# Patient Record
Sex: FEMALE | Race: BLACK OR AFRICAN AMERICAN | NOT HISPANIC OR LATINO | Employment: PART TIME | URBAN - NONMETROPOLITAN AREA
[De-identification: names, ages, dates, MRNs, and addresses within clinical notes are randomized per-mention and may not be internally consistent; named-entity substitution may affect disease eponyms.]

---

## 2017-01-04 ENCOUNTER — OFFICE VISIT (OUTPATIENT)
Dept: OTOLARYNGOLOGY | Facility: CLINIC | Age: 67
End: 2017-01-04

## 2017-01-04 VITALS — TEMPERATURE: 97.8 F | BODY MASS INDEX: 25.18 KG/M2 | HEIGHT: 69 IN | WEIGHT: 170 LBS

## 2017-01-04 DIAGNOSIS — K11.20 SIALADENITIS: Primary | ICD-10-CM

## 2017-01-04 PROCEDURE — 99203 OFFICE O/P NEW LOW 30 MIN: CPT | Performed by: OTOLARYNGOLOGY

## 2017-01-04 NOTE — LETTER
January 4, 2017     Pete Clarke MD  [address] [place] [address] Dr  [facility] [address] [place] [id number]    Patient: Chrissy Dawson   YOB: 1950   Date of Visit: 1/4/2017       Dear Dr. Tricia MD:    Thank you for referring Chrissy Dawson to me for evaluation. Below are the relevant portions of my assessment and plan of care.    If you have questions, please do not hesitate to call me. I look forward to following Chrissy along with you.         Sincerely,        Cornelius Joseph MD        CC: MD Cornelius Landaverde MD  1/4/2017  2:58 PM  Signed  Subjective   Chrissy Dawson is a 66 y.o. female.   This is a consultation from Dr. Clarke  History of Present Illness   Patient reports that about 6 weeks ago she noted swelling in her neck more so on the right than the left.  Nothing in particular seems to bring this on at the time was not particularly painful.  Saw Dr. Hairston in the [facility] who referred her to Dr. Clarke.  Dr. Clarke treated her with Bactrim.  She says the mass doesn't seem to have gone down any and is now but come tender on the right side.  She denies dysphagia.  Denies any voice change.  Has had a deliberate approximately 30 pound weight loss over the last year.  States the mass does not change with eating.  Has a remote history of a mandible fracture at age 19.  Denies any fevers, chills, night sweats      The following portions of the patient's history were reviewed and updated as appropriate: allergies, current medications, past family history, past medical history, past social history, past surgical history and problem list.      Chrissy Dawson reports that she has never smoked. She does not have any smokeless tobacco history on file. She reports that she does not drink alcohol or use illicit drugs.  Patient is not a tobacco user and has not been counseled for use of tobacco products    Family History   Problem Relation Age of  Onset   • Heart disease Other    • Hypertension Other    • Hypertension Mother    • Diabetes Father    • Depression Maternal Grandmother    • Heart disease Maternal Grandfather          Current Outpatient Prescriptions:   •  aspirin 81 MG tablet, Take 81 mg by mouth., Disp: , Rfl:   •  Multiple Vitamins-Minerals (CENTRUM SILVER PO), Take 1 tablet by mouth Daily., Disp: , Rfl:   •  pravastatin (PRAVACHOL) 10 MG tablet, Take 10 mg by mouth., Disp: , Rfl:   •  sulfamethoxazole-trimethoprim (BACTRIM DS,SEPTRA DS) 800-160 MG per tablet, TK 1 T PO BID, Disp: , Rfl: 0      Past Medical History   Diagnosis Date   • Backache    • Benign essential hypertension    • Cervical arthritis    • Coronary arteriosclerosis    • Ganglion cyst of wrist      Ganglion/synovial cyst - wrist   • Ganglion of wrist    • Hip pain    • History of echocardiogram 10/23/2015     Echocardiogram W/ color flow 43734 (1) - Normal LV function with Ef of 55-60%.Normal RV size and function.Pseudonormal diastolic dysfunciton with borderline [facility].NO sig.valvular regurg or stenosis.   • History of mammogram 09/10/2014     MAMMOGRAM SCREENING 77108 - [facility] (1) - [facility] ([facility])   • Hyperlipidemia    • Inguinal pain    • Recurrent angina status post coronary artery bypass graft      Recurrent angina after coronary artery bypass graft   • Urge incontinence of urine          Review of Systems   Constitutional: Negative for chills and fever.   Genitourinary:        Urinary incontinence   All other systems reviewed and are negative.          Objective   Physical Exam  General: Well-developed well-nourished female in no acute distress.  Alert and oriented ×3.  Head: Normocephalic. Face: Symmetrical strength and appearance. PERRL. EOMI. Voice:Strong Speech:Fluent  Ears: External ears no deformity, canals no discharge, tympanic membranes intact clear and mobile bilaterally.  Nose: Nares show no discharge mass polyp or purulence.  Boggy mucosa is  present.  No gross external deformity.  Septum: Midline  Oral cavity: Lips and gums without lesions.  Tongue and floor of mouth without lesions.  Parotid and submandibular ducts unobstructed.  No mucosal lesions on the buccal mucosa or vestibule of the mouth.  Pharynx: No erythema exudate mass or ulcer  Neck: The masses in question appeared to be the submandibular glands bilaterally.  The right is slightly more prominent than the left and they are somewhat firm and mildly tender but not fluctuant.  Bimanual palpation does not reveal anything that feels like a stone in either submandibular duct.  I don't feel any worrisome lymphadenopathy.  Trachea and larynx midline.  No thyromegaly.        Assessment/Plan   Chrissy was seen today for neck mass.    Diagnoses and all orders for this visit:    Sialadenitis        Plan: Ceftin 250 mg by mouth twice a day ×10 days.  Increase oral fluid intake.  Use lemon drops or other sour material 2-3 times a day to stimulate salivary flow.  Return in 3 weeks.    My thanks to Dr. Clarke for this consult

## 2017-01-04 NOTE — MR AVS SNAPSHOT
Chrissy Dawson   1/4/2017 1:25 PM   Office Visit    Dept Phone:  [phone]   Encounter #:  [id number]    Provider:  Cornelius Joseph MD   Department:  [facility] OTOLARYNGOLOGY                Your Full Care Plan              Your Updated Medication List          This list is accurate as of: 1/4/17  2:55 PM.  Always use your most recent med list.                aspirin 81 MG tablet       CENTRUM SILVER PO       pravastatin 10 MG tablet   Commonly known as:  PRAVACHOL       sulfamethoxazole-trimethoprim 800-160 MG per tablet   Commonly known as:  BACTRIM DS,SEPTRA DS               You Were Diagnosed With        Codes Comments    Sialadenitis    -  Primary ICD-10-CM: K11.20  ICD-9-CM: 527.2       Instructions     None    Patient Instructions History      Upcoming Appointments     Visit Type Date Time Department    NEW PATIENT 1/4/2017  1:25 PM [facility] OTOLARYNGOLOGY MAD    FOLLOW UP 1/20/2017  1:00 PM [facility] CARDIOLOGY MAD    OFFICE VISIT 1/26/2017 10:10 AM [facility] OTOLARYNGOLOGY MAD    OFFICE VISIT 2/13/2017  1:45 PM [facility] MED MAD 4TH      MyChart Signup     Our records indicate that you have declined [facility] [employer] signup. If you would like to sign up for [employer], please email [email or web address] or call [phone] to obtain an activation code.             Other Info from Your Visit           Your Appointments     Jan 20, 2017  1:00 PM CST   Follow Up with Vishnu Reynaga MD PhD   [facility] CARDIOLOGY (--)    [address] [place] [address] Dr  [facility] [address] [place] [id number] [phone]           Arrive 15 minutes prior to appointment.            Jan 26, 2017 10:10 AM CST   Office Visit with Cornelius Joseph MD   [facility] OTOLARYNGOLOGY (--)    [address] [place] [address] Dr  [facility] [address] [place] [id number] [phone]           Arrive 15 minutes prior to appointment.            Feb  "13, 2017  1:45 PM CST   Office Visit with Yessica Carrillo MD   [facility] FAMILY MEDICINE (--)    [address]  [facility] [address] [place] [id number] [phone]           Arrive 15 minutes prior to appointment.              Allergies     Crestor [Rosuvastatin]      Lipitor [Atorvastatin]        Reason for Visit     Neck Mass right      Vital Signs     Temperature Height Weight Last Menstrual Period Body Mass Index Smoking Status    97.8 °F (36.6 °C) 69\" (175.3 cm) 170 lb (77.1 kg) (LMP Unknown) 25.1 kg/m2 Never Smoker      Problems and Diagnoses Noted     Sialadenitis    -  Primary        "

## 2017-01-04 NOTE — PROGRESS NOTES
Subjective   Chrissy Dawson is a 66 y.o. female.   This is a consultation from Dr. Clarke  History of Present Illness   Patient reports that about 6 weeks ago she noted swelling in her neck more so on the right than the left.  Nothing in particular seems to bring this on at the time was not particularly painful.  Saw Dr. Hairston in the [facility] who referred her to Dr. Clarke.  Dr. Clarke treated her with Bactrim.  She says the mass doesn't seem to have gone down any and is now but come tender on the right side.  She denies dysphagia.  Denies any voice change.  Has had a deliberate approximately 30 pound weight loss over the last year.  States the mass does not change with eating.  Has a remote history of a mandible fracture at age 19.  Denies any fevers, chills, night sweats      The following portions of the patient's history were reviewed and updated as appropriate: allergies, current medications, past family history, past medical history, past social history, past surgical history and problem list.      Chrissy Dawson reports that she has never smoked. She does not have any smokeless tobacco history on file. She reports that she does not drink alcohol or use illicit drugs.  Patient is not a tobacco user and has not been counseled for use of tobacco products    Family History   Problem Relation Age of Onset   • Heart disease Other    • Hypertension Other    • Hypertension Mother    • Diabetes Father    • Depression Maternal Grandmother    • Heart disease Maternal Grandfather          Current Outpatient Prescriptions:   •  aspirin 81 MG tablet, Take 81 mg by mouth., Disp: , Rfl:   •  Multiple Vitamins-Minerals (CENTRUM SILVER PO), Take 1 tablet by mouth Daily., Disp: , Rfl:   •  pravastatin (PRAVACHOL) 10 MG tablet, Take 10 mg by mouth., Disp: , Rfl:   •  sulfamethoxazole-trimethoprim (BACTRIM DS,SEPTRA DS) 800-160 MG per tablet, TK 1 T PO BID, Disp: , Rfl: 0      Past Medical History    Diagnosis Date   • Backache    • Benign essential hypertension    • Cervical arthritis    • Coronary arteriosclerosis    • Ganglion cyst of wrist      Ganglion/synovial cyst - wrist   • Ganglion of wrist    • Hip pain    • History of echocardiogram 10/23/2015     Echocardiogram W/ color flow 77330 (1) - Normal LV function with Ef of 55-60%.Normal RV size and function.Pseudonormal diastolic dysfunciton with borderline [facility].NO sig.valvular regurg or stenosis.   • History of mammogram 09/10/2014     MAMMOGRAM SCREENING 33309 - [facility] (1) - [facility] ([facility])   • Hyperlipidemia    • Inguinal pain    • Recurrent angina status post coronary artery bypass graft      Recurrent angina after coronary artery bypass graft   • Urge incontinence of urine          Review of Systems   Constitutional: Negative for chills and fever.   Genitourinary:        Urinary incontinence   All other systems reviewed and are negative.          Objective   Physical Exam  General: Well-developed well-nourished female in no acute distress.  Alert and oriented ×3.  Head: Normocephalic. Face: Symmetrical strength and appearance. PERRL. EOMI. Voice:Strong Speech:Fluent  Ears: External ears no deformity, canals no discharge, tympanic membranes intact clear and mobile bilaterally.  Nose: Nares show no discharge mass polyp or purulence.  Boggy mucosa is present.  No gross external deformity.  Septum: Midline  Oral cavity: Lips and gums without lesions.  Tongue and floor of mouth without lesions.  Parotid and submandibular ducts unobstructed.  No mucosal lesions on the buccal mucosa or vestibule of the mouth.  Pharynx: No erythema exudate mass or ulcer  Neck: The masses in question appeared to be the submandibular glands bilaterally.  The right is slightly more prominent than the left and they are somewhat firm and mildly tender but not fluctuant.  Bimanual palpation does not reveal anything that feels like a stone in either submandibular  duct.  I don't feel any worrisome lymphadenopathy.  Trachea and larynx midline.  No thyromegaly.        Assessment/Plan   Chrissy was seen today for neck mass.    Diagnoses and all orders for this visit:    Sialadenitis        Plan: Ceftin 250 mg by mouth twice a day ×10 days.  Increase oral fluid intake.  Use lemon drops or other sour material 2-3 times a day to stimulate salivary flow.  Return in 3 weeks.    My thanks to Dr. Clarke for this consult

## 2017-01-06 DIAGNOSIS — R92.8 ABNORMAL MAMMOGRAM OF RIGHT BREAST: Primary | ICD-10-CM

## 2017-01-09 ENCOUNTER — TELEPHONE (OUTPATIENT)
Dept: FAMILY MEDICINE CLINIC | Facility: CLINIC | Age: 67
End: 2017-01-09

## 2017-01-10 NOTE — TELEPHONE ENCOUNTER
Letter sent to pt with her Cologuard Colon Cancer Screening results read and Negative, Normal.  Copy of the Report attached to the letter.    ----- Message from Yessica Carrillo MD sent at 1/4/2017 10:20 AM CST -----  Please let her know that her Colon Cancer screen was negative.  Next one in 3 years.

## 2017-01-17 ENCOUNTER — CONSULT (OUTPATIENT)
Dept: SURGERY | Facility: CLINIC | Age: 67
End: 2017-01-17

## 2017-01-17 ENCOUNTER — TELEPHONE (OUTPATIENT)
Dept: FAMILY MEDICINE CLINIC | Facility: CLINIC | Age: 67
End: 2017-01-17

## 2017-01-17 ENCOUNTER — HOSPITAL ENCOUNTER (OUTPATIENT)
Dept: OTHER | Facility: HOSPITAL | Age: 67
Discharge: HOME | End: 2017-01-17
Attending: FAMILY MEDICINE | Admitting: FAMILY MEDICINE

## 2017-01-17 VITALS
HEIGHT: 69 IN | WEIGHT: 174 LBS | DIASTOLIC BLOOD PRESSURE: 80 MMHG | BODY MASS INDEX: 25.77 KG/M2 | SYSTOLIC BLOOD PRESSURE: 120 MMHG

## 2017-01-17 DIAGNOSIS — R92.8 ABNORMAL MAMMOGRAM OF RIGHT BREAST: Primary | ICD-10-CM

## 2017-01-17 DIAGNOSIS — R92.8 ABNORMAL MAMMOGRAM, UNSPECIFIED: ICD-10-CM

## 2017-01-17 PROCEDURE — 99213 OFFICE O/P EST LOW 20 MIN: CPT | Performed by: SURGERY

## 2017-01-17 RX ORDER — CEFUROXIME AXETIL 250 MG/1
TABLET ORAL
Refills: 0 | COMMUNITY
Start: 2017-01-04 | End: 2017-01-27

## 2017-01-17 NOTE — TELEPHONE ENCOUNTER
Called to discuss ultrasound/mammogram findings.  Is scheduled to have bx.  Didn't seem upset and didn't have any questions or concerns.    Will follow-up result.

## 2017-01-17 NOTE — LETTER
January 17, 2017     Eric Juarez MD  [facility] [address] [place] [address]    Patient: Kash Dawson   YOB: 1950   Date of Visit: 1/17/2017       Dear Dr. Gerardo MD:    Thank you for referring Kash Dawson to me for evaluation. Below are the relevant portions of my assessment and plan of care.    If you have questions, please do not hesitate to call me. I look forward to following Kash along with you.         Sincerely,        Delfino Greer MD        CC: No Recipients  Delfino Greer MD  1/17/2017  4:59 PM  Signed    Chief Complaint: Kash Dawson is a 66 y.o. female who was seen in consultation at the request of Eric Juarez MD  for abnormal breast imaging    History of Present Illness:  66 year old woman with an abnormal RIGHT mammogram and US category 5. No palpable masses, skin dimpling, no nipple dc, no axillary adenopathy. No FH of breast or ovarian cancer.  Patient presents with abnormal breast imaging. She noted no new masses, skin changes, nipple discharge, nipple changes prior to her most recent imaging.  Her most recent imaging includes the following:     Patient Name- KASH DAWSON  Patient ID- [id number]   Ordering- ERIC JUAREZ  Attending-   Referring- MD ERIC JUAREZ  ------------------------------------------------      PROCEDURE- Right unilateral mammogram with 3-D tomosynthesis with CAD  and ultrasound right breast      REASON FOR EXAM- Abnormal screening mammography      FINDINGS- Comparison study dated 1/4/2017.Patient presents for workup  of questionable right breast upper inner quadrant small asymmetric  density and associated architectural distortion. This workup consisted  of spot compression imaging true lateral projection right breast and  ultrasound. Spot compression imaging reveals persistent right breast  12 to 1-00 position small asymmetric density with associated  architectural distortion. Ultrasound was  performed to further evaluate  this region. On ultrasound this lesion is seen to represent a solid  hypoechoic mass with very irregular borders and acoustic shadowing.  This mass appears deeper than wide and measures 1 x 0.84 x 0.61 cm.  This mass is highly suggestive of malignancy.      IMPRESSION-   Right breast 12 to 1-00 position mass lesion which is highly  suggestive of malignancy. Biopsy is recommended to further evaluate.      BI-RADS category 5- Highly suggestive of malignancy.      Recommendation- Ultrasound biopsy recommended.      Electronically Signed By- Alphonse Botello MD On: 2017-01-17 16:17:07  Breast Biopsy History: Patient has had the following breast biopsies:bilateral, multiple  Breast Cancer HIstory: Patient does not have a past medical history of breast cancer.  Obstetric/Gynecologic History:   Age menstrual periods began:           Patient is postmenopausal, entered menopause naturally at age: 14    Number of pregnancies: 3   Number of live births: 3      Age of delivery of first child: 19   She has had her uterus and ovaries removed, is postmenopausal, and takes norhormones.      She is here for evaluation.    Past Medical History   Diagnosis Date   • Backache    • Benign essential hypertension    • Cervical arthritis    • Coronary arteriosclerosis    • Ganglion cyst of wrist      Ganglion/synovial cyst - wrist   • Ganglion of wrist    • Hip pain    • History of echocardiogram 10/23/2015     Echocardiogram W/ color flow 11288 (1) - Normal LV function with Ef of 55-60%.Normal RV size and function.Pseudonormal diastolic dysfunciton with borderline [facility].NO sig.valvular regurg or stenosis.   • History of mammogram 09/10/2014     MAMMOGRAM SCREENING 86276 - [facility] (1) - LIZZ MILLER ([facility])   • Hyperlipidemia    • Inguinal pain    • Recurrent angina status post coronary artery bypass graft      Recurrent angina after coronary artery bypass graft   • Urge incontinence of urine      Past  "Surgical History   Procedure Laterality Date   • Coronary artery bypass graft  2000     CABG, arterial, single (1)   • Total abdominal hysterectomy with salpingo oophorectomy  1998     SALVADOR/BSO (1)   • Total hip arthroplasty  2011     Total hip replacement (3)       Current Outpatient Prescriptions:     •  aspirin 81 MG tablet, Take 81 mg by mouth., Disp: , Rfl:   •  cefuroxime (CEFTIN) 250 MG tablet, TK 1 T PO BID, Disp: , Rfl: 0  •  Multiple Vitamins-Minerals (CENTRUM SILVER PO), Take 1 tablet by mouth Daily., Disp: , Rfl:   •  pravastatin (PRAVACHOL) 10 MG tablet, Take 10 mg by mouth., Disp: , Rfl:     Allergies   Allergen Reactions   • Crestor [Rosuvastatin]    • Lipitor [Atorvastatin]      Family History   Problem Relation Age of Onset   • Heart disease Other    • Hypertension Other    • Hypertension Mother    • Diabetes Father    • Depression Maternal Grandmother    • Heart disease Maternal Grandfather      Social History     Social History   • Marital status:                      Occupational History   • Nurse assistant at [employer]     Social History Main Topics   • Smoking status: Never Smoker   • Smokeless tobacco: No               Review of Systems    Vital Signs:  Visit Vitals   • /80   • Ht 69\" (175.3 cm)   • Wt 174 lb (78.9 kg)   • BMI 25.7 kg/m2        Physical Exam   Constitutional: She is oriented to person, place, and time and well-developed, well-nourished, and in no distress. No distress.   HENT:   Head: Normocephalic and atraumatic.   Eyes: Conjunctivae and EOM are normal. Pupils are equal, round, and reactive to light.   Neck: Normal range of motion. Neck supple. No JVD present. No tracheal deviation present. No thyromegaly present.   Cardiovascular: Normal rate, regular rhythm and normal heart sounds.    Pulmonary/Chest: Effort normal and breath sounds normal. No respiratory distress. She has no wheezes. She has no rales. Right breast exhibits no inverted nipple, no mass, no " nipple discharge, no skin change and no tenderness. Left breast exhibits no inverted nipple, no mass, no nipple discharge, no skin change and no tenderness. Breasts are symmetrical.       Musculoskeletal: Normal range of motion. She exhibits no edema, tenderness or deformity.   Lymphadenopathy:     She has no cervical adenopathy.   Neurological: She is oriented to person, place, and time. GCS score is 15.   Skin: Skin is warm and dry. No rash noted. She is not diaphoretic. No erythema. No pallor.   Psychiatric: Mood, memory, affect and judgment normal.   Nursing note and vitals reviewed.      I have personally reviewed all breast imaging and concur with the radiologic diagnosis: BiRADS 4 RIGHT side    Assessment:    Chrissy was seen today for abnormal breast imaging.    Diagnoses and all orders for this visit:    Abnormal mammogram of right breast        Plan:  1. US mammotome RIGHT breast with clip placement    The procedure is explained to the patient. The patient will be placed supine for the procedure. A small incision will be made under local anesthesia, and a special, large needle will be used to perform the biopsy under ultrasound guidance.   A permanent titanium clip will be placed in the breast to red the site of biopsy should further surgery be necessary.  The risks of bleeding/bruising, infection, the possible need for open biopsy or other procedure, scarring, and poor wound healing are explained. There is a low transfusion risk. The risks of chronic pain are, likewise, low.   Alternatives include open biopsy in the operating room under local anesthesia with moderate sedation or general anesthesia or simply watching the lesion to see if there is change. These are not currently suggested.    She understands and agrees to the procedure.      Delfino Greer MD        We have spent 20 minutes in visit today, 20 in face to face .          EMR Dragon/transcription disclaimer:    Much of this encounter  note is an electronic transcription/translocation of spoken language to printed text.  The electronic translation of spoken language may permit erroneous, or at times, nonsensical words or phrases to be inadvertently transcribed.  Although I have reviewed the note from such areas, some may still exist.

## 2017-01-17 NOTE — PROGRESS NOTES
Chief Complaint: Chrissy Rogers is a 66 y.o. female who was seen in consultation at the request of Yessica Juarez MD  for abnormal breast imaging    History of Present Illness:  66 year old woman with an abnormal RIGHT mammogram and US category 5. No palpable masses, skin dimpling, no nipple dc, no axillary adenopathy. No FH of breast or ovarian cancer.  Patient presents with abnormal breast imaging. She noted no new masses, skin changes, nipple discharge, nipple changes prior to her most recent imaging.  Her most recent imaging includes the following:     Patient Name- CHRISSY ROGERS  Patient ID- [id number]   Ordering- YESSICA JUAREZ  Attending-   Referring- MD YESSICA JUAREZ  ------------------------------------------------      PROCEDURE- Right unilateral mammogram with 3-D tomosynthesis with CAD  and ultrasound right breast      REASON FOR EXAM- Abnormal screening mammography      FINDINGS- Comparison study dated 1/4/2017.Patient presents for workup  of questionable right breast upper inner quadrant small asymmetric  density and associated architectural distortion. This workup consisted  of spot compression imaging true lateral projection right breast and  ultrasound. Spot compression imaging reveals persistent right breast  12 to 1-00 position small asymmetric density with associated  architectural distortion. Ultrasound was performed to further evaluate  this region. On ultrasound this lesion is seen to represent a solid  hypoechoic mass with very irregular borders and acoustic shadowing.  This mass appears deeper than wide and measures 1 x 0.84 x 0.61 cm.  This mass is highly suggestive of malignancy.      IMPRESSION-   Right breast 12 to 1-00 position mass lesion which is highly  suggestive of malignancy. Biopsy is recommended to further evaluate.      BI-RADS category 5- Highly suggestive of malignancy.      Recommendation- Ultrasound biopsy recommended.      Electronically  Signed By- Alphonse Botello MD On: 2017-01-17 16:17:07  Breast Biopsy History: Patient has had the following breast biopsies:bilateral, multiple  Breast Cancer HIstory: Patient does not have a past medical history of breast cancer.  Obstetric/Gynecologic History:   Age menstrual periods began:           Patient is postmenopausal, entered menopause naturally at age: 14    Number of pregnancies: 3   Number of live births: 3      Age of delivery of first child: 19   She has had her uterus and ovaries removed, is postmenopausal, and takes norhormones.      She is here for evaluation.    Past Medical History   Diagnosis Date   • Backache    • Benign essential hypertension    • Cervical arthritis    • Coronary arteriosclerosis    • Ganglion cyst of wrist      Ganglion/synovial cyst - wrist   • Ganglion of wrist    • Hip pain    • History of echocardiogram 10/23/2015     Echocardiogram W/ color flow 35436 (1) - Normal LV function with Ef of 55-60%.Normal RV size and function.Pseudonormal diastolic dysfunciton with borderline [facility].NO sig.valvular regurg or stenosis.   • History of mammogram 09/10/2014     MAMMOGRAM SCREENING 00941 - [facility] (1) - [facility] ([facility])   • Hyperlipidemia    • Inguinal pain    • Recurrent angina status post coronary artery bypass graft      Recurrent angina after coronary artery bypass graft   • Urge incontinence of urine      Past Surgical History   Procedure Laterality Date   • Coronary artery bypass graft  2000     CABG, arterial, single (1)   • Total abdominal hysterectomy with salpingo oophorectomy  1998     SALVADOR/BSO (1)   • Total hip arthroplasty  2011     Total hip replacement (3)       Current Outpatient Prescriptions:     •  aspirin 81 MG tablet, Take 81 mg by mouth., Disp: , Rfl:   •  cefuroxime (CEFTIN) 250 MG tablet, TK 1 T PO BID, Disp: , Rfl: 0  •  Multiple Vitamins-Minerals (CENTRUM SILVER PO), Take 1 tablet by mouth Daily., Disp: , Rfl:   •  pravastatin (PRAVACHOL) 10 MG  "tablet, Take 10 mg by mouth., Disp: , Rfl:     Allergies   Allergen Reactions   • Crestor [Rosuvastatin]    • Lipitor [Atorvastatin]      Family History   Problem Relation Age of Onset   • Heart disease Other    • Hypertension Other    • Hypertension Mother    • Diabetes Father    • Depression Maternal Grandmother    • Heart disease Maternal Grandfather      Social History     Social History   • Marital status:                      Occupational History   • Nurse assistant at [employer]     Social History Main Topics   • Smoking status: Never Smoker   • Smokeless tobacco: No               Review of Systems    Vital Signs:  Visit Vitals   • /80   • Ht 69\" (175.3 cm)   • Wt 174 lb (78.9 kg)   • BMI 25.7 kg/m2        Physical Exam   Constitutional: She is oriented to person, place, and time and well-developed, well-nourished, and in no distress. No distress.   HENT:   Head: Normocephalic and atraumatic.   Eyes: Conjunctivae and EOM are normal. Pupils are equal, round, and reactive to light.   Neck: Normal range of motion. Neck supple. No JVD present. No tracheal deviation present. No thyromegaly present.   Cardiovascular: Normal rate, regular rhythm and normal heart sounds.    Pulmonary/Chest: Effort normal and breath sounds normal. No respiratory distress. She has no wheezes. She has no rales. Right breast exhibits no inverted nipple, no mass, no nipple discharge, no skin change and no tenderness. Left breast exhibits no inverted nipple, no mass, no nipple discharge, no skin change and no tenderness. Breasts are symmetrical.       Musculoskeletal: Normal range of motion. She exhibits no edema, tenderness or deformity.   Lymphadenopathy:     She has no cervical adenopathy.   Neurological: She is oriented to person, place, and time. GCS score is 15.   Skin: Skin is warm and dry. No rash noted. She is not diaphoretic. No erythema. No pallor.   Psychiatric: Mood, memory, affect and judgment normal. "   Nursing note and vitals reviewed.      I have personally reviewed all breast imaging and concur with the radiologic diagnosis: BiRADS 4 RIGHT side    Assessment:    Chrissy was seen today for abnormal breast imaging.    Diagnoses and all orders for this visit:    Abnormal mammogram of right breast        Plan:  1. US mammotome RIGHT breast with clip placement    The procedure is explained to the patient. The patient will be placed supine for the procedure. A small incision will be made under local anesthesia, and a special, large needle will be used to perform the biopsy under ultrasound guidance.   A permanent titanium clip will be placed in the breast to red the site of biopsy should further surgery be necessary.  The risks of bleeding/bruising, infection, the possible need for open biopsy or other procedure, scarring, and poor wound healing are explained. There is a low transfusion risk. The risks of chronic pain are, likewise, low.   Alternatives include open biopsy in the operating room under local anesthesia with moderate sedation or general anesthesia or simply watching the lesion to see if there is change. These are not currently suggested.    She understands and agrees to the procedure.      Delfino Greer MD        We have spent 20 minutes in visit today, 20 in face to face .          EMR Dragon/transcription disclaimer:    Much of this encounter note is an electronic transcription/translocation of spoken language to printed text.  The electronic translation of spoken language may permit erroneous, or at times, nonsensical words or phrases to be inadvertently transcribed.  Although I have reviewed the note from such areas, some may still exist.

## 2017-01-23 ENCOUNTER — DOCUMENTATION (OUTPATIENT)
Dept: SURGERY | Facility: CLINIC | Age: 67
End: 2017-01-23

## 2017-01-23 ENCOUNTER — HOSPITAL ENCOUNTER (OUTPATIENT)
Dept: ULTRASOUND IMAGING | Facility: HOSPITAL | Age: 67
Discharge: HOME | End: 2017-01-23
Admitting: SURGERY

## 2017-01-23 VITALS
HEIGHT: 69 IN | RESPIRATION RATE: 18 BRPM | DIASTOLIC BLOOD PRESSURE: 62 MMHG | WEIGHT: 176.15 LBS | TEMPERATURE: 98 F | SYSTOLIC BLOOD PRESSURE: 113 MMHG | HEART RATE: 66 BPM | BODY MASS INDEX: 26.09 KG/M2 | OXYGEN SATURATION: 97 %

## 2017-01-23 DIAGNOSIS — R92.8 ABNORMAL MAMMOGRAM, UNSPECIFIED: ICD-10-CM

## 2017-01-23 PROCEDURE — 19083 BX BREAST 1ST LESION US IMAG: CPT | Performed by: SURGERY

## 2017-01-23 PROCEDURE — A4648 IMPLANTABLE TISSUE MARKER: HCPCS

## 2017-01-23 PROCEDURE — 76942 ECHO GUIDE FOR BIOPSY: CPT

## 2017-01-23 PROCEDURE — 88305 TISSUE EXAM BY PATHOLOGIST: CPT | Performed by: SURGERY

## 2017-01-23 PROCEDURE — 88305 TISSUE EXAM BY PATHOLOGIST: CPT | Performed by: PATHOLOGY

## 2017-01-23 RX ORDER — HYDROCODONE BITARTRATE AND ACETAMINOPHEN 5; 325 MG/1; MG/1
1 TABLET ORAL ONCE AS NEEDED
Status: DISCONTINUED | OUTPATIENT
Start: 2017-01-23 | End: 2017-01-24 | Stop reason: HOSPADM

## 2017-01-23 RX ORDER — OXYCODONE HYDROCHLORIDE AND ACETAMINOPHEN 5; 325 MG/1; MG/1
TABLET ORAL
Status: COMPLETED
Start: 2017-01-23 | End: 2017-01-23

## 2017-01-23 RX ORDER — DIAZEPAM 5 MG/1
TABLET ORAL
Status: COMPLETED
Start: 2017-01-23 | End: 2017-01-23

## 2017-01-23 RX ADMIN — DIAZEPAM 5 MG: 5 TABLET ORAL at 07:00

## 2017-01-23 RX ADMIN — OXYCODONE HYDROCHLORIDE AND ACETAMINOPHEN 1 TABLET: 5; 325 TABLET ORAL at 07:00

## 2017-01-23 NOTE — MR AVS SNAPSHOT
[facility]  [phone]                    Chrissy Dawson   1/23/2017  8:15 AM   [facility] guide brst biop vac rot dev    Dept Phone:  [phone]   Encounter #:  [id number]    Provider:  LUNA  1   Department:  [facility]                Your Full Care Plan              Your Updated Medication List      TAKE these medications     aspirin 81 MG tablet       cefuroxime 250 MG tablet   Commonly known as:  CEFTIN       CENTRUM SILVER PO       pravastatin 10 MG tablet   Commonly known as:  PRAVACHOL               We Performed the Following     Activity as Tolerated     Discharge patient     Discharge to Care of  When Patient Meets Criteria     Return to Clinic for Follow Up     Tissue Exam     US guided breast biopsy vacuum rotate device right       You Were Diagnosed With        Codes Comments    Abnormal mammogram, unspecified     ICD-10-CM: R92.8  ICD-9-CM: 793.80       Instructions     None    Patient Instructions History      Upcoming Appointments     Visit Type Date Time Department    [facility] GUIDE BRST BIOP VAC ROT DEV 1/23/2017  8:15 AM [facility] US    OFFICE VISIT 1/26/2017 10:10 AM [facility] OTOLARYNGOLOGY MAD    OFFICE VISIT 1/27/2017  3:25 PM W GEN SURGERY MAD    OFFICE VISIT 2/13/2017  1:45 PM [facility] FAM MED MAD 4TH    FOLLOW UP 2/27/2017  1:45 PM W CARDIOLOGY [facility]      MyChart Signup     Our records indicate that you have declined [facility] MyChart signup. If you would like to sign up for MyChart, please email [email or web address] or call [phone] to obtain an activation code.             Other Info from Your Visit           Your Appointments     Jan 26, 2017 10:10 AM CST   Office Visit with Cornelius Joseph MD   [facility] OTOLARYNGOLOGY (--)    [address] [place] [address] [address]  [address] [place] [id number] [phone]           Arrive 15 minutes prior to appointment.            Jan 27, 2017  3:25 PM CST    "  Office Visit with Delfino Greer MD   [facility] GENERAL SURGERY (--)    [address] [place] [address] [address]  [address]  [facility] [address] [phone]           Arrive 15 minutes prior to appointment.            Feb 13, 2017  1:45 PM CST   Office Visit with Yessica Carrillo MD   [facility] FAMILY MEDICINE (--)    [address]  [address] [facility] [address] [phone]           Arrive 15 minutes prior to appointment.            Feb 27, 2017  1:45 PM CST   Follow Up with Vishnu Reynaga MD PhD   [facility] CARDIOLOGY (--)    [address] [place] [address] [address]  [address] [facility] [address] [phone]           Arrive 15 minutes prior to appointment.              Allergies     Crestor [Rosuvastatin]      Lipitor [Atorvastatin]        Vital Signs     Blood Pressure Pulse Temperature Respirations Height Weight    113/62 (BP Location: Left arm, Patient Position: Lying) 66 98 °F (36.7 °C) (Temporal Artery ) 18 69\" (175.3 cm) 176 lb 2.4 oz (79.9 kg)    Oxygen Saturation Body Mass Index Smoking Status             97% 26.01 kg/m2 Never Smoker         Problems and Diagnoses Noted     Abnormal mammogram          Medications Administered     diazePAM (VALIUM) 5 MG tablet  - ADS Override Pull                  oxyCODONE-acetaminophen (PERCOCET) 5-325 MG per tablet  - ADS Override Pull                    Results         "

## 2017-01-23 NOTE — PROGRESS NOTES
Preoperative diagnosis: Abnormal right-sided mammogram and ultrasound  Postoperative diagnosis: Same  Operation: Ultrasound directed mammotome biopsy of the right breast with clip placement  Surgeon: Dr. Delfino Greer  Asst.: Janet Bishop  Findings: Adequate biopsies taken clip in good position  Complications: None  Drains: None  Anesthesia: 40 cc of 1% Xylocaine with epinephrine  Indications: This patient is 66 years old and seen for an abnormal right-sided mammogram and ultrasound BI-RADS Category 4.  The lesion is located at the 1 o'clock position, 5 cm from the nipple  Procedure: The patient is brought to the x-ray suite and placed supine on the stretcher in slight right side up left side down position.  Ultrasound is performed demonstrating the area in question.  The area lateral to this is prepped with Betadine and local anesthesia of 1% Xylocaine with epinephrine is instilled.  An 11 blade knife was used to make a small incision and an 8-gauge mammotome needle is placed under the lesion.  Core biopsies are taken.  The device rotated plus and -30° to complete the excision.  The mammotome clip is then passed through the channel and deployed.  The devices rotated 180° and removed from the breast.  Steri-Strip and compression are applied.  She tolerated the procedure well and was returned to the same-day surgery suite.

## 2017-01-23 NOTE — BRIEF OP NOTE
Procedure Note    Chrissy Dawson  1/23/2017    Pre-op Diagnosis:   Abnormal RIGHT mammogram and US    Post-op Diagnosis:     Same    Procedure/CPT® Codes:  US RIGHT mammotome bx and clip placement    Surgeon: Percy    Assistant: Raymond Barry MD    Anesthesia: Local/minimal sedation    Staff:  Janet Green CFA    Estimated Blood Loss: * No values recorded between 1/23/2017 12:00 AM and 1/23/2017  8:46 AM *    Specimens:                  ID Type Source Tests Collected by Time Destination   A :  Tissue Breast, Right TISSUE EXAM Delfino Greer MD 1/23/2017 0838          Findings: Adequate biopsy and clip placement    Complications: None      Delfino Greer MD     Date: 1/23/2017  Time: 8:46 AM

## 2017-01-23 NOTE — IP AVS SNAPSHOT
AFTER VISIT SUMMARY             Chrissy Dawson           About your hospitalization     You were admitted on:  January 23, 2017 You last received care in the:  [facility]       Procedures & Surgeries      * No procedures listed *     1/23/2017     * No surgeons listed *  -------------------      Medications    If you or your caregiver advised us that you are currently taking a medication and that medication is marked below as “Resume”, this simply indicates that we have reviewed those medications to make sure our new therapy recommendations do not interfere.  If you have concerns about medications other than those new ones which we are prescribing today, please consult the physician who prescribed them (or your primary physician).  Our review of your home medications is not meant to indicate that we are directing their use.             Your Medications      CONTINUE taking these medications     aspirin 81 MG tablet   Take 81 mg by mouth.           cefuroxime 250 MG tablet   TK 1 T PO BID   Commonly known as:  CEFTIN           CENTRUM SILVER PO   Take 1 tablet by mouth Daily.           pravastatin 10 MG tablet   Take 10 mg by mouth.   Commonly known as:  PRAVACHOL                      Your Medications      Your Medication List           Morning Noon Evening Bedtime As Needed    aspirin 81 MG tablet   Take 81 mg by mouth.                                cefuroxime 250 MG tablet   TK 1 T PO BID   Commonly known as:  CEFTIN                                CENTRUM SILVER PO   Take 1 tablet by mouth Daily.                                pravastatin 10 MG tablet   Take 10 mg by mouth.   Commonly known as:  PRAVACHOL                                         Instructions for After Discharge        Activity Instructions     Activity as Tolerated                   Discharge Instructions       Friday 1/27/17 at 3:30pm with     Discharge References/Attachments     BREAST BIOPSY, CARE AFTER,  EASY-TO-READ (ENGLISH)       Follow-ups for After Discharge        Follow-up Information     Follow up with Yessica Carrillo MD .    Specialty:  Family Medicine    Contact information:    [address]   [place] [id number] [phone]        Referrals and Follow-ups to Schedule     Return to Clinic for Follow Up    As directed    4 days             Scheduled Appointments     Jan 26, 2017 10:10 AM CST   Office Visit with Cornelius Joseph MD   [facility] OTOLARYNGOLOGY (--)    [address] [place] [address] [address]  [address] [facility] [address] [phone]           Arrive 15 minutes prior to appointment.            Jan 27, 2017  3:25 PM CST   Office Visit with Delfino Greer MD   [facility] GENERAL SURGERY (--)    [address] [place] [address] [address]  [address]  [facility] [address] [phone]           Arrive 15 minutes prior to appointment.            Feb 13, 2017  1:45 PM CST   Office Visit with Yessica Carrillo MD   [facility] FAMILY MEDICINE (--)    [address] [facility] [address]  [address] [place] [id number] [phone]           Arrive 15 minutes prior to appointment.            Feb 27, 2017  1:45 PM CST   Follow Up with Vishnu Reynaga MD PhD   [facility] CARDIOLOGY (--)    [address] [place] [address] [address]  [address] [place] [id number] [phone]           Arrive 15 minutes prior to appointment.              MyChart Signup     Our records indicate that you have declined [facility] [employer] signup. If you would like to sign up for [employer], please email [email or web address] or call [phone] to obtain an activation code.         Summary of Your Hospitalization        Reason for Hospitalization     Your primary diagnosis was:  Not on File      Care Providers     Not on file      Your Allergies  Date Reviewed: 1/23/2017    Allergen Reactions    Crestor (Rosuvastatin) Not Noted          Lipitor (Atorvastatin) Not Noted      Pending Labs     Order Current Status    Tissue Exam Collected (01/23/17 2569)      Patient Belongings Returned     Document Return of Belongings Flowsheet     Were the patient bedside belongings sent home?   --   Belongings Retrieved from Security & Sent Home   --    Belongings Sent to Safe   --   Medications Retrieved from Pharmacy & Sent Home   --              More Information      Breast Biopsy, Care After  These instructions give you information on caring for yourself after your procedure. Your doctor may also give you more specific instructions. Call your doctor if you have any problems or questions after your procedure.  HOME CARE  · Only take medicine as told by your doctor.  · Do not take aspirin.  · Keep your sutures (stitches) dry when bathing.  · Protect the biopsy area. Do not let the area get bumped.  · Avoid activities that could pull the biopsy site open until your doctor approves. This includes:    Stretching.    Reaching.    Exercise.    Sports.    Lifting more than 3lb.  · Continue your normal diet.  · Wear a good support bra for 24hours. Ice pack 15 mins every hour for first 4 hours  · Change any bandages (dressings) as told by your doctor.  · Do not drink alcohol while taking pain medicine.  · Keep all doctor visits as told. Ask when your test results will be ready. Make sure you get your test results.  GET HELP RIGHT AWAY IF:   · You have a fever.  · You have more bleeding (more than a small spot) from the biopsy site.  · You have trouble breathing.  · You have yellowish-white fluid (pus) coming from the biopsy site.  · You have redness, puffiness (swelling), or more pain in the biopsy site.  · You have a bad smell coming from the biopsy site.  · Your biopsy site opens after sutures, staples, or sticky strips have been removed.  · You have a rash.  · You need stronger medicine.  MAKE SURE YOU:  · Understand these instructions.  · Will watch your  condition.  · Will get help right away if you are not doing well or get worse.     This information is not intended to replace advice given to you by your health care provider. Make sure you discuss any questions you have with your health care provider.     Document Released: 10/14/2010 Document Revised: 01/08/2016 Document Reviewed: 01/27/2013  [employer] Interactive Patient Education ©2016 [employer]vier Inc.         PREVENTING SURGICAL SITE INFECTIONS     Surgical Site Infections FAQs  What is a Surgical Site Infection (SSI)?  A surgical site infection is an infection that occurs after surgery in the part of the body where the surgery took place. Most patients who have surgery do not develop an infection. However, infections develop in about 1 to 3 out of every 100 patients who have surgery.  Some of the common symptoms of a surgical site infection are:  · Redness and pain around the area where you had surgery  · Drainage of cloudy fluid from your surgical wound  · Fever  Can SSIs be treated?  Yes. Most surgical site infections can be treated with antibiotics. The antibiotic given to you depends on the bacteria (germs) causing the infection. Sometimes patients with SSIs also need another surgery to treat the infection.  What are some of the things that hospitals are doing to prevent SSIs?  To prevent SSIs, doctors, nurses, and other healthcare providers:  · Clean their hands and arms up to their elbows with an antiseptic agent just before the surgery.  · Clean their hands with soap and water or an alcohol-based hand rub before and after caring for each patient.  · May remove some of your hair immediately before your surgery using electric clippers if the hair is in the same area where the procedure will occur. They should not shave you with a razor.  · Wear special hair covers, masks, gowns, and gloves during surgery to keep the surgery area clean.  · Give you antibiotics before your surgery starts. In most cases, you  should get antibiotics within 60 minutes before the surgery starts and the antibiotics should be stopped within 24 hours after surgery.  · Clean the skin at the site of your surgery with a special soap that kills germs.  What can I do to help prevent SSIs?  Before your surgery:  · Tell your doctor about other medical problems you may have. Health problems such as allergies, diabetes, and obesity could affect your surgery and your treatment.  · Quit smoking. Patients who smoke get more infections. Talk to your doctor about how you can quit before your surgery.  · Do not shave near where you will have surgery. Shaving with a razor can irritate your skin and make it easier to develop an infection.  At the time of your surgery:  · Speak up if someone tries to shave you with a razor before surgery. Ask why you need to be shaved and talk with your surgeon if you have any concerns.  · Ask if you will get antibiotics before surgery.  After your surgery:  · Make sure that your healthcare providers clean their hands before examining you, either with soap and water or an alcohol-based hand rub.    If you do not see your providers clean their hands, please ask them to do so.  · Family and friends who visit you should not touch the surgical wound or dressings.  · Family and friends should clean their hands with soap and water or an alcohol-based hand rub before and after visiting you. If you do not see them clean their hands, ask them to clean their hands.  What do I need to do when I go home from the hospital?  · Before you go home, your doctor or nurse should explain everything you need to know about taking care of your wound. Make sure you understand how to care for your wound before you leave the hospital.  · Always clean your hands before and after caring for your wound.  · Before you go home, make sure you know who to contact if you have questions or problems after you get home.  · If you have any symptoms of an infection,  such as redness and pain at the surgery site, drainage, or fever, call your doctor immediately.  If you have additional questions, please ask your doctor or nurse.  Developed and co-sponsored by The Society for Healthcare Epidemiology of [place] (SHEA); Infectious Diseases Society of [place] (IDSA); American Hospital Association; Association for Professionals in Infection Control and Epidemiology (APIC); Centers for Disease Control and Prevention (CDC); and The Joint Commission.     This information is not intended to replace advice given to you by your health care provider. Make sure you discuss any questions you have with your health care provider.     Document Released: 12/23/2014 Document Revised: 01/08/2016 Document Reviewed: 03/02/2016  [employer] Interactive Patient Education ©2016 Elsevier Inc.             SYMPTOMS OF A STROKE    Call 911 or have someone take you to the Emergency Department if you have any of the following:    · Sudden numbness or weakness of your face, arm or leg especially on one side of the body  · Sudden confusion, diffiiculty speaking or trouble understanding   · Changes in your vision or loss of sight in one eye  · Sudden severe headache with no known cause  · sudden dizziness, trouble walking, loss of balance or coordination    It is important to seek emergency care right away if you have further stroke symptoms. If you get emergency help quickly, the powerful clot-dissolving medicines can reduce the disabilities caused by a stroke.     For more information:    American Stroke Association  1-907-3-STROKE  www.strokeassociation.org           IF YOU SMOKE OR USE TOBACCO PLEASE READ THE FOLLOWING:    Why is smoking bad for me?  Smoking increases the risk of heart disease, lung disease, vascular disease, stroke, and cancer.     If you smoke, STOP!    If you would like more information on quitting smoking, please visit the [employer] website:  www.[employer]table/Entig[employer] Systemsate/healthier-together/smoke   This link will provide additional resources including the QUIT line and the Beat the Pack support groups.     For more information:    American Cancer Society  (533) 162-6441    American Heart Association  1-598.127.5621               YOU ARE THE MOST IMPORTANT FACTOR IN YOUR RECOVERY.     Follow all instructions carefully.     I have reviewed my discharge instructions with my nurse, including the following information, if applicable:     Information about my illness and diagnosis   Follow up appointments (including lab draws)   Wound Care   Equipment Needs   Medications (new and continuing) along with side effects   Preventative information such as vaccines and smoking cessations   Diet   Pain   I know when to contact my Doctor's office or seek emergency care      I want my nurse to describe the side effects of my medications: YES NO   If the answer is no, I understand the side effects of my medications: YES NO   My nurse described the side effects of my medications in a way that I could understand: YES NO   I have taken my personal belongings and my own medications with me at discharge: YES NO            I have received this information and my questions have been answered. I have discussed any concerns I see with this plan with the nurse or physician. I understand these instructions.    Signature of Patient or Responsible Person: _____________________________________    Date: _________________  Time: __________________    Signature of Healthcare Provider: _______________________________________  Date: _________________  Time: __________________

## 2017-01-23 NOTE — POST-PROCEDURE NOTE
Preoperative diagnosis: Abnormal right-sided mammogram and ultrasound  Postoperative diagnosis: Same  Operation: Ultrasound directed mammotome biopsy of the right breast with clip placement  Surgeon: Dr. Delfino Greer  Asst.: Janet Bishop  Findings: Adequate biopsies taken, clip in good position  Complications: None  Drains: None  Anesthesia: 40 cc of 1% Xylocaine with epinephrine  Indications: This patient is 66 years old and seen for an abnormal right-sided mammogram and ultrasound BI-RADS Category 4.  The lesion is located at the 100 o'clock position, 5 cm from the nipple  Procedure: The patient is brought to the x-ray suite and placed supine on the stretcher.  Ultrasound is performed demonstrating the area in question.  The area lateral to this is prepped with Betadine and local anesthesia of 1% Xylocaine with epinephrine is instilled.  An 11 blade knife is used to make a small incision and a 8-gauge mammotome needle is placed under the lesion.  Core biopsies are taken.  The device rotated plus and -30° to complete the excision.  The mammotome clip is then passed through the channel and deployed.  The devices rotated 180° and removed from the breast.  Steri-Strip and compression are applied.      She tolerated the procedure well and was returned to the same-day surgery suite.

## 2017-01-23 NOTE — PROGRESS NOTES
Procedure   Procedures    Preoperative diagnosis: Abnormal RIGHT-sided mammogram and ultrasound  Postoperative diagnosis: Same  Operation: Ultrasound directed mammotome biopsy of the RIGHT breast with clip placement  Surgeon: Dr. Delfino Greer  Asst.: Janet Bishop  Findings: Adequate biopsies taken, clip in good position  Complications: None  Drains: None  Anesthesia: 40 cc of 1% Xylocaine with epinephrine  Indications: This patient is 66 years old and seen for an abnormal RIGHT-sided mammogram and ultrasound BI-RADS Category 4.  The lesion is located at the 100 o'clock position, 5 cm from the nipple  Procedure: The patient is brought to the x-ray suite and placed supine on the stretcher.  Ultrasound is performed demonstrating the area in question.  The area lateral to this is prepped with Betadine and local anesthesia of 1% Xylocaine with epinephrine is instilled.  An 11 blade knife is used to make a small incision and a 8-gauge mammotome needle is placed under the lesion.  Core biopsies are taken.  The device rotated plus and -30° to complete the excision.  The mammotome clip is then passed through the channel and deployed.  The devices rotated 180° and removed from the breast.  Steri-Strip and compression are applied.      She tolerated the procedure well and was returned to the same-day surgery suite.

## 2017-01-23 NOTE — PLAN OF CARE
Problem: Patient Care Overview (Adult)  Goal: Plan of Care Review  Outcome: Outcome(s) achieved Date Met:  01/23/17 01/23/17 0933   Coping/Psychosocial Response Interventions   Plan Of Care Reviewed With patient;family   Patient Care Overview   Progress improving   Outcome Evaluation   Outcome Summary/Follow up Plan No complications noted       Goal: Discharge Needs Assessment  Outcome: Outcome(s) achieved Date Met:  01/23/17    Problem: Perioperative Period (Adult)  Goal: Signs and Symptoms of Listed Potential Problems Will be Absent or Manageable (Perioperative Period)  Outcome: Outcome(s) achieved Date Met:  01/23/17

## 2017-01-24 LAB
LAB AP CASE REPORT: NORMAL
LAB AP DIAGNOSIS COMMENT: NORMAL
LAB AP INTRADEPARTMENTAL CONSULT: NORMAL
Lab: NORMAL
PATH REPORT.FINAL DX SPEC: NORMAL
PATH REPORT.GROSS SPEC: NORMAL

## 2017-01-27 ENCOUNTER — OFFICE VISIT (OUTPATIENT)
Dept: SURGERY | Facility: CLINIC | Age: 67
End: 2017-01-27

## 2017-01-27 ENCOUNTER — PREP FOR SURGERY (OUTPATIENT)
Dept: SURGERY | Facility: CLINIC | Age: 67
End: 2017-01-27

## 2017-01-27 VITALS
BODY MASS INDEX: 25.77 KG/M2 | DIASTOLIC BLOOD PRESSURE: 80 MMHG | HEIGHT: 69 IN | WEIGHT: 174 LBS | SYSTOLIC BLOOD PRESSURE: 130 MMHG

## 2017-01-27 DIAGNOSIS — R92.8 ABNORMAL MAMMOGRAM OF RIGHT BREAST: Primary | ICD-10-CM

## 2017-01-27 PROCEDURE — 99212 OFFICE O/P EST SF 10 MIN: CPT | Performed by: SURGERY

## 2017-01-27 RX ORDER — LIDOCAINE HYDROCHLORIDE 10 MG/ML
20 INJECTION, SOLUTION INFILTRATION; PERINEURAL ONCE
Status: COMPLETED | OUTPATIENT
Start: 2017-01-27 | End: 2017-01-27

## 2017-01-27 RX ORDER — SODIUM CHLORIDE, SODIUM LACTATE, POTASSIUM CHLORIDE, CALCIUM CHLORIDE 600; 310; 30; 20 MG/100ML; MG/100ML; MG/100ML; MG/100ML
50 INJECTION, SOLUTION INTRAVENOUS CONTINUOUS
Status: CANCELLED | OUTPATIENT
Start: 2017-01-27

## 2017-01-27 RX ADMIN — LIDOCAINE HYDROCHLORIDE 20 ML: 10 INJECTION, SOLUTION INFILTRATION; PERINEURAL at 13:59

## 2017-01-27 NOTE — PROGRESS NOTES
Chief Complaint   Patient presents with   • Follow-up     Recheck right breast mammotone 1-23-17.     HPI    Final Diagnosis   MAMMOTOME CORES, RIGHT BREAST:   FIBROEPITHELIAL LESION, PROBABLY FIBROADENOMA.   SCLEROSING ADENOSIS AND A FEW MICROCALCIFIC DEPOSITS.   FOCAL CICATRICIAL FIBROSIS.      Physical Exam   Cardiovascular: Normal rate, regular rhythm and normal heart sounds.    Pulmonary/Chest: Right breast exhibits no inverted nipple, no mass, no nipple discharge, no skin change and no tenderness. Left breast exhibits no inverted nipple, no mass, no nipple discharge, no skin change and no tenderness. Breasts are symmetrical.       ASSESSMENT    Chrissy was seen today for follow-up.    Diagnoses and all orders for this visit:    Abnormal mammogram of right breast  Comments:  Bx is not concordant with the xray findings  Orders:  -     Case Request; Standing  -     lactated ringers infusion; Infuse 50 mL/hr into a venous catheter Continuous.  -     ceFAZolin (ANCEF) 2 g in sodium chloride 0.9 % 100 mL IVPB; Infuse 2 g into a venous catheter 1 (One) Time.  -     Case Request    Other orders  -     Verify informed consent; Standing  -     Place sequential compression device- to be placed on patient in Pre-op; Standing  -     Please contact surgeon with questions or concerns.; Standing  -     Insert Peripheral IV; Standing        PLAN  1. Excisional biopsy under US guidance with xray examination of the surgical specimen- procedure is explained with the risks of bleeding, infection, poor wound healing. Understands and agrees.

## 2017-01-31 ENCOUNTER — APPOINTMENT (OUTPATIENT)
Dept: PREADMISSION TESTING | Facility: HOSPITAL | Age: 67
End: 2017-01-31

## 2017-01-31 VITALS
BODY MASS INDEX: 25.92 KG/M2 | WEIGHT: 175 LBS | OXYGEN SATURATION: 98 % | RESPIRATION RATE: 18 BRPM | SYSTOLIC BLOOD PRESSURE: 112 MMHG | HEIGHT: 69 IN | DIASTOLIC BLOOD PRESSURE: 68 MMHG | HEART RATE: 68 BPM

## 2017-01-31 PROCEDURE — 93010 ELECTROCARDIOGRAM REPORT: CPT | Performed by: INTERNAL MEDICINE

## 2017-01-31 PROCEDURE — 93005 ELECTROCARDIOGRAM TRACING: CPT

## 2017-02-01 ENCOUNTER — ANESTHESIA EVENT (OUTPATIENT)
Dept: PERIOP | Facility: HOSPITAL | Age: 67
End: 2017-02-01

## 2017-02-02 ENCOUNTER — HOSPITAL ENCOUNTER (OUTPATIENT)
Facility: HOSPITAL | Age: 67
Setting detail: HOSPITAL OUTPATIENT SURGERY
Discharge: HOME | End: 2017-02-02
Attending: SURGERY | Admitting: SURGERY

## 2017-02-02 ENCOUNTER — APPOINTMENT (OUTPATIENT)
Dept: MAMMOGRAPHY | Facility: HOSPITAL | Age: 67
End: 2017-02-02

## 2017-02-02 ENCOUNTER — ANESTHESIA (OUTPATIENT)
Dept: PERIOP | Facility: HOSPITAL | Age: 67
End: 2017-02-02

## 2017-02-02 VITALS
SYSTOLIC BLOOD PRESSURE: 121 MMHG | OXYGEN SATURATION: 100 % | BODY MASS INDEX: 26.29 KG/M2 | DIASTOLIC BLOOD PRESSURE: 73 MMHG | RESPIRATION RATE: 18 BRPM | WEIGHT: 177.47 LBS | HEIGHT: 69 IN | TEMPERATURE: 98 F | HEART RATE: 74 BPM

## 2017-02-02 DIAGNOSIS — R92.8 ABNORMAL MAMMOGRAM OF RIGHT BREAST: ICD-10-CM

## 2017-02-02 DIAGNOSIS — N63.0 BREAST MASS: ICD-10-CM

## 2017-02-02 PROCEDURE — 19301 PARTIAL MASTECTOMY: CPT | Performed by: SURGERY

## 2017-02-02 PROCEDURE — 25010000002 HYDROMORPHONE PER 4 MG: Performed by: NURSE ANESTHETIST, CERTIFIED REGISTERED

## 2017-02-02 PROCEDURE — 88307 TISSUE EXAM BY PATHOLOGIST: CPT | Performed by: PATHOLOGY

## 2017-02-02 PROCEDURE — 25010000002 FENTANYL CITRATE (PF) 100 MCG/2ML SOLUTION: Performed by: NURSE ANESTHETIST, CERTIFIED REGISTERED

## 2017-02-02 PROCEDURE — 88307 TISSUE EXAM BY PATHOLOGIST: CPT | Performed by: SURGERY

## 2017-02-02 PROCEDURE — 25010000002 PROPOFOL 10 MG/ML EMULSION: Performed by: NURSE ANESTHETIST, CERTIFIED REGISTERED

## 2017-02-02 PROCEDURE — 76098 X-RAY EXAM SURGICAL SPECIMEN: CPT

## 2017-02-02 PROCEDURE — 25010000002 MIDAZOLAM PER 1 MG: Performed by: ANESTHESIOLOGY

## 2017-02-02 RX ORDER — SODIUM CHLORIDE, SODIUM LACTATE, POTASSIUM CHLORIDE, CALCIUM CHLORIDE 600; 310; 30; 20 MG/100ML; MG/100ML; MG/100ML; MG/100ML
50 INJECTION, SOLUTION INTRAVENOUS CONTINUOUS
Status: DISCONTINUED | OUTPATIENT
Start: 2017-02-02 | End: 2017-02-02 | Stop reason: HOSPADM

## 2017-02-02 RX ORDER — ONDANSETRON 2 MG/ML
4 INJECTION INTRAMUSCULAR; INTRAVENOUS ONCE AS NEEDED
Status: DISCONTINUED | OUTPATIENT
Start: 2017-02-02 | End: 2017-02-02 | Stop reason: HOSPADM

## 2017-02-02 RX ORDER — FENTANYL CITRATE 50 UG/ML
INJECTION, SOLUTION INTRAMUSCULAR; INTRAVENOUS AS NEEDED
Status: DISCONTINUED | OUTPATIENT
Start: 2017-02-02 | End: 2017-02-02 | Stop reason: SURG

## 2017-02-02 RX ORDER — ISOSULFAN BLUE 50 MG/5ML
INJECTION, SOLUTION SUBCUTANEOUS
Status: DISPENSED
Start: 2017-02-02 | End: 2017-02-02

## 2017-02-02 RX ORDER — HYDRALAZINE HYDROCHLORIDE 20 MG/ML
5 INJECTION INTRAMUSCULAR; INTRAVENOUS
Status: DISCONTINUED | OUTPATIENT
Start: 2017-02-02 | End: 2017-02-02 | Stop reason: HOSPADM

## 2017-02-02 RX ORDER — PROMETHAZINE HYDROCHLORIDE 25 MG/ML
12.5 INJECTION, SOLUTION INTRAMUSCULAR; INTRAVENOUS ONCE AS NEEDED
Status: DISCONTINUED | OUTPATIENT
Start: 2017-02-02 | End: 2017-02-02 | Stop reason: HOSPADM

## 2017-02-02 RX ORDER — ACETAMINOPHEN 650 MG/1
650 SUPPOSITORY RECTAL ONCE AS NEEDED
Status: DISCONTINUED | OUTPATIENT
Start: 2017-02-02 | End: 2017-02-02 | Stop reason: HOSPADM

## 2017-02-02 RX ORDER — ISOSULFAN BLUE 50 MG/5ML
INJECTION, SOLUTION SUBCUTANEOUS AS NEEDED
Status: DISCONTINUED | OUTPATIENT
Start: 2017-02-02 | End: 2017-02-02 | Stop reason: HOSPADM

## 2017-02-02 RX ORDER — PROPOFOL 10 MG/ML
VIAL (ML) INTRAVENOUS AS NEEDED
Status: DISCONTINUED | OUTPATIENT
Start: 2017-02-02 | End: 2017-02-02 | Stop reason: SURG

## 2017-02-02 RX ORDER — SODIUM CHLORIDE, SODIUM LACTATE, POTASSIUM CHLORIDE, CALCIUM CHLORIDE 600; 310; 30; 20 MG/100ML; MG/100ML; MG/100ML; MG/100ML
9 INJECTION, SOLUTION INTRAVENOUS CONTINUOUS
Status: DISCONTINUED | OUTPATIENT
Start: 2017-02-02 | End: 2017-02-02 | Stop reason: HOSPADM

## 2017-02-02 RX ORDER — MIDAZOLAM HYDROCHLORIDE 1 MG/ML
2 INJECTION INTRAMUSCULAR; INTRAVENOUS
Status: DISCONTINUED | OUTPATIENT
Start: 2017-02-02 | End: 2017-02-02 | Stop reason: HOSPADM

## 2017-02-02 RX ORDER — FLUMAZENIL 0.1 MG/ML
0.2 INJECTION INTRAVENOUS AS NEEDED
Status: DISCONTINUED | OUTPATIENT
Start: 2017-02-02 | End: 2017-02-02 | Stop reason: HOSPADM

## 2017-02-02 RX ORDER — LABETALOL HYDROCHLORIDE 5 MG/ML
5 INJECTION, SOLUTION INTRAVENOUS
Status: DISCONTINUED | OUTPATIENT
Start: 2017-02-02 | End: 2017-02-02 | Stop reason: HOSPADM

## 2017-02-02 RX ORDER — PROMETHAZINE HYDROCHLORIDE 25 MG/1
25 SUPPOSITORY RECTAL ONCE AS NEEDED
Status: DISCONTINUED | OUTPATIENT
Start: 2017-02-02 | End: 2017-02-02 | Stop reason: HOSPADM

## 2017-02-02 RX ORDER — METOCLOPRAMIDE HYDROCHLORIDE 5 MG/ML
10 INJECTION INTRAMUSCULAR; INTRAVENOUS ONCE AS NEEDED
Status: DISCONTINUED | OUTPATIENT
Start: 2017-02-02 | End: 2017-02-02 | Stop reason: HOSPADM

## 2017-02-02 RX ORDER — SODIUM CHLORIDE 0.9 % (FLUSH) 0.9 %
1-10 SYRINGE (ML) INJECTION AS NEEDED
Status: DISCONTINUED | OUTPATIENT
Start: 2017-02-02 | End: 2017-02-02 | Stop reason: HOSPADM

## 2017-02-02 RX ORDER — DIPHENHYDRAMINE HYDROCHLORIDE 50 MG/ML
12.5 INJECTION INTRAMUSCULAR; INTRAVENOUS
Status: DISCONTINUED | OUTPATIENT
Start: 2017-02-02 | End: 2017-02-02 | Stop reason: HOSPADM

## 2017-02-02 RX ORDER — NALOXONE HCL 0.4 MG/ML
0.2 VIAL (ML) INJECTION AS NEEDED
Status: DISCONTINUED | OUTPATIENT
Start: 2017-02-02 | End: 2017-02-02 | Stop reason: HOSPADM

## 2017-02-02 RX ORDER — MORPHINE SULFATE 2 MG/ML
2 INJECTION, SOLUTION INTRAMUSCULAR; INTRAVENOUS
Status: DISCONTINUED | OUTPATIENT
Start: 2017-02-02 | End: 2017-02-02 | Stop reason: HOSPADM

## 2017-02-02 RX ORDER — ACETAMINOPHEN 325 MG/1
650 TABLET ORAL ONCE AS NEEDED
Status: DISCONTINUED | OUTPATIENT
Start: 2017-02-02 | End: 2017-02-02 | Stop reason: HOSPADM

## 2017-02-02 RX ORDER — DEXAMETHASONE SODIUM PHOSPHATE 4 MG/ML
8 INJECTION, SOLUTION INTRA-ARTICULAR; INTRALESIONAL; INTRAMUSCULAR; INTRAVENOUS; SOFT TISSUE ONCE AS NEEDED
Status: DISCONTINUED | OUTPATIENT
Start: 2017-02-02 | End: 2017-02-02 | Stop reason: HOSPADM

## 2017-02-02 RX ORDER — LIDOCAINE HYDROCHLORIDE 20 MG/ML
INJECTION, SOLUTION INFILTRATION; PERINEURAL AS NEEDED
Status: DISCONTINUED | OUTPATIENT
Start: 2017-02-02 | End: 2017-02-02 | Stop reason: SURG

## 2017-02-02 RX ORDER — ISOSULFAN BLUE 50 MG/5ML
INJECTION, SOLUTION SUBCUTANEOUS
Status: DISCONTINUED
Start: 2017-02-02 | End: 2017-02-02 | Stop reason: WASHOUT

## 2017-02-02 RX ORDER — PROMETHAZINE HYDROCHLORIDE 25 MG/1
25 TABLET ORAL ONCE AS NEEDED
Status: DISCONTINUED | OUTPATIENT
Start: 2017-02-02 | End: 2017-02-02 | Stop reason: HOSPADM

## 2017-02-02 RX ORDER — BUPIVACAINE HYDROCHLORIDE AND EPINEPHRINE 5; 5 MG/ML; UG/ML
INJECTION, SOLUTION EPIDURAL; INTRACAUDAL; PERINEURAL
Status: DISCONTINUED
Start: 2017-02-02 | End: 2017-02-02 | Stop reason: WASHOUT

## 2017-02-02 RX ORDER — HYDROCODONE BITARTRATE AND ACETAMINOPHEN 7.5; 325 MG/1; MG/1
1 TABLET ORAL EVERY 4 HOURS PRN
Qty: 20 TABLET | Refills: 0 | Status: SHIPPED | OUTPATIENT
Start: 2017-02-02 | End: 2017-02-13

## 2017-02-02 RX ORDER — HYDROMORPHONE HCL 110MG/55ML
PATIENT CONTROLLED ANALGESIA SYRINGE INTRAVENOUS AS NEEDED
Status: DISCONTINUED | OUTPATIENT
Start: 2017-02-02 | End: 2017-02-02 | Stop reason: SURG

## 2017-02-02 RX ORDER — MIDAZOLAM HYDROCHLORIDE 1 MG/ML
1 INJECTION INTRAMUSCULAR; INTRAVENOUS
Status: DISCONTINUED | OUTPATIENT
Start: 2017-02-02 | End: 2017-02-02 | Stop reason: HOSPADM

## 2017-02-02 RX ORDER — SODIUM CHLORIDE, SODIUM LACTATE, POTASSIUM CHLORIDE, CALCIUM CHLORIDE 600; 310; 30; 20 MG/100ML; MG/100ML; MG/100ML; MG/100ML
INJECTION, SOLUTION INTRAVENOUS
Status: COMPLETED
Start: 2017-02-02 | End: 2017-02-02

## 2017-02-02 RX ADMIN — HYDROMORPHONE HYDROCHLORIDE 0.5 MG: 2 INJECTION, SOLUTION INTRAMUSCULAR; INTRAVENOUS; SUBCUTANEOUS at 12:40

## 2017-02-02 RX ADMIN — SODIUM CHLORIDE, POTASSIUM CHLORIDE, SODIUM LACTATE AND CALCIUM CHLORIDE 50 ML/HR: 600; 310; 30; 20 INJECTION, SOLUTION INTRAVENOUS at 10:34

## 2017-02-02 RX ADMIN — LIDOCAINE HYDROCHLORIDE 100 MG: 20 INJECTION, SOLUTION INFILTRATION; PERINEURAL at 11:52

## 2017-02-02 RX ADMIN — SODIUM CHLORIDE, POTASSIUM CHLORIDE, SODIUM LACTATE AND CALCIUM CHLORIDE: 600; 310; 30; 20 INJECTION, SOLUTION INTRAVENOUS at 11:49

## 2017-02-02 RX ADMIN — HYDROMORPHONE HYDROCHLORIDE 0.5 MG: 1 INJECTION, SOLUTION INTRAMUSCULAR; INTRAVENOUS; SUBCUTANEOUS at 13:50

## 2017-02-02 RX ADMIN — FENTANYL CITRATE 100 MCG: 50 INJECTION, SOLUTION INTRAMUSCULAR; INTRAVENOUS at 11:52

## 2017-02-02 RX ADMIN — PROPOFOL 100 MG: 10 INJECTION, EMULSION INTRAVENOUS at 11:52

## 2017-02-02 RX ADMIN — HYDROMORPHONE HYDROCHLORIDE 4 MG: 2 INJECTION, SOLUTION INTRAMUSCULAR; INTRAVENOUS; SUBCUTANEOUS at 11:50

## 2017-02-02 RX ADMIN — MIDAZOLAM 2 MG: 1 INJECTION INTRAMUSCULAR; INTRAVENOUS at 11:47

## 2017-02-02 RX ADMIN — SODIUM CHLORIDE, SODIUM LACTATE, POTASSIUM CHLORIDE, CALCIUM CHLORIDE 50 ML/HR: 600; 310; 30; 20 INJECTION, SOLUTION INTRAVENOUS at 10:34

## 2017-02-02 RX ADMIN — HYDROMORPHONE HYDROCHLORIDE 0.5 MG: 2 INJECTION, SOLUTION INTRAMUSCULAR; INTRAVENOUS; SUBCUTANEOUS at 12:37

## 2017-02-02 NOTE — ANESTHESIA PREPROCEDURE EVALUATION
Anesthesia Evaluation     Patient summary reviewed and Nursing notes reviewed    No history of anesthetic complications   Airway   Mallampati: II  TM distance: >3 FB  Neck ROM: full  no difficulty expected  Dental - normal exam     Pulmonary - normal exam   Cardiovascular - normal exam  (+) hypertension, CAD, CABG > 6 Months, angina,     Neuro/Psych  GI/Hepatic/Renal/Endo      Musculoskeletal     Abdominal  - normal exam   Substance History      OB/GYN          Other   (+) arthritis                          Anesthesia Plan    ASA 3     general     intravenous induction   Anesthetic plan and risks discussed with patient.

## 2017-02-02 NOTE — ANESTHESIA POSTPROCEDURE EVALUATION
Patient: Chrissy Dawson    Procedure Summary     Date Anesthesia Start Anesthesia Stop Room / Location    02/02/17 1149   [facility] / [facility]       Procedure Diagnosis Surgeon Provider    RIGHT BREAST LUMPECTOMY WITH NEEDLE LOCALIZATION AND ULTRASOUND (Right Breast) Abnormal mammogram of right breast  (Abnormal mammogram of right breast [R92.8]) MD Jaron Graham MD          Anesthesia Type: general  Last vitals  BP      Temp      Pulse     Resp      SpO2        Post Anesthesia Care and Evaluation    Patient location during evaluation: bedside  Patient participation: complete - patient cannot participate  Level of consciousness: awake  Pain score: 0  Pain management: adequate  Airway patency: patent  Anesthetic complications: No anesthetic complications  PONV Status: none  Cardiovascular status: acceptable  Respiratory status: acceptable  Hydration status: acceptable

## 2017-02-02 NOTE — ANESTHESIA PROCEDURE NOTES
Airway  Date/Time: 2/2/2017 1:16 PM  Airway not difficult    General Information and Staff    CRNA: ANTONINO LOPEZ    Indications and Patient Condition  Indications for airway management: airway protection    Preoxygenated: yes  MILS maintained throughout  Mask difficulty assessment: 0 - not attempted    Final Airway Details  Final airway type: supraglottic airway      Successful airway: Size 1    Number of attempts at approach: 1

## 2017-02-02 NOTE — ANESTHESIA PROCEDURE NOTES
Airway  Urgency: elective    Date/Time: 2/2/2017 11:59 AM  Airway not difficult    General Information and Staff    Patient location during procedure: OR  Anesthesiologist: ANTONINO LOPEZ    Indications and Patient Condition  Indications for airway management: airway protection    Preoxygenated: yes  MILS maintained throughout  Mask difficulty assessment: 0 - not attempted    Final Airway Details  Final airway type: supraglottic airway      Successful airway: classic  Size 4    Number of attempts at approach: 1

## 2017-02-06 ENCOUNTER — OFFICE VISIT (OUTPATIENT)
Dept: SURGERY | Facility: CLINIC | Age: 67
End: 2017-02-06

## 2017-02-06 VITALS
DIASTOLIC BLOOD PRESSURE: 78 MMHG | BODY MASS INDEX: 26.96 KG/M2 | WEIGHT: 182 LBS | HEIGHT: 69 IN | SYSTOLIC BLOOD PRESSURE: 124 MMHG

## 2017-02-06 DIAGNOSIS — R92.8 ABNORMAL MAMMOGRAM OF RIGHT BREAST: Primary | ICD-10-CM

## 2017-02-06 PROCEDURE — 99024 POSTOP FOLLOW-UP VISIT: CPT | Performed by: SURGERY

## 2017-02-06 NOTE — PROGRESS NOTES
Chief Complaint   Patient presents with   • Post-op Follow-up     Post operative right lumpectomy 2-2-17     HPI    Pathology:    Fibrous mastitis. No cancer    Physical Exam   Pulmonary/Chest:         ASSESSMENT    Chrissy was seen today for post-op follow-up.    Diagnoses and all orders for this visit:    Abnormal mammogram of right breast       PLAN  1. Recheck in 1 month    Delfino Greer MD

## 2017-02-07 LAB
LAB AP CASE REPORT: NORMAL
Lab: NORMAL
PATH REPORT.FINAL DX SPEC: NORMAL
PATH REPORT.GROSS SPEC: NORMAL

## 2017-02-13 ENCOUNTER — OFFICE VISIT (OUTPATIENT)
Dept: FAMILY MEDICINE CLINIC | Facility: CLINIC | Age: 67
End: 2017-02-13

## 2017-02-13 VITALS
BODY MASS INDEX: 26.36 KG/M2 | WEIGHT: 178 LBS | SYSTOLIC BLOOD PRESSURE: 110 MMHG | DIASTOLIC BLOOD PRESSURE: 62 MMHG | HEIGHT: 69 IN

## 2017-02-13 DIAGNOSIS — H60.501 ACUTE OTITIS EXTERNA OF RIGHT EAR, UNSPECIFIED TYPE: Primary | ICD-10-CM

## 2017-02-13 DIAGNOSIS — R32 URINARY INCONTINENCE, UNSPECIFIED TYPE: ICD-10-CM

## 2017-02-13 PROCEDURE — 99213 OFFICE O/P EST LOW 20 MIN: CPT | Performed by: FAMILY MEDICINE

## 2017-02-13 RX ORDER — OXYBUTYNIN CHLORIDE 5 MG/1
5 TABLET, EXTENDED RELEASE ORAL DAILY
Qty: 30 TABLET | Refills: 2 | Status: SHIPPED | OUTPATIENT
Start: 2017-02-13 | End: 2017-04-13 | Stop reason: DRUGHIGH

## 2017-02-13 RX ORDER — PRAVASTATIN SODIUM 10 MG
10 TABLET ORAL DAILY
Qty: 90 TABLET | Refills: 3 | Status: SHIPPED | OUTPATIENT
Start: 2017-02-13 | End: 2018-03-05 | Stop reason: SDUPTHER

## 2017-02-13 NOTE — PROGRESS NOTES
"Subjective   Chrissy Amira Dawson is a 66 y.o. female.     History of Present Illness   Ms. Dawson is a 67yo female that presents today for follow-up on urinary incontince.  She has been trying kegel exercises and that hasn't helped that much.  Says that she has sensation of urgency with standing.  Doesn't have that issue when she is sitting down or at rest. She wanted to not try medication the last time that she was here but now is considering it.  She has been having \"cracking\" sensation in her right ear.  Hearing has been off in that ear as well.  She has been having this issue since December.  She has pain in that ear.  No drainage from the ear.  She feels a little off balance as well.       Current Outpatient Prescriptions:   •  aspirin 81 MG tablet, Take 81 mg by mouth., Disp: , Rfl:   •  Multiple Vitamins-Minerals (CENTRUM SILVER PO), Take 1 tablet by mouth Daily., Disp: , Rfl:   •  pravastatin (PRAVACHOL) 10 MG tablet, Take 10 mg by mouth., Disp: , Rfl:     The following portions of the patient's history were reviewed and updated as appropriate: allergies, current medications, past family history, past medical history, past social history, past surgical history and problem list.    Review of Systems   Constitutional: Negative for activity change, appetite change, fatigue and unexpected weight change.   HENT: Positive for congestion, ear pain, hearing loss, postnasal drip and rhinorrhea. Negative for ear discharge, facial swelling, sinus pressure and sore throat.    Gastrointestinal: Negative for abdominal distention, abdominal pain, constipation and diarrhea.   Genitourinary: Positive for urgency. Negative for dysuria, frequency, hematuria, menstrual problem, pelvic pain, vaginal discharge and vaginal pain.       Objective    Vitals:    02/13/17 1350   BP: 110/62   Weight: 178 lb (80.7 kg)   Height: 69\" (175.3 cm)       Physical Exam   Constitutional: She is oriented to person, place, and time. She appears " well-developed and well-nourished. No distress.   HENT:   Right Ear: Tympanic membrane normal. There is swelling and tenderness. No drainage. No middle ear effusion. Decreased hearing is noted.   Left Ear: Hearing, tympanic membrane, external ear and ear canal normal.   Cardiovascular: Normal rate, regular rhythm and normal heart sounds.    No murmur heard.  Pulmonary/Chest: Effort normal and breath sounds normal. No respiratory distress. She has no wheezes.   Abdominal: Soft. Bowel sounds are normal. She exhibits no distension. There is no tenderness.   Neurological: She is alert and oriented to person, place, and time.   Nursing note and vitals reviewed.      Assessment/Plan   Problems Addressed this Visit     None      Visit Diagnoses     Acute otitis externa of right ear, unspecified type    -  Primary    Relevant Medications    neomycin-colistin-hydrocortisone-thonzonium (CORTISPORIN-TC) 3.3-3-10-0.5 MG/ML otic suspension    Urinary incontinence, unspecified type              1.) Otitis Externa-  Will treat with cortisporin for 10 days.  2.) Urinary incontinence-  Will try oxybutnin.  Continue kegel exercises also.  RTC in 2-3 months or sooner PRN

## 2017-03-21 ENCOUNTER — OFFICE VISIT (OUTPATIENT)
Dept: ORTHOPEDIC SURGERY | Facility: CLINIC | Age: 67
End: 2017-03-21

## 2017-03-21 VITALS — BODY MASS INDEX: 24.62 KG/M2 | HEIGHT: 70 IN | WEIGHT: 172 LBS

## 2017-03-21 DIAGNOSIS — Z96.642 PRESENCE OF LEFT ARTIFICIAL HIP JOINT: ICD-10-CM

## 2017-03-21 DIAGNOSIS — Z96.641 PRESENCE OF ARTIFICIAL HIP JOINT, RIGHT: Primary | ICD-10-CM

## 2017-03-21 DIAGNOSIS — R26.9 GAIT DISTURBANCE: ICD-10-CM

## 2017-03-21 PROBLEM — Z96.649 PRESENCE OF ARTIFICIAL HIP JOINT: Status: ACTIVE | Noted: 2017-03-21

## 2017-03-21 PROCEDURE — 99203 OFFICE O/P NEW LOW 30 MIN: CPT | Performed by: ORTHOPAEDIC SURGERY

## 2017-03-21 NOTE — PROGRESS NOTES
Chrissy Dawson is a 66 y.o. female   Primary provider:  Yessica Carrillo MD       Chief Complaint   Patient presents with   • Right Hip - Hip Problem     Xray at [facility]  6/24/16,  New xray done today in the office       HISTORY OF PRESENT ILLNESS: patient states that she has no pain she has trouble with standing from a sitting position has to use cane for assistance.  She had a total hip arthroplasty in 1999 on the right.  She then had a revision in 2011.  She now reports difficulty with walking but has no pain.  She has a problem when she initially stands and begins walking, she has a difficult time starting in getting going.  She is currently using a cane for support.  She has not done physical therapy and a long time.  She denies any numbness or tingling.  No specific injury.    Lower Extremity Issue   This is a recurrent problem. Episode onset: 1998 and 2011 hip replacements. The problem occurs constantly. Associated symptoms comments: Limping. The symptoms are aggravated by walking.        CONCURRENT MEDICAL HISTORY:    Past Medical History:   Diagnosis Date   • Backache    • Benign essential hypertension     PATIENT DENIES   • Cervical arthritis    • Coronary arteriosclerosis    • Ganglion cyst of wrist     Ganglion/synovial cyst - wrist   • Ganglion of wrist    • Hip pain    • History of echocardiogram 10/23/2015    Echocardiogram W/ color flow 61434 (1) - Normal LV function with Ef of 55-60%.Normal RV size and function.Pseudonormal diastolic dysfunciton with borderline [facility].NO sig.valvular regurg or stenosis.   • History of mammogram 09/10/2014    MAMMOGRAM SCREENING 31865 - [facility] (1[facility] - [facility] ([facility])   • History of transfusion    • Hyperlipidemia    • Inguinal pain    • Recurrent angina status post coronary artery bypass graft     Recurrent angina after coronary artery bypass graft   • Urge incontinence of urine        Allergies   Allergen Reactions   • Crestor  [Rosuvastatin]    • Lipitor [Atorvastatin]          Current Outpatient Prescriptions:   •  amoxicillin (AMOXIL) 500 MG capsule, Take 2 capsules by mouth 3 (Three) Times a Day., Disp: 30 capsule, Rfl: 0  •  aspirin 81 MG tablet, Take 81 mg by mouth., Disp: , Rfl:   •  loratadine (CLARITIN) 10 MG tablet, Take 1 tablet by mouth Daily., Disp: 30 tablet, Rfl: 0  •  Multiple Vitamins-Minerals (CENTRUM SILVER PO), Take 1 tablet by mouth Daily., Disp: , Rfl:   •  neomycin-colistin-hydrocortisone-thonzonium (CORTISPORIN-TC) 3.3-3-10-0.5 MG/ML otic suspension, Administer 3 drops to the right ear 3 (Three) Times a Day., Disp: 10 mL, Rfl: 0  •  oxybutynin XL (DITROPAN-XL) 5 MG 24 hr tablet, Take 1 tablet by mouth Daily., Disp: 30 tablet, Rfl: 2  •  pravastatin (PRAVACHOL) 10 MG tablet, Take 1 tablet by mouth Daily., Disp: 90 tablet, Rfl: 3    Past Surgical History:   Procedure Laterality Date   • BREAST BIOPSY Right 2/2/2017    Procedure: RIGHT BREAST LUMPECTOMY WITH NEEDLE LOCALIZATION AND ULTRASOUND;  Surgeon: Delfino Greer MD;  Location: [facility];  Service:    • CARDIAC SURGERY     • CORONARY ARTERY BYPASS GRAFT  2000    CABG, arterial, single (1)   • GANGLION CYST EXCISION     • JOINT REPLACEMENT     • ORIF MANDIBULAR FRACTURE     • TOTAL ABDOMINAL HYSTERECTOMY     • TOTAL ABDOMINAL HYSTERECTOMY WITH SALPINGO OOPHORECTOMY  1998    SALVADOR/BSO (1)   • TOTAL HIP ARTHROPLASTY  2011    Total hip replacement (3)       Family History   Problem Relation Age of Onset   • Heart disease Other    • Hypertension Other    • Hypertension Mother    • Diabetes Father    • Depression Maternal Grandmother    • Heart disease Maternal Grandfather        Social History     Social History   • Marital status:      Spouse name: N/A   • Number of children: N/A   • Years of education: N/A     Occupational History   • Not on file.     Social History Main Topics   • Smoking status: Never Smoker   • Smokeless tobacco: Never Used   • Alcohol use No  "  • Drug use: No   • Sexual activity: Defer      Comment:      Other Topics Concern   • Not on file     Social History Narrative        Review of Systems   Respiratory: Negative.    Cardiovascular: Negative.    Gastrointestinal: Negative.    Genitourinary: Negative.    All other systems reviewed and are negative.      PHYSICAL EXAMINATION:       Ht 69.5\" (176.5 cm)  Wt 172 lb (78 kg)  BMI 25.04 kg/m2    Physical Exam   Constitutional: She is oriented to person, place, and time. She appears well-developed and well-nourished.   Neurological: She is alert and oriented to person, place, and time.   Psychiatric: She has a normal mood and affect. Her behavior is normal. Judgment and thought content normal.       GAIT:     []  Normal  []  Antalgic    Assistive device: []  None  []  Walker     []  Crutches  [x]  Cane     []  Wheelchair  []  Stretcher    Right Hip Exam     Tenderness   The patient is experiencing no tenderness.         Range of Motion   Flexion: 110   Internal Rotation: 20   External Rotation: 30     Muscle Strength   Abduction: 4/5   Adduction: 4/5   Flexion: 4/5     Tests   FABBY: negative  Fadir:  Negative FADIR test    Other   Erythema: absent  Sensation: normal  Pulse: present    Comments:  Stable joint exam      Left Hip Exam     Tenderness   The patient is experiencing no tenderness.         Range of Motion   Flexion: 110   Internal Rotation: 20   External Rotation: 30     Muscle Strength   Abduction: 4/5   Adduction: 4/5   Flexion: 4/5     Tests   FABBY: negative  Fadir:  Negative FADIR test    Other   Erythema: absent  Sensation: normal  Pulse: present    Comments:  Stable joint exam                  Xr Hip With Or Without Pelvis 2 - 3 View Right    Result Date: 3/22/2017  Narrative: AP of the pelvis with AP and lateral views of the right hip show acceptable position alignment of bilateral total hip arthroplasty.  The right hip shows questionable ostial lysis around the acetabular component " with mild medialization of the component.  She has had previous revision surgery with fixation of the greater trochanter on the right.  These wires are broken in multiple locations and there is severe ostial lysis of the greater trochanter suggestive of stress shielding.  The implant appears to be very well seated and secure in the femoral shaft.  There are some bony changes along the proximal aspect of the left femur consistent with some stress shielding as well.  The left sided implant appears to be cemented in place without any signs of loosening at this time.  She appears to have slight pelvic obliquity with the right leg perhaps being slightly short.  No comparison views. 03/22/17 at 3:57 PM by Jeovanny Maravilla MD           ASSESSMENT:    Diagnoses and all orders for this visit:    Presence of artificial hip joint, right  -     XR Hip With or Without Pelvis 2 - 3 View Right; Future  -     Ambulatory Referral to Physical Therapy Evaluate and treat    Presence of left artificial hip joint  -     Ambulatory Referral to Physical Therapy Evaluate and treat    Gait disturbance  -     Ambulatory Referral to Physical Therapy Evaluate and treat          PLAN    She will begin physical therapy with range of motion and strengthening and modalities for gait training.  We also discussed conditioning and strength training.  She will work with a home exercise program and use a cane as necessary.  We had a long discussion regarding the fact that she is not having pain but is having difficulty with walking.  Revision surgery needs to be reserved for painful scenario.  She definitely shows signs of ostial lysis and stress shielding in the greater trochanter and shows sign of hardware failure from previous trochanteric fixation.  However, the stem looks well seated and well fixed in the canal of the femur.  There is questionable loosening of the acetabular component which may need to be addressed at some point.    Return in  about 6 weeks (around 5/2/2017) for recheck.    Jeovanny Maravilla MD

## 2017-03-28 ENCOUNTER — HOSPITAL ENCOUNTER (OUTPATIENT)
Dept: PHYSICAL THERAPY | Facility: HOSPITAL | Age: 67
Setting detail: THERAPIES SERIES
Discharge: HOME | End: 2017-03-28

## 2017-03-28 DIAGNOSIS — Z96.641 HISTORY OF TOTAL HIP REPLACEMENT, RIGHT: ICD-10-CM

## 2017-03-28 DIAGNOSIS — Z96.642 PRESENCE OF LEFT ARTIFICIAL HIP JOINT: ICD-10-CM

## 2017-03-28 DIAGNOSIS — R26.9 ABNORMALITY OF GAIT: Primary | ICD-10-CM

## 2017-03-28 PROCEDURE — G8979 MOBILITY GOAL STATUS: HCPCS | Performed by: PHYSICAL THERAPIST

## 2017-03-28 PROCEDURE — 97162 PT EVAL MOD COMPLEX 30 MIN: CPT | Performed by: PHYSICAL THERAPIST

## 2017-03-28 PROCEDURE — G8978 MOBILITY CURRENT STATUS: HCPCS | Performed by: PHYSICAL THERAPIST

## 2017-03-28 NOTE — PROGRESS NOTES
Outpatient Physical Therapy Ortho Initial Evaluation  [facility]     Patient Name: Chrissy Dawson  : 1950  MRN: [id number]  Today's Date: 3/28/2017Patient reports 0/10 pain and N/A % improvement since initiating therapy.  Attendance 1/1appointments scheduled, Not yet approved by insurance. Next MD follow up is 17.  Visit Date: 2017    Patient Active Problem List   Diagnosis   • Adenopathy, cervical   • Hyperlipidemia   • Coronary arteriosclerosis   • Benign essential hypertension   • Abnormal mammogram of right breast   • Presence of artificial hip joint        Past Medical History:   Diagnosis Date   • Backache    • Benign essential hypertension     PATIENT DENIES   • Cervical arthritis    • Coronary arteriosclerosis    • Ganglion cyst of wrist     Ganglion/synovial cyst - wrist   • Ganglion of wrist    • Hip pain    • History of echocardiogram 10/23/2015    Echocardiogram W/ color flow 06499 (1) - Normal LV function with Ef of 55-60%.Normal RV size and function.Pseudonormal diastolic dysfunciton with borderline [facility].NO sig.valvular regurg or stenosis.   • History of mammogram 09/10/2014    MAMMOGRAM SCREENING 47347 - [facility] (1[facility] - [facility] ([facility])   • History of transfusion    • Hyperlipidemia    • Inguinal pain    • Recurrent angina status post coronary artery bypass graft     Recurrent angina after coronary artery bypass graft   • Urge incontinence of urine         Past Surgical History:   Procedure Laterality Date   • BREAST BIOPSY Right 2017    Procedure: RIGHT BREAST LUMPECTOMY WITH NEEDLE LOCALIZATION AND ULTRASOUND;  Surgeon: Delfino Greer MD;  Location: [facility];  Service:    • CARDIAC SURGERY     • CORONARY ARTERY BYPASS GRAFT      CABG, arterial, single (1)   • GANGLION CYST EXCISION     • JOINT REPLACEMENT     • ORIF MANDIBULAR FRACTURE     • TOTAL ABDOMINAL HYSTERECTOMY     • TOTAL ABDOMINAL HYSTERECTOMY WITH SALPINGO OOPHORECTOMY      SALVADOR/BSO  (1)   • TOTAL HIP ARTHROPLASTY  2011    Total hip replacement (3)     Outpatient Medications     amoxicillin (AMOXIL) 500 MG capsule      aspirin 81 MG tablet      loratadine (CLARITIN) 10 MG tablet      Multiple Vitamins-Minerals (CENTRUM SILVER PO)      neomycin-colistin-hydrocortisone-thonzonium (CORTISPORIN-TC) 3.3-3-10-0.5 MG/ML otic suspension      oxybutynin XL (DITROPAN-XL) 5 MG 24 hr tablet      pravastatin (PRAVACHOL) 10 MG tablet      ALLERGIES: Crestor, Lipitor    Visit Dx:     ICD-10-CM ICD-9-CM   1. Abnormality of gait R26.9 781.2   2. History of total hip replacement, right Z96.641 V43.64   3. Presence of left artificial hip joint Z96.642 V43.64           PT Ortho       03/28/17 1000          Subjective Comments    Subjective Comments Reports to therapy today with complaints of weakness and difficulties getting up out of a chair, also issues with initiating walking without limping.  She states she uses the cane when pushing up from the chair and first started walking in the morning she doesn't use it most of the time throughout her own household but only when outdoors  -DD      Subjective Pain    Able to rate subjective pain? yes  -DD      Post-Treatment Pain Level 0  -DD      Sensation    Sensation WNL? WNL  -DD      Hip/Thigh Palpation    Greater Trochanter Right:;Tender  -DD      ITB Right:;Tender  -DD      Left Hip    Internal Rotation ROM Details 20  -DD      External Rotation ROM Details 25  -DD      Right Hip    Flexion ROM Details 110   supine  -DD      Extension ROM Details 5  -DD      ABduction ROM Details 20   supine  -DD        User Key  (r) = Recorded By, (t) = Taken By, (c) = Cosigned By    Initials Name Provider Type    DD Cheyanne Landers, PT Physical Therapist                  PT OP Goals       03/28/17 1000       PT Short Term Goals    STG Date to Achieve 04/18/17  -DD     STG 1 Patient will be independent in home exercise program.  -DD     STG 2 The patient will have 100° hip  flexion active range of motion supine.  -DD     STG 3 The patient will have 30° active hip abduction supine.  -DD     STG 4 The patient be able to sit to stand and initiate walk without hesitation.  -DD     STG 5 Subjectively report 40-50% improvement.  -DD     STG 6 Patient will be independent in HEP.  -DD     Long Term Goals    LTG Date to Achieve 05/09/17  -DD     LTG 1 Will be able to perform 10 sit to stands without use of upper extremities.  -DD     LTG 2 The patient be able to ambulate without an assistive device.  -DD     LTG 3 The patient will manual muscle test hip flexor at 5 minus or greater on the right.  -DD     LTG 4 Patient will manual muscle test at 4+ or better for hip abduction on the right.  -DD     LTG 5 The patient reports 75% or better improvement.  -DD     LTG 6 LEFS score   40 or more  -DD     Time Calculation    PT Goal Re-Cert Due Date 04/18/17  -DD       User Key  (r) = Recorded By, (t) = Taken By, (c) = Cosigned By    Initials Name Provider Type    DD Cheyanne Landers, PT Physical Therapist                PT Assessment/Plan       03/28/17 1039       PT Assessment    Functional Limitations Impaired gait  -DD     Impairments Muscle strength;Range of motion;Gait  -DD     Assessment Comments Patient shows decreased right hip mobility. Weakness of the right hip. Post SALTY 1999, with revision 2011 by Dr Brown.  She did not have therapy after surgery.  -DD     Please refer to paper survey for additional self-reported information No  -DD     Rehab Potential Good  -DD     Patient/caregiver participated in establishment of treatment plan and goals Yes  -DD     Patient would benefit from skilled therapy intervention Yes  -DD     PT Plan    PT Frequency 2x/week  -DD     Predicted Duration of Therapy Intervention (days/wks) 6 weeks  -DD     Planned CPT's? PT EVAL MOD COMPLELITY: 71019;PT THER PROC EA 15 MIN: 66379;PT MANUAL THERAPY EA 15 MIN: 08286;PT GAIT TRAINING EA 15 MIN: 11452  -DD      Physical Therapy Interventions (Optional Details) manual therapy techniques;stretching;strengthening;home exercise program;gait training;modalities  -DD     PT Plan Comments Range of motion, stretching for hip mobility.  Strengthening of the right hip.  Gait and balance activities.  Ice as needed.  Myofascial release soft tissue of the IT band  -DD       User Key  (r) = Recorded By, (t) = Taken By, (c) = Cosigned By    Initials Name Provider Type    MERE Landers, PT Physical Therapist                  Exercises       03/28/17 1000          Subjective Comments    Subjective Comments Reports to therapy today with complaints of weakness and difficulties getting up out of a chair, also issues with initiating walking without limping.  She states she uses the cane when pushing up from the chair and first started walking in the morning she doesn't use it most of the time throughout her own household but only when outdoors  -DD      Subjective Pain    Able to rate subjective pain? yes  -DD      Post-Treatment Pain Level 0  -DD        User Key  (r) = Recorded By, (t) = Taken By, (c) = Cosigned By    Initials Name Provider Type    MERE Landers, PT Physical Therapist                  Outcome Measures       03/28/17 1000 03/28/17 0800       Lower Extremity Functional Index    Any of your usual work, housework or school activities  1  -DD     Your usual hobbies, recreational or sporting activities  1  -DD     Getting into or out of the bath  1  -DD     Walking between rooms  1  -DD     Putting on your shoes or socks  0  -DD     Squatting  0  -DD     Lifting an object, like a bag of groceries from the floor  3  -DD     Performing light activities around your home  2  -DD     Performing heavy activities around your home  1  -DD     Getting into or out of a car  2  -DD     Walking 2 blocks  2  -DD     Walking a mile  1  -DD     Going up or down 10 stairs (about 1 flight of stairs)  2  -DD     Standing for 1  hour  3  -DD     Sitting for 1 hour  2  -DD     Running on even ground  1  -DD     Running on uneven ground  1  -DD     Making sharp turns while running fast  1  -DD     Hopping  1  -DD     Rolling over in bed  1  -DD     Total  27  -DD     Functional Assessment    Outcome Measure Options Lower Extremity Functional Scale (LEFS)  -DD        User Key  (r) = Recorded By, (t) = Taken By, (c) = Cosigned By    Initials Name Provider Type    DD Cheyanne Landers, PT Physical Therapist            Time Calculation:   Start Time: 0805  Stop Time: 0840  Time Calculation (min): 35 min  Total Timed Code Minutes- PT: 0 minute(s)     Therapy Charges for Today     Code Description Service Date Service Provider Modifiers Qty    86536015505 HC PT MOBILITY CURRENT 3/28/2017 Cheyanne Landers, PT GP, CK 1    26829814900 HC PT MOBILITY PROJECTED 3/28/2017 Cheyanne Landers, PT GP, CI 1    21629417494 HC PT EVAL MOD COMPLEXITY 3 3/28/2017 Cheyanne Landers, PT GP 1          PT G-Codes  PT Professional Judgement Used?: Yes  Outcome Measure Options: Lower Extremity Functional Scale (LEFS)  Score: 27  Functional Limitation: Mobility: Walking and moving around  Mobility: Walking and Moving Around Current Status (): At least 40 percent but less than 60 percent impaired, limited or restricted  Mobility: Walking and Moving Around Goal Status (): At least 1 percent but less than 20 percent impaired, limited or restricted         Cheyanne Landers, PT  3/28/2017

## 2017-04-06 ENCOUNTER — HOSPITAL ENCOUNTER (OUTPATIENT)
Dept: PHYSICAL THERAPY | Facility: HOSPITAL | Age: 67
Setting detail: THERAPIES SERIES
Discharge: HOME | End: 2017-04-06

## 2017-04-06 DIAGNOSIS — Z96.641 HISTORY OF TOTAL HIP REPLACEMENT, RIGHT: Primary | ICD-10-CM

## 2017-04-06 DIAGNOSIS — R26.9 ABNORMALITY OF GAIT: ICD-10-CM

## 2017-04-06 PROCEDURE — 97110 THERAPEUTIC EXERCISES: CPT

## 2017-04-06 NOTE — PROGRESS NOTES
Outpatient Physical Therapy Ortho Treatment Note  [facility]     Patient Name: Chrissy Dawson  : 1950  MRN: [id number]  Today's Date: 2017      Attendance: 2  Subjective Improvement: 0%  Recert:  MD Appointment: 2017      Visit Date: 2017    Visit Dx:    ICD-10-CM ICD-9-CM   1. History of total hip replacement, right Z96.641 V43.64   2. Abnormality of gait R26.9 781.2       Patient Active Problem List   Diagnosis   • Adenopathy, cervical   • Hyperlipidemia   • Coronary arteriosclerosis   • Benign essential hypertension   • Abnormal mammogram of right breast   • Presence of artificial hip joint        Past Medical History:   Diagnosis Date   • Backache    • Benign essential hypertension     PATIENT DENIES   • Cervical arthritis    • Coronary arteriosclerosis    • Ganglion cyst of wrist     Ganglion/synovial cyst - wrist   • Ganglion of wrist    • Hip pain    • History of echocardiogram 10/23/2015    Echocardiogram W/ color flow 84264 (1) - Normal LV function with Ef of 55-60%.Normal RV size and function.Pseudonormal diastolic dysfunciton with borderline [facility].NO sig.valvular regurg or stenosis.   • History of mammogram 09/10/2014    MAMMOGRAM SCREENING 73625 - [facility] ([facility] - [facility] ([facility])   • History of transfusion    • Hyperlipidemia    • Inguinal pain    • Recurrent angina status post coronary artery bypass graft     Recurrent angina after coronary artery bypass graft   • Urge incontinence of urine         Past Surgical History:   Procedure Laterality Date   • BREAST BIOPSY Right 2017    Procedure: RIGHT BREAST LUMPECTOMY WITH NEEDLE LOCALIZATION AND ULTRASOUND;  Surgeon: Delfino Greer MD;  Location: [facility];  Service:    • CARDIAC SURGERY     • CORONARY ARTERY BYPASS GRAFT      CABG, arterial, single (1)   • GANGLION CYST EXCISION     • JOINT REPLACEMENT     • ORIF MANDIBULAR FRACTURE     • TOTAL ABDOMINAL HYSTERECTOMY     • TOTAL ABDOMINAL HYSTERECTOMY  WITH SALPINGO OOPHORECTOMY  1998    SALVADOR/BSO (1)   • TOTAL HIP ARTHROPLASTY  2011    Total hip replacement (3)             PT Ortho       04/06/17 1026    Gait Assessment/Treatment    Gait, [place] Level independent  -JW    Gait, Assistive Device other (see comments)   none- pt states she doesn't always use SC  -JW      User Key  (r) = Recorded By, (t) = Taken By, (c) = Cosigned By    Initials Name Provider Type    JERRY Medina PTA Physical Therapy Assistant                            PT Assessment/Plan       04/06/17 1000       PT Assessment    Functional Limitations Impaired gait  -JW     Impairments Muscle strength;Range of motion;Gait  -JW     Assessment Comments Pt w/ slight increase in pain after tx, blair new ther ex well  -JW     Rehab Potential Good  -JW     Patient/caregiver participated in establishment of treatment plan and goals Yes  -JW     Patient would benefit from skilled therapy intervention Yes  -JW     PT Plan    PT Frequency 2x/week  -JW     Predicted Duration of Therapy Intervention (days/wks) 6 weeks  -JW     PT Plan Comments hip streghtening, stretches  -JW       User Key  (r) = Recorded By, (t) = Taken By, (c) = Cosigned By    Initials Name Provider Type    JERRY Medina PTA Physical Therapy Assistant                    Exercises       04/06/17 1026          Subjective Comments    Subjective Comments Pt states she worked on exercises and stretches at home since last tx  -JW      Subjective Pain    Able to rate subjective pain? yes  -JW      Pre-Treatment Pain Level 0  -JW      Post-Treatment Pain Level 3  -JW      Aquatics    Aquatics performed? No  -JW      Exercise 1    Exercise Name 1 Pro II, seat 12, level -1   -JW      Time (Minutes) 1 10  -JW      Exercise 2    Exercise Name 2 st. HS stretch  -JW      Sets 2 1  -JW      Reps 2 3  -JW      Time (Seconds) 2 30  -JW      Exercise 3    Exercise Name 3 seated piriformis stretch  -JW      Sets 3 1  -JW      Reps 3 3   "-JW      Time (Seconds) 3 30  -JW      Exercise 4    Exercise Name 4 seated hip flexion  -JW      Sets 4 2  -JW      Reps 4 10  -JW      Exercise 5    Exercise Name 5 ADD squeeze w/ bolster  -JW      Sets 5 2  -JW      Reps 5 10  -JW      Time (Seconds) 5 5\" hold  -JW      Exercise 6    Exercise Name 6 sup SLR  -JW      Sets 6 2  -JW      Reps 6 10  -JW      Exercise 7    Exercise Name 7 bridging  -JW      Sets 7 2  -JW      Reps 7 10  -JW      Exercise 8    Exercise Name 8 SL clamshells  -JW      Sets 8 2  -JW      Reps 8 10  -JW      Exercise 9    Exercise Name 9 SL hip ABD  -JW      Sets 9 2  -JW      Reps 9 10  -JW      Exercise 10    Exercise Name 10 supine HS  -JW      Sets 10 2  -JW      Reps 10 10  -JW        User Key  (r) = Recorded By, (t) = Taken By, (c) = Cosigned By    Initials Name Provider Type    JERRY Medina, PTA Physical Therapy Assistant                               PT OP Goals       04/06/17 1026       PT Short Term Goals    STG Date to Achieve 04/18/17  -JW     STG 1 Patient will be independent in home exercise program.  -JW     STG 1 Progress Not Met  -JW     STG 2 The patient will have 100° hip flexion active range of motion supine.  -JW     STG 2 Progress Not Met  -JW     STG 3 The patient will have 30° active hip abduction supine.  -JW     STG 3 Progress Not Met  -JW     STG 4 The patient be able to sit to stand and initiate walk without hesitation.  -JW     STG 4 Progress Not Met  -JW     STG 5 Subjectively report 40-50% improvement.  -JW     STG 5 Progress Not Met  -JW     STG 6 Patient will be independent in HEP.  -JW     STG 6 Progress Not Met  -JW     Long Term Goals    LTG Date to Achieve 05/09/17  -JW     LTG 1 Will be able to perform 10 sit to stands without use of upper extremities.  -JW     LTG 1 Progress Not Met  -JW     LTG 2 The patient be able to ambulate without an assistive device.  -JW     LTG 2 Progress Not Met  -JW     LTG 3 The patient will manual muscle test " hip flexor at 5 minus or greater on the right.  -JW     LTG 3 Progress Not Met  -JW     LTG 4 Patient will manual muscle test at 4+ or better for hip abduction on the right.  -JW     LTG 4 Progress Not Met  -JW     LTG 5 The patient reports 75% or better improvement.  -JW     LTG 5 Progress Not Met  -JW     LTG 6 LEFS score   40 or more  -JW     LTG 6 Progress Not Met  -JW     Time Calculation    PT Goal Re-Cert Due Date 04/18/17  -JW       User Key  (r) = Recorded By, (t) = Taken By, (c) = Cosigned By    Initials Name Provider Type    JERRY Medina PTA Physical Therapy Assistant                Therapy Education       04/06/17 1111          Therapy Education    Given HEP  -JW      Program New  -JW      How Provided Written  -JW      Provided to Patient  -JW      Level of Understanding Verbalized;Demonstrated  -JW        User Key  (r) = Recorded By, (t) = Taken By, (c) = Cosigned By    Initials Name Provider Type    JERRY Medina PTA Physical Therapy Assistant                Time Calculation:   Start Time: 1026  Stop Time: 1111  Time Calculation (min): 45 min  Total Timed Code Minutes- PT: 45 minute(s)    Therapy Charges for Today     Code Description Service Date Service Provider Modifiers Qty    51320149001 HC PT THER PROC EA 15 MIN 4/6/2017 Diana Medina PTA GP 3                    Diana Medina PTA  4/6/2017

## 2017-04-11 ENCOUNTER — APPOINTMENT (OUTPATIENT)
Dept: PHYSICAL THERAPY | Facility: HOSPITAL | Age: 67
End: 2017-04-11

## 2017-04-13 ENCOUNTER — APPOINTMENT (OUTPATIENT)
Dept: LAB | Facility: HOSPITAL | Age: 67
End: 2017-04-13

## 2017-04-13 ENCOUNTER — OFFICE VISIT (OUTPATIENT)
Dept: FAMILY MEDICINE CLINIC | Facility: CLINIC | Age: 67
End: 2017-04-13

## 2017-04-13 VITALS
HEIGHT: 70 IN | WEIGHT: 173 LBS | SYSTOLIC BLOOD PRESSURE: 110 MMHG | BODY MASS INDEX: 24.77 KG/M2 | DIASTOLIC BLOOD PRESSURE: 72 MMHG

## 2017-04-13 DIAGNOSIS — E78.5 HYPERLIPIDEMIA, UNSPECIFIED HYPERLIPIDEMIA TYPE: ICD-10-CM

## 2017-04-13 DIAGNOSIS — Z11.59 NEED FOR HEPATITIS C SCREENING TEST: ICD-10-CM

## 2017-04-13 DIAGNOSIS — R32 URINARY INCONTINENCE, UNSPECIFIED TYPE: Primary | ICD-10-CM

## 2017-04-13 LAB
ALBUMIN SERPL-MCNC: 4.5 G/DL (ref 3.4–4.8)
ALBUMIN/GLOB SERPL: 1.3 G/DL (ref 1.1–1.8)
ALP SERPL-CCNC: 80 U/L (ref 38–126)
ALT SERPL W P-5'-P-CCNC: 18 U/L (ref 9–52)
ANION GAP SERPL CALCULATED.3IONS-SCNC: 10 MMOL/L (ref 5–15)
ARTICHOKE IGE QN: 97 MG/DL (ref 1–129)
AST SERPL-CCNC: 46 U/L (ref 14–36)
BILIRUB SERPL-MCNC: 0.5 MG/DL (ref 0.2–1.3)
BUN BLD-MCNC: 18 MG/DL (ref 7–21)
BUN/CREAT SERPL: 31 (ref 7–25)
CALCIUM SPEC-SCNC: 9.8 MG/DL (ref 8.4–10.2)
CHLORIDE SERPL-SCNC: 99 MMOL/L (ref 95–110)
CHOLEST SERPL-MCNC: 208 MG/DL (ref 0–199)
CO2 SERPL-SCNC: 30 MMOL/L (ref 22–31)
CREAT BLD-MCNC: 0.58 MG/DL (ref 0.5–1)
GFR SERPL CREATININE-BSD FRML MDRD: 126 ML/MIN/1.73 (ref 45–104)
GLOBULIN UR ELPH-MCNC: 3.4 GM/DL (ref 2.3–3.5)
GLUCOSE BLD-MCNC: 86 MG/DL (ref 60–100)
HDLC SERPL-MCNC: 59 MG/DL (ref 60–200)
LDLC/HDLC SERPL: 2.3 {RATIO} (ref 0–3.22)
POTASSIUM BLD-SCNC: 4.1 MMOL/L (ref 3.5–5.1)
PROT SERPL-MCNC: 7.9 G/DL (ref 6.3–8.6)
SODIUM BLD-SCNC: 139 MMOL/L (ref 137–145)
TRIGL SERPL-MCNC: 67 MG/DL (ref 20–199)

## 2017-04-13 PROCEDURE — 80061 LIPID PANEL: CPT | Performed by: FAMILY MEDICINE

## 2017-04-13 PROCEDURE — 86803 HEPATITIS C AB TEST: CPT | Performed by: FAMILY MEDICINE

## 2017-04-13 PROCEDURE — 80053 COMPREHEN METABOLIC PANEL: CPT | Performed by: FAMILY MEDICINE

## 2017-04-13 PROCEDURE — 99213 OFFICE O/P EST LOW 20 MIN: CPT | Performed by: FAMILY MEDICINE

## 2017-04-13 PROCEDURE — 36415 COLL VENOUS BLD VENIPUNCTURE: CPT | Performed by: FAMILY MEDICINE

## 2017-04-13 RX ORDER — OXYBUTYNIN CHLORIDE 15 MG/1
15 TABLET, EXTENDED RELEASE ORAL DAILY
Qty: 30 TABLET | Refills: 2 | Status: SHIPPED | OUTPATIENT
Start: 2017-04-13 | End: 2017-07-24 | Stop reason: SDUPTHER

## 2017-04-13 NOTE — PROGRESS NOTES
Subjective   Chrissy Amira Dawson is a 66 y.o. female.     History of Present Illness   Ms. Dawson is a 67yo female that presents today for follow-up on urinary incontence.  She has had to continue wearing briefs.   She has been trying Kegels and oxybutynin and that hasn't helped.  She is interested in trying something else.  She hasn't had any issues with dysuria, hematuria, or increased frequency.  She has been taking pravastatin for her cholesterol and has been tolerating that better then previous statins.  She has had CAD and wants to make sure that the medication is working well.      Current Outpatient Prescriptions:   •  aspirin 81 MG tablet, Take 81 mg by mouth., Disp: , Rfl:   •  Multiple Vitamins-Minerals (CENTRUM SILVER PO), Take 1 tablet by mouth Daily., Disp: , Rfl:   •  pravastatin (PRAVACHOL) 10 MG tablet, Take 1 tablet by mouth Daily., Disp: 90 tablet, Rfl: 3  •  oxybutynin XL (DITROPAN XL) 15 MG 24 hr tablet, Take 1 tablet by mouth Daily., Disp: 30 tablet, Rfl: 2    The following portions of the patient's history were reviewed and updated as appropriate: allergies, current medications, past family history, past medical history, past social history, past surgical history and problem list.    Review of Systems   Constitutional: Negative for activity change, appetite change, fatigue and unexpected weight change.   Cardiovascular: Negative for chest pain, palpitations and leg swelling.   Gastrointestinal: Negative for abdominal distention, abdominal pain, constipation, diarrhea, nausea and vomiting.   Genitourinary: Negative for difficulty urinating, dysuria, frequency, hematuria, menstrual problem, pelvic pain, urgency, vaginal discharge and vaginal pain.   Skin: Negative for pallor, rash and wound.   Psychiatric/Behavioral: Negative for dysphoric mood and sleep disturbance. The patient is not nervous/anxious.        Objective    Vitals:    04/13/17 0937   BP: 110/72   Weight: 173 lb (78.5 kg)   Height:  "69.5\" (176.5 cm)     Physical Exam   Constitutional: She is oriented to person, place, and time. She appears well-developed and well-nourished. No distress.   Cardiovascular: Normal rate, regular rhythm and normal heart sounds.    No murmur heard.  No LE edema.   Pulmonary/Chest: Effort normal and breath sounds normal. No respiratory distress.   Abdominal: Soft. Bowel sounds are normal. She exhibits no distension. There is no tenderness.   Neurological: She is alert and oriented to person, place, and time.   Psychiatric: She has a normal mood and affect. Her behavior is normal. Judgment and thought content normal.   Nursing note and vitals reviewed.      Assessment/Plan   Problems Addressed this Visit        Cardiovascular and Mediastinum    Hyperlipidemia    Relevant Orders    Lipid Panel (Completed)    Comprehensive Metabolic Panel (Completed)      Other Visit Diagnoses     Urinary incontinence, unspecified type    -  Primary    Relevant Medications    oxybutynin XL (DITROPAN XL) 15 MG 24 hr tablet    Other Relevant Orders    Ambulatory Referral to Urology    Need for hepatitis C screening test        Relevant Orders    Hepatitis C antibody        1.) Urinary Incontinence-  Will increase her oxybutynin and if that helps she can cancel appointment with urology. Will order that referral today.  2.) Hyperlipidemia-  Will recheck lipids and LFT today.  Increase pravastatin if needed.  RTC in 2-3 months or sooner PRN           "

## 2017-04-14 LAB — HCV AB SER DONR QL: NEGATIVE

## 2017-04-21 ENCOUNTER — HOSPITAL ENCOUNTER (OUTPATIENT)
Dept: PHYSICAL THERAPY | Facility: HOSPITAL | Age: 67
Setting detail: THERAPIES SERIES
Discharge: HOME | End: 2017-04-21

## 2017-04-21 DIAGNOSIS — Z96.641 HISTORY OF TOTAL HIP REPLACEMENT, RIGHT: Primary | ICD-10-CM

## 2017-04-21 DIAGNOSIS — R26.9 ABNORMALITY OF GAIT: ICD-10-CM

## 2017-04-21 DIAGNOSIS — Z96.642 PRESENCE OF LEFT ARTIFICIAL HIP JOINT: ICD-10-CM

## 2017-04-21 PROCEDURE — 97110 THERAPEUTIC EXERCISES: CPT | Performed by: PHYSICAL THERAPIST

## 2017-04-21 PROCEDURE — G8979 MOBILITY GOAL STATUS: HCPCS | Performed by: PHYSICAL THERAPIST

## 2017-04-21 PROCEDURE — 97110 THERAPEUTIC EXERCISES: CPT

## 2017-04-21 PROCEDURE — G8978 MOBILITY CURRENT STATUS: HCPCS | Performed by: PHYSICAL THERAPIST

## 2017-04-21 NOTE — PROGRESS NOTES
Outpatient Physical Therapy Ortho Re-Assessment  [facility]     Patient Name: Chrissy Dawson  : 1950  MRN: [id number]  Today's Date: 2017      Visit Date: 2017   Subjective Improvement 30-40%  Visits 3/5  Visits approvede6 until 2017  RTMD 2017  recert date 2017    Patient Active Problem List   Diagnosis   • Adenopathy, cervical   • Hyperlipidemia   • Coronary arteriosclerosis   • Benign essential hypertension   • Abnormal mammogram of right breast   • Presence of artificial hip joint        Past Medical History:   Diagnosis Date   • Backache    • Benign essential hypertension     PATIENT DENIES   • Cervical arthritis    • Coronary arteriosclerosis    • Ganglion cyst of wrist     Ganglion/synovial cyst - wrist   • Ganglion of wrist    • Hip pain    • History of echocardiogram 10/23/2015    Echocardiogram W/ color flow 65669 (1) - Normal LV function with Ef of 55-60%.Normal RV size and function.Pseudonormal diastolic dysfunciton with borderline [facility].NO sig.valvular regurg or stenosis.   • History of mammogram 09/10/2014    MAMMOGRAM SCREENING 92147 - [facility] (1) - LIZZ MILLER ([facility])   • History of transfusion    • Hyperlipidemia    • Inguinal pain    • Recurrent angina status post coronary artery bypass graft     Recurrent angina after coronary artery bypass graft   • Urge incontinence of urine         Past Surgical History:   Procedure Laterality Date   • BREAST BIOPSY Right 2017    Procedure: RIGHT BREAST LUMPECTOMY WITH NEEDLE LOCALIZATION AND ULTRASOUND;  Surgeon: Delfino Greer MD;  Location: [facility];  Service:    • CARDIAC SURGERY     • CORONARY ARTERY BYPASS GRAFT      CABG, arterial, single (1)   • GANGLION CYST EXCISION     • JOINT REPLACEMENT     • ORIF MANDIBULAR FRACTURE     • TOTAL ABDOMINAL HYSTERECTOMY     • TOTAL ABDOMINAL HYSTERECTOMY WITH SALPINGO OOPHORECTOMY      SALVADOR/BSO (1)   • TOTAL HIP ARTHROPLASTY      Total hip  replacement (3)       Visit Dx:     ICD-10-CM ICD-9-CM   1. History of total hip replacement, right Z96.641 V43.64   2. Abnormality of gait R26.9 781.2   3. Presence of left artificial hip joint Z96.642 V43.64     Medications (Admitted on 4/21/2017)     Outpatient Medications     aspirin 81 MG tablet      Multiple Vitamins-Minerals (CENTRUM SILVER PO)      oxybutynin XL (DITROPAN XL) 15 MG 24 hr tablet      pravastatin (PRAVACHOL) 10 MG tablet                  PT Ortho       04/21/17 0800    Gait Assessment/Treatment    Gait, [place] Level independent  -CP    Gait, Gait Deviations antalgic  -CP    Gait, Comment trendelberg gait   -CP      User Key  (r) = Recorded By, (t) = Taken By, (c) = Cosigned By    Initials Name Provider Type    CP Breana Lutz PTA Physical Therapy Assistant                            Therapy Education       04/21/17 0943          Therapy Education    Given HEP   Standing hip 3way, heel raises and toe riases  -CP      Program New  -CP      How Provided Verbal;Demonstration;Written  -CP      Provided to Patient  -CP      Level of Understanding Verbalized;Demonstrated  -CP        User Key  (r) = Recorded By, (t) = Taken By, (c) = Cosigned By    Initials Name Provider Type    CP Breana Lutz PTA Physical Therapy Assistant                PT OP Goals       04/21/17 0900 04/21/17 0845    PT Short Term Goals    STG Date to Achieve 05/05/17  -BS 05/05/17  -BS    STG 1 Patient will be independent in home exercise program.  -BS     STG 1 Progress Progressing  -BS     STG 1 Progress Comments --  -BS     STG 2 The patient will have 100° hip flexion active range of motion supine.  -BS     STG 2 Progress Progressing  -BS     STG 3 The patient will have 30° active hip abduction supine.  -BS     STG 3 Progress Progressing  -BS     STG 4 The patient be able to sit to stand and initiate walk without hesitation.  -BS     STG 4 Progress Progressing  -BS     STG 5 Subjectively report 40-50%  improvement.  -BS     STG 5 Progress Progressing  -BS     STG 6 Patient will be independent in HEP.  -BS     STG 6 Progress Progressing  -BS     Long Term Goals    LTG Date to Achieve 05/19/17  -BS     LTG 1 Will be able to perform 10 sit to stands without use of upper extremities.  -BS     LTG 1 Progress Progressing  -BS     LTG 2 The patient be able to ambulate without an assistive device.  -BS     LTG 2 Progress Met  -BS     LTG 3 The patient will manual muscle test hip flexor at 5 minus or greater on the right.  -BS     LTG 3 Progress Ongoing  -BS     LTG 4 Patient will manual muscle test at 4+ or better for hip abduction on the right.  -BS     LTG 4 Progress Progressing  -BS     LTG 5 The patient reports 75% or better improvement.  -BS     LTG 5 Progress Progressing  -BS     LTG 6 LEFS score   > or = 45   -BS     LTG 6 Progress Progressing  -BS     Time Calculation    PT Goal Re-Cert Due Date 05/12/17  -BS 05/12/17  -BS      User Key  (r) = Recorded By, (t) = Taken By, (c) = Cosigned By    Initials Name Provider Type    RADHA Franks, PT Physical Therapist                PT Assessment/Plan       04/21/17 0944 04/21/17 0900    PT Assessment    Functional Limitations  Impaired gait  -BS    Impairments  Muscle strength;Range of motion;Gait  -BS    Assessment Comments no increase pain with exercies.  c/o weakness.  -CP Pt is s/p L hip revision in 2011 (s/p L SALTY in 1999). Limited R hip strength, with slight decrease in R hip flex/abd MMT and AROM since the PT evaluation and improved gait mechanics without use of an AD (met LTG #2) on level surfaces.   -BS    Please refer to paper survey for additional self-reported information  Yes  -BS    Rehab Potential  Good  -BS    Patient/caregiver participated in establishment of treatment plan and goals  Yes  -BS    Patient would benefit from skilled therapy intervention  Yes  -BS    PT Plan    PT Frequency 2x/week  -CP 2x/week  -BS    Predicted Duration of Therapy  "Intervention (days/wks)  4 weeks  -BS    Planned CPT's?  PT THER PROC EA 15 MIN: 25265;PT RE-EVAL: 94288;PT NEUROMUSC RE-EDUCATION EA 15 MIN: 62251;PT THER ACT EA 15 MIN: 90432;PT GAIT TRAINING EA 15 MIN: 96904;PT MANUAL THERAPY EA 15 MIN: 08871;PT HOT OR COLD PACK TREAT MCARE;PT ELECTRICAL STIM UNATTEND:   -BS    Physical Therapy Interventions (Optional Details)  gait training;home exercise program;modalities;patient/family education;strengthening;ROM (Range of Motion);stretching;manual therapy techniques;neuromuscular re-education  -BS    PT Plan Comments  continue with R hip flex/abd strengthening, with emphais on improving R hip flex AROM to improve R foot clearance with climbing stairs  -BS      User Key  (r) = Recorded By, (t) = Taken By, (c) = Cosigned By    Initials Name Provider Type    CP Breana Lutz, PTA Physical Therapy Assistant    BS Alphonse Franks, PT Physical Therapist                  Exercises       04/21/17 0800          Subjective Comments    Subjective Comments Patient states that she does not want to have another hip surgery.  States that it is hard to get out of a chair or a car.  Only uses a cane on unlevel surfaces  -CP      Subjective Pain    Able to rate subjective pain? yes  -CP      Pre-Treatment Pain Level 2  -CP      Post-Treatment Pain Level 2  -CP      Subjective Pain Comment Believes that increase pain is secondary to with weather  -CP      Aquatics    Aquatics performed? No  -CP      Exercise 1    Exercise Name 1 Pro II seat 12 Level 2  -CP      Time (Minutes) 1 10  -CP      Exercise 2    Exercise Name 2 Standing Right HS Stretch  -CP      Sets 2 3  -CP      Time (Seconds) 2 30  -CP      Exercise 3    Exercise Name 3 incline stretch  -CP      Sets 3 3  -CP      Time (Seconds) 3 30  -CP      Exercise 4    Exercise Name 4 heel/toe raises  -CP      Sets 4 2  -CP      Reps 4 10  -CP      Exercise 5    Exercise Name 5 step up 4\"  -CP      Sets 5 2  -CP      Reps 5 10  -CP      " Exercise 6    Exercise Name 6 mini squats  -CP      Sets 6 2  -CP      Reps 6 10  -CP      Exercise 7    Exercise Name 7 Standing hip 3 way  -CP      Reps 7 15  -CP      Exercise 8    Exercise Name 8 side stepping on foam  -CP      Cueing 8 --   handrail assist for balance  -CP      Reps 8 5  -CP      Exercise 9    Exercise Name 9 seated hip fl  -CP      Sets 9 2  -CP      Reps 9 10  -CP      Exercise 10    Exercise Name 10 Sit to stand from high low table at different heights independeant  -CP      Sets 10 3  -CP      Reps 10 5  -CP        User Key  (r) = Recorded By, (t) = Taken By, (c) = Cosigned By    Initials Name Provider Type    CP Breana Lutz, PTA Physical Therapy Assistant                              Outcome Measures       04/21/17 0900          Lower Extremity Functional Index    Any of your usual work, housework or school activities 3  -BS      Your usual hobbies, recreational or sporting activities 2  -BS      Getting into or out of the bath 4  -BS      Walking between rooms 2  -BS      Putting on your shoes or socks 1  -BS      Squatting 0  -BS      Lifting an object, like a bag of groceries from the floor 4  -BS      Performing light activities around your home 2  -BS      Performing heavy activities around your home 1  -BS      Getting into or out of a car 3  -BS      Walking 2 blocks 3  -BS      Walking a mile 1  -BS      Going up or down 10 stairs (about 1 flight of stairs) 2  -BS      Standing for 1 hour 3  -BS      Sitting for 1 hour 3  -BS      Running on even ground 2  -BS      Running on uneven ground 1  -BS      Making sharp turns while running fast 1  -BS      Hopping 0  -BS      Rolling over in bed 2  -BS      Total 40  -BS      Functional Assessment    Outcome Measure Options Lower Extremity Functional Scale (LEFS)  -BS        User Key  (r) = Recorded By, (t) = Taken By, (c) = Cosigned By    Initials Name Provider Type    RADHA Franks, PT Physical Therapist            Time  Calculation:   Start Time: 0840  Stop Time: 0930  Time Calculation (min): 50 min  Total Timed Code Minutes- PT: 30 minute(s)     Therapy Charges for Today     Code Description Service Date Service Provider Modifiers Qty    75632017262 HC PT MOBILITY CURRENT 4/21/2017 Alphonse Franks, PT GP, CK 1    24021719031 HC PT MOBILITY PROJECTED 4/21/2017 Alphonse Franks, PT GP, CI 1    27860211659 HC PT THER PROC EA 15 MIN 4/21/2017 Alphonse Franks, PT GP 1          PT G-Codes  PT Professional Judgement Used?: Yes  Outcome Measure Options: Lower Extremity Functional Scale (LEFS)  Score: 40  Functional Limitation: Mobility: Walking and moving around  Mobility: Walking and Moving Around Current Status (): At least 40 percent but less than 60 percent impaired, limited or restricted  Mobility: Walking and Moving Around Goal Status (): At least 1 percent but less than 20 percent impaired, limited or restricted         Alphonse Franks PT  4/21/2017

## 2017-04-21 NOTE — PROGRESS NOTES
Outpatient Physical Therapy Ortho Treatment Note  [facility]     Patient Name: Chrissy Dawson  : 1950  MRN: [id number]  Today's Date: 2017      Visit Date: 2017      Subjective [id number]%  Visits 3/5  Visits approvede6 until 2017  RTMD 2017  recert date 2017    Visit Dx:    ICD-10-CM ICD-9-CM   1. History of total hip replacement, right Z96.641 V43.64   2. Abnormality of gait R26.9 781.2   3. Presence of left artificial hip joint Z96.642 V43.64       Patient Active Problem List   Diagnosis   • Adenopathy, cervical   • Hyperlipidemia   • Coronary arteriosclerosis   • Benign essential hypertension   • Abnormal mammogram of right breast   • Presence of artificial hip joint        Past Medical History:   Diagnosis Date   • Backache    • Benign essential hypertension     PATIENT DENIES   • Cervical arthritis    • Coronary arteriosclerosis    • Ganglion cyst of wrist     Ganglion/synovial cyst - wrist   • Ganglion of wrist    • Hip pain    • History of echocardiogram 10/23/2015    Echocardiogram W/ color flow 74090 (1) - Normal LV function with Ef of 55-60%.Normal RV size and function.Pseudonormal diastolic dysfunciton with borderline [facility].NO sig.valvular regurg or stenosis.   • History of mammogram 09/10/2014    MAMMOGRAM SCREENING 05635 - [facility] ([facility] - [facility] ([facility])   • History of transfusion    • Hyperlipidemia    • Inguinal pain    • Recurrent angina status post coronary artery bypass graft     Recurrent angina after coronary artery bypass graft   • Urge incontinence of urine         Past Surgical History:   Procedure Laterality Date   • BREAST BIOPSY Right 2017    Procedure: RIGHT BREAST LUMPECTOMY WITH NEEDLE LOCALIZATION AND ULTRASOUND;  Surgeon: Delfino Greer MD;  Location: [facility];  Service:    • CARDIAC SURGERY     • CORONARY ARTERY BYPASS GRAFT      CABG, arterial, single (1)   • GANGLION CYST EXCISION     • JOINT REPLACEMENT     •  ORIF MANDIBULAR FRACTURE     • TOTAL ABDOMINAL HYSTERECTOMY     • TOTAL ABDOMINAL HYSTERECTOMY WITH SALPINGO OOPHORECTOMY  1998    SALVADOR/BSO (1)   • TOTAL HIP ARTHROPLASTY  2011    Total hip replacement (3)             PT Ortho       04/21/17 0800    Gait Assessment/Treatment    Gait, [place] Level independent  -CP    Gait, Gait Deviations antalgic  -CP    Gait, Comment trendelberg gait   -CP      User Key  (r) = Recorded By, (t) = Taken By, (c) = Cosigned By    Initials Name Provider Type    CP Breana Lutz PTA Physical Therapy Assistant                            PT Assessment/Plan       04/21/17 0944 04/21/17 0900    PT Assessment    Functional Limitations  Impaired gait  -BS    Impairments  Muscle strength;Range of motion;Gait  -BS    Assessment Comments no increase pain with exercies.  c/o weakness.  -CP Pt is s/p L hip revision in 2011 (s/p L SALTY in 1999). Limited R hip strength, with slight decrease in R hip flex/abd MMT and AROM since the PT evaluation and improved gait mechanics without use of an AD (met LTG #2) on level surfaces.   -BS    Rehab Potential  Good  -BS    Patient/caregiver participated in establishment of treatment plan and goals  Yes  -BS    Patient would benefit from skilled therapy intervention  Yes  -BS    PT Plan    PT Frequency 2x/week  -CP 2x/week  -BS    Predicted Duration of Therapy Intervention (days/wks)  4 weeks  -BS    PT Plan Comments  continue with R hip flex/abd strengthening, with emphais on improving R hip flex AROM to improve R foot clearance with climbing stairs  -BS      User Key  (r) = Recorded By, (t) = Taken By, (c) = Cosigned By    Initials Name Provider Type    CP Breana Lutz PTA Physical Therapy Assistant    RADHA Franks, PT Physical Therapist                    Exercises       04/21/17 0800          Subjective Comments    Subjective Comments Patient states that she does not want to have another hip surgery.  States that it is hard to get out of a  "chair or a car.  Only uses a cane on unlevel surfaces  -CP      Subjective Pain    Able to rate subjective pain? yes  -CP      Pre-Treatment Pain Level 2  -CP      Post-Treatment Pain Level 2  -CP      Subjective Pain Comment Believes that increase pain is secondary to with weather  -CP      Aquatics    Aquatics performed? No  -CP      Exercise 1    Exercise Name 1 Pro II seat 12 Level 2  -CP      Time (Minutes) 1 10  -CP      Exercise 2    Exercise Name 2 Standing Right HS Stretch  -CP      Sets 2 3  -CP      Time (Seconds) 2 30  -CP      Exercise 3    Exercise Name 3 incline stretch  -CP      Sets 3 3  -CP      Time (Seconds) 3 30  -CP      Exercise 4    Exercise Name 4 heel/toe raises  -CP      Sets 4 2  -CP      Reps 4 10  -CP      Exercise 5    Exercise Name 5 step up 4\"  -CP      Sets 5 2  -CP      Reps 5 10  -CP      Exercise 6    Exercise Name 6 mini squats  -CP      Sets 6 2  -CP      Reps 6 10  -CP      Exercise 7    Exercise Name 7 Standing hip 3 way  -CP      Reps 7 15  -CP      Exercise 8    Exercise Name 8 side stepping on foam  -CP      Cueing 8 --   handrail assist for balance  -CP      Reps 8 5  -CP      Exercise 9    Exercise Name 9 seated hip fl  -CP      Sets 9 2  -CP      Reps 9 10  -CP      Exercise 10    Exercise Name 10 Sit to stand from high low table at different heights independeant  -CP      Sets 10 3  -CP      Reps 10 5  -CP        User Key  (r) = Recorded By, (t) = Taken By, (c) = Cosigned By    Initials Name Provider Type    CP Breana Lutz, PTA Physical Therapy Assistant                               PT OP Goals       04/21/17 0900 04/21/17 0845    PT Short Term Goals    STG Date to Achieve 05/05/17  -BS 05/05/17  -BS    STG 1 Patient will be independent in home exercise program.  -BS     STG 1 Progress Not Met  -BS     STG 1 Progress Comments Not met  -BS     STG 2 The patient will have 100° hip flexion active range of motion supine.  -BS     STG 2 Progress Not Met  -BS     STG 3 " The patient will have 30° active hip abduction supine.  -BS     STG 3 Progress Not Met  -BS     STG 4 The patient be able to sit to stand and initiate walk without hesitation.  -BS     STG 4 Progress Progressing  -BS     STG 5 Subjectively report 40-50% improvement.  -BS     STG 5 Progress Progressing  -BS     STG 6 Patient will be independent in HEP.  -BS     STG 6 Progress Not Met  -BS     Long Term Goals    LTG Date to Achieve 05/19/17  -BS     LTG 1 Will be able to perform 10 sit to stands without use of upper extremities.  -BS     LTG 1 Progress Not Met  -BS     LTG 2 The patient be able to ambulate without an assistive device.  -BS     LTG 2 Progress Met  -BS     LTG 3 The patient will manual muscle test hip flexor at 5 minus or greater on the right.  -BS     LTG 3 Progress Not Met  -BS     LTG 4 Patient will manual muscle test at 4+ or better for hip abduction on the right.  -BS     LTG 4 Progress Not Met  -BS     LTG 5 The patient reports 75% or better improvement.  -BS     LTG 5 Progress Not Met  -BS     LTG 6 LEFS score   40 or more  -BS     LTG 6 Progress Not Met  -BS     Time Calculation    PT Goal Re-Cert Due Date 05/12/17  -BS 05/12/17  -BS      User Key  (r) = Recorded By, (t) = Taken By, (c) = Cosigned By    Initials Name Provider Type    RADHA Franks, PT Physical Therapist                Therapy Education       04/21/17 0943          Therapy Education    Given HEP   Standing hip 3way, heel raises and toe riases  -CP      Program New  -CP      How Provided Verbal;Demonstration;Written  -CP      Provided to Patient  -CP      Level of Understanding Verbalized;Demonstrated  -CP        User Key  (r) = Recorded By, (t) = Taken By, (c) = Cosigned By    Initials Name Provider Type    CP Breana Lutz PTA Physical Therapy Assistant                Outcome Measures       04/21/17 0900          Lower Extremity Functional Index    Any of your usual work, housework or school activities 3  -BS      Your  usual hobbies, recreational or sporting activities 2  -BS      Getting into or out of the bath 4  -BS      Walking between rooms 2  -BS      Putting on your shoes or socks 1  -BS      Squatting 0  -BS      Lifting an object, like a bag of groceries from the floor 4  -BS      Performing light activities around your home 2  -BS      Performing heavy activities around your home 1  -BS      Getting into or out of a car 3  -BS      Walking 2 blocks 3  -BS      Walking a mile 1  -BS      Going up or down 10 stairs (about 1 flight of stairs) 2  -BS      Standing for 1 hour 3  -BS      Sitting for 1 hour 3  -BS      Running on even ground 2  -BS      Running on uneven ground 1  -BS      Making sharp turns while running fast 1  -BS      Hopping 0  -BS      Rolling over in bed 2  -BS      Total 40  -BS      Functional Assessment    Outcome Measure Options Lower Extremity Functional Scale (LEFS)  -BS        User Key  (r) = Recorded By, (t) = Taken By, (c) = Cosigned By    Initials Name Provider Type    RADHA Franks, PT Physical Therapist            Time Calculation:   Start Time: 0840  Stop Time: 0930  Time Calculation (min): 50 min  Total Timed Code Minutes- PT: 30 minute(s)    Therapy Charges for Today     Code Description Service Date Service Provider Modifiers Qty    76794521566 HC PT THER PROC EA 15 MIN 4/21/2017 Breana Lutz PTA GP 2          PT G-Codes  Outcome Measure Options: Lower Extremity Functional Scale (LEFS)         Breana Lutz PTA  4/21/2017

## 2017-04-24 ENCOUNTER — HOSPITAL ENCOUNTER (OUTPATIENT)
Dept: PHYSICAL THERAPY | Facility: HOSPITAL | Age: 67
Setting detail: THERAPIES SERIES
Discharge: HOME | End: 2017-04-24

## 2017-04-24 DIAGNOSIS — Z96.642 PRESENCE OF LEFT ARTIFICIAL HIP JOINT: ICD-10-CM

## 2017-04-24 DIAGNOSIS — R26.9 ABNORMALITY OF GAIT: ICD-10-CM

## 2017-04-24 DIAGNOSIS — Z96.641 HISTORY OF TOTAL HIP REPLACEMENT, RIGHT: Primary | ICD-10-CM

## 2017-04-24 PROCEDURE — 97110 THERAPEUTIC EXERCISES: CPT

## 2017-04-24 NOTE — PROGRESS NOTES
Outpatient Physical Therapy Ortho Treatment Note  [facility]     Patient Name: Chrissy Dawson  : 1950  MRN: [id number]  Today's Date: 2017      Visit Date: 2017     Subjective Improvement some  Visits 4/6  Visits approved 6 or until 2017  RTMD 2017  Recert Date 2017      Visit Dx:    ICD-10-CM ICD-9-CM   1. History of total hip replacement, right Z96.641 V43.64   2. Abnormality of gait R26.9 781.2   3. Presence of left artificial hip joint Z96.642 V43.64       Patient Active Problem List   Diagnosis   • Adenopathy, cervical   • Hyperlipidemia   • Coronary arteriosclerosis   • Benign essential hypertension   • Abnormal mammogram of right breast   • Presence of artificial hip joint        Past Medical History:   Diagnosis Date   • Backache    • Benign essential hypertension     PATIENT DENIES   • Cervical arthritis    • Coronary arteriosclerosis    • Ganglion cyst of wrist     Ganglion/synovial cyst - wrist   • Ganglion of wrist    • Hip pain    • History of echocardiogram 10/23/2015    Echocardiogram W/ color flow 49871 (1) - Normal LV function with Ef of 55-60%.Normal RV size and function.Pseudonormal diastolic dysfunciton with borderline [facility].NO sig.valvular regurg or stenosis.   • History of mammogram 09/10/2014    MAMMOGRAM SCREENING 12219 - [facility] ([facility] - LIZZ MILLER ([facility]   • History of transfusion    • Hyperlipidemia    • Inguinal pain    • Recurrent angina status post coronary artery bypass graft     Recurrent angina after coronary artery bypass graft   • Urge incontinence of urine         Past Surgical History:   Procedure Laterality Date   • BREAST BIOPSY Right 2017    Procedure: RIGHT BREAST LUMPECTOMY WITH NEEDLE LOCALIZATION AND ULTRASOUND;  Surgeon: Delfino Greer MD;  Location: [facility];  Service:    • CARDIAC SURGERY     • CORONARY ARTERY BYPASS GRAFT      CABG, arterial, single (1)   • GANGLION CYST EXCISION     • JOINT REPLACEMENT    "  • ORIF MANDIBULAR FRACTURE     • TOTAL ABDOMINAL HYSTERECTOMY     • TOTAL ABDOMINAL HYSTERECTOMY WITH SALPINGO OOPHORECTOMY  1998    SALVADOR/BSO (1)   • TOTAL HIP ARTHROPLASTY  2011    Total hip replacement (3)             PT Ortho       04/24/17 0800    Subjective Comments    Subjective Comments Patient reports no pain today.  States right leg feels weak.  -CP    Subjective Pain    Able to rate subjective pain? yes  -CP    Pre-Treatment Pain Level 0  -CP    Gait Assessment/Treatment    Gait, [place] Level independent  -CP    Gait, Comment trendelberg gait.  -CP      User Key  (r) = Recorded By, (t) = Taken By, (c) = Cosigned By    Initials Name Provider Type    CP Breana Lutz PTA Physical Therapy Assistant                            PT Assessment/Plan       04/24/17 0931       PT Assessment    Assessment Comments no c/o  increased pain.  -CP     PT Plan    PT Frequency 2x/week  -CP     Predicted Duration of Therapy Intervention (days/wks) 4 weeks  -CP     PT Plan Comments sit to stand from high low table at different heights  -CP       User Key  (r) = Recorded By, (t) = Taken By, (c) = Cosigned By    Initials Name Provider Type    CP Breana Lutz PTA Physical Therapy Assistant                    Exercises       04/24/17 0800          Subjective Comments    Subjective Comments Patient reports no pain today.  States right leg feels weak.  -CP      Subjective Pain    Able to rate subjective pain? yes  -CP      Pre-Treatment Pain Level 0  -CP      Aquatics    Aquatics performed? No  -CP      Exercise 1    Exercise Name 1 Pro II sesat 12 level 1  -CP      Time (Minutes) 1 10  -CP      Exercise 2    Exercise Name 2 incline stretch  -CP      Sets 2 3  -CP      Time (Seconds) 2 30  -CP      Exercise 3    Exercise Name 3 heel raises  -CP      Sets 3 2  -CP      Reps 3 10  -CP      Exercise 4    Exercise Name 4 Step up 4\"  -CP      Sets 4 2  -CP      Reps 4 10  -CP      Exercise 5    Exercise Name 5 lat step up " "4\"  -CP      Sets 5 2  -CP      Reps 5 10  -CP      Exercise 6    Exercise Name 6 Cybex leg press  -CP      Resistance 6 --   50 lb  -CP      Sets 6 3  -CP      Reps 6 10  -CP      Exercise 7    Exercise Name 7 Cybex hip AD  -CP      Weights/Plates 7 --   20 lb  -CP      Sets 7 3  -CP      Reps 7 10  -CP      Exercise 8    Exercise Name 8 Cybex hip AB  -CP      Weights/Plates 8 20  -CP      Sets 8 3  -CP      Reps 8 10  -CP      Exercise 9    Exercise Name 9 seated marching  -CP      Sets 9 2  -CP      Reps 9 10  -CP      Exercise 10    Exercise Name 10 SLR with place and hold  -CP      Sets 10 2  -CP      Reps 10 10  -CP      Time (Seconds) 10 3  -CP      Exercise 11    Exercise Name 11 heel slides for hip arom  -CP      Sets 11 2  -CP      Reps 11 10  -CP        User Key  (r) = Recorded By, (t) = Taken By, (c) = Cosigned By    Initials Name Provider Type    NICOLLE Lutz, PTA Physical Therapy Assistant                               PT OP Goals       04/24/17 0933 04/24/17 0900    PT Short Term Goals    STG Date to Achieve 05/05/17  -CP     STG 1 Patient will be independent in home exercise program.  -CP     STG 1 Progress Progressing  -CP     STG 2 The patient will have 100° hip flexion active range of motion supine.  -CP     STG 2 Progress Progressing  -CP     STG 3 The patient will have 30° active hip abduction supine.  -CP     STG 3 Progress Progressing  -CP     STG 4 The patient be able to sit to stand and initiate walk without hesitation.  -CP     STG 4 Progress Progressing  -CP     STG 5 Subjectively report 40-50% improvement.  -CP     STG 5 Progress Progressing  -CP     STG 6 Patient will be independent in HEP.  -CP     STG 6 Progress Progressing  -CP     Long Term Goals    LTG Date to Achieve 05/19/17  -CP     LTG 1 Will be able to perform 10 sit to stands without use of upper extremities.  -CP     LTG 1 Progress Progressing  -CP     LTG 2 The patient be able to ambulate without an assistive device.  " -CP     LTG 2 Progress Met  -CP     LTG 3 The patient will manual muscle test hip flexor at 5 minus or greater on the right.  -CP     LTG 3 Progress Ongoing  -CP     LTG 4 Patient will manual muscle test at 4+ or better for hip abduction on the right.  -CP     LTG 4 Progress Progressing  -CP     LTG 5 The patient reports 75% or better improvement.  -CP     LTG 5 Progress Progressing  -CP     LTG 6 LEFS score   > or = 45   -CP     LTG 6 Progress Progressing  -CP     Time Calculation    PT Goal Re-Cert Due Date  05/12/17  -CP      User Key  (r) = Recorded By, (t) = Taken By, (c) = Cosigned By    Initials Name Provider Type    CP Breana Lutz PTA Physical Therapy Assistant                Therapy Education       04/24/17 0930          Therapy Education    Given --   no change to hep  -CP      Program Reinforced  -CP      How Provided Verbal;Demonstration  -CP      Level of Understanding Verbalized;Demonstrated  -CP        User Key  (r) = Recorded By, (t) = Taken By, (c) = Cosigned By    Initials Name Provider Type    CP Breana Lutz PTA Physical Therapy Assistant                Time Calculation:   Start Time: 0850  Stop Time: 0933  Time Calculation (min): 43 min  Total Timed Code Minutes- PT: 43 minute(s)    Therapy Charges for Today     Code Description Service Date Service Provider Modifiers Qty    [id number]  PT THER PROC EA 15 MIN 4/24/2017 Breana Lutz PTA GP 3                    Breana Lutz PTA  4/24/2017

## 2017-04-25 ENCOUNTER — HOSPITAL ENCOUNTER (OUTPATIENT)
Dept: PHYSICAL THERAPY | Facility: HOSPITAL | Age: 67
Setting detail: THERAPIES SERIES
Discharge: HOME | End: 2017-04-25

## 2017-04-25 DIAGNOSIS — Z96.642 PRESENCE OF LEFT ARTIFICIAL HIP JOINT: ICD-10-CM

## 2017-04-25 DIAGNOSIS — Z96.641 HISTORY OF TOTAL HIP REPLACEMENT, RIGHT: ICD-10-CM

## 2017-04-25 DIAGNOSIS — R26.9 ABNORMALITY OF GAIT: Primary | ICD-10-CM

## 2017-04-25 PROCEDURE — 97110 THERAPEUTIC EXERCISES: CPT

## 2017-04-25 NOTE — PROGRESS NOTES
Outpatient Physical Therapy Ortho Treatment Note  [facility]     Patient Name: Chrissy Dawson  : 1950  MRN: [id number]  Today's Date: 2017      Visit Date: 2017  Visits /7. Viry 17. MD f/u 17. 60% improvement.   Visit Dx:    ICD-10-CM ICD-9-CM   1. Abnormality of gait R26.9 781.2   2. History of total hip replacement, right Z96.641 V43.64   3. Presence of left artificial hip joint Z96.642 V43.64       Patient Active Problem List   Diagnosis   • Adenopathy, cervical   • Hyperlipidemia   • Coronary arteriosclerosis   • Benign essential hypertension   • Abnormal mammogram of right breast   • Presence of artificial hip joint        Past Medical History:   Diagnosis Date   • Backache    • Benign essential hypertension     PATIENT DENIES   • Cervical arthritis    • Coronary arteriosclerosis    • Ganglion cyst of wrist     Ganglion/synovial cyst - wrist   • Ganglion of wrist    • Hip pain    • History of echocardiogram 10/23/2015    Echocardiogram W/ color flow 55253 (1) - Normal LV function with Ef of 55-60%.Normal RV size and function.Pseudonormal diastolic dysfunciton with borderline [facility].NO sig.valvular regurg or stenosis.   • History of mammogram 09/10/2014    MAMMOGRAM SCREENING 89918 - [facility] (1) - [facility] ([facility])   • History of transfusion    • Hyperlipidemia    • Inguinal pain    • Recurrent angina status post coronary artery bypass graft     Recurrent angina after coronary artery bypass graft   • Urge incontinence of urine         Past Surgical History:   Procedure Laterality Date   • BREAST BIOPSY Right 2017    Procedure: RIGHT BREAST LUMPECTOMY WITH NEEDLE LOCALIZATION AND ULTRASOUND;  Surgeon: Delfino Greer MD;  Location: [facility];  Service:    • CARDIAC SURGERY     • CORONARY ARTERY BYPASS GRAFT      CABG, arterial, single (1)   • GANGLION CYST EXCISION     • JOINT REPLACEMENT     • ORIF MANDIBULAR FRACTURE     • TOTAL ABDOMINAL HYSTERECTOMY      • TOTAL ABDOMINAL HYSTERECTOMY WITH SALPINGO OOPHORECTOMY  1998    SALVADOR/BSO (1)   • TOTAL HIP ARTHROPLASTY  2011    Total hip replacement (3)             PT Ortho       04/25/17 1100    Subjective Comments    Subjective Comments Pt reports feeling well today. She had some soreness from machine exercises but not very intense. She still has difficulty with don/doff R sock and shoe.   -JW    Subjective Pain    Post-Treatment Pain Level 0  -JW    Right Hip    Flexion AROM --   78°  -JW    ABduction AROM --   Supine 15°  -JW    Gait Assessment/Treatment    Gait, Bland Level independent  -    Gait, Comment Cortezenberg  -JW      04/24/17 0800    Subjective Comments    Subjective Comments Patient reports no pain today.  States right leg feels weak.  -CP    Subjective Pain    Able to rate subjective pain? yes  -CP    Pre-Treatment Pain Level 0  -CP    Gait Assessment/Treatment    Gait, Bland Level independent  -CP    Gait, Comment trendelberg gait.  -CP      User Key  (r) = Recorded By, (t) = Taken By, (c) = Cosigned By    Initials Name Provider Type     Kalyan Howe, [facility] Physical Therapy Assistant    NICOLLE Lutz, [facility] Physical Therapy Assistant                            PT Assessment/Plan       04/25/17 1100       PT Assessment    Functional Limitations Impaired gait  -     Impairments Muscle strength;Range of motion;Gait  -JW     Assessment Comments 2 new goals met today. Good blair of today's treatment. Subjective reports of better TE performance.   -     Rehab Potential Good  -     Patient/caregiver participated in establishment of treatment plan and goals Yes  -JW     Patient would benefit from skilled therapy intervention Yes  -JW     PT Plan    PT Frequency 2x/week  -     Predicted Duration of Therapy Intervention (days/wks) 4 weeks  -JW     PT Plan Comments Cont PT per POC.  -       User Key  (r) = Recorded By, (t) = Taken By, (c) = Cosigned By    Initials Name Provider Type     JERRY Howe PTA Physical Therapy Assistant                    Exercises       04/25/17 1100          Subjective Comments    Subjective Comments Pt reports feeling well today. She had some soreness from machine exercises but not very intense. She still has difficulty with don/doff R sock and shoe.   -JW      Subjective Pain    Able to rate subjective pain? yes  -JW      Pre-Treatment Pain Level 0  -JW      Post-Treatment Pain Level 0  -JW      Aquatics    Aquatics performed? No  -JW      Exercise 1    Exercise Name 1 Pro II sesat 12 level 1  -JW      Time (Minutes) 1 10  -JW      Exercise 2    Exercise Name 2 SKC to 90°  -JW      Sets 2 2  -JW      Time (Seconds) 2 30  -JW      Exercise 3    Exercise Name 3 Supine hip abd stretch  -JW      Sets 3 2  -JW      Time (Seconds) 3 30  -JW      Exercise 4    Exercise Name 4 SLR   Independent  -JW      Sets 4 3  -JW      Reps 4 10  -JW      Exercise 5    Exercise Name 5 Bridges  -JW      Sets 5 3  -JW      Reps 5 10  -JW      Exercise 6    Exercise Name 6 S/L hip abd  -JW      Sets 6 2  -JW      Reps 6 10  -JW      Exercise 7    Exercise Name 7 Clamshells  -JW      Reps 7 20  -JW      Exercise 8    Exercise Name 8 Sit-stands    Multi haris mat table  -JW      Sets 8 2  -JW      Reps 8 10  -JW      Exercise 9    Exercise Name 9 Step ups  -JW      Sets 9 2  -JW      Reps 9 10  -JW      Exercise 10    Exercise Name 10 Alt toe touch to step  -JW      Sets 10 2  -JW      Reps 10 10  -JW      Exercise 11    Exercise Name 11 Hip add squeeze  -JW      Reps 11 30  -JW      Exercise 12    Exercise Name 12 Supine modified light piriformis stretch   Very light stretch.  -JW      Sets 12 3  -JW      Time (Seconds) 12 30  -JW        User Key  (r) = Recorded By, (t) = Taken By, (c) = Cosigned By    Initials Name Provider Type    JW Kalyan Howe PTA Physical Therapy Assistant                               PT OP Goals       04/25/17 1100       PT Short Term Goals    STG Date  to Achieve 05/05/17  -JW     STG 1 Patient will be independent in home exercise program.  -JW     STG 1 Progress Progressing  -JW     STG 2 The patient will have 100° hip flexion active range of motion supine.  -JW     STG 2 Progress Progressing  -JW     STG 3 The patient will have 30° active hip abduction supine.  -JW     STG 3 Progress Progressing  -JW     STG 4 The patient be able to sit to stand and initiate walk without hesitation.  -JW     STG 4 Progress Progressing  -JW     STG 5 Subjectively report 40-50% improvement.  -JW     STG 5 Progress Met  -JW     STG 6 Patient will be independent in HEP.  -JW     STG 6 Progress Progressing  -JW     Long Term Goals    LTG Date to Achieve 05/19/17  -JW     LTG 1 Will be able to perform 10 sit to stands without use of upper extremities.  -JW     LTG 1 Progress Met  -     LTG 2 The patient be able to ambulate without an assistive device.  -     LTG 2 Progress Met  -     LTG 3 The patient will manual muscle test hip flexor at 5 minus or greater on the right.  -     LTG 3 Progress Ongoing  -JW     LTG 4 Patient will manual muscle test at 4+ or better for hip abduction on the right.  -     LTG 4 Progress Progressing  -JW     LTG 5 The patient reports 75% or better improvement.  -     LTG 5 Progress Progressing  -     LTG 6 LEFS score   > or = 45   -     LTG 6 Progress Progressing  -       User Key  (r) = Recorded By, (t) = Taken By, (c) = Cosigned By    Initials Name Provider Type    JERRY Howe PTA Physical Therapy Assistant                    Time Calculation:   Start Time: 1105  Stop Time: 1154  Time Calculation (min): 49 min  Total Timed Code Minutes- PT: 49 minute(s)    Therapy Charges for Today     Code Description Service Date Service Provider Modifiers Qty    [id number] HC PT THER PROC EA 15 MIN 4/25/2017 Kalyan Howe PTA GP 3                    Kalyan Howe PTA  4/25/2017

## 2017-05-02 ENCOUNTER — OFFICE VISIT (OUTPATIENT)
Dept: ORTHOPEDIC SURGERY | Facility: CLINIC | Age: 67
End: 2017-05-02

## 2017-05-02 VITALS — BODY MASS INDEX: 25.52 KG/M2 | WEIGHT: 172.3 LBS | HEIGHT: 69 IN

## 2017-05-02 DIAGNOSIS — Z96.641 PRESENCE OF ARTIFICIAL HIP JOINT, RIGHT: Primary | ICD-10-CM

## 2017-05-02 DIAGNOSIS — M70.61 TROCHANTERIC BURSITIS, RIGHT HIP: ICD-10-CM

## 2017-05-02 DIAGNOSIS — R26.9 GAIT DISTURBANCE: ICD-10-CM

## 2017-05-02 PROCEDURE — 99213 OFFICE O/P EST LOW 20 MIN: CPT | Performed by: ORTHOPAEDIC SURGERY

## 2017-05-02 PROCEDURE — 20610 DRAIN/INJ JOINT/BURSA W/O US: CPT | Performed by: ORTHOPAEDIC SURGERY

## 2017-05-02 RX ADMIN — TRIAMCINOLONE ACETONIDE 40 MG: 40 INJECTION, SUSPENSION INTRA-ARTICULAR; INTRAMUSCULAR at 10:21

## 2017-05-02 RX ADMIN — LIDOCAINE HYDROCHLORIDE 1 ML: 10 INJECTION, SOLUTION INFILTRATION; PERINEURAL at 10:21

## 2017-05-04 ENCOUNTER — HOSPITAL ENCOUNTER (OUTPATIENT)
Dept: PHYSICAL THERAPY | Facility: HOSPITAL | Age: 67
Setting detail: THERAPIES SERIES
Discharge: HOME | End: 2017-05-04

## 2017-05-04 DIAGNOSIS — Z96.641 HISTORY OF TOTAL HIP REPLACEMENT, RIGHT: ICD-10-CM

## 2017-05-04 DIAGNOSIS — Z96.642 PRESENCE OF LEFT ARTIFICIAL HIP JOINT: ICD-10-CM

## 2017-05-04 DIAGNOSIS — R26.9 ABNORMALITY OF GAIT: Primary | ICD-10-CM

## 2017-05-04 PROCEDURE — 97110 THERAPEUTIC EXERCISES: CPT

## 2017-05-04 RX ORDER — TRIAMCINOLONE ACETONIDE 40 MG/ML
40 INJECTION, SUSPENSION INTRA-ARTICULAR; INTRAMUSCULAR
Status: COMPLETED | OUTPATIENT
Start: 2017-05-02 | End: 2017-05-02

## 2017-05-04 RX ORDER — LIDOCAINE HYDROCHLORIDE 10 MG/ML
1 INJECTION, SOLUTION INFILTRATION; PERINEURAL
Status: COMPLETED | OUTPATIENT
Start: 2017-05-02 | End: 2017-05-02

## 2017-05-05 ENCOUNTER — APPOINTMENT (OUTPATIENT)
Dept: PHYSICAL THERAPY | Facility: HOSPITAL | Age: 67
End: 2017-05-05

## 2017-07-24 DIAGNOSIS — R32 URINARY INCONTINENCE, UNSPECIFIED TYPE: ICD-10-CM

## 2017-07-24 RX ORDER — OXYBUTYNIN CHLORIDE 15 MG/1
15 TABLET, EXTENDED RELEASE ORAL DAILY
Qty: 30 TABLET | Refills: 2 | Status: SHIPPED | OUTPATIENT
Start: 2017-07-24 | End: 2018-07-16

## 2017-10-02 ENCOUNTER — TELEPHONE (OUTPATIENT)
Dept: FAMILY MEDICINE CLINIC | Facility: CLINIC | Age: 67
End: 2017-10-02

## 2017-10-02 NOTE — TELEPHONE ENCOUNTER
Patient has been called with the information relayed.       ----- Message from Yessica Carrillo MD sent at 9/29/2017  1:32 PM CDT -----  Contact: pt called  She will need the booster in December and then she won't need another one.      ----- Message -----     From: Adriana Diaz     Sent: 9/29/2017  12:48 PM       To: Yessica Carrillo MD    Pt said that she had left a message and has never heard anything back. She had pneumonia shot done within the last year thinks it was last December and wants to see how long it is good for. Phone is [phone].

## 2017-12-12 ENCOUNTER — TELEPHONE (OUTPATIENT)
Dept: FAMILY MEDICINE CLINIC | Facility: CLINIC | Age: 67
End: 2017-12-12

## 2018-01-02 ENCOUNTER — CLINICAL SUPPORT (OUTPATIENT)
Dept: FAMILY MEDICINE CLINIC | Facility: CLINIC | Age: 68
End: 2018-01-02

## 2018-01-02 DIAGNOSIS — Z23 NEED FOR PNEUMOCOCCAL VACCINATION: Primary | ICD-10-CM

## 2018-01-02 PROCEDURE — 90732 PPSV23 VACC 2 YRS+ SUBQ/IM: CPT | Performed by: FAMILY MEDICINE

## 2018-01-02 PROCEDURE — 90471 IMMUNIZATION ADMIN: CPT | Performed by: FAMILY MEDICINE

## 2018-03-06 RX ORDER — PRAVASTATIN SODIUM 10 MG
TABLET ORAL
Qty: 90 TABLET | Refills: 0 | Status: SHIPPED | OUTPATIENT
Start: 2018-03-06 | End: 2018-08-27 | Stop reason: SDUPTHER

## 2018-07-16 ENCOUNTER — OFFICE VISIT (OUTPATIENT)
Dept: INTERNAL MEDICINE | Facility: CLINIC | Age: 68
End: 2018-07-16

## 2018-07-16 ENCOUNTER — APPOINTMENT (OUTPATIENT)
Dept: LAB | Facility: HOSPITAL | Age: 68
End: 2018-07-16

## 2018-07-16 ENCOUNTER — TELEPHONE (OUTPATIENT)
Dept: INTERNAL MEDICINE | Facility: CLINIC | Age: 68
End: 2018-07-16

## 2018-07-16 VITALS
WEIGHT: 163.8 LBS | OXYGEN SATURATION: 98 % | SYSTOLIC BLOOD PRESSURE: 110 MMHG | HEIGHT: 69 IN | HEART RATE: 72 BPM | DIASTOLIC BLOOD PRESSURE: 70 MMHG | BODY MASS INDEX: 24.26 KG/M2

## 2018-07-16 DIAGNOSIS — Z78.0 ASYMPTOMATIC AGE-RELATED POSTMENOPAUSAL STATE: ICD-10-CM

## 2018-07-16 DIAGNOSIS — Z12.31 ENCOUNTER FOR SCREENING MAMMOGRAM FOR BREAST CANCER: ICD-10-CM

## 2018-07-16 DIAGNOSIS — I25.10 CORONARY ARTERY DISEASE INVOLVING NATIVE CORONARY ARTERY OF NATIVE HEART WITHOUT ANGINA PECTORIS: ICD-10-CM

## 2018-07-16 DIAGNOSIS — E78.2 MIXED HYPERLIPIDEMIA: Primary | ICD-10-CM

## 2018-07-16 DIAGNOSIS — Z13.820 ENCOUNTER FOR SCREENING FOR OSTEOPOROSIS: ICD-10-CM

## 2018-07-16 DIAGNOSIS — N39.41 URGE INCONTINENCE OF URINE: ICD-10-CM

## 2018-07-16 LAB
ALBUMIN SERPL-MCNC: 4.2 G/DL (ref 3.4–4.8)
ALBUMIN/GLOB SERPL: 1.1 G/DL (ref 1.1–1.8)
ALP SERPL-CCNC: 67 U/L (ref 38–126)
ALT SERPL W P-5'-P-CCNC: 14 U/L (ref 9–52)
ANION GAP SERPL CALCULATED.3IONS-SCNC: 5 MMOL/L (ref 5–15)
ARTICHOKE IGE QN: 92 MG/DL (ref 1–129)
AST SERPL-CCNC: 51 U/L (ref 14–36)
BASOPHILS # BLD AUTO: 0.01 10*3/MM3 (ref 0–0.2)
BASOPHILS NFR BLD AUTO: 0.2 % (ref 0–2)
BILIRUB SERPL-MCNC: 0.6 MG/DL (ref 0.2–1.3)
BILIRUB UR QL STRIP: NEGATIVE
BUN BLD-MCNC: 21 MG/DL (ref 7–21)
BUN/CREAT SERPL: 39.6 (ref 7–25)
CALCIUM SPEC-SCNC: 9.5 MG/DL (ref 8.4–10.2)
CHLORIDE SERPL-SCNC: 102 MMOL/L (ref 95–110)
CHOLEST SERPL-MCNC: 199 MG/DL (ref 0–199)
CLARITY UR: CLEAR
CO2 SERPL-SCNC: 32 MMOL/L (ref 22–31)
COLOR UR: YELLOW
CREAT BLD-MCNC: 0.53 MG/DL (ref 0.5–1)
DEPRECATED RDW RBC AUTO: 45.3 FL (ref 36.4–46.3)
EOSINOPHIL # BLD AUTO: 0.04 10*3/MM3 (ref 0–0.7)
EOSINOPHIL NFR BLD AUTO: 1 % (ref 0–7)
ERYTHROCYTE [DISTWIDTH] IN BLOOD BY AUTOMATED COUNT: 15 % (ref 11.5–14.5)
GFR SERPL CREATININE-BSD FRML MDRD: 139 ML/MIN/1.73 (ref 45–104)
GLOBULIN UR ELPH-MCNC: 4 GM/DL (ref 2.3–3.5)
GLUCOSE BLD-MCNC: 82 MG/DL (ref 60–100)
GLUCOSE UR STRIP-MCNC: NEGATIVE MG/DL
HCT VFR BLD AUTO: 39.3 % (ref 35–45)
HDLC SERPL-MCNC: 68 MG/DL (ref 60–200)
HGB BLD-MCNC: 13 G/DL (ref 12–15.5)
HGB UR QL STRIP.AUTO: NEGATIVE
IMM GRANULOCYTES # BLD: 0.01 10*3/MM3 (ref 0–0.02)
IMM GRANULOCYTES NFR BLD: 0.2 % (ref 0–0.5)
KETONES UR QL STRIP: NEGATIVE
LDLC/HDLC SERPL: 1.78 {RATIO} (ref 0–3.22)
LEUKOCYTE ESTERASE UR QL STRIP.AUTO: NEGATIVE
LYMPHOCYTES # BLD AUTO: 1.71 10*3/MM3 (ref 0.6–4.2)
LYMPHOCYTES NFR BLD AUTO: 42.6 % (ref 10–50)
MCH RBC QN AUTO: 27.4 PG (ref 26.5–34)
MCHC RBC AUTO-ENTMCNC: 33.1 G/DL (ref 31.4–36)
MCV RBC AUTO: 82.9 FL (ref 80–98)
MONOCYTES # BLD AUTO: 0.23 10*3/MM3 (ref 0–0.9)
MONOCYTES NFR BLD AUTO: 5.7 % (ref 0–12)
NEUTROPHILS # BLD AUTO: 2.01 10*3/MM3 (ref 2–8.6)
NEUTROPHILS NFR BLD AUTO: 50.3 % (ref 37–80)
NITRITE UR QL STRIP: NEGATIVE
PH UR STRIP.AUTO: 6 [PH] (ref 5–9)
PLATELET # BLD AUTO: 246 10*3/MM3 (ref 150–450)
PMV BLD AUTO: 10.8 FL (ref 8–12)
POTASSIUM BLD-SCNC: 4.2 MMOL/L (ref 3.5–5.1)
PROT SERPL-MCNC: 8.2 G/DL (ref 6.3–8.6)
PROT UR QL STRIP: NEGATIVE
RBC # BLD AUTO: 4.74 10*6/MM3 (ref 3.77–5.16)
SODIUM BLD-SCNC: 139 MMOL/L (ref 137–145)
SP GR UR STRIP: 1.02 (ref 1–1.03)
TRIGL SERPL-MCNC: 51 MG/DL (ref 20–199)
TSH SERPL DL<=0.05 MIU/L-ACNC: 1.41 MIU/ML (ref 0.46–4.68)
UROBILINOGEN UR QL STRIP: NORMAL
WBC NRBC COR # BLD: 4.01 10*3/MM3 (ref 3.2–9.8)

## 2018-07-16 PROCEDURE — 85025 COMPLETE CBC W/AUTO DIFF WBC: CPT | Performed by: INTERNAL MEDICINE

## 2018-07-16 PROCEDURE — 36415 COLL VENOUS BLD VENIPUNCTURE: CPT | Performed by: INTERNAL MEDICINE

## 2018-07-16 PROCEDURE — 80061 LIPID PANEL: CPT | Performed by: INTERNAL MEDICINE

## 2018-07-16 PROCEDURE — 99203 OFFICE O/P NEW LOW 30 MIN: CPT | Performed by: INTERNAL MEDICINE

## 2018-07-16 PROCEDURE — 81003 URINALYSIS AUTO W/O SCOPE: CPT | Performed by: INTERNAL MEDICINE

## 2018-07-16 PROCEDURE — 80053 COMPREHEN METABOLIC PANEL: CPT | Performed by: INTERNAL MEDICINE

## 2018-07-16 PROCEDURE — 84443 ASSAY THYROID STIM HORMONE: CPT | Performed by: INTERNAL MEDICINE

## 2018-07-16 NOTE — PROGRESS NOTES
Subjective   Chrissy Dawson is a 68 y.o. female       Patient is in to [facility]. Former patient of .    Patient is doing well, and denies any new problems, except of urinary incontinence.      Hyperlipidemia   This is a chronic problem. The current episode started more than 1 year ago. Recent lipid tests were reviewed and are variable. Pertinent negatives include no chest pain or shortness of breath. Current antihyperlipidemic treatment includes statins. The current treatment provides significant improvement of lipids. There are no compliance problems.          She missed her appointment with Cardiology.  History of CAD, CABG in 2000.      Regarding urinary incontinence, she was placed on Ditropan by Dr.D'Amico.  Still has problems with urge incontinence and complains of the side effects related to the medication. /xerostomia/.    Past Medical History:   Diagnosis Date   • Backache    • Benign essential hypertension     PATIENT DENIES   • Cervical arthritis (CMS/HCC)    • Coronary arteriosclerosis    • Ganglion cyst of wrist     Ganglion/synovial cyst - wrist   • Ganglion of wrist    • Hip pain    • History of echocardiogram 10/23/2015    Echocardiogram W/ color flow 27427 (1) - Normal LV function with Ef of 55-60%.Normal RV size and function.Pseudonormal diastolic dysfunciton with borderline [facility].NO sig.valvular regurg or stenosis.   • History of mammogram 09/10/2014    MAMMOGRAM SCREENING 24272 - [facility] (1) - [facility] ([facility])   • History of transfusion    • Hyperlipidemia    • Inguinal pain    • Recurrent angina status post coronary artery bypass graft (CMS/HCC)     Recurrent angina after coronary artery bypass graft   • Urge incontinence of urine      Past Surgical History:   Procedure Laterality Date   • BREAST BIOPSY Right 2/2/2017    Procedure: RIGHT BREAST LUMPECTOMY WITH NEEDLE LOCALIZATION AND ULTRASOUND;  Surgeon: Delfino Greer MD;  Location: [facility];  Service:    •  CARDIAC SURGERY     • CORONARY ARTERY BYPASS GRAFT  2000    CABG, arterial, single (1)   • GANGLION CYST EXCISION     • JOINT REPLACEMENT     • ORIF MANDIBULAR FRACTURE     • TOTAL ABDOMINAL HYSTERECTOMY     • TOTAL ABDOMINAL HYSTERECTOMY WITH SALPINGO OOPHORECTOMY  1998    SALVADOR/BSO (1)   • TOTAL HIP ARTHROPLASTY  2011    Total hip replacement (3)       Social History   Substance Use Topics   • Smoking status: Never Smoker   • Smokeless tobacco: Never Used   • Alcohol use No     Family History   Problem Relation Age of Onset   • Heart disease Other    • Hypertension Other    • Hypertension Mother    • Diabetes Father    • Depression Maternal Grandmother    • Heart disease Maternal Grandfather      Allergies   Allergen Reactions   • Crestor [Rosuvastatin]    • Lipitor [Atorvastatin]      Current Outpatient Prescriptions on File Prior to Visit   Medication Sig Dispense Refill   • aspirin 81 MG tablet Take 81 mg by mouth.     • Multiple Vitamins-Minerals (CENTRUM SILVER PO) Take 1 tablet by mouth Daily.     • pravastatin (PRAVACHOL) 10 MG tablet TAKE 1 TABLET BY MOUTH DAILY 90 tablet 0   • trimethoprim-polymyxin b (POLYTRIM) 04752-9.1 UNIT/ML-% ophthalmic solution Administer 1 drop to the right eye Every 4 (Four) Hours. 10 mL 0   • [DISCONTINUED] oxybutynin XL (DITROPAN XL) 15 MG 24 hr tablet Take 1 tablet by mouth Daily. 30 tablet 2     No current facility-administered medications on file prior to visit.      Review of Systems   Constitutional: Negative.  Negative for activity change and fatigue.   HENT: Negative.    Eyes: Negative.    Respiratory: Negative for chest tightness and shortness of breath.    Cardiovascular: Negative for chest pain, palpitations and leg swelling.   Gastrointestinal: Negative for abdominal pain, constipation and diarrhea.   Endocrine: Negative for cold intolerance and heat intolerance.   Genitourinary: Negative for decreased urine volume, difficulty urinating, dysuria, vaginal bleeding and  vaginal discharge.        Urge incontinence     Musculoskeletal: Negative for back pain and gait problem.   Skin: Negative for color change and rash.   Neurological: Negative for dizziness and light-headedness.   Hematological: Negative for adenopathy. Does not bruise/bleed easily.   Psychiatric/Behavioral: Negative for sleep disturbance. The patient is not nervous/anxious.        Objective   Physical Exam   Constitutional: She is oriented to person, place, and time. She appears well-developed and well-nourished.   HENT:   Head: Normocephalic and atraumatic.   Nose: Nose normal.   Mouth/Throat: Oropharynx is clear and moist.   Eyes: Conjunctivae are normal. No scleral icterus.   Neck: Neck supple. No JVD present.   Cardiovascular: Normal rate and regular rhythm.    Pulmonary/Chest: Effort normal and breath sounds normal.   Abdominal: Soft. Bowel sounds are normal. She exhibits no mass.   Musculoskeletal: She exhibits no deformity.   Neurological: She is alert and oriented to person, place, and time.   Skin: Skin is warm and dry.   Psychiatric: She has a normal mood and affect. Her behavior is normal.   Nursing note and vitals reviewed.          Patient Active Problem List   Diagnosis   • Adenopathy, cervical   • Hyperlipidemia   • Coronary arteriosclerosis   • Benign essential hypertension   • Abnormal mammogram of right breast   • Presence of artificial hip joint   • Gait disturbance   • Trochanteric bursitis, right hip         Lab Results   Component Value Date    LDL 97 04/13/2017     (H) 10/19/2015     (H) 09/03/2014         Lab Results   Component Value Date    TSH 1.07 12/02/2016     No results found for: HGBA1C   Lab Results   Component Value Date    BUN 18 04/13/2017    BUN 21 12/02/2016    BUN 16 10/19/2015     Lab Results   Component Value Date    BUN 18 04/13/2017    BUN 21 12/02/2016    BUN 16 10/19/2015     Lab Results   Component Value Date    CREATININE 0.58 04/13/2017    CREATININE 0.6  12/02/2016    CREATININE 0.6 10/19/2015     Lab Results   Component Value Date    HGB 13.5 12/02/2016    HGB 13.0 10/19/2015    HGB 13.1 09/03/2014     Lab Results   Component Value Date    HCT 40.5 12/02/2016    HCT 40.0 10/19/2015    HCT 40.1 09/03/2014     Lab Results   Component Value Date    CHOL 208 (H) 04/13/2017     Lab Results   Component Value Date    TRIG 67 04/13/2017    TRIG 41 10/19/2015    TRIG 72 09/03/2014     Lab Results   Component Value Date    HDL 59 (L) 04/13/2017    HDL 68 10/19/2015    HDL 69 09/03/2014     No components found for: LDLDIRECTC  Lab Results   Component Value Date    LDL 97 04/13/2017     (H) 10/19/2015     (H) 09/03/2014     Lab Results   Component Value Date    LDLHDL 2.30 04/13/2017     Brief Urine Lab Results     None        ECG 12 Lead   Order: [id number]   Status:  Final result   Visible to patient:  No (Not Released) Next appt:  None   Narrative     Test Reason : Pre-Op ClearanceBlood Pressure : **/** mmHGVent. Rate : 062 BPM     Atrial Rate : 062 BPM   P-R Int : 138 ms          QRS Dur : 074 ms    QT Int : 424 ms       P-R-T Axes : 028 -31 039 degrees   QTc Int : 430 msNormal sinus rhythmLeft axis   deviationMinimal voltage criteria for LVH, may be normal variantCannot rule out Anterior infarct (cited on or before 19-OCT-2015)Abnormal ECGWhen compared with ECG of 19-JUL-2016 13:12,No significant change was foundConfirmed by HUY HUGGINS, B. N.   ([id number]),  DERRICK NUNEZ (5) on2/2/2017 8:23:18 AMReferred By:             Confirmed By:ENRRIQUE SON MD      Specimen Collected: 01/31/17 14:14              Tissue Exam [[id number]] (Order [id number])   Order   Date: 2/2/2017 Department: [facility] OR Released By: Alycia Caballero RN Authorizing: Delfino Greer MD   Reprint Order Requisition     Tissue Exam (Order #[id number]) on 2/2/17   OR Specimen ID: [id number]  Specimen Description: RIGHT LUMPECTOMY, 1 OCLOCK POSITION, 4.3 CM FROM NIPPLE, APPROPRIATELY  MARKED   Tissue Exam   Order: [id number]   Status:  Edited Result - FINAL   Visible to patient:  No (Not Released) Dx:  Abnormal mammogram of right breast    1yr ago   Final Diagnosis   RIGHT LUMPECTOMY, 1 O'CLOCK POSITION, 4.3 CM FROM NIPPLE:        LYMPHOCYTIC MASTITIS.        NO EVIDENCE OF NEOPLASM.    Amendment electronically signed by Kris Baker MD on 2/7/2017 at 1437   Electronically signed by Kris Baker MD on 2/6/2017 at 1539   Gross Description    The specimen consists of a lumpectomy specimen from the right breast weighing 6 grams and measuring 5.0 x 4.0 x 2.0 cm.  Serial sectioning shows a centrally located cavity filled with blood measuring 1.5 cm in greatest dimension and is about 0.6 cm away from the closest excisional margin which is deep.  Representative sections are embedded as follows:   1A and 1B cranial, 1C and 1D caudal, 1E and 1F medial, 1G and 1H lateral, 1I and 1J superficial, 1K and 1L deep and the remainder from around the cavity.       Resulting Agency [facility]      Specimen Collected: 02/02/17 12:43 Last Resulted: 02/07/17 14:37                Assessment/Plan   Chrissy was seen today for establish care.    Diagnoses and all orders for this visit:    Mixed hyperlipidemia  -     Lipid Panel  -     Comprehensive metabolic panel  -     TSH    Coronary artery disease involving native coronary artery of native heart without angina pectoris  -     Comprehensive metabolic panel  -     CBC & Differential  -     Ambulatory Referral to Cardiology    Urge incontinence of urine  -     Urinalysis With Microscopic If Indicated (No Culture) - Urine, Clean Catch    Encounter for screening mammogram for breast cancer  -     Mammo Screening Digital Tomosynthesis Bilateral With CAD    Encounter for screening for osteoporosis  -     DEXA Bone Density Axial    Asymptomatic age-related postmenopausal state  -     DEXA Bone Density Axial    Other orders  -     Mirabegron ER (MYRBETRIQ) 25 MG  tablet sustained-release 24 hour 24 hr tablet; Take 1 tablet by mouth Daily.           Plan        1. Pravastatin.  Will monitor lipids and LFTs.  2. ASA, statin.  As per cardiology.  3. Trial of Myrbetriq.  Medication benefits and side effects discussed in detail.  4. Mammogram scheduled.  Breast self exam.  5.Bone density for screening purposes.  OTC MVI with Calcium and vit D.  Weight bearing exercise.  6. Information provided on Shingrix and Tdap.  She will check with the Pharmacy regarding coverage and availibility.          Follow up in 6 months.            This document has been electronically signed by Kaya Rice MD on July 16, 2018 9:33 AM

## 2018-08-08 DIAGNOSIS — I25.10 CORONARY ARTERY DISEASE INVOLVING NATIVE HEART WITHOUT ANGINA PECTORIS, UNSPECIFIED VESSEL OR LESION TYPE: Primary | ICD-10-CM

## 2018-08-09 ENCOUNTER — OFFICE VISIT (OUTPATIENT)
Dept: CARDIOLOGY | Facility: CLINIC | Age: 68
End: 2018-08-09

## 2018-08-09 VITALS
OXYGEN SATURATION: 98 % | HEART RATE: 72 BPM | DIASTOLIC BLOOD PRESSURE: 80 MMHG | BODY MASS INDEX: 24.27 KG/M2 | SYSTOLIC BLOOD PRESSURE: 120 MMHG | WEIGHT: 163.9 LBS | HEIGHT: 69 IN

## 2018-08-09 DIAGNOSIS — I10 BENIGN ESSENTIAL HYPERTENSION: ICD-10-CM

## 2018-08-09 DIAGNOSIS — E78.2 MIXED HYPERLIPIDEMIA: ICD-10-CM

## 2018-08-09 DIAGNOSIS — I25.10 CORONARY ARTERIOSCLEROSIS: Primary | ICD-10-CM

## 2018-08-09 PROCEDURE — 99214 OFFICE O/P EST MOD 30 MIN: CPT | Performed by: INTERNAL MEDICINE

## 2018-08-09 PROCEDURE — 93000 ELECTROCARDIOGRAM COMPLETE: CPT | Performed by: INTERNAL MEDICINE

## 2018-08-09 NOTE — PROGRESS NOTES
Cardiovascular Medicine   Vishnu Reynaga M.D., Ph.D., [facility]      The patient returns to cardiovascular clinic for follow-up of the following:    PROBLEM LIST:  1. ASCAD  A. PCI-LAD  B. CABG with LIMA to LAD in 2000 Dr. Montes  2. HLD  A. Statin intolerance to Zocor and Crestor    Chrissy Amira Dawson is a 68 y.o. female who returns in follow-up today after being lost to follow-up.  She has a history of atherosclerotic coronary artery disease.  She had a PCI to the LAD.  She subsequently underwent CABG with a LIMA to the LAD in 2000.  This was secondary to stent restenosis.  Her last invasive coronary angiography was in October 2010.  At that time her THOMPSON was patent.  LAD showed native vessel disease of 60-70%.  She is currently on aspirin and pravastatin.  She denies easy bruising or bleeding.  She's had no exercise intolerance or dyspnea.  She denies any resting, exertional or nocturnal angina.  She tells me that she still was not interested in using a beta blocker at this point.  Concerning her hyperlipidemia, she remains on pravastatin.  She's had documented statin intolerance to other statin medications.  She's currently tolerating her dose well.  She remains closely followed by primary care.        Review of Systems   Cardiovascular: Negative.    Respiratory: Negative.      family history includes Depression in her maternal grandmother; Diabetes in her father; Heart disease in her maternal grandfather and other; Hypertension in her mother and other.   reports that she has never smoked. She has never used smokeless tobacco. She reports that she does not drink alcohol or use drugs.  Allergies   Allergen Reactions   • Crestor [Rosuvastatin]    • Lipitor [Atorvastatin]        Current Outpatient Prescriptions:   •  aspirin 81 MG tablet, Take 81 mg by mouth., Disp: , Rfl:   •  Mirabegron ER (MYRBETRIQ) 25 MG tablet sustained-release 24 hour 24 hr tablet, Take 1 tablet by mouth Daily., Disp: 30 tablet, Rfl:  2  •  Multiple Vitamins-Minerals (CENTRUM SILVER PO), Take 1 tablet by mouth Daily., Disp: , Rfl:   •  pravastatin (PRAVACHOL) 10 MG tablet, TAKE 1 TABLET BY MOUTH DAILY, Disp: 90 tablet, Rfl: 0  •  trimethoprim-polymyxin b (POLYTRIM) 58305-8.1 UNIT/ML-% ophthalmic solution, Administer 1 drop to the right eye Every 4 (Four) Hours., Disp: 10 mL, Rfl: 0    Physical Exam:  Vitals:    08/09/18 0823   BP: 120/80   Pulse: 72   SpO2: 98%     Body mass index is 24.2 kg/m².   Last Weights:   Wt Readings from Last 3 Encounters:   07/16/18 74.3 kg (163 lb 12.8 oz)   12/30/17 75.4 kg (166 lb 3.2 oz)   05/02/17 78.2 kg (172 lb 4.8 oz)     Pulse Ox: Normal   General: alert, appears stated age and cooperative  Body Habitus: well-nourished  HEENT: Head: Normocephalic, no lesions, without obvious abnormality. No arcus senilis, xanthelasma or xanthomas.  JVP: 6 cm of water at 45 degrees   Volume/Pulsation: Normal  Carotid Exam: normal pulsation bilaterally   Carotid Volume: normal    Subclavian Bruit: absent  Vertebral Bruit: absent   Heart rate: normal    Heart Rhythm: regular     Heart Sounds: S1: normal intensity  S2: normal intensity  S3: absent   S4: absent  Opening Snap: absent  A2-OS:  absent.   Pericardial rub: absent    Ejection click: None      Murmurs:  absent   Extremity: moves all extremities equally.       DATA REVIEWED:     EKG. I personally reviewed and interpreted the EKG.    --------------------------------------------------------------------------------------------------  LABS:   Lab Results   Component Value Date    GLUCOSE 82 07/16/2018    BUN 21 07/16/2018    CREATININE 0.53 07/16/2018    EGFRIFAFRI 139 (H) 07/16/2018    BCR 39.6 (H) 07/16/2018    K 4.2 07/16/2018    CO2 32.0 (H) 07/16/2018    CALCIUM 9.5 07/16/2018    ALBUMIN 4.20 07/16/2018    AST 51 (H) 07/16/2018    ALT 14 07/16/2018     Lab Results   Component Value Date    WBC 4.01 07/16/2018    HGB 13.0 07/16/2018    HCT 39.3 07/16/2018    MCV 82.9  07/16/2018     07/16/2018     Lab Results   Component Value Date    CHOL 199 07/16/2018    CHLPL 294 (H) 10/19/2015    TRIG 51 07/16/2018    HDL 68 07/16/2018    LDL 92 07/16/2018     Lab Results   Component Value Date    TSH 1.410 07/16/2018     No results found for: CKTOTAL, CKMB, CKMBINDEX, TROPONINI, TROPONINT  No results found for: HGBA1C  Lab Results   Component Value Date    DDIMER 773 (H) 08/08/2014     Lab Results   Component Value Date    ALT 14 07/16/2018     No results found for: HGBA1C  Lab Results   Component Value Date    CREATININE 0.53 07/16/2018     No results found for: IRON, TIBC, FERRITIN  No results found for: INR, PROTIME    Assessment/Plan      Diagnosis Plan   1. Coronary arteriosclerosis, s/p CABG and PCI to LAD. CCS 0.   · Continue aspirin and statin   · She continues to refuse beta blocker    2. Mixed hyperlipidemia  Hyperlipidemia Plan  · Dietary changes: Increase soluble fiber  · Reduce saturated fat and cholesterol  · Exercise changes: Advised to engage in aerobic exercise on most days of the week  · Statin: Yes  · ASA: Yes  · Continue follow-up visits with PCP for monitoring of labs         3. Normal body weight  · . continue active lifestyle              Return in about 1 year (around 8/9/2019).            This document has been electronically signed by Vishnu Reynaga MD PhD on August 9, 2018 8:37 AM

## 2018-08-13 ENCOUNTER — TELEPHONE (OUTPATIENT)
Dept: FAMILY MEDICINE CLINIC | Facility: CLINIC | Age: 68
End: 2018-08-13

## 2018-08-13 NOTE — TELEPHONE ENCOUNTER
Per Dr. Rice, Ms. Dawson has been called with her recent Mammogram results & recommendations.  Continue her current medications and follow-up as planned or sooner if any problems.      On the Toviaz  She states she has only been on it for a couple of days and it has seemed to help.  She will give it a little more time.       ----- Message from Kaya Rice MD sent at 8/13/2018 11:17 AM CDT -----  mammo ok  Ask her if toviaz samples helped  ----- Message -----  From: [email or web address], Rad Results [place]  Sent: 8/13/2018   8:56 AM  To: Kaya Rice MD

## 2018-08-27 ENCOUNTER — TELEPHONE (OUTPATIENT)
Dept: INTERNAL MEDICINE | Facility: CLINIC | Age: 68
End: 2018-08-27

## 2018-08-27 RX ORDER — PRAVASTATIN SODIUM 10 MG
10 TABLET ORAL DAILY
Qty: 90 TABLET | Refills: 3 | Status: SHIPPED | OUTPATIENT
Start: 2018-08-27 | End: 2018-10-05 | Stop reason: SDUPTHER

## 2018-09-05 ENCOUNTER — TELEPHONE (OUTPATIENT)
Dept: INTERNAL MEDICINE | Facility: CLINIC | Age: 68
End: 2018-09-05

## 2018-09-05 RX ORDER — FESOTERODINE FUMARATE 4 MG/1
4 TABLET, EXTENDED RELEASE ORAL
Qty: 90 TABLET | Refills: 1 | Status: SHIPPED | OUTPATIENT
Start: 2018-09-05 | End: 2019-01-02

## 2018-10-05 RX ORDER — PRAVASTATIN SODIUM 10 MG
10 TABLET ORAL DAILY
Qty: 30 TABLET | Refills: 1 | Status: SHIPPED | OUTPATIENT
Start: 2018-10-05 | End: 2019-03-29 | Stop reason: SDUPTHER

## 2019-03-29 ENCOUNTER — OFFICE VISIT (OUTPATIENT)
Dept: FAMILY MEDICINE CLINIC | Facility: CLINIC | Age: 69
End: 2019-03-29

## 2019-03-29 ENCOUNTER — APPOINTMENT (OUTPATIENT)
Dept: LAB | Facility: HOSPITAL | Age: 69
End: 2019-03-29

## 2019-03-29 VITALS
SYSTOLIC BLOOD PRESSURE: 120 MMHG | OXYGEN SATURATION: 97 % | BODY MASS INDEX: 24.05 KG/M2 | HEIGHT: 70 IN | DIASTOLIC BLOOD PRESSURE: 66 MMHG | WEIGHT: 168 LBS | HEART RATE: 75 BPM

## 2019-03-29 DIAGNOSIS — N39.490 INCONTINENCE OVERFLOW, STRESS FEMALE: ICD-10-CM

## 2019-03-29 DIAGNOSIS — N39.3 INCONTINENCE OVERFLOW, STRESS FEMALE: ICD-10-CM

## 2019-03-29 DIAGNOSIS — I25.10 CORONARY ARTERIOSCLEROSIS: ICD-10-CM

## 2019-03-29 DIAGNOSIS — Z00.00 ANNUAL PHYSICAL EXAM: Primary | ICD-10-CM

## 2019-03-29 PROCEDURE — 99203 OFFICE O/P NEW LOW 30 MIN: CPT | Performed by: FAMILY MEDICINE

## 2019-03-29 PROCEDURE — 83036 HEMOGLOBIN GLYCOSYLATED A1C: CPT | Performed by: FAMILY MEDICINE

## 2019-03-29 PROCEDURE — 84443 ASSAY THYROID STIM HORMONE: CPT | Performed by: FAMILY MEDICINE

## 2019-03-29 PROCEDURE — 36415 COLL VENOUS BLD VENIPUNCTURE: CPT | Performed by: FAMILY MEDICINE

## 2019-03-29 RX ORDER — OXYBUTYNIN CHLORIDE 5 MG/1
5 TABLET, EXTENDED RELEASE ORAL DAILY
Qty: 30 TABLET | Refills: 2 | Status: SHIPPED | OUTPATIENT
Start: 2019-03-29 | End: 2019-06-26

## 2019-03-29 RX ORDER — PRAVASTATIN SODIUM 10 MG
10 TABLET ORAL DAILY
Qty: 30 TABLET | Refills: 5 | Status: SHIPPED | OUTPATIENT
Start: 2019-03-29 | End: 2020-05-04 | Stop reason: SDUPTHER

## 2019-03-29 NOTE — PROGRESS NOTES
Subjective:     Chrissy Dawson is a 68 y.o. female who presents for establish care      Patient reports long-standing history of incontinence.  She was previously on oxybutynin, and was following with Dr. D'Amico with urology.  She has previously been referred to physical therapy for pelvic floor exercises, with no improvement.  She is not currently on any medications, and stopped seeing Dr. D'Amico because of conflict with the specialist.  Patient is interested in referral to see urologist other than Dr. Damico, and we discussed the importance of continued pelvic floor exercises for symptomatic relief.  Patient reports incontinence usually occurs in the setting of positional changes, especially at night, and she wears a breif at all times for accidents.    Risk Factors Reviewed  1. Diet -- generally healthy, lots of vegetable  2. Exercise -- walks, will start water aerobics  3. Safety -- wears seatbelt/helmet/sunscreen  4. Smoking -- second hand smoke exposure  5. Alcohol -e denies  6. Ilicit drug use -- denies  7. STDs/Contraception -- post-menopausal, same partner  8. Advance directive -- she is interested in further information            Past Medical Hx:  Past Medical History:   Diagnosis Date   • Backache    • Benign essential hypertension     PATIENT DENIES   • Cervical arthritis    • Coronary arteriosclerosis    • Fibrocystic breast    • Ganglion cyst of wrist     Ganglion/synovial cyst - wrist   • Ganglion of wrist    • Hip pain    • History of echocardiogram 10/23/2015    Echocardiogram W/ color flow 59520 (1) - Normal LV function with Ef of 55-60%.Normal RV size and function.Pseudonormal diastolic dysfunciton with borderline [facility].NO sig.valvular regurg or stenosis.   • History of mammogram 09/10/2014    MAMMOGRAM SCREENING 21185 - [facility] (1) - [facility]ONILLAWSANDRA ([facility])   • History of transfusion    • Hyperlipidemia    • Inguinal pain    • Recurrent angina status post coronary artery bypass  graft (CMS/HCC)     Recurrent angina after coronary artery bypass graft   • Urge incontinence of urine        Past Surgical Hx:  Past Surgical History:   Procedure Laterality Date   • BREAST BIOPSY Right 2/2/2017    Procedure: RIGHT BREAST LUMPECTOMY WITH NEEDLE LOCALIZATION AND ULTRASOUND;  Surgeon: Delfino Greer MD;  Location: [facility];  Service:    • BREAST CYST ASPIRATION     • CARDIAC SURGERY     • CORONARY ARTERY BYPASS GRAFT  2000    CABG, arterial, single (1)   • GANGLION CYST EXCISION     • JOINT REPLACEMENT     • ORIF MANDIBULAR FRACTURE     • TOTAL ABDOMINAL HYSTERECTOMY     • TOTAL ABDOMINAL HYSTERECTOMY WITH SALPINGO OOPHORECTOMY  1998    SALVADOR/BSO (1)   • TOTAL HIP ARTHROPLASTY  2011    Total hip replacement (3)       Health Maintenance:  Health Maintenance   Topic Date Due   • MEDICARE ANNUAL WELLNESS  12/09/2016   • INFLUENZA VACCINE  08/01/2018   • TDAP/TD VACCINES (1 - Tdap) 07/16/2019 (Originally 6/12/1969)   • ZOSTER VACCINE (1 of 2) 07/16/2019 (Originally 6/12/2000)   • LIPID PANEL  07/16/2019   • MAMMOGRAM  08/10/2020   • DXA SCAN  08/10/2023   • COLONOSCOPY  12/09/2026   • PNEUMOCOCCAL VACCINES (65+ LOW/MEDIUM RISK)  Completed   • HEPATITIS C SCREENING  Addressed       Current Meds:    Current Outpatient Medications:   •  aspirin 81 MG tablet, Take 81 mg by mouth., Disp: , Rfl:   •  Multiple Vitamins-Minerals (CENTRUM SILVER PO), Take 1 tablet by mouth Daily., Disp: , Rfl:   •  pravastatin (PRAVACHOL) 10 MG tablet, Take 1 tablet by mouth Daily., Disp: 30 tablet, Rfl: 5  •  oxybutynin XL (DITROPAN-XL) 5 MG 24 hr tablet, Take 1 tablet by mouth Daily., Disp: 30 tablet, Rfl: 2    Allergies:  Crestor [rosuvastatin] and Lipitor [atorvastatin]    Family Hx:  Family History   Problem Relation Age of Onset   • Heart disease Other    • Hypertension Other    • Hypertension Mother    • Diabetes Father    • Depression Maternal Grandmother    • Heart disease Maternal Grandfather         Social  "History:  Social History     Socioeconomic History   • Marital status:      Spouse name: Not on file   • Number of children: Not on file   • Years of education: Not on file   • Highest education level: Not on file   Tobacco Use   • Smoking status: Never Smoker   • Smokeless tobacco: Never Used   Substance and Sexual Activity   • Alcohol use: No   • Drug use: No   • Sexual activity: Defer     Comment:        Review of Systems  Review of Systems   Constitutional: Negative for activity change, appetite change, fatigue and fever.   HENT: Negative for ear pain and sore throat.    Eyes: Negative for pain and visual disturbance.   Respiratory: Negative for cough and shortness of breath.    Cardiovascular: Negative for chest pain and palpitations.   Gastrointestinal: Negative for abdominal pain and nausea.   Endocrine: Negative for cold intolerance and heat intolerance.   Genitourinary: Positive for frequency. Negative for decreased urine volume, difficulty urinating and dysuria.   Musculoskeletal: Negative for arthralgias and gait problem.   Skin: Negative for color change and rash.   Neurological: Negative for dizziness, weakness and headaches.   Hematological: Negative for adenopathy. Does not bruise/bleed easily.   Psychiatric/Behavioral: Negative for agitation, confusion and sleep disturbance.       Objective:     /66 (BP Location: Left arm, Patient Position: Sitting, Cuff Size: Adult)   Pulse 75   Ht 176.5 cm (69.5\")   Wt 76.2 kg (168 lb)   LMP  (LMP Unknown)   SpO2 97%   BMI 24.45 kg/m²     Physical Exam   Constitutional: She is oriented to person, place, and time. She appears well-developed and well-nourished.   HENT:   Head: Normocephalic and atraumatic.   Eyes: Conjunctivae, EOM and lids are normal. Pupils are equal, round, and reactive to light.   Neck: Normal range of motion. Neck supple.   Cardiovascular: Normal rate, regular rhythm and normal heart sounds. Exam reveals no gallop and " no friction rub.   No murmur heard.  Pulmonary/Chest: Effort normal and breath sounds normal.   Abdominal: Soft. Normal appearance and bowel sounds are normal. There is no tenderness.   Musculoskeletal: Normal range of motion. She exhibits no tenderness or deformity.   Patient has a cane to help with standing and sitting, and getting in and out of cars, uses minimally with walking.   Neurological: She is alert and oriented to person, place, and time.   Skin: Skin is warm, dry and intact.   Psychiatric: She has a normal mood and affect. Her speech is normal and behavior is normal. Judgment and thought content normal. Cognition and memory are normal.        Visual Acuity Screening    Right eye Left eye Both eyes   Without correction: 20/40 20/30 20/30   With correction:        Assessment/Plan:     Chrissy was seen today for establish care and annual exam.    Diagnoses and all orders for this visit:    Annual physical exam  -     Ambulatory Referral to Urology  -     TSH  -     CBC Auto Differential  -     Comprehensive Metabolic Panel  -     Hemoglobin A1c    Incontinence overflow, stress female  -     oxybutynin XL (DITROPAN-XL) 5 MG 24 hr tablet; Take 1 tablet by mouth Daily.    Coronary arteriosclerosis  -     pravastatin (PRAVACHOL) 10 MG tablet; Take 1 tablet by mouth Daily.           Follow-up:     Return in about 6 months (around 9/29/2019).      Goals     None          Preventative:    Vaccines Recommended at this visit:   No Vaccines recommended today. Patient is up to date on all vaccines.     Screenings Recommended at this visit:  No Screenings offered today. Patient is up to date on all screenings at this time.     Smoking Status:  Patient has never smoked.    Alcohol Intake:  Patient does not drink    Patient's Body mass index is 24.45 kg/m². BMI is within normal parameters. No follow-up required..        RISK SCORE: 4      Signature:  Dasia Carlisle MD [facility] PGY3  [place] [address]  Office: [phone]          This document has been electronically signed by Dasia Carlisle MD on March 29, 2019 5:14 PM

## 2019-03-30 LAB
HBA1C MFR BLD: 5.9 % (ref 4.8–5.6)
TSH SERPL DL<=0.05 MIU/L-ACNC: 1.32 MIU/ML (ref 0.27–4.2)

## 2019-06-26 ENCOUNTER — OFFICE VISIT (OUTPATIENT)
Dept: ORTHOPEDIC SURGERY | Facility: CLINIC | Age: 69
End: 2019-06-26

## 2019-06-26 VITALS
HEIGHT: 70 IN | DIASTOLIC BLOOD PRESSURE: 78 MMHG | SYSTOLIC BLOOD PRESSURE: 130 MMHG | HEART RATE: 72 BPM | WEIGHT: 174 LBS | OXYGEN SATURATION: 98 % | BODY MASS INDEX: 24.91 KG/M2 | TEMPERATURE: 98.5 F

## 2019-06-26 DIAGNOSIS — Z96.641 PRESENCE OF RIGHT ARTIFICIAL HIP JOINT: ICD-10-CM

## 2019-06-26 DIAGNOSIS — M70.61 TROCHANTERIC BURSITIS, RIGHT HIP: Primary | ICD-10-CM

## 2019-06-26 PROCEDURE — 20610 DRAIN/INJ JOINT/BURSA W/O US: CPT | Performed by: ORTHOPAEDIC SURGERY

## 2019-06-26 PROCEDURE — 99202 OFFICE O/P NEW SF 15 MIN: CPT | Performed by: ORTHOPAEDIC SURGERY

## 2019-06-26 RX ORDER — LIDOCAINE HYDROCHLORIDE AND EPINEPHRINE 10; 10 MG/ML; UG/ML
2 INJECTION, SOLUTION INFILTRATION; PERINEURAL
Status: COMPLETED | OUTPATIENT
Start: 2019-06-26 | End: 2019-06-26

## 2019-06-26 RX ORDER — TRIAMCINOLONE ACETONIDE 40 MG/ML
80 INJECTION, SUSPENSION INTRA-ARTICULAR; INTRAMUSCULAR
Status: COMPLETED | OUTPATIENT
Start: 2019-06-26 | End: 2019-06-26

## 2019-06-26 RX ADMIN — LIDOCAINE HYDROCHLORIDE AND EPINEPHRINE 2 ML: 10; 10 INJECTION, SOLUTION INFILTRATION; PERINEURAL at 14:20

## 2019-06-26 RX ADMIN — TRIAMCINOLONE ACETONIDE 80 MG: 40 INJECTION, SUSPENSION INTRA-ARTICULAR; INTRAMUSCULAR at 14:20

## 2019-06-26 NOTE — PROGRESS NOTES
"Chrissy Amira Dawson is a 69 y.o. female returns for     Chief Complaint   Patient presents with   • Right Hip - Follow-up, Pain       HISTORY OF PRESENT ILLNESS:  Patient is being seen for Right Hip pain follow up.    Mrs. Dawson returns for evaluation of right hip pain.    Mrs. Dawson is now 69 years old.  I treated her previously in [place].  He underwent bilateral hip arthroplasty with a right hip surgery being done in 1998.  Do not have a previous medical records for review.  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  She underwent revision of her liner with bone grafting of the acetabular cyst.  The surgery was done in 2011.  As I recall initially she did well after this.  I have not seen her for quite some time.  She saw Dr. Maravilla 2 years ago and was referred to physical therapy.  She also had an injection of the trochanteric bursa which gave her good relief of her symptoms.  She complains of a limp primarily with prolonged standing or sitting up.  She has been using a cane intermittently.  She also has some pain over the lateral aspect of the hip which she says is worse when lying on her right side.  There is been no recent trauma.    Medications include Pravachol.  Is allergic to Crestor and Lipitor.  Past medical history is remarkable for hypertension and coronary artery disease.  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       CONCURRENT MEDICAL HISTORY:    The following portions of the patient's history were reviewed and updated as appropriate: allergies, current medications, past family history, past medical history, past social history, past surgical history and problem list.         PHYSICAL EXAMINATION:       /78 (BP Location: Right leg, Patient Position: Sitting)   Pulse 72   Temp 98.5 °F (36.9 °C) (Oral)   Ht 176.5 cm (69.5\")   Wt 78.9 kg (174 lb)   LMP  (LMP Unknown)   SpO2 98%   BMI 25.33 kg/m² "     Physical Exam in general she is alert pleasant and in no apparent distress.  She responds appropriately to questions and commands.  GAIT:     []  Normal  [x]  Antalgic    Assistive device: []  None  []  Walker     []  Crutches  [x]  Cane     []  Wheelchair  []  Stretcher    Ortho Exam she walks with a slight limp favoring the right.  Alignment of lower extremities is unremarkable.  Leg lengths are equal.  There are 2 surgical scars over the lateral and posterior lateral aspects of the right hip.  Dorsalis pedis pulses 2+ symmetrically.  Motion of the right hip is smooth but somewhat guarded with flexion to 80 degrees, external rotation 20 to 30 degrees, internal rotation to neutral, and abduction about 20 degrees.  Motions of the hip are smooth.  Motions of the left hip are painless with flexion to almost 90 degrees, external rotation 45 degrees, internal rotation 5 degrees, and abduction almost 45 degrees.    Xr Hip With Or Without Pelvis 2 - 3 View Right    Result Date: 6/26/2019  Ordering Provider:  Jeovanny Brown MD Ordering Diagnosis/Indication:  Trochanteric bursitis, right hip, Presence of right artificial hip joint Procedure:  XR HIP W OR WO PELVIS 2-3 VIEW RIGHT Exam Date:  6/26/19 COMPARISON: Exam of the right hip dated 2017.      Radiographs done today include crosstable lateral of the right hip and standing AP pelvis.  There is been bilateral total hip arthroplasty.  The left femoral component is cemented.  There are multiple wire sutures in place over the proximal femur.  The left acetabulum and right femur appear to be well fixed.  There is a circumferential radiolucency around the right acetabular cup.  Abduction of the right cup is estimated at 50 to 55 degrees.  There is about 15 degrees of retroversion of the cup.  There is also a radiolucency between the femoral component and cement proximally on the left.  There is mild protrusio of the right hip. Jeovanny Brown MD  6/26/19        Large Joint Arthrocentesis: R greater trochanteric bursa  Date/Time: 6/26/2019 2:20 PM  Consent given by: patient  Site marked: site marked  Timeout: Immediately prior to procedure a time out was called to verify the correct patient, procedure, equipment, support staff and site/side marked as required   Supporting Documentation  Indications: pain   Procedure Details  Location: hip - R greater trochanteric bursa  Preparation: Patient was prepped and draped in the usual sterile fashion  Needle size: 20 G (spinal)  Approach: lateral  Medications administered: 2 mL lidocaine-EPINEPHrine 1 %-1:721448; 80 mg triamcinolone acetonide 40 MG/ML (0.5%Bupivacaine 3cc NDC  3063-9631-62  Lot# -[id number])  Patient tolerance: patient tolerated the procedure well with no immediate complications              ASSESSMENT: Right hip pain following revision arthroplasty.  Her Pain may be partly related to trochanteric bursitis.  However think the most likely cause of her symptoms is polyethylene wear and loose acetabular component.  I find no clinical suggestion of infection.  We Had a prolonged discussion about the natural history of the disorders and treatment options.  SHe wishes to avoid another surgery if possible.  He was instructed in activity restriction.  Potential benefits of trochanteric injection were discussed.  She appeared to understand and wished to proceed.    The right hip was prepped.  Skin was infiltrated with 1% Xylocaine with epinephrine.  The trochanteric bursa was injected with a mixture of Marcaine Kenalog and Xylocaine with epinephrine here there were no complications.  After the injection she reported improvement in pain with weightbearing.    Return here in 3months if her symptoms have not significantly improved.    Diagnoses and all orders for this visit:    Trochanteric bursitis, right hip  -     XR Pelvis 1 or 2 View  -     XR Hip With or Without Pelvis 2 - 3 View Right; Future  -     Large  Joint Arthrocentesis: R greater trochanteric bursa    Presence of right artificial hip joint  -     XR Pelvis 1 or 2 View  -     XR Hip With or Without Pelvis 2 - 3 View Right; Future  -     Large Joint Arthrocentesis: R greater trochanteric bursa          PLAN    Return in about 3 months (around 9/26/2019).    Jeovanny Brown MD

## 2019-07-15 ENCOUNTER — TELEPHONE (OUTPATIENT)
Dept: ORTHOPEDIC SURGERY | Facility: CLINIC | Age: 69
End: 2019-07-15

## 2019-07-15 NOTE — TELEPHONE ENCOUNTER
ANASTASIA      Pt CALLED AND IS WANTING A CALL DO DISCUSS THE INFLAMMATION IN HER HIP    SHE IS THINKING SHE NEEDS SOME KIND OF MEDICATION TO GET RID OF THE INFLAMMATION

## 2019-07-19 NOTE — TELEPHONE ENCOUNTER
We received a phone call from Mrs. Dawson dictating she was wanting to know if anti-inflammatory medication will be helpful.  I called her listed phone #4 times with no answer.  I called on 16 July at 2:00 and 415, on 17 July at 110, and on 18 July at 1230.    Anti-inflammatory medications may be of some benefit.  If she has not tried over-the-counter medication such as ibuprofen and Aleve that would be my first choice.  If she has not tried these medications with no benefit I would recommend Mobic 15 mg daily with meals.    We will await her return call.

## 2019-07-24 NOTE — TELEPHONE ENCOUNTER
ANASTASIA      Pt CALLED STATING SHE HADN'T SEEN YOUR MISSED CALLS AND WANTED TO KNOW WHAT YOUR RESPONSE WAS TO HER MESSAGE..    I READ YOUR MESSAGE TO THE Pt   SHE STATED SHE HAS NOT TRIED OVER THE COUNTER MEDICATIONS AND IS GOING TO GIVE THEM A TRY AND THEN CALL US BACK AND LET US KNOW HOW THEY ARE DOING FOR HER..

## 2019-08-08 DIAGNOSIS — I25.10 CORONARY ARTERY DISEASE INVOLVING NATIVE CORONARY ARTERY OF NATIVE HEART WITHOUT ANGINA PECTORIS: Primary | ICD-10-CM

## 2019-08-09 ENCOUNTER — OFFICE VISIT (OUTPATIENT)
Dept: CARDIOLOGY | Facility: CLINIC | Age: 69
End: 2019-08-09

## 2019-08-09 VITALS
HEART RATE: 78 BPM | OXYGEN SATURATION: 98 % | DIASTOLIC BLOOD PRESSURE: 68 MMHG | HEIGHT: 70 IN | SYSTOLIC BLOOD PRESSURE: 110 MMHG | BODY MASS INDEX: 24.55 KG/M2 | WEIGHT: 171.5 LBS

## 2019-08-09 DIAGNOSIS — E78.2 MIXED HYPERLIPIDEMIA: ICD-10-CM

## 2019-08-09 DIAGNOSIS — I25.10 CORONARY ARTERIOSCLEROSIS: Primary | ICD-10-CM

## 2019-08-09 PROCEDURE — 99214 OFFICE O/P EST MOD 30 MIN: CPT | Performed by: INTERNAL MEDICINE

## 2019-08-09 PROCEDURE — 93000 ELECTROCARDIOGRAM COMPLETE: CPT | Performed by: INTERNAL MEDICINE

## 2019-08-09 NOTE — PROGRESS NOTES
Cardiovascular Medicine   Vishnu Reynaga M.D., Ph.D., [facility]      The patient returns to cardiovascular clinic for follow-up of the following:    PROBLEM LIST:  1. ASCAD  A. PCI-LAD  B. CABG with LIMA to LAD in 2000 Dr. Montes  2. HLD  A. Statin intolerance to Zocor and Crestor    Chrissy Amira Dawson is a 69 y.o. female who returns in follow-up today. She has a history of atherosclerotic coronary artery disease.  She had a PCI to the LAD.  She subsequently underwent CABG with a LIMA to the LAD in 2000.  This was secondary to ISR. Her last invasive coronary angiography was in October 2010.  At that time her THOMPSON was patent.  LAD showed native vessel disease of 60-70%.  She is currently on aspirin and pravastatin.  She denies easy bruising or bleeding.  She's had no exercise intolerance or dyspnea.  She denies any resting, exertional or nocturnal angina.  She tells me that she still was not interested in using a beta blocker at this point.  Concerning her hyperlipidemia, she remains on pravastatin.  She's had documented statin intolerance to other statin medications.  She's currently tolerating her dose well.  She has had no emergency department visits or inpatient admissions for angina. 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 She has had no activity or exercise intolerance.         Review of Systems   Cardiovascular: Negative.    Respiratory: Negative.      family history includes Depression in her maternal grandmother; Diabetes in her father; Heart disease in her maternal grandfather and other; Hypertension in her mother and other.   reports that she has never smoked. She has never used smokeless tobacco. She reports that she does not drink alcohol or use drugs.  Allergies   Allergen Reactions   • Crestor [Rosuvastatin]    • Lipitor [Atorvastatin]        Current Outpatient Medications:   •  aspirin 81 MG tablet, Take 81 mg by mouth., Disp: , Rfl:   •  Multiple Vitamins-Minerals (CENTRUM SILVER PO),  Take 1 tablet by mouth Daily., Disp: , Rfl:   •  pravastatin (PRAVACHOL) 10 MG tablet, Take 1 tablet by mouth Daily., Disp: 30 tablet, Rfl: 5    Physical Exam:  Vitals:    08/09/19 0852   BP: 110/68   Pulse: 78   SpO2: 98%     Body mass index is 24.96 kg/m².   Last Weights:   Wt Readings from Last 3 Encounters:   06/26/19 78.9 kg (174 lb)   03/29/19 76.2 kg (168 lb)   01/02/19 75.1 kg (165 lb 9.6 oz)     Pulse Ox: Normal   General: alert, appears stated age and cooperative  Body Habitus: well-nourished  HEENT: Head: Normocephalic, no lesions, without obvious abnormality. No arcus senilis, xanthelasma or xanthomas.  JVP: 6 cm of water at 45 degrees   Volume/Pulsation: Normal  Carotid Exam: normal pulsation bilaterally   Carotid Volume: normal    Subclavian Bruit: absent  Vertebral Bruit: absent   Heart rate: normal    Heart Rhythm: regular     Heart Sounds: S1: normal intensity  S2: normal intensity  S3: absent   S4: absent  Opening Snap: absent  A2-OS:  absent.   Pericardial rub: absent    Ejection click: None      Murmurs:  absent   Extremity: moves all extremities equally.       DATA REVIEWED:     EKG. I personally reviewed and interpreted the EKG.      --------------------------------------------------------------------------------------------------  LABS:   Lab Results   Component Value Date    GLUCOSE 82 07/16/2018    BUN 21 07/16/2018    CREATININE 0.53 07/16/2018    EGFRIFAFRI 139 (H) 07/16/2018    BCR 39.6 (H) 07/16/2018    K 4.2 07/16/2018    CO2 32.0 (H) 07/16/2018    CALCIUM 9.5 07/16/2018    ALBUMIN 4.20 07/16/2018    AST 51 (H) 07/16/2018    ALT 14 07/16/2018     Lab Results   Component Value Date    WBC 4.01 07/16/2018    HGB 13.0 07/16/2018    HCT 39.3 07/16/2018    MCV 82.9 07/16/2018     07/16/2018     Lab Results   Component Value Date    CHOL 199 07/16/2018    CHLPL 294 (H) 10/19/2015    TRIG 51 07/16/2018    HDL 68 07/16/2018    LDL 92 07/16/2018     Lab Results   Component Value Date     TSH 1.320 03/29/2019     No results found for: CKTOTAL, CKMB, CKMBINDEX, TROPONINI, TROPONINT  Lab Results   Component Value Date    HGBA1C 5.90 (H) 03/29/2019     Lab Results   Component Value Date    DDIMER 773 (H) 08/08/2014     Lab Results   Component Value Date    ALT 14 07/16/2018     Lab Results   Component Value Date    HGBA1C 5.90 (H) 03/29/2019     Lab Results   Component Value Date    CREATININE 0.53 07/16/2018     No results found for: IRON, TIBC, FERRITIN  No results found for: INR, PROTIME    Assessment/Plan      Diagnosis Plan   1. Coronary arteriosclerosis, s/p CABG and PCI to LAD. CCS 0.   · Continue aspirin and statin   · She continues to refuse beta blocker    2. Mixed hyperlipidemia  Hyperlipidemia Plan  · Dietary changes: Increase soluble fiber  · Reduce saturated fat and cholesterol  · Exercise changes: Advised to engage in aerobic exercise on most days of the week  · Statin: Yes  · ASA: Yes  · Continue follow-up visits with PCP for monitoring of labs         3. Normal body weight  ·  continue active lifestyle            Return in about 1 year (around 8/9/2020).

## 2019-08-09 NOTE — PATIENT INSTRUCTIONS
Preventing Unhealthy Weight Gain, Adult  Staying at a healthy weight is important to your overall health. When fat builds up in your body, you may become overweight or obese. Being overweight or obese increases your risk of developing certain health problems, such as heart disease, diabetes, sleeping problems, joint problems, and some types of cancer.  Unhealthy weight gain is often the result of making unhealthy food choices or not getting enough exercise. You can make changes to your lifestyle to prevent obesity and stay as healthy as possible.  What nutrition changes can be made?    · Eat only as much as your body needs. To do this:  ? Pay attention to signs that you are hungry or full. Stop eating as soon as you feel full.  ? If you feel hungry, try drinking water first before eating. Drink enough water so your urine is clear or pale yellow.  ? Eat smaller portions. Pay attention to portion sizes when eating out.  ? Look at serving sizes on food labels. Most foods contain more than one serving per container.  ? Eat the recommended number of calories for your gender and activity level. For most active people, a daily total of 2,000 calories is appropriate. If you are trying to lose weight or are not very active, you may need to eat fewer calories. Talk with your health care provider or a diet and nutrition specialist (dietitian) about how many calories you need each day.  · Choose healthy foods, such as:  ? Fruits and vegetables. At each meal, try to fill at least half of your plate with fruits and vegetables.  ? Whole grains, such as whole-wheat bread, brown rice, and quinoa.  ? Lean meats, such as chicken or fish.  ? Other healthy proteins, such as beans, eggs, or tofu.  ? Healthy fats, such as nuts, seeds, fatty fish, and olive oil.  ? Low-fat or fat-free dairy products.  · Check food labels, and avoid food and drinks that:  ? Are high in calories.  ? Have added sugar.  ? Are high in sodium.  ? Have saturated  fats or trans fats.  · Cook foods in healthier ways, such as by baking, broiling, or grilling.  · Make a meal plan for the week, and shop with a grocery list to help you stay on track with your purchases. Try to avoid going to the grocery store when you are hungry.  · When grocery shopping, try to shop around the outside of the store first, where the fresh foods are. Doing this helps you to avoid prepackaged foods, which can be high in sugar, salt (sodium), and fat.  What lifestyle changes can be made?    · Exercise for 30 or more minutes on 5 or more days each week. Exercising may include brisk walking, yard work, biking, running, swimming, and team sports like basketball and soccer. Ask your health care provider which exercises are safe for you.  · Do muscle-strengthening activities, such as lifting weights or using resistance bands, on 2 or more days a week.  · Do not use any products that contain nicotine or tobacco, such as cigarettes and e-cigarettes. If you need help quitting, ask your health care provider.  · Limit alcohol intake to no more than 1 drink a day for nonpregnant women and 2 drinks a day for men. One drink equals 12 oz of beer, 5 oz of wine, or 1½ oz of hard liquor.  · Try to get 7-9 hours of sleep each night.  What other changes can be made?  · Keep a food and activity journal to keep track of:  ? What you ate and how many calories you had. Remember to count the calories in sauces, dressings, and side dishes.  ? Whether you were active, and what exercises you did.  ? Your calorie, weight, and activity goals.  · Check your weight regularly. Track any changes. If you notice you have gained weight, make changes to your diet or activity routine.  · Avoid taking weight-loss medicines or supplements. Talk to your health care provider before starting any new medicine or supplement.  · Talk to your health care provider before trying any new diet or exercise plan.  Why are these changes  important?  Eating healthy, staying active, and having healthy habits can help you to prevent obesity. Those changes also:  · Help you manage stress and emotions.  · Help you connect with friends and family.  · Improve your self-esteem.  · Improve your sleep.  · Prevent long-term health problems.  What can happen if changes are not made?  Being obese or overweight can cause you to develop joint or bone problems, which can make it hard for you to stay active or do activities you enjoy. Being obese or overweight also puts stress on your heart and lungs and can lead to health problems like diabetes, heart disease, and some cancers.  Where to find more information  Talk with your health care provider or a dietitian about healthy eating and healthy lifestyle choices. You may also find information from:  · [place] Department of Agriculture, MyPlate: www.DrawQuestmyplate.gov  · American Heart Association: www.heart.org  · Centers for Disease Control and Prevention: www.cdc.gov  Summary  · Staying at a healthy weight is important to your overall health. It helps you to prevent certain diseases and health problems, such as heart disease, diabetes, joint problems, sleep disorders, and some types of cancer.  · Being obese or overweight can cause you to develop joint or bone problems, which can make it hard for you to stay active or do activities you enjoy.  · You can prevent unhealthy weight gain by eating a healthy diet, exercising regularly, not smoking, limiting alcohol, and getting enough sleep.  · Talk with your health care provider or a dietitian for guidance about healthy eating and healthy lifestyle choices.  This information is not intended to replace advice given to you by your health care provider. Make sure you discuss any questions you have with your health care provider.  Document Released: 12/19/2017 Document Revised: 09/28/2018 Document Reviewed: 01/24/2018  [employer] Interactive Patient Education © 2019 [employer]  Coronary Artery Disease, Female    Coronary artery disease (CAD) is a condition in which the arteries that lead to the heart (coronary arteries) become narrow or blocked. The narrowing or blockage can lead to decreased blood flow to the heart. Prolonged reduced blood flow can cause a heart attack (myocardial infarction or MI). This condition may also be called coronary heart disease.  Because CAD is the leading cause of death in women, it is important to understand what causes this condition and how it is treated.  What are the causes?  CAD is most often caused by atherosclerosis. This is the buildup of fat and cholesterol (plaque) on the inside of the arteries. Over time, the plaque may narrow or block the artery, reducing blood flow to the heart. Plaque can also become weak and break off within a coronary artery and cause a sudden blockage. Other less common causes of CAD include:  · An embolism or blood clot in a coronary artery.  · A tearing of the artery (spontaneous coronary artery dissection).  · An aneurysm.  · Inflammation (vasculitis) in the artery wall.  What increases the risk?  The following factors may make you more likely to develop this condition:  · Age. Women over age 55 are at a greater risk of CAD.  · Family history of CAD.  · High blood pressure (hypertension).  · Diabetes.  · High cholesterol levels.  · Tobacco use.  · Lack of exercise.  · Menopause.  ? All postmenopausal women are at greater risk of CAD.  ? Women who have experienced menopause between the ages of 40-45 (early menopause) are at a higher risk of CAD.  ? Women who have experienced menopause before age 40 (premature menopause) are at a very high risk of CAD.  · Excessive alcohol use  · A diet high in saturated and trans fats, such as fried food and processed meat.  Other possible risk factors include:  · High stress levels.  · Depression  · Obesity.  · Sleep apnea.  What are the signs or symptoms?  Many people do not have  any symptoms during the early stages of CAD. As the condition progresses, symptoms may include:  · Chest pain (angina). The pain can:  ? Feel like crushing or squeezing, or a tightness, pressure, fullness, or heaviness in the chest.  ? Last more than a few minutes or can stop and recur. The pain tends to get worse with exercise or stress and to fade with rest.  · Pain in the arms, neck, jaw, or back.  · Unexplained heartburn or indigestion.  · Shortness of breath.  · Nausea.  · Sudden cold sweats.  · Sudden light-headedness.  · Fluttering or fast heartbeat (palpitations).  Many women have chest discomfort and the other symptoms. However, women often have unusual (atypical) symptoms, such as:  · Fatigue.  · Vomiting.  · Unexplained feelings of nervousness or anxiety.  · Unexplained weakness.  · Dizziness or fainting.  How is this diagnosed?  This condition is diagnosed based on:  · Your family and medical history.  · A physical exam.  · Tests, including:  ? A test to check the electrical signals in your heart (electrocardiogram).  ? Exercise stress test. This looks for signs of blockage when the heart is stressed with exercise, such as running on a treadmill.  ? Pharmacologic stress test. This test looks for signs of blockage when the heart is being stressed with a medicine.  ? Blood tests.  ? Coronary angiogram. This is a procedure to look at the coronary arteries to see if there is any blockage. During this test, a dye is injected into your arteries so they appear on an X-ray.  ? A test that uses sound waves to take a picture of your heart (echocardiogram).  ? Chest X-ray.  How is this treated?  This condition may be treated by:  · Healthy lifestyle changes to reduce risk factors.  · Medicines such as:  ? Antiplatelet medicines and blood-thinning medicines, such as aspirin. These help prevent blood clots.  ? Nitroglycerin.  ? Blood pressure medicines.  ? Cholesterol-lowering medicine.  · Coronary angioplasty and  stenting. During this procedure, a thin, flexible tube is inserted through a blood vessel and into a blocked artery. A balloon or similar device on the end of the tube is inflated to open up the artery. In some cases, a small, mesh tube (stent) is inserted into the artery to keep it open.  · Coronary artery bypass surgery. During this surgery, veins or arteries from other parts of the body are used to create a bypass around the blockage and allow blood to reach your heart.  Follow these instructions at home:  Medicines  · Take over-the-counter and prescription medicines only as told by your health care provider.  · Do not take the following medicines unless your health care provider approves:  ? NSAIDs, such as ibuprofen, naproxen, or celecoxib.  ? Vitamin supplements that contain vitamin A, vitamin E, or both.  ? Hormone replacement therapy that contains estrogen with or without progestin.  Lifestyle  · Follow an exercise program approved by your health care provider. Aim for 150 minutes of moderate exercise or 75 minutes of vigorous exercise each week.  · Maintain a healthy weight or lose weight as approved by your health care provider.  · Rest when you are tired.  · Learn to manage stress or try to limit your stress. Ask your health care provider for suggestions if you need help.  · Get screened for depression and seek treatment, if needed.  · Do not use any products that contain nicotine or tobacco, such as cigarettes and e-cigarettes. If you need help quitting, ask your health care provider.  · Do not use illegal drugs.  Eating and drinking  · Follow a heart-healthy diet. A dietitian can help educate you about healthy food options and changes. In general, eat plenty of fruits and vegetables, lean meats, and whole grains.  · Avoid foods high in:  ? Sugar.  ? Salt (sodium).  ? Saturated fats, such as processed or fatty meat.  ? Trans fats, such as fried food.  · Use healthy cooking methods such as roasting,  grilling, broiling, baking, poaching, steaming, or stir-frying.  · If you drink alcohol, and your health care provider approves, limit your alcohol intake to no more than 1 drink per day. One drink equals 12 ounces of beer, 5 ounces of wine, or 1½ ounces of hard liquor.  General instructions  · Manage any other health conditions, such as hypertension and diabetes. These conditions affect your heart.  · Your health care provider may ask you to monitor your blood pressure. Ideally, your blood pressure should be below 130/80.  · Keep all follow-up visits as told by your health care provider. This is important.  Get help right away if:  · You have pain in your chest, neck, arm, jaw, stomach, or back that:  ? Lasts more than a few minutes.  ? Is recurring.  ? Is not relieved by taking medicine under your tongue (sublingualnitroglycerin).  · You have profuse sweating without cause.  · You have unexplained:  ? Heartburn or indigestion.  ? Shortness of breath or difficulty breathing.  ? Fluttering or fast heartbeat (palpitations).  ? Nausea or vomiting.  ? Fatigue.  ? Feelings of nervousness or anxiety.  ? Weakness.  ? Diarrhea.  · You have sudden light-headedness or dizziness.  · You faint.  · You feel like hurting yourself or think about taking your own life.  These symptoms may represent a serious problem that is an emergency. Do not wait to see if the symptoms will go away. Get medical help right away. Call your local emergency services (911 in the [place] Do not drive yourself to the hospital.  Summary  · Coronary artery disease (CAD) is a process in which the arteries that lead to the heart (coronary arteries) become narrow or blocked. The narrowing or blockage can lead to a heart attack.  · Many women have chest discomfort and other common symptoms of CAD. However, women often have different (atypical) symptoms, such as fatigue, vomiting, and dizziness or weakness.  · CAD can be treated with lifestyle changes,  medicines, surgery, or a combination of these treatments.  This information is not intended to replace advice given to you by your health care provider. Make sure you discuss any questions you have with your health care provider.  Document Released: 03/11/2013 Document Revised: 12/08/2017 Document Reviewed: 12/08/2017  [employer] Interactive Patient Education © 2019 [employer]

## 2019-10-02 ENCOUNTER — HOSPITAL ENCOUNTER (EMERGENCY)
Facility: HOSPITAL | Age: 69
Discharge: HOME | End: 2019-10-02
Attending: EMERGENCY MEDICINE | Admitting: EMERGENCY MEDICINE

## 2019-10-02 ENCOUNTER — APPOINTMENT (OUTPATIENT)
Dept: ULTRASOUND IMAGING | Facility: HOSPITAL | Age: 69
End: 2019-10-02

## 2019-10-02 VITALS
BODY MASS INDEX: 25.96 KG/M2 | WEIGHT: 175.3 LBS | RESPIRATION RATE: 18 BRPM | OXYGEN SATURATION: 95 % | DIASTOLIC BLOOD PRESSURE: 77 MMHG | TEMPERATURE: 98.3 F | HEART RATE: 61 BPM | SYSTOLIC BLOOD PRESSURE: 117 MMHG | HEIGHT: 69 IN

## 2019-10-02 DIAGNOSIS — S80.11XA CONTUSION OF RIGHT LOWER EXTREMITY, INITIAL ENCOUNTER: Primary | ICD-10-CM

## 2019-10-02 PROCEDURE — 99283 EMERGENCY DEPT VISIT LOW MDM: CPT

## 2019-10-02 PROCEDURE — 93971 EXTREMITY STUDY: CPT

## 2019-10-02 NOTE — ED PROVIDER NOTES
Subjective   69 year old female presents form  with pain and a bruised area to the RLE lateral calf. Concerned for DVT. NO history of DVT or PE. Some swelling. No hormone use. No smoking. No travel or surgeries. Wearing a compression sock on the right for 2 days of symptoms. Denies any trauma.    Family history, surgical history, social history, current medications and allergies are reviewed with the patient and triage documentation and vitals are reviewed.           History provided by:  Patient and spouse   used: No        Review of Systems   Constitutional: Negative for chills and fever.   HENT: Negative.    Respiratory: Negative for cough, chest tightness, shortness of breath and wheezing.    Cardiovascular: Positive for leg swelling. Negative for chest pain and palpitations.   Gastrointestinal: Negative.    Endocrine: Negative.    Genitourinary: Negative.    Musculoskeletal: Positive for myalgias. Negative for arthralgias, back pain, joint swelling and neck pain.   Skin: Positive for color change (bruising RLE). Negative for pallor, rash and wound.   Allergic/Immunologic: Negative.    Neurological: Negative.    Hematological: Negative for adenopathy. Does not bruise/bleed easily.   Psychiatric/Behavioral: Negative.        Past Medical History:   Diagnosis Date   • Backache    • Benign essential hypertension     PATIENT DENIES   • Cervical arthritis    • Coronary arteriosclerosis    • Fibrocystic breast    • Ganglion cyst of wrist     Ganglion/synovial cyst - wrist   • Ganglion of wrist    • Hip pain    • History of echocardiogram 10/23/2015    Echocardiogram W/ color flow 99679 (1) - Normal LV function with Ef of 55-60%.Normal RV size and function.Pseudonormal diastolic dysfunciton with borderline [facility].NO sig.valvular regurg or stenosis.   • History of mammogram 09/10/2014    MAMMOGRAM SCREENING 73756 - [facility] (1[facility] - LIZZ MILLER ([facility])   • History of transfusion    •  Hyperlipidemia    • Inguinal pain    • Recurrent angina status post coronary artery bypass graft (CMS/HCC)     Recurrent angina after coronary artery bypass graft   • Urge incontinence of urine        Allergies   Allergen Reactions   • Crestor [Rosuvastatin]    • Lipitor [Atorvastatin]        Past Surgical History:   Procedure Laterality Date   • BREAST BIOPSY Right 2/2/2017    Procedure: RIGHT BREAST LUMPECTOMY WITH NEEDLE LOCALIZATION AND ULTRASOUND;  Surgeon: Delfino Greer MD;  Location: [facility] [place];  Service:    • BREAST CYST ASPIRATION     • CARDIAC SURGERY     • CORONARY ARTERY BYPASS GRAFT  2000    CABG, arterial, single (1)   • GANGLION CYST EXCISION     • JOINT REPLACEMENT     • ORIF MANDIBULAR FRACTURE     • TOTAL ABDOMINAL HYSTERECTOMY     • TOTAL ABDOMINAL HYSTERECTOMY WITH SALPINGO OOPHORECTOMY  1998    SALVADOR/BSO (1)   • TOTAL HIP ARTHROPLASTY  2011    Total hip replacement (3)       Family History   Problem Relation Age of Onset   • Heart disease Other    • Hypertension Other    • Hypertension Mother    • Diabetes Father    • Depression Maternal Grandmother    • Heart disease Maternal Grandfather        Social History     Socioeconomic History   • Marital status:      Spouse name: Not on file   • Number of children: Not on file   • Years of education: Not on file   • Highest education level: Not on file   Tobacco Use   • Smoking status: Never Smoker   • Smokeless tobacco: Never Used   Substance and Sexual Activity   • Alcohol use: No   • Drug use: No   • Sexual activity: Defer     Comment:            Objective   Physical Exam   Constitutional: She is oriented to person, place, and time. She appears well-developed and well-nourished. No distress.   HENT:   Head: Normocephalic.   Eyes: Conjunctivae are normal. Pupils are equal, round, and reactive to light.   Neck: Normal range of motion.   Cardiovascular: Normal rate and regular rhythm.   Pulmonary/Chest: Effort normal and breath sounds normal.    Abdominal: Soft. Bowel sounds are normal.   Musculoskeletal: Normal range of motion.        Right lower leg: She exhibits tenderness and swelling. She exhibits no bony tenderness, no deformity and no laceration.        Legs:  Neurological: She is alert and oriented to person, place, and time.   Skin: Skin is warm and dry. Capillary refill takes less than 2 seconds. She is not diaphoretic.   Psychiatric: She has a normal mood and affect.   Nursing note and vitals reviewed.      Procedures           ED Course          Wells' Criteria (for DVT) reviewed and/or performed as part of the patient evaluation and treatment planning process.  The result associated with this review/performance is: -1       MDM  Number of Diagnoses or Management Options  Contusion of right lower extremity, initial encounter:      Amount and/or Complexity of Data Reviewed  Tests in the radiology section of CPT®: reviewed    Patient Progress  Patient progress: stable    US negative for DVT. Low risk. Exam more consistent with contusion. Advised on symptomatic treatment and follow-up should symptoms persist.     Final diagnoses:   Contusion of right lower extremity, initial encounter              Frandy Mares, DO  10/04/19 0047

## 2019-12-11 ENCOUNTER — LAB (OUTPATIENT)
Dept: LAB | Facility: HOSPITAL | Age: 69
End: 2019-12-11

## 2019-12-11 ENCOUNTER — TELEPHONE (OUTPATIENT)
Dept: FAMILY MEDICINE CLINIC | Facility: CLINIC | Age: 69
End: 2019-12-11

## 2019-12-11 ENCOUNTER — OFFICE VISIT (OUTPATIENT)
Dept: FAMILY MEDICINE CLINIC | Facility: CLINIC | Age: 69
End: 2019-12-11

## 2019-12-11 VITALS
HEIGHT: 70 IN | SYSTOLIC BLOOD PRESSURE: 110 MMHG | OXYGEN SATURATION: 98 % | DIASTOLIC BLOOD PRESSURE: 82 MMHG | WEIGHT: 176.5 LBS | HEART RATE: 86 BPM | BODY MASS INDEX: 25.27 KG/M2

## 2019-12-11 DIAGNOSIS — Z12.31 ENCOUNTER FOR SCREENING MAMMOGRAM FOR MALIGNANT NEOPLASM OF BREAST: ICD-10-CM

## 2019-12-11 DIAGNOSIS — I25.10 CORONARY ARTERY DISEASE INVOLVING NATIVE HEART WITHOUT ANGINA PECTORIS, UNSPECIFIED VESSEL OR LESION TYPE: Primary | ICD-10-CM

## 2019-12-11 DIAGNOSIS — I25.10 CORONARY ARTERY DISEASE INVOLVING NATIVE HEART WITHOUT ANGINA PECTORIS, UNSPECIFIED VESSEL OR LESION TYPE: ICD-10-CM

## 2019-12-11 DIAGNOSIS — Z23 NEED FOR IMMUNIZATION AGAINST INFLUENZA: ICD-10-CM

## 2019-12-11 DIAGNOSIS — N39.45 CONTINUOUS LEAKAGE OF URINE: ICD-10-CM

## 2019-12-11 LAB
ALBUMIN SERPL-MCNC: 4.2 G/DL (ref 3.5–5.2)
ALBUMIN/GLOB SERPL: 1.2 G/DL
ALP SERPL-CCNC: 68 U/L (ref 39–117)
ALT SERPL W P-5'-P-CCNC: 15 U/L (ref 1–33)
ANION GAP SERPL CALCULATED.3IONS-SCNC: 7.6 MMOL/L (ref 5–15)
AST SERPL-CCNC: 19 U/L (ref 1–32)
BASOPHILS # BLD AUTO: 0.01 10*3/MM3 (ref 0–0.2)
BASOPHILS NFR BLD AUTO: 0.2 % (ref 0–1.5)
BILIRUB SERPL-MCNC: 0.3 MG/DL (ref 0.2–1.2)
BUN BLD-MCNC: 20 MG/DL (ref 8–23)
BUN/CREAT SERPL: 35.1 (ref 7–25)
CALCIUM SPEC-SCNC: 9.8 MG/DL (ref 8.6–10.5)
CHLORIDE SERPL-SCNC: 101 MMOL/L (ref 98–107)
CHOLEST SERPL-MCNC: 242 MG/DL (ref 0–200)
CO2 SERPL-SCNC: 30.4 MMOL/L (ref 22–29)
CREAT BLD-MCNC: 0.57 MG/DL (ref 0.57–1)
DEPRECATED RDW RBC AUTO: 39.6 FL (ref 37–54)
EOSINOPHIL # BLD AUTO: 0.05 10*3/MM3 (ref 0–0.4)
EOSINOPHIL NFR BLD AUTO: 1.1 % (ref 0.3–6.2)
ERYTHROCYTE [DISTWIDTH] IN BLOOD BY AUTOMATED COUNT: 12.9 % (ref 12.3–15.4)
GFR SERPL CREATININE-BSD FRML MDRD: 127 ML/MIN/1.73
GLOBULIN UR ELPH-MCNC: 3.5 GM/DL
GLUCOSE BLD-MCNC: 84 MG/DL (ref 65–99)
HCT VFR BLD AUTO: 40.3 % (ref 34–46.6)
HDLC SERPL-MCNC: 80 MG/DL (ref 40–60)
HGB BLD-MCNC: 13.4 G/DL (ref 12–15.9)
IMM GRANULOCYTES # BLD AUTO: 0.02 10*3/MM3 (ref 0–0.05)
IMM GRANULOCYTES NFR BLD AUTO: 0.4 % (ref 0–0.5)
LDLC SERPL CALC-MCNC: 152 MG/DL (ref 0–100)
LDLC/HDLC SERPL: 1.9 {RATIO}
LYMPHOCYTES # BLD AUTO: 1.28 10*3/MM3 (ref 0.7–3.1)
LYMPHOCYTES NFR BLD AUTO: 27.4 % (ref 19.6–45.3)
MCH RBC QN AUTO: 28.6 PG (ref 26.6–33)
MCHC RBC AUTO-ENTMCNC: 33.3 G/DL (ref 31.5–35.7)
MCV RBC AUTO: 86.1 FL (ref 79–97)
MONOCYTES # BLD AUTO: 0.35 10*3/MM3 (ref 0.1–0.9)
MONOCYTES NFR BLD AUTO: 7.5 % (ref 5–12)
NEUTROPHILS # BLD AUTO: 2.97 10*3/MM3 (ref 1.7–7)
NEUTROPHILS NFR BLD AUTO: 63.4 % (ref 42.7–76)
NRBC BLD AUTO-RTO: 0 /100 WBC (ref 0–0.2)
PLATELET # BLD AUTO: 289 10*3/MM3 (ref 140–450)
PMV BLD AUTO: 11.9 FL (ref 6–12)
POTASSIUM BLD-SCNC: 4.4 MMOL/L (ref 3.5–5.2)
PROT SERPL-MCNC: 7.7 G/DL (ref 6–8.5)
RBC # BLD AUTO: 4.68 10*6/MM3 (ref 3.77–5.28)
SODIUM BLD-SCNC: 139 MMOL/L (ref 136–145)
TRIGL SERPL-MCNC: 51 MG/DL (ref 0–150)
VLDLC SERPL-MCNC: 10.2 MG/DL (ref 5–40)
WBC NRBC COR # BLD: 4.68 10*3/MM3 (ref 3.4–10.8)

## 2019-12-11 PROCEDURE — 80061 LIPID PANEL: CPT

## 2019-12-11 PROCEDURE — 99213 OFFICE O/P EST LOW 20 MIN: CPT | Performed by: FAMILY MEDICINE

## 2019-12-11 PROCEDURE — 36415 COLL VENOUS BLD VENIPUNCTURE: CPT

## 2019-12-11 PROCEDURE — 80053 COMPREHEN METABOLIC PANEL: CPT

## 2019-12-11 PROCEDURE — 85025 COMPLETE CBC W/AUTO DIFF WBC: CPT

## 2019-12-11 NOTE — PROGRESS NOTES
Subjective   Chief Complaint   Patient presents with   • Annual Exam     Chrissy Dawson is a 69 y.o. year old presenting to [facility] as new patient.      Additional Concerns:    1.  Hyperlipidemia  - Patient currently taking pravastatin and tolerating it well.  She had allergic reactions to Crestor and Lipitor previously.      2.  Pre-diabetes - History of pre-diabetes.  Last HgbA1c was checked 3/29/19 and was 5.9%.  She was unaware of this value.      3.  Incontinence - Patient's main concern is incontinence.  Says that it is not with coughing or sneezing.  She has been seen by urologist about a year ago.  She reports bladder scan after urination, and notes that her bladder was empty.  She was started on medication, but uncertain the name.  Notes that it did not help with her incontinence.  She says the incontinence is all the time.  Would like to see another specialist about this issue.  Open to medical and surgical options.    4.  Tremor - Notes a tremor in the left hand, only with movement.  Started within the last year.  No tremor at rest.  No family history of tremors.  Patient does not have difficulty holding things.  Does not drop things.        Past Medical History:   Diagnosis Date   • Backache    • Benign essential hypertension     PATIENT DENIES   • Cervical arthritis    • Coronary arteriosclerosis    • Fibrocystic breast    • Ganglion cyst of wrist     Ganglion/synovial cyst - wrist   • Ganglion of wrist    • Hip pain    • History of echocardiogram 10/23/2015    Echocardiogram W/ color flow 10217 (1) - Normal LV function with Ef of 55-60%.Normal RV size and function.Pseudonormal diastolic dysfunciton with borderline [facility].NO sig.valvular regurg or stenosis.   • History of mammogram 09/10/2014    MAMMOGRAM SCREENING 00709 - [facility] (1[facility] - LIZZ MILLER ([facility])   • History of transfusion    • Hyperlipidemia    • Inguinal pain    • Recurrent angina status post coronary artery bypass graft  (CMS/HCC)     Recurrent angina after coronary artery bypass graft   • Urge incontinence of urine        Past Surgical History:   Procedure Laterality Date   • TOTAL ABDOMINAL HYSTERECTOMY WITH SALPINGO OOPHORECTOMY  1998    SALVADOR/BSO (1)   • CORONARY ARTERY BYPASS GRAFT  2000    CABG, arterial, single (1)   • TOTAL HIP ARTHROPLASTY  2011    Total hip replacement (3)   • BREAST BIOPSY Right 2/2/2017    Procedure: RIGHT BREAST LUMPECTOMY WITH NEEDLE LOCALIZATION AND ULTRASOUND;  Surgeon: Delfino Greer MD;  Location: [facility];  Service:    • BREAST CYST ASPIRATION     • CARDIAC SURGERY     • GANGLION CYST EXCISION     • JOINT REPLACEMENT     • ORIF MANDIBULAR FRACTURE     • TOTAL ABDOMINAL HYSTERECTOMY         Medications:  Current Outpatient Medications on File Prior to Visit   Medication Sig Dispense Refill   • aspirin 81 MG tablet Take 81 mg by mouth.     • Multiple Vitamins-Minerals (CENTRUM SILVER PO) Take 1 tablet by mouth Daily.     • pravastatin (PRAVACHOL) 10 MG tablet Take 1 tablet by mouth Daily. 30 tablet 5     No current facility-administered medications on file prior to visit.        Allergies   Allergen Reactions   • Crestor [Rosuvastatin] Shortness Of Breath   • Lipitor [Atorvastatin] Shortness Of Breath       Family History   Problem Relation Age of Onset   • Heart disease Other    • Hypertension Other    • Hypertension Mother    • Diabetes Father    • Depression Maternal Grandmother    • Heart disease Maternal Grandfather        Social History     Socioeconomic History   • Marital status:      Spouse name: Not on file   • Number of children: Not on file   • Years of education: Not on file   • Highest education level: Not on file   Tobacco Use   • Smoking status: Never Smoker   • Smokeless tobacco: Never Used   Substance and Sexual Activity   • Alcohol use: No   • Drug use: No   • Sexual activity: Defer     Comment:        Health Maintenance   Topic Date Due   • TDAP/TD VACCINES (1 - Tdap)  "06/12/1961   • ZOSTER VACCINE (1 of 2) 06/12/2000   • LIPID PANEL  07/16/2019   • INFLUENZA VACCINE  08/01/2019   • MAMMOGRAM  08/10/2020   • DXA SCAN  08/10/2023   • COLONOSCOPY  12/09/2026   • PNEUMOCOCCAL VACCINES (65+ LOW/MEDIUM RISK)  Completed         Problem list was reviewed and updated as appropriate.      Review of Systems   Constitutional: Negative for appetite change and unexpected weight change.   HENT: Negative for voice change.    Eyes: Negative for visual disturbance.   Respiratory: Negative for chest tightness and shortness of breath.    Cardiovascular: Negative for chest pain and leg swelling.   Gastrointestinal: Negative for abdominal pain, constipation and diarrhea.   Endocrine: Negative for cold intolerance and heat intolerance.   Genitourinary: Negative for difficulty urinating.   Musculoskeletal: Negative for back pain and myalgias.   Skin: Negative for rash.   Neurological: Positive for tremors. Negative for numbness and headaches.   Psychiatric/Behavioral: Negative for dysphoric mood and sleep disturbance.         Objective     /82   Pulse 86   Ht 176.5 cm (69.5\")   Wt 80.1 kg (176 lb 8 oz)   LMP  (LMP Unknown)   SpO2 98%   BMI 25.69 kg/m²   Physical Exam   Constitutional: She is oriented to person, place, and time. She appears well-developed and well-nourished. No distress.   HENT:   Head: Normocephalic and atraumatic.   Nose: Nose normal.   Mouth/Throat: Oropharynx is clear and moist.   Eyes: Pupils are equal, round, and reactive to light. Conjunctivae and EOM are normal.   Neck: Normal range of motion. Neck supple. No thyromegaly present.   Cardiovascular: Normal rate, regular rhythm and normal heart sounds.   No murmur heard.  Pulmonary/Chest: Effort normal and breath sounds normal. No respiratory distress. She has no wheezes. She has no rales.   Abdominal: Soft. Bowel sounds are normal. She exhibits no distension. There is no tenderness.   Musculoskeletal: Normal range of " motion. She exhibits no edema or tenderness.   Lymphadenopathy:     She has no cervical adenopathy.   Neurological: She is alert and oriented to person, place, and time. No cranial nerve deficit.   Tremor with movement of left upper extremity, none at rest   Skin: Skin is warm and dry. No rash noted.   Psychiatric: She has a normal mood and affect.   Vitals reviewed.      Lab Review   CBC, CMP, TSH and HgbA1c from 3/29/19 reviewed.    Imaging  None         Assessment/Plan   Chrissy was seen today for annual exam.    Diagnoses and all orders for this visit:    Coronary artery disease involving native heart without angina pectoris, unspecified vessel or lesion type  Patient follow with cardiology.  Will check labs, including lipid panel, CBC and CMP.  Patient should continue taking pravastatin.  Will adjust medication if necessary after labs.Patient had normal DEXA in 2018.  She is due for her mammogram.  Counseled on shingles vaccine, and patient will consider.  Advised to have completed at her pharmacy if she decided to get this immunization.  -     Lipid Panel; Future  -     CBC & Differential; Future  -     Comprehensive Metabolic Panel; Future    Continuous leakage of urine  Patient has seen urology in the past.  Medication given did not work.  She cannot remember the name.  Attempted to contact the pharmacy, and they have no record of any incontinence medication in the last 6 months.  Patient dose not wish to see the urologist she was seeing previously.  Will refer to a different urology office close to her work.    -     Ambulatory Referral to Urology    Encounter for screening mammogram for malignant neoplasm of breast  -     Mammo Screening Digital Tomosynthesis Bilateral With CAD; Future    Need for immunization against influenza                   This document has been electronically signed by Fanny Herrera MD on December 11, 2019 8:48 AM

## 2019-12-12 ENCOUNTER — TELEPHONE (OUTPATIENT)
Dept: FAMILY MEDICINE CLINIC | Facility: CLINIC | Age: 69
End: 2019-12-12

## 2019-12-12 NOTE — TELEPHONE ENCOUNTER
Attempted to contact patient with results.  No place to leave a message. If she returns call, please let patient know lab results:    - CBC was normal  - CMP was ok  - Lipid panel used to calculate 10 year risk of ASCVD.  It is 9.6%.  Patient unable to take Lipitor or Crestor.  Would benefit from higher dose of pravastatin.  Please see if patient has had any problem with an increased dose, and if she would be ok with trying 20 mg instead of 10 mg.  Please let me know.    Thanks, ROHAN Herrera

## 2020-02-13 ENCOUNTER — TELEPHONE (OUTPATIENT)
Dept: FAMILY MEDICINE CLINIC | Facility: CLINIC | Age: 70
End: 2020-02-13

## 2020-02-13 NOTE — TELEPHONE ENCOUNTER
Please call and let patient know that her mammogram was normal.  Will plan to repeat in 1 year.  Thanks, ROHAN Herrera

## 2020-02-13 NOTE — TELEPHONE ENCOUNTER
Per Dr. Herrera, Ms. Dawson has been called with recent Screening Mammogram results and recommendations  Continue her current medications and follow-up as planned or sooner if any problems.

## 2020-03-13 ENCOUNTER — TRANSCRIBE ORDERS (OUTPATIENT)
Dept: GENERAL RADIOLOGY | Facility: CLINIC | Age: 70
End: 2020-03-13

## 2020-03-13 DIAGNOSIS — S00.93XA NON-FETAL CEPHALOHEMATOMA, INITIAL ENCOUNTER: ICD-10-CM

## 2020-03-13 DIAGNOSIS — M79.642 LEFT HAND PAIN: Primary | ICD-10-CM

## 2020-03-17 ENCOUNTER — APPOINTMENT (OUTPATIENT)
Dept: CT IMAGING | Facility: HOSPITAL | Age: 70
End: 2020-03-17

## 2020-03-17 ENCOUNTER — HOSPITAL ENCOUNTER (EMERGENCY)
Facility: HOSPITAL | Age: 70
Discharge: HOME | End: 2020-03-17
Attending: FAMILY MEDICINE | Admitting: FAMILY MEDICINE

## 2020-03-17 VITALS
OXYGEN SATURATION: 96 % | RESPIRATION RATE: 18 BRPM | SYSTOLIC BLOOD PRESSURE: 124 MMHG | TEMPERATURE: 98 F | DIASTOLIC BLOOD PRESSURE: 68 MMHG | HEART RATE: 74 BPM

## 2020-03-17 DIAGNOSIS — R51.9 NONINTRACTABLE HEADACHE, UNSPECIFIED CHRONICITY PATTERN, UNSPECIFIED HEADACHE TYPE: Primary | ICD-10-CM

## 2020-03-17 DIAGNOSIS — S06.0X0A CONCUSSION WITHOUT LOSS OF CONSCIOUSNESS, INITIAL ENCOUNTER: ICD-10-CM

## 2020-03-17 DIAGNOSIS — S00.83XA CONTUSION OF FACE, INITIAL ENCOUNTER: ICD-10-CM

## 2020-03-17 PROCEDURE — 99283 EMERGENCY DEPT VISIT LOW MDM: CPT

## 2020-03-17 PROCEDURE — 70450 CT HEAD/BRAIN W/O DYE: CPT

## 2020-03-17 RX ORDER — BUTALBITAL, ACETAMINOPHEN AND CAFFEINE 50; 325; 40 MG/1; MG/1; MG/1
1 TABLET ORAL EVERY 6 HOURS PRN
Qty: 15 TABLET | Refills: 0 | Status: SHIPPED | OUTPATIENT
Start: 2020-03-17 | End: 2020-06-16

## 2020-03-17 NOTE — ED TRIAGE NOTES
Pt states she fell at work Friday, striking left side of head. Pt has bruising to left periorbital region and was referred to ED by other provider for CT.

## 2020-03-24 ENCOUNTER — TRANSCRIBE ORDERS (OUTPATIENT)
Dept: PHYSICAL THERAPY | Facility: HOSPITAL | Age: 70
End: 2020-03-24

## 2020-03-24 ENCOUNTER — TRANSCRIBE ORDERS (OUTPATIENT)
Dept: GENERAL RADIOLOGY | Facility: CLINIC | Age: 70
End: 2020-03-24

## 2020-03-24 ENCOUNTER — OFFICE VISIT (OUTPATIENT)
Dept: ORTHOPEDIC SURGERY | Facility: CLINIC | Age: 70
End: 2020-03-24

## 2020-03-24 VITALS — BODY MASS INDEX: 25.2 KG/M2 | WEIGHT: 176 LBS | HEIGHT: 70 IN

## 2020-03-24 DIAGNOSIS — S62.609D: Primary | ICD-10-CM

## 2020-03-24 DIAGNOSIS — S62.317A CLOSED DISPLACED FRACTURE OF BASE OF FIFTH METACARPAL BONE OF LEFT HAND, INITIAL ENCOUNTER: ICD-10-CM

## 2020-03-24 DIAGNOSIS — M79.642 LEFT HAND PAIN: Primary | ICD-10-CM

## 2020-03-24 DIAGNOSIS — M79.645 PAIN OF FINGER OF LEFT HAND: Primary | ICD-10-CM

## 2020-03-24 NOTE — PROGRESS NOTES
Patient presents from [facility] at the request of Dr Ospina to have an Ulnar gutter splint applied    Applied Ulnar Gutter Splint to the left hand.  Extra padding was placed on the ulnar/volar side of the hand as she has an area of skin maceration that measures approximately 1in x 1in.  No open area was present.

## 2020-03-26 ENCOUNTER — HOSPITAL ENCOUNTER (OUTPATIENT)
Dept: PHYSICAL THERAPY | Facility: HOSPITAL | Age: 70
Setting detail: THERAPIES SERIES
Discharge: HOME | End: 2020-03-26

## 2020-03-26 DIAGNOSIS — S62.317A CLOSED DISPLACED FRACTURE OF BASE OF FIFTH METACARPAL BONE OF LEFT HAND, INITIAL ENCOUNTER: Primary | ICD-10-CM

## 2020-03-26 PROCEDURE — 97161 PT EVAL LOW COMPLEX 20 MIN: CPT | Performed by: PHYSICAL THERAPIST

## 2020-03-26 PROCEDURE — 97760 ORTHOTIC MGMT&TRAING 1ST ENC: CPT | Performed by: PHYSICAL THERAPIST

## 2020-03-26 NOTE — THERAPY EVALUATION
Outpatient Physical Therapy Hand Initial Evaluation   [facility]     Patient Name: Chrissy Dawson  : 1950  MRN: [id number]  Today's Date: 3/26/2020         Visit Date: 2020  Attendance:  (??? approved)  Subjective Improvement: n/a  Next MD Appt: 2020  Recert Date: 2020    Therapy Diagnosis: Base of L 5th metacarpal fracture, minimally displaced         Past Medical History:   Diagnosis Date   • Backache    • Benign essential hypertension     PATIENT DENIES   • Cervical arthritis    • Coronary arteriosclerosis    • Fibrocystic breast    • Ganglion cyst of wrist     Ganglion/synovial cyst - wrist   • Ganglion of wrist    • Hip pain    • History of echocardiogram 10/23/2015    Echocardiogram W/ color flow 26223 (1) - Normal LV function with Ef of 55-60%.Normal RV size and function.Pseudonormal diastolic dysfunciton with borderline [facility].NO sig.valvular regurg or stenosis.   • History of mammogram 09/10/2014    MAMMOGRAM SCREENING 08297 - [facility] (1) - [facility] ([facility])   • History of transfusion    • Hyperlipidemia    • Inguinal pain    • Recurrent angina status post coronary artery bypass graft (CMS/HCC)     Recurrent angina after coronary artery bypass graft   • Urge incontinence of urine         Past Surgical History:   Procedure Laterality Date   • BREAST BIOPSY Right 2017    Procedure: RIGHT BREAST LUMPECTOMY WITH NEEDLE LOCALIZATION AND ULTRASOUND;  Surgeon: Delfino Greer MD;  Location: [facility];  Service:    • BREAST CYST ASPIRATION     • CARDIAC SURGERY     • CORONARY ARTERY BYPASS GRAFT      CABG, arterial, single (1)   • GANGLION CYST EXCISION     • JOINT REPLACEMENT     • ORIF MANDIBULAR FRACTURE     • TOTAL ABDOMINAL HYSTERECTOMY     • TOTAL ABDOMINAL HYSTERECTOMY WITH SALPINGO OOPHORECTOMY      SALVADOR/BSO (1)   • TOTAL HIP ARTHROPLASTY      Total hip replacement (3)       Current Outpatient Medications:   •  aspirin 81 MG tablet, Take 81 mg  "by mouth., Disp: , Rfl:   •  butalbital-acetaminophen-caffeine (FIORICET, ESGIC) -40 MG per tablet, Take 1 tablet by mouth Every 6 (Six) Hours As Needed for Headache., Disp: 15 tablet, Rfl: 0  •  Multiple Vitamins-Minerals (CENTRUM SILVER PO), Take 1 tablet by mouth Daily., Disp: , Rfl:   •  pravastatin (PRAVACHOL) 10 MG tablet, Take 1 tablet by mouth Daily., Disp: 30 tablet, Rfl: 5    Allergies   Allergen Reactions   • Crestor [Rosuvastatin] Shortness Of Breath   • Lipitor [Atorvastatin] Shortness Of Breath       Visit Dx:    ICD-10-CM ICD-9-CM   1. Closed displaced fracture of base of fifth metacarpal bone of left hand, initial encounter S62.317A 815.02       Patient History     Row Name 03/26/20 0800             History    Chief Complaint  Pain;Difficulty with daily activities  -      Type of Pain  Hand pain left  -SS      Date Current Problem(s) Began  03/13/20  -      Brief Description of Current Complaint  Patient fractured the base of her left 5th metacarpal when she fell. She states that her foot caught on the floor and caused her to trip and fall. She put her hands up to protect her face. She fractured her hand and contused the left side of her face. She went to Occupational Medicine for examination. She was placed in an orthoglass brace that caused an ulcer on her hand. She was placed in a second orthoglass splint that is tight and has been painful. She has loosened the wrapping of the splint which has helped.  female with children.   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 Hobbies: none  -SS      What " clinical tests have you had for this problem?  X-ray  -SS      Results of Clinical Tests  minimally displaced fracture base of L 5th MC  -SS         Pain     Pain Location  Hand left  -SS      Pain at Present  2  -SS      Pain at Best  2  -SS      Pain at Worst  10  -SS      Pain Frequency  Constant/continuous  -SS      Pain Description  Throbbing  -SS      What Performance Factors Make the Current Problem(s) WORSE?  dependent positioning  -SS      What Performance Factors Make the Current Problem(s) BETTER?  elevation, Tylenol  -SS      Is your sleep disturbed?  No  -SS      Is medication used to assist with sleep?  Yes OTC sleep aid PRN  -SS      Difficulties at work?  restrictions  -SS      Difficulties with ADL's?  decreased use of L hand  -SS      Difficulties with recreational activities?  n/a  -SS         Fall Risk Assessment    Any falls in the past year:  Yes  -SS      Number of falls reported in the last 12 months  1  -SS      Factors that contributed to the fall:  Tripped  -SS      Does patient have a fear of falling  Yes (comment)  -SS         Daily Activities    Primary Language  English  -         Safety    Are you being hurt, hit, or frightened by anyone at home or in your life?  No  -SS      Are you being neglected by a caregiver  No  -SS        User Key  (r) = Recorded By, (t) = Taken By, (c) = Cosigned By    Initials Name Provider Type     Lv Tyler, NICKY DPT Physical Therapist             Hand Therapy (last 24 hours)      Hand Eval     Row Name 03/26/20 0800             Splint Form    Splint Type  Forearm based  -SS      Immobilized with  Wrist in neutral;MCPJ flexed  -SS      Select Digits  Ring;Small  -SS      Splint Purpose  Immobilize affected area  -SS      During (condition to wear splint)  Acute healing  -SS      Use (daily wear)  Continuous, off for hygiene  -SS      Splint Use (overall time to wear splint)  2-3 weeks;3-4 weeks  -SS         Hand ROM Tested?    Hand ROM  Tested?  Left Flexion- AROM;Left Extension- AROM  -SS         Left Extension AROM    IV- MP AROM  0  -SS      IV- PIP AROM  0  -SS      IV- DIP AROM  0  -SS      IV- TAVERAS Left Extension AROM  0  -SS      V- MP AROM  0  -SS      V- PIP AROM  0  -SS      V- DIP AROM  0  -SS      V- TAVERAS Left Extension AROM  0  -SS         Left Flexion AROM    IV- MP AROM  50  -SS      IV- PIP AROM  65  -SS      IV- DIP AROM  15  -SS      IV- TAVERAS Left Flexion AROM  130  -SS      V- MP AROM  30  -SS      V- PIP AROM  35  -SS      V- DIP AROM  20  -SS      V- TAVERAS Left Flexion AROM  85  -SS         Therapy Education    Education Details  orthosis wear, care, precautions; HEP of PIP & DIP AROM in orthosis, 30 reps every 2 waking hours  -      Given  HEP;Symptoms/condition management  -SS      Program  New  -SS      How Provided  Verbal;Demonstration;Written  -SS      Provided to  Patient  -SS      Level of Understanding  Verbalized;Demonstrated  -SS        User Key  (r) = Recorded By, (t) = Taken By, (c) = Cosigned By    Initials Name Provider Type     Lv Tyler, PT DPT Physical Therapist        PT Ortho     Row Name 03/26/20 0800       Subjective Comments    Subjective Comments  see Therapy Patient History  -       Precautions and Contraindications    Precautions  base L 5th fracture with minimal displacement  -       Subjective Pain    Able to rate subjective pain?  yes  -SS    Pre-Treatment Pain Level  2  -SS    Post-Treatment Pain Level  0  -SS       Posture/Observations    Posture/Observations Comments  Patient presents wearing an orthoglass splint Ace-wrapped into place. Small area of irritation from wrapping in webspace in/base of L thumb. Base of hypothenar eminence shows signs of previous irritation but intact at present. Ecchymosis palm and slightly in RF & SF L hand.  -SS       General ROM    RT Upper Ext  --  -SS    LT Upper Ext  Comment  -SS       Left Upper Ext    Lt Upper Extremity Comments   Wrist AROM  deferred. See Hand Eval for digital AROM.  -       Sensation    Light Touch  No apparent deficits  -      User Key  (r) = Recorded By, (t) = Taken By, (c) = Cosigned By    Initials Name Provider Type    Lv Daugherty, PT DPT Physical Therapist        Therapy Education  Education Details: orthosis wear, care, precautions; HEP of PIP & DIP AROM in orthosis, 30 reps every 2 waking hours  Given: HEP, Symptoms/condition management  Program: New  How Provided: Verbal, Demonstration, Written  Provided to: Patient  Level of Understanding: Verbalized, Demonstrated    PT OP Goals     Row Name 03/26/20 0800          PT Short Term Goals    STG Date to Achieve  04/16/20  -     STG 1  Note a >/= 25% subjective improvement.  -     STG 2  QuickDASH score to be </= 60.  -     STG 3  RF PIP active flexion WNLs.  -     STG 4  SF PIP active flexion to be >/= 70 deg.  -     STG 5  SF DIP active flexion to be >/= 50 deg.  -        Long Term Goals    LTG Date to Achieve  05/21/20  -     LTG 1  Independent with HEP/self-management.  -     LTG 2  Digital AROM WNLs.  -     LTG 3  Left  strength to be >/= 50% of right.  -     LTG 4  Return to normal work activities.  -        Time Calculation    PT Goal Re-Cert Due Date  04/16/20  -       User Key  (r) = Recorded By, (t) = Taken By, (c) = Cosigned By    Initials Name Provider Type    Lv Daugherty, PT DPT Physical Therapist          PT Assessment/Plan     Row Name 03/26/20 0800          PT Assessment    Functional Limitations  Limitation in home management;Limitations in community activities;Limitations in functional capacity and performance;Performance in leisure activities;Performance in self-care ADL;Performance in work activities  -     Impairments  Range of motion;Pain;Dexterity fracture  -     Assessment Comments  Patient is 13 days s/p minimally displaced fracture at base of L [facility]. I fabricated an orthosis to protect the  healing fracture as the ones from MD were uncomfortable for patient and showing signs of adverse skin responses. Patient notes good fit and comfort of orthosis. Written orthosis instructions given to patient and copy in chart.  -SS     Rehab Potential  Excellent  -SS     Patient/caregiver participated in establishment of treatment plan and goals  Yes  -SS     Patient would benefit from skilled therapy intervention  Yes  -SS        PT Plan    PT Frequency  1x/week  -     Predicted Duration of Therapy Intervention (Therapy Eval)  4-6 weeks  -     PT Plan Comments  Orthosis modification as needed. Gentle ROM for fingers. Begin wrist AROM in approximately 4 weeks. Hold strengthening until at least 8 weeks post-injury. Fluidotherapy will be utilized to assist with return of motion.   -       User Key  (r) = Recorded By, (t) = Taken By, (c) = Cosigned By    Initials Name Provider Type    Lv Daugherty, PT DPT Physical Therapist            OP Exercises     Row Name 03/26/20 0800             Subjective Comments    Subjective Comments  see Therapy Patient History  -         Subjective Pain    Able to rate subjective pain?  yes  -SS      Pre-Treatment Pain Level  2  -      Post-Treatment Pain Level  0  -         Exercise 1    Exercise Name 1  Fabrication of forearm-based metacarpal fracture protection orthosis  -         Exercise 2    Exercise Name 2  Orthosis wear, care, and precautions instruction  -         Exercise 3    Exercise Name 3  AROM in orthosis  -      Additional Comments  HEP  -        User Key  (r) = Recorded By, (t) = Taken By, (c) = Cosigned By    Initials Name Provider Type    Lv Daugherty, PT DPT Physical Therapist          Outcome Measure Options: Quick DASH  Quick DASH  Open a tight or new jar.: Unable  Do heavy household chores (e.g., wash walls, wash floors): Unable  Carry a shopping bag or briefcase: Unable  Wash your back: Unable  Use a knife to cut food:  Unable  Recreational activities in which you take some force or impact through your arm, should or hand (e.g. golf, hammering, tennis, etc.): Unable  During the past week, to what extent has your arm, shoulder, or hand problem interfered with your normal social activities with family, friends, neighbors or groups?: Extremely  During the past week, were you limited in your work or other regular daily activities as a result of your arm, shoulder or hand problem?: Very limited  Arm, Shoulder, or hand pain: Moderate  Tingling (pins and needles) in your arm, shoulder, or hand: None  During the past week, how much difficulty have you had sleeping because of the pain in your arm, shoulder or hand?: Moderate Difficultly  Number of Questions Answered: 11  Quick DASH Score: 79.55         Time Calculation:   Start Time: 0756  Stop Time: 0900  Time Calculation (min): 64 min     Therapy Charges for Today     Code Description Service Date Service Provider Modifiers Qty    63218644009 HC PT EVAL LOW COMPLEXITY 3 3/26/2020 Lv Tyler, PT DPT GP 1    53259021371 HC ORTHOTIC(S) MGMT/TRAIN INITIAL ENCOUNTER, EACH 15MIN 3/26/2020 Lv Tyler, PT DPT GP 1                   Lv Tyler PT, DPT, CHT  3/26/2020

## 2020-04-01 ENCOUNTER — HOSPITAL ENCOUNTER (OUTPATIENT)
Dept: PHYSICAL THERAPY | Facility: HOSPITAL | Age: 70
Setting detail: THERAPIES SERIES
Discharge: HOME | End: 2020-04-01

## 2020-04-01 DIAGNOSIS — S62.317A CLOSED DISPLACED FRACTURE OF BASE OF FIFTH METACARPAL BONE OF LEFT HAND, INITIAL ENCOUNTER: Primary | ICD-10-CM

## 2020-04-01 PROCEDURE — 97110 THERAPEUTIC EXERCISES: CPT | Performed by: PHYSICAL THERAPIST

## 2020-04-01 NOTE — THERAPY TREATMENT NOTE
Outpatient Physical Therapy Hand Treatment Note   [facility]     Patient Name: Chrissy Dawson  : 1950  MRN: [id number]  Today's Date: 2020         Visit Date: 2020  Attendance: 2/2 (6 visits approved)  Subjective Improvement: 60%  Next MD Appt: 2020  Recert Date: 2020     Therapy Diagnosis: Base of L 5th metacarpal fracture, minimally displaced     Visit Dx:    ICD-10-CM ICD-9-CM   1. Closed displaced fracture of base of fifth metacarpal bone of left hand, initial encounter S62.317A 815.02               Hand Therapy (last 24 hours)      Hand Eval     Row Name 20 0800             Left Flexion AROM    IV- MP AROM  90  -SS      IV- PIP AROM  105  -SS      IV- DIP AROM  55  -SS      IV- TAVERAS Left Flexion AROM  250  -SS      V- MP AROM  70  -SS      V- PIP AROM  95  -SS      V- DIP AROM  75  -SS      V- TAVERAS Left Flexion AROM  240  -SS        User Key  (r) = Recorded By, (t) = Taken By, (c) = Cosigned By    Initials Name Provider Type    Lv Daugherty, PT DPT Physical Therapist        PT Ortho     Row Name 20 0800       Subjective Comments    Subjective Comments  Not having pain in the L hand at this time. Orthosis working well. Some irritation volar RF and USP that she is able to pad and help be better. Swelling is better. Movement may be improved. 60% subjective improvement.   -SS       Precautions and Contraindications    Precautions  base L 5th fracture with minimal displacement  -SS       Subjective Pain    Able to rate subjective pain?  yes  -SS    Pre-Treatment Pain Level  0  -SS    Post-Treatment Pain Level  0  -SS       Posture/Observations    Posture/Observations Comments  Patient presents wearing her orthosis. Red area L USP.   -SS      User Key  (r) = Recorded By, (t) = Taken By, (c) = Cosigned By    Initials Name Provider Type    Lv Daugherty PT DPT Physical Therapist            PT Assessment/Plan     Row Name 20 0800           PT Assessment    Functional Limitations  Limitation in home management;Limitations in community activities;Limitations in functional capacity and performance;Performance in leisure activities;Performance in self-care ADL;Performance in work activities  -     Impairments  Range of motion;Pain;Dexterity fracture  -SS     Assessment Comments  I added padding to the orthosis at volar RF and USP while patient was in fluidotherapy. She reports improved comfort with the padding. Tolerates treatment well. Significantly improved AROM.   -SS     Rehab Potential  Excellent  -SS     Patient/caregiver participated in establishment of treatment plan and goals  Yes  -SS     Patient would benefit from skilled therapy intervention  Yes  -SS        PT Plan    PT Frequency  1x/week  -SS     Predicted Duration of Therapy Intervention (Therapy Eval)  4-6 weeks  -SS     PT Plan Comments  Continue POC. Orthosis adjustment/modification PRN.  -       User Key  (r) = Recorded By, (t) = Taken By, (c) = Cosigned By    Initials Name Provider Type    SS Lv Tyler, PT DPT Physical Therapist            OP Exercises     Row Name 04/01/20 0800             Precautions    Existing Precautions/Restrictions  -- L 5th MC fracture  -         Subjective Comments    Subjective Comments  Not having pain in the L hand at this time. Orthosis working well. Some irritation volar RF and USP that she is able to pad and help be better. Swelling is better. Movement may be improved. 60% subjective improvement.   -SS         Subjective Pain    Able to rate subjective pain?  yes  -SS      Pre-Treatment Pain Level  0  -SS      Post-Treatment Pain Level  0  -SS         Exercise 1    Exercise Name 1  Fluidotherapy with gentle AROM  -SS      Cueing 1  Verbal  -SS      Time 1  15 mins  -SS         Exercise 2    Exercise Name 2  Tabletop  -SS      Cueing 2  Verbal;Demo  -SS      Sets 2  1  -SS      Reps 2  15  -SS         Exercise 3    Exercise Name 3   Claw  -SS      Cueing 3  Verbal;Demo  -SS      Sets 3  1  -SS      Reps 3  20  -SS         Exercise 4    Exercise Name 4  Fist  -SS      Cueing 4  Verbal;Demo  -SS      Sets 4  1  -SS      Reps 4  15  -SS         Exercise 5    Exercise Name 5  Tabletop<->FDS fist  -SS      Cueing 5  Verbal;Tactile;Demo  -SS      Sets 5  1  -SS      Reps 5  15  -SS        User Key  (r) = Recorded By, (t) = Taken By, (c) = Cosigned By    Initials Name Provider Type    Lv Daugherty, PT DPT Physical Therapist               PT OP Goals     Row Name 04/01/20 0800          PT Short Term Goals    STG Date to Achieve  04/16/20  -     STG 1  Note a >/= 25% subjective improvement.  -     STG 1 Progress  Met  -     STG 2  QuickDASH score to be </= 60.  -     STG 2 Progress  Ongoing  -     STG 3  RF PIP active flexion WNLs.  -     STG 3 Progress  Ongoing  -     STG 4  SF PIP active flexion to be >/= 70 deg.  -     STG 4 Progress  Met  -     STG 5  SF DIP active flexion to be >/= 50 deg.  -     STG 5 Progress  Met  -        Long Term Goals    LTG Date to Achieve  05/21/20  -     LTG 1  Independent with HEP/self-management.  -     LTG 1 Progress  Ongoing  -     LTG 2  Digital AROM WNLs.  -     LTG 2 Progress  Ongoing  -     LTG 3  Left  strength to be >/= 50% of right.  -     LTG 3 Progress  Ongoing  -     LTG 4  Return to normal work activities.  -     LTG 4 Progress  Ongoing  -        Time Calculation    PT Goal Re-Cert Due Date  04/16/20  -       User Key  (r) = Recorded By, (t) = Taken By, (c) = Cosigned By    Initials Name Provider Type    Lv Daugherty, PT DPT Physical Therapist          Therapy Education  Education Details: tabletop, claw, fist, in-and-out  Given: HEP  How Provided: Verbal, Demonstration, Written  Provided to: Patient  Level of Understanding: Verbalized, Demonstrated              Time Calculation:   Start Time: 0845  Stop Time: 0923  Time Calculation  (min): 38 min     Therapy Charges for Today     Code Description Service Date Service Provider Modifiers Qty    81535578473 HC PT THER PROC EA 15 MIN 4/1/2020 Lv Tyler, PT DPT GP 3                   Lv Tyler, PT, DPT, CHT  4/1/2020

## 2020-04-06 ENCOUNTER — HOSPITAL ENCOUNTER (OUTPATIENT)
Dept: MRI IMAGING | Facility: HOSPITAL | Age: 70
Discharge: HOME | End: 2020-04-06
Admitting: GENERAL PRACTICE

## 2020-04-06 DIAGNOSIS — S00.93XA CONTUSION OF HEAD, UNSPECIFIED PART OF HEAD, INITIAL ENCOUNTER: ICD-10-CM

## 2020-04-06 PROCEDURE — 70551 MRI BRAIN STEM W/O DYE: CPT

## 2020-04-08 ENCOUNTER — APPOINTMENT (OUTPATIENT)
Dept: PHYSICAL THERAPY | Facility: HOSPITAL | Age: 70
End: 2020-04-08

## 2020-04-09 ENCOUNTER — HOSPITAL ENCOUNTER (OUTPATIENT)
Dept: PHYSICAL THERAPY | Facility: HOSPITAL | Age: 70
Setting detail: THERAPIES SERIES
Discharge: HOME | End: 2020-04-09

## 2020-04-09 DIAGNOSIS — S62.317A CLOSED DISPLACED FRACTURE OF BASE OF FIFTH METACARPAL BONE OF LEFT HAND, INITIAL ENCOUNTER: Primary | ICD-10-CM

## 2020-04-09 PROCEDURE — 97110 THERAPEUTIC EXERCISES: CPT | Performed by: PHYSICAL THERAPIST

## 2020-04-09 NOTE — THERAPY TREATMENT NOTE
"    Outpatient Physical Therapy Hand Treatment Note   [facility]     Patient Name: Chrissy Dawson  : 1950  MRN: [id number]  Today's Date: 2020         Visit Date: 2020  Attendance: 3/4 (6 visits approved)  Subjective Improvement: 60%  Next MD Appt: 2020  Recert Date: 2020     Therapy Diagnosis: Base of L 5th metacarpal fracture, minimally displaced     Visit Dx:    ICD-10-CM ICD-9-CM   1. Closed displaced fracture of base of fifth metacarpal bone of left hand, initial encounter S62.317A 815.02               Hand Therapy (last 24 hours)      Hand Eval     Row Name 20 1500             Left Flexion AROM    V- MP AROM  75  -SS      V- PIP AROM  85  -SS      V- DIP AROM  75  -SS      V- TAVERAS Left Flexion AROM  235  -SS        User Key  (r) = Recorded By, (t) = Taken By, (c) = Cosigned By    Initials Name Provider Type    Lv Daugherty, PT DPT Physical Therapist        PT Ortho     Row Name 20 1500       Precautions and Contraindications    Precautions  base L 5th fracture with minimal displacement  -SS       Subjective Pain    Post-Treatment Pain Level  -- \"just stiffness\"  -SS       Posture/Observations    Posture/Observations Comments  Presents wearing her orthosis. No signs of adverse skin reaction noted.   -SS       Left Upper Ext    Lt Wrist Flexion AROM  38 deg  -SS    Lt Wrist Extension AROM  40 deg  -SS      User Key  (r) = Recorded By, (t) = Taken By, (c) = Cosigned By    Initials Name Provider Type    Lv Daugherty, PT DPT Physical Therapist            PT Assessment/Plan     Row Name 20 1500          PT Assessment    Functional Limitations  Limitation in home management;Limitations in community activities;Limitations in functional capacity and performance;Performance in leisure activities;Performance in self-care ADL;Performance in work activities  -SS     Impairments  Range of motion;Pain;Dexterity fracture  -SS     Assessment " "Comments  MD has requested treatment for post-concussion/headaches. We have requested an order from MD. I recommended cognitive rest at home.  -SS     Rehab Potential  Excellent  -SS     Patient/caregiver participated in establishment of treatment plan and goals  Yes  -SS     Patient would benefit from skilled therapy intervention  Yes  -SS        PT Plan    PT Frequency  1x/week  -SS     Predicted Duration of Therapy Intervention (Therapy Eval)  4-6 weeks  -SS     PT Plan Comments  Recheck next week including assessment of cervical spine and vestibular system for post-concussion syndrome.  -SS       User Key  (r) = Recorded By, (t) = Taken By, (c) = Cosigned By    Initials Name Provider Type    SS Lv Tyler, PT DPT Physical Therapist            OP Exercises     Row Name 04/09/20 1500             Precautions    Existing Precautions/Restrictions  -- L 5th MC fracture  -SS         Subjective Comments    Subjective Comments  \"My hand, it's like it's swollen. I may be using it more than I should.\" Working on HEP. Motion is better some days than others. Next MD: 4/14/2020. Orthosis continues to [facility]ate at [facility].   -SS         Subjective Pain    Able to rate subjective pain?  yes  -SS      Pre-Treatment Pain Level  4  -SS      Post-Treatment Pain Level  -- \"just stiffness\"  -SS         Exercise 1    Exercise Name 1  Fluidotherapy with gentle AROM  -SS      Cueing 1  Verbal  -SS      Time 1  15 mins  -SS         Exercise 2    Exercise Name 2  LLPS claw  -SS      Cueing 2  Verbal;Tactile  -SS      Time 2  1 min  -SS         Exercise 3    Exercise Name 3  Fist   -SS      Cueing 3  Verbal  -SS      Sets 3  1  -SS      Reps 3  15  -SS         Exercise 4    Exercise Name 4  In-and-Out  -SS      Cueing 4  Verbal  -SS      Sets 4  1  -SS      Reps 4  20  -SS         Exercise 5    Exercise Name 5  Wrist flexion/extension AROM  -SS      Cueing 5  Verbal;Demo  -SS      Sets 5  1  -SS      Reps 5  20  -SS        User Key  " (r) = Recorded By, (t) = Taken By, (c) = Cosigned By    Initials Name Provider Type    Lv Daugherty, PT DPT Physical Therapist          PT OP Goals     Row Name 04/09/20 1500          PT Short Term Goals    STG Date to Achieve  04/16/20  -     STG 1  Note a >/= 25% subjective improvement.  -SS     STG 1 Progress  Met  -     STG 2  QuickDASH score to be </= 60.  -SS     STG 2 Progress  Ongoing  -     STG 3  RF PIP active flexion WNLs.  -SS     STG 3 Progress  Ongoing  -SS     STG 4  SF PIP active flexion to be >/= 70 deg.  -SS     STG 4 Progress  Met  -     STG 5  SF DIP active flexion to be >/= 50 deg.  -     STG 5 Progress  Met  -        Long Term Goals    LTG Date to Achieve  05/21/20  -     LTG 1  Independent with HEP/self-management.  -     LTG 1 Progress  Ongoing  -     LTG 2  Digital AROM WNLs.  -     LTG 2 Progress  Ongoing  -     LTG 3  Left  strength to be >/= 50% of right.  -     LTG 3 Progress  Ongoing  -     LTG 4  Return to normal work activities.  -     LTG 4 Progress  Ongoing  -        Time Calculation    PT Goal Re-Cert Due Date  04/16/20  -       User Key  (r) = Recorded By, (t) = Taken By, (c) = Cosigned By    Initials Name Provider Type    Lv Daugherty, PT DPT Physical Therapist          Therapy Education  Education Details: reinforced HEP; recommended cognitive rest at home  Given: HEP, Symptoms/condition management  How Provided: Verbal  Provided to: Patient  Level of Understanding: Verbalized              Time Calculation:   Start Time: 1513  Stop Time: 1549  Time Calculation (min): 36 min     Therapy Charges for Today     Code Description Service Date Service Provider Modifiers Qty    54876121134 HC PT THER PROC EA 15 MIN 4/9/2020 Lv Tyler, PT DPT GP 2                   Lv Tyler, PT, DPT, CHT  4/9/2020

## 2020-04-10 ENCOUNTER — TRANSCRIBE ORDERS (OUTPATIENT)
Dept: GENERAL RADIOLOGY | Facility: CLINIC | Age: 70
End: 2020-04-10

## 2020-04-10 DIAGNOSIS — M54.2 CERVICALGIA: Primary | ICD-10-CM

## 2020-04-14 ENCOUNTER — TRANSCRIBE ORDERS (OUTPATIENT)
Dept: GENERAL RADIOLOGY | Facility: CLINIC | Age: 70
End: 2020-04-14

## 2020-04-14 DIAGNOSIS — M79.646 PAIN OF FINGER, UNSPECIFIED LATERALITY: Primary | ICD-10-CM

## 2020-04-16 ENCOUNTER — HOSPITAL ENCOUNTER (OUTPATIENT)
Dept: PHYSICAL THERAPY | Facility: HOSPITAL | Age: 70
Setting detail: THERAPIES SERIES
Discharge: HOME | End: 2020-04-16

## 2020-04-16 DIAGNOSIS — S06.9X0A MILD TRAUMATIC BRAIN INJURY, WITHOUT LOSS OF CONSCIOUSNESS, INITIAL ENCOUNTER (HCC): ICD-10-CM

## 2020-04-16 DIAGNOSIS — S62.317A CLOSED DISPLACED FRACTURE OF BASE OF FIFTH METACARPAL BONE OF LEFT HAND, INITIAL ENCOUNTER: Primary | ICD-10-CM

## 2020-04-16 PROCEDURE — 97164 PT RE-EVAL EST PLAN CARE: CPT | Performed by: PHYSICAL THERAPIST

## 2020-04-16 PROCEDURE — 97140 MANUAL THERAPY 1/> REGIONS: CPT | Performed by: PHYSICAL THERAPIST

## 2020-04-16 NOTE — THERAPY RE-EVALUATION
"    Outpatient Physical Therapy Ortho Re-Evaluation  [facility]     Patient Name: Chrissy Dawson  : 1950  MRN: [id number]  Today's Date: 2020      Visit Date: 2020  Attendance: 5 (6 visits approved)  Subjective Improvement: 60% - hand  Next MD Appt: 2020  Recert Date: 2020     Therapy Diagnosis: 1) Base of L 5th metacarpal fracture, minimally displaced; 2) Headaches s/p mTBI     Changes in Medications: ketorolac added to medication list  Changes in MD Orders: \"Headache therapy for mTBI\"  Number of Work Days Lost: 5 days       Past Medical History:   Diagnosis Date   • Backache    • Benign essential hypertension     PATIENT DENIES   • Cervical arthritis    • Coronary arteriosclerosis    • Fibrocystic breast    • Ganglion cyst of wrist     Ganglion/synovial cyst - wrist   • Ganglion of wrist    • Hip pain    • History of echocardiogram 10/23/2015    Echocardiogram W/ color flow 50350 (1) - Normal LV function with Ef of 55-60%.Normal RV size and function.Pseudonormal diastolic dysfunciton with borderline [facility].NO sig.valvular regurg or stenosis.   • History of mammogram 09/10/2014    MAMMOGRAM SCREENING 45188 - [facility] (1[facility] - [facility]ILLER ([facility])   • History of transfusion    • Hyperlipidemia    • Inguinal pain    • Recurrent angina status post coronary artery bypass graft (CMS/HCC)     Recurrent angina after coronary artery bypass graft   • Urge incontinence of urine         Past Surgical History:   Procedure Laterality Date   • BREAST BIOPSY Right 2017    Procedure: RIGHT BREAST LUMPECTOMY WITH NEEDLE LOCALIZATION AND ULTRASOUND;  Surgeon: Delfino Greer MD;  Location: [facility];  Service:    • BREAST CYST ASPIRATION     • CARDIAC SURGERY     • CORONARY ARTERY BYPASS GRAFT      CABG, arterial, single (1)   • GANGLION CYST EXCISION     • JOINT REPLACEMENT     • ORIF MANDIBULAR FRACTURE     • TOTAL ABDOMINAL HYSTERECTOMY     • TOTAL ABDOMINAL HYSTERECTOMY WITH " "SALPINGO OOPHORECTOMY  1998    SALVADOR/BSO (1)   • TOTAL HIP ARTHROPLASTY  2011    Total hip replacement (3)       Visit Dx:     ICD-10-CM ICD-9-CM   1. Closed displaced fracture of base of fifth metacarpal bone of left hand, initial encounter S62.317A 815.02   2. Mild traumatic brain injury, without loss of consciousness, initial encounter (CMS/[facility]) S06.9X0A 854.01             PT Ortho     Row Name 04/16/20 1500       Subjective Comments    Subjective Comments  Went to Occupational Medicine on 4/10/2020 due to \"terrible headaches.\" Reports that she saw a nurse practitioner and given injections and prescribed ketorolac. Headache pain is better since Saturday. Saw Dr. Ospina 4/14/2020. Returns to Occupational Health on 4/21/2020. Hand is doing okay. \"It's weak, but it's to be expected because it's in the brace.\" Some improvement in flexion. Swells occasionally. Has also been referred to Sports Med by Dr. Ospina for \"Headache therapy for MTBI\". 60% subjective improvement in hand. Has been working on cognitive rest as instructed last therapy session.  -SS       Precautions and Contraindications    Precautions  base L 5th fracture with minimal displacement  -SS       Subjective Pain    Able to rate subjective pain?  yes  -SS    Pre-Treatment Pain Level  -- hand 3/10, 0/10 head  -SS    Post-Treatment Pain Level  -- hand 3/10, 0/10 head  -SS       Posture/Observations    Alignment Options  --  -SS    Posture/Observations Comments  Forward head with increased cervical lordosis. Presents wearing her orthosis. No signs of skin breakdown. Presents ambulating with cane. Supine cervical resting position is R sidebent.   -SS       Cervical/Thoracic Special Tests    Cervical/Thoracic Special Tests Comments  Increased tone B SCM. TTP mastoid insertion of R SCM. L rotation of upper cervical spine noted when laying supine.   -SS       Head/Neck/Trunk    Neck Extension AROM  30 deg  -SS    Neck Flexion AROM  40 deg  -SS    Neck Lt " Lateral Flexion AROM  33 deg  -SS    Neck Rt Lateral Flexion AROM  28 deg  -SS    Neck Lt Rotation AROM  46 deg  -SS    Neck Rt Rotation AROM  60 deg  -SS       Left Upper Ext    Lt Wrist Flexion AROM  50 deg  -SS    Lt Wrist Extension AROM  43 deg  -SS    Lt Upper Extremity Comments   see Hand Eval for digital AROM  -SS      User Key  (r) = Recorded By, (t) = Taken By, (c) = Cosigned By    Initials Name Provider Type     Lv Tyler, PT DPT Physical Therapist           Hand Therapy (last 24 hours)      Hand Eval     Row Name 04/16/20 1500             Left Extension AROM    IV- MP AROM  0  -SS      IV- PIP AROM  0  -SS      IV- DIP AROM  0  -SS      IV- TAVERAS Left Extension AROM  0  -SS      V- MP AROM  0  -SS      V- PIP AROM  0  -SS      V- DIP AROM  0  -SS      V- TAVERAS Left Extension AROM  0  -SS         Left Flexion AROM    IV- MP AROM  85  -SS      IV- PIP AROM  100 110 deg in claw position  -SS      IV- DIP AROM  55  60 deg in claw position  -SS      IV- TAVERAS Left Flexion AROM  240  -SS      V- MP AROM  75  -SS      V- PIP AROM  80 95 in claw position  -SS      V- DIP AROM  75 80 deg in claw position  -SS      V- TAVERAS Left Flexion AROM  230  -SS        User Key  (r) = Recorded By, (t) = Taken By, (c) = Cosigned By    Initials Name Provider Type     Lv Tyler, PT DPT Physical Therapist          Vestibular Eval     Row Name 04/16/20 1500             Cognition    Orientation Level  Oriented to place;Oriented to time;Oriented to situation;Oriented to person  -SS         Occulomotor Exam Fixation Present    Smooth Pursuit  Normal  -SS         Vestibulo-Occular Reflex (VOR)    VOR 1 Head Only  WNLs  -SS        User Key  (r) = Recorded By, (t) = Taken By, (c) = Cosigned By    Initials Name Provider Type    Lv Daugherty, PT DPT Physical Therapist            Therapy Education  Education Details: therapy plan, motion progress  How Provided: Verbal  Provided to: Patient  Level of  Understanding: Verbalized     PT OP Goals     Row Name 04/16/20 1500          PT Short Term Goals    STG Date to Achieve  05/07/20  -     STG 1  Note a >/= 25% subjective improvement.  -     STG 1 Progress  Met  -     STG 2  QuickDASH score to be </= 60.  -     STG 2 Progress  Ongoing  -     STG 2 Progress Comments  not assessed this date  -     STG 3  RF PIP active flexion WNLs.  -     STG 3 Progress  Met  -     STG 4  SF PIP active flexion to be >/= 70 deg.  -     STG 4 Progress  Met  -     STG 5  SF DIP active flexion to be >/= 50 deg.  -     STG 5 Progress  Met  -        Long Term Goals    LTG Date to Achieve  05/21/20  -     LTG 1  Independent with HEP/self-management.  -     LTG 1 Progress  Ongoing  -     LTG 2  Digital AROM WNLs.  -     LTG 2 Progress  Ongoing  -     LTG 3  Left  strength to be >/= 50% of right.  -     LTG 3 Progress  Ongoing  -     LTG 4  Return to normal work activities.  -     LTG 4 Progress  Ongoing  -     LTG 5  Minimal headaches with activity.  -     LTG 5 Progress  New  -     LTG 6  QuickDASH score to be </= 40.  -     LTG 6 Progress  New  -        Time Calculation    PT Goal Re-Cert Due Date  05/07/20  -       User Key  (r) = Recorded By, (t) = Taken By, (c) = Cosigned By    Initials Name Provider Type     Lv Tyler, PT DPT Physical Therapist          PT Assessment/Plan     Row Name 04/16/20 1500          PT Assessment    Functional Limitations  Limitation in home management;Limitations in community activities;Limitations in functional capacity and performance;Performance in leisure activities;Performance in self-care ADL;Performance in work activities  -     Impairments  Range of motion;Pain;Dexterity fracture  -     Assessment Comments  Initiated care for headaches s/p mTBI per MD order. Wrist and finger AROM improving. Muscular restriction R>L SCM. Joint mobility restrictions mid and upper cervical spine.  "Re-evaluation completed this date due to the addition of headaches/mTBI to Dr. Ospina's therapy orders.  -SS     Rehab Potential  -- Excellent to good.  -SS     Patient/caregiver participated in establishment of treatment plan and goals  Yes  -SS     Patient would benefit from skilled therapy intervention  Yes  -SS        PT Plan    PT Frequency  1x/week;2x/week  -SS     Predicted Duration of Therapy Intervention (Therapy Eval)  4-6 weeks with further to be determined  -SS     PT Plan Comments  Manual therapy to cervical spine, MFR, joint mobilization, graded return to activity for concussion, fluidotherapy & ROM for hand.   -SS       User Key  (r) = Recorded By, (t) = Taken By, (c) = Cosigned By    Initials Name Provider Type    Lv Daugherty, PT DPT Physical Therapist            OP Exercises     Row Name 04/16/20 1500             Subjective Comments    Subjective Comments  Went to Occupational Medicine on 4/10/2020 due to \"terrible headaches.\" Reports that she saw a nurse practitioner and given injections and prescribed ketorolac. Headache pain is better since Saturday. Saw Dr. Ospina 4/14/2020. Returns to Occupational Health on 4/21/2020. Hand is doing okay. \"It's weak, but it's to be expected because it's in the brace.\" Some improvement in flexion. Swells occasionally. Has also been referred to Sports Med by Dr. Ospina for \"Headache therapy for MTBI\". 60% subjective improvement in hand. Has been working on cognitive rest as instructed last therapy session.  -SS         Subjective Pain    Able to rate subjective pain?  yes  -SS      Pre-Treatment Pain Level  -- hand 3/10, 0/10 head  -SS      Post-Treatment Pain Level  -- hand 3/10, 0/10 head  -SS         Exercise 1    Exercise Name 1  re-evaluation  -         Exercise 2    Exercise Name 2  see Manual  -SS        User Key  (r) = Recorded By, (t) = Taken By, (c) = Cosigned By    Initials Name Provider Type    Lv Daugherty, PT DPT Physical " Therapist           Manual Rx (last 36 hours)      Manual Treatments     Row Name 04/16/20 1500             Manual Rx 1    Manual Rx 1 Location  SCM & cervical spine  -SS      Manual Rx 1 Type  MFR  -SS         Manual Rx 2    Manual Rx 2 Location  upper cervical spine  -SS      Manual Rx 2 Type  ME   -SS         Manual Rx 3    Manual Rx 3 Location  SCM & cervical spine  -SS      Manual Rx 3 Type  MFR  -SS         Manual Rx 4    Manual Rx 4 Location  cervical spine  -SS      Manual Rx 4 Type  joint mobilization  -SS      Manual Rx 4 Grade  2  -SS         Manual Rx 5    Manual Rx 5 Location  SCM & cervical spine  -SS      Manual Rx 5 Type  MFR  -SS        User Key  (r) = Recorded By, (t) = Taken By, (c) = Cosigned By    Initials Name Provider Type    SS Lv Tyler, PT DPT Physical Therapist           Time Calculation:     Start Time: 1502  Stop Time: 1552  Time Calculation (min): 50 min     Therapy Charges for Today     Code Description Service Date Service Provider Modifiers Qty    50509371799 HC PT MANUAL THERAPY EA 15 MIN 4/16/2020 Lv Tyler, PT DPT GP 2    65325684687 HC PT RE-EVAL ESTABLISHED PLAN 2 4/16/2020 Lv Tyler, PT DPT GP 1                    Lv Tyler, PT, DPT, CHT  4/16/2020

## 2020-04-20 ENCOUNTER — APPOINTMENT (OUTPATIENT)
Dept: PHYSICAL THERAPY | Facility: HOSPITAL | Age: 70
End: 2020-04-20

## 2020-04-22 ENCOUNTER — HOSPITAL ENCOUNTER (OUTPATIENT)
Dept: PHYSICAL THERAPY | Facility: HOSPITAL | Age: 70
Setting detail: THERAPIES SERIES
Discharge: HOME | End: 2020-04-22

## 2020-04-22 DIAGNOSIS — S06.9X0A MILD TRAUMATIC BRAIN INJURY, WITHOUT LOSS OF CONSCIOUSNESS, INITIAL ENCOUNTER (HCC): ICD-10-CM

## 2020-04-22 DIAGNOSIS — S62.317A CLOSED DISPLACED FRACTURE OF BASE OF FIFTH METACARPAL BONE OF LEFT HAND, INITIAL ENCOUNTER: Primary | ICD-10-CM

## 2020-04-22 PROCEDURE — 97018 PARAFFIN BATH THERAPY: CPT

## 2020-04-22 PROCEDURE — 97110 THERAPEUTIC EXERCISES: CPT

## 2020-04-22 PROCEDURE — 97022 WHIRLPOOL THERAPY: CPT

## 2020-04-22 NOTE — THERAPY TREATMENT NOTE
Outpatient Physical Therapy Ortho Treatment Note  [facility]     Patient Name: Chrissy Dawson  : 1950  MRN: [id number]  Today's Date: 2020      Visit Date: 2020  Attendance:  (6 visits approved)  Subjective Improvement: 60% - hand  Next MD Appt: 2020  Recert Date: 2020     Visit Dx:    ICD-10-CM ICD-9-CM   1. Closed displaced fracture of base of fifth metacarpal bone of left hand, initial encounter S62.317A 815.02   2. Mild traumatic brain injury, without loss of consciousness, initial encounter (CMS/[facility]) S06.9X0A 854.01       Patient Active Problem List   Diagnosis   • Adenopathy, cervical   • Hyperlipidemia   • Coronary arteriosclerosis   • Benign essential hypertension   • Abnormal mammogram of right breast   • Presence of artificial hip joint   • Gait disturbance   • Trochanteric bursitis, right hip        Past Medical History:   Diagnosis Date   • Backache    • Benign essential hypertension     PATIENT DENIES   • Cervical arthritis    • Coronary arteriosclerosis    • Fibrocystic breast    • Ganglion cyst of wrist     Ganglion/synovial cyst - wrist   • Ganglion of wrist    • Hip pain    • History of echocardiogram 10/23/2015    Echocardiogram W/ color flow 45646 (1) - Normal LV function with Ef of 55-60%.Normal RV size and function.Pseudonormal diastolic dysfunciton with borderline [facility].NO sig.valvular regurg or stenosis.   • History of mammogram 09/10/2014    MAMMOGRAM SCREENING 97680 - [facility] (1[facility] - JBayron MILLER ([facility])   • History of transfusion    • Hyperlipidemia    • Inguinal pain    • Recurrent angina status post coronary artery bypass graft (CMS/[facility])     Recurrent angina after coronary artery bypass graft   • Urge incontinence of urine         Past Surgical History:   Procedure Laterality Date   • BREAST BIOPSY Right 2017    Procedure: RIGHT BREAST LUMPECTOMY WITH NEEDLE LOCALIZATION AND ULTRASOUND;  Surgeon: Delfino Greer MD;  Location[facility];   Service:    • BREAST CYST ASPIRATION     • CARDIAC SURGERY     • CORONARY ARTERY BYPASS GRAFT  2000    CABG, arterial, single (1)   • GANGLION CYST EXCISION     • JOINT REPLACEMENT     • ORIF MANDIBULAR FRACTURE     • TOTAL ABDOMINAL HYSTERECTOMY     • TOTAL ABDOMINAL HYSTERECTOMY WITH SALPINGO OOPHORECTOMY  1998    SALVADOR/BSO (1)   • TOTAL HIP ARTHROPLASTY  2011    Total hip replacement (3)           PT Assessment/Plan     Row Name 04/22/20 1600          PT Assessment    Functional Limitations  Limitation in home management;Limitations in community activities;Limitations in functional capacity and performance;Performance in leisure activities;Performance in self-care ADL;Performance in work activities  -PD     Impairments  Range of motion;Pain;Dexterity  -PD     Assessment Comments  pt blair tx very well and really enjoyed the paraffin bath.   -PD     Rehab Potential  Good  -PD     Patient/caregiver participated in establishment of treatment plan and goals  Yes  -PD     Patient would benefit from skilled therapy intervention  Yes  -PD        PT Plan    PT Frequency  1x/week;2x/week  -PD     Predicted Duration of Therapy Intervention (Therapy Eval)  4-6 weeks with futher to be determined  -PD     PT Plan Comments  cont with POC. check orthotic cushion.  -PD       User Key  (r) = Recorded By, (t) = Taken By, (c) = Cosigned By    Initials Name Provider Type    PD Miguel Angel Jimenez, PT Physical Therapist            OP Exercises     Row Name 04/22/20 1600             Subjective Comments    Subjective Comments  a little soreness and tenderness in the hand  -PD         Subjective Pain    Able to rate subjective pain?  yes  -PD      Pre-Treatment Pain Level  -- 2-3/10 hand, 0/10 headache.  -PD      Post-Treatment Pain Level  -- 2-3/10 hand, 0/10 headache.  -PD         Exercise 1    Exercise Name 1  Fluidotherapy with gentle AROM  -PD      Time 1  15 min  -PD         Exercise 2    Exercise Name 2  LLPS claw  -PD      Cueing 2   Verbal;Tactile  -PD      Time 2  1 min  -PD         Exercise 3    Exercise Name 3  Fist   -PD      Cueing 3  Verbal  -PD      Sets 3  1  -PD      Reps 3  15  -PD         Exercise 4    Exercise Name 4  In-and-Out  -PD      Cueing 4  Verbal  -PD      Sets 4  1  -PD      Reps 4  20  -PD         Exercise 5    Exercise Name 5  Wrist flexion/extension AROM  -PD      Cueing 5  Verbal;Demo  -PD      Sets 5  1  -PD      Reps 5  20  -PD         Exercise 6    Exercise Name 6  paraffin bath  -PD      Time 6  5 min   -PD        User Key  (r) = Recorded By, (t) = Taken By, (c) = Cosigned By    Initials Name Provider Type    PD Miguel Angel Jimenez, PT Physical Therapist            PT OP Goals     Row Name 04/22/20 1600          PT Short Term Goals    STG Date to Achieve  05/07/20  -PD     STG 1  Note a >/= 25% subjective improvement.  -PD     STG 1 Progress  Met  -PD     STG 2  QuickDASH score to be </= 60.  -PD     STG 2 Progress  Ongoing  -PD     STG 3  RF PIP active flexion WNLs.  -PD     STG 3 Progress  Met  -PD     STG 4  SF PIP active flexion to be >/= 70 deg.  -PD     STG 4 Progress  Met  -PD     STG 5  SF DIP active flexion to be >/= 50 deg.  -PD     STG 5 Progress  Met  -PD        Long Term Goals    LTG Date to Achieve  05/21/20  -PD     LTG 1  Independent with HEP/self-management.  -PD     LTG 1 Progress  Ongoing  -PD     LTG 2  Digital AROM WNLs.  -PD     LTG 2 Progress  Ongoing  -PD     LTG 3  Left  strength to be >/= 50% of right.  -PD     LTG 3 Progress  Ongoing  -PD     LTG 4  Return to normal work activities.  -PD     LTG 4 Progress  Ongoing  -PD     LTG 5  Minimal headaches with activity.  -PD     LTG 5 Progress  New  -PD     LTG 6  QuickDASH score to be </= 40.  -PD     LTG 6 Progress  New  -PD        Time Calculation    PT Goal Re-Cert Due Date  05/07/20  -PD       User Key  (r) = Recorded By, (t) = Taken By, (c) = Cosigned By    Initials Name Provider Type    PD Miguel Angel Jimenez, PT Physical Therapist               Time Calculation:      Therapy Charges for Today     Code Description Service Date Service Provider Modifiers Qty    72486606518 HC PT THER PROC EA 15 MIN 4/22/2020 Miguel Angel Jimenez, PT GP 1    53358462058 HC PT WHIRLPOOL 4/22/2020 Miguel Angel Jimenez, PT GP 1    [id number]  PT PARAFFIN BATH 4/22/2020 Miguel Angel Jimenez, PT GP 1            Miguel Angel Jimenez, PT  4/22/2020

## 2020-04-27 ENCOUNTER — APPOINTMENT (OUTPATIENT)
Dept: PHYSICAL THERAPY | Facility: HOSPITAL | Age: 70
End: 2020-04-27

## 2020-04-28 ENCOUNTER — HOSPITAL ENCOUNTER (OUTPATIENT)
Dept: PHYSICAL THERAPY | Facility: HOSPITAL | Age: 70
Setting detail: THERAPIES SERIES
Discharge: HOME | End: 2020-04-28

## 2020-04-28 DIAGNOSIS — S62.317A CLOSED DISPLACED FRACTURE OF BASE OF FIFTH METACARPAL BONE OF LEFT HAND, INITIAL ENCOUNTER: Primary | ICD-10-CM

## 2020-04-28 PROCEDURE — 97018 PARAFFIN BATH THERAPY: CPT

## 2020-04-28 PROCEDURE — 97110 THERAPEUTIC EXERCISES: CPT

## 2020-04-28 PROCEDURE — 97022 WHIRLPOOL THERAPY: CPT

## 2020-04-28 NOTE — THERAPY TREATMENT NOTE
Outpatient Physical Therapy Ortho Treatment Note  [facility]     Patient Name: Chrissy Dawson  : 1950  MRN: [id number]  Today's Date: 2020      Visit Date: 2020  Attendance:  (6 visits approved)  Subjective Improvement: 60% - hand  Next MD Appt: 2020  Recert Date: 2020  Visit Dx:    ICD-10-CM ICD-9-CM   1. Closed displaced fracture of base of fifth metacarpal bone of left hand, initial encounter S62.317A 815.02       Patient Active Problem List   Diagnosis   • Adenopathy, cervical   • Hyperlipidemia   • Coronary arteriosclerosis   • Benign essential hypertension   • Abnormal mammogram of right breast   • Presence of artificial hip joint   • Gait disturbance   • Trochanteric bursitis, right hip        Past Medical History:   Diagnosis Date   • Backache    • Benign essential hypertension     PATIENT DENIES   • Cervical arthritis    • Coronary arteriosclerosis    • Fibrocystic breast    • Ganglion cyst of wrist     Ganglion/synovial cyst - wrist   • Ganglion of wrist    • Hip pain    • History of echocardiogram 10/23/2015    Echocardiogram W/ color flow 05636 (1) - Normal LV function with Ef of 55-60%.Normal RV size and function.Pseudonormal diastolic dysfunciton with borderline [facility].NO sig.valvular regurg or stenosis.   • History of mammogram 09/10/2014    MAMMOGRAM SCREENING 99867 - [facility] ([facility] - LIZZ MILL[facility] ([facility])   • History of transfusion    • Hyperlipidemia    • Inguinal pain    • Recurrent angina status post coronary artery bypass graft (CMS/HCC)     Recurrent angina after coronary artery bypass graft   • Urge incontinence of urine         Past Surgical History:   Procedure Laterality Date   • BREAST BIOPSY Right 2017    Procedure: RIGHT BREAST LUMPECTOMY WITH NEEDLE LOCALIZATION AND ULTRASOUND;  Surgeon: Delfino Greer MD;  Location: [facility];  Service:    • BREAST CYST ASPIRATION     • CARDIAC SURGERY     • CORONARY ARTERY BYPASS GRAFT      CABG,  arterial, single (1)   • GANGLION CYST EXCISION     • JOINT REPLACEMENT     • ORIF MANDIBULAR FRACTURE     • TOTAL ABDOMINAL HYSTERECTOMY     • TOTAL ABDOMINAL HYSTERECTOMY WITH SALPINGO OOPHORECTOMY  1998    SALVADOR/BSO (1)   • TOTAL HIP ARTHROPLASTY  2011    Total hip replacement (3)           PT Assessment/Plan     Row Name 04/28/20 1600          PT Assessment    Functional Limitations  Limitation in home management;Limitations in community activities;Limitations in functional capacity and performance;Performance in leisure activities;Performance in self-care ADL;Performance in work activities  -PD     Impairments  Range of motion;Pain;Dexterity  -PD     Assessment Comments  pt blair tx very well, enjoyed the warmth of the paraffin bath.  -PD     Rehab Potential  Good  -PD     Patient/caregiver participated in establishment of treatment plan and goals  Yes  -PD     Patient would benefit from skilled therapy intervention  Yes  -PD        PT Plan    PT Frequency  1x/week;2x/week  -PD     Predicted Duration of Therapy Intervention (Therapy Eval)  4-6 weeks with futher to be determined  -PD     PT Plan Comments  cont with POC. check orthotic cushion.  -PD       User Key  (r) = Recorded By, (t) = Taken By, (c) = Cosigned By    Initials Name Provider Type    PD Miguel Angel Jimenez, PT Physical Therapist            OP Exercises     Row Name 04/28/20 1600             Subjective Comments    Subjective Comments  there is still some redness where the pads are on the orthotic. She and her  went to get tested for COVID 19 on Thursday and got the result on Sunday which was negative.    -PD         Subjective Pain    Able to rate subjective pain?  yes  -PD      Pre-Treatment Pain Level  -- 3/10 hand, no headache  -PD      Post-Treatment Pain Level  -- 3/10 hand, no headache  -PD         Exercise 1    Exercise Name 1  Fluidotherapy with gentle AROM  -PD      Time 1  15 min  -PD         Exercise 2    Exercise Name 2  LLPS claw  -PD       Time 2  1 min  -PD         Exercise 3    Exercise Name 3  Fist   -PD      Cueing 3  Verbal  -PD      Sets 3  1  -PD      Reps 3  15  -PD         Exercise 4    Exercise Name 4  In-and-Out  -PD      Cueing 4  Verbal  -PD      Sets 4  1  -PD      Reps 4  20  -PD         Exercise 5    Exercise Name 5  Wrist flexion/extension AROM  -PD      Cueing 5  Verbal;Demo  -PD      Sets 5  1  -PD      Reps 5  20  -PD         Exercise 6    Exercise Name 6  paraffin bath  -PD      Time 6  5 min   -PD        User Key  (r) = Recorded By, (t) = Taken By, (c) = Cosigned By    Initials Name Provider Type    PD Miguel Angel Jimenez, PT Physical Therapist              PT OP Goals     Row Name 04/28/20 1600          PT Short Term Goals    STG Date to Achieve  05/07/20  -PD     STG 1  Note a >/= 25% subjective improvement.  -PD     STG 1 Progress  Met  -PD     STG 2  QuickDASH score to be </= 60.  -PD     STG 2 Progress  Ongoing  -PD     STG 3  RF PIP active flexion WNLs.  -PD     STG 3 Progress  Met  -PD     STG 4  SF PIP active flexion to be >/= 70 deg.  -PD     STG 4 Progress  Met  -PD     STG 5  SF DIP active flexion to be >/= 50 deg.  -PD     STG 5 Progress  Met  -PD        Long Term Goals    LTG Date to Achieve  05/21/20  -PD     LTG 1  Independent with HEP/self-management.  -PD     LTG 1 Progress  Ongoing  -PD     LTG 2  Digital AROM WNLs.  -PD     LTG 2 Progress  Ongoing  -PD     LTG 3  Left  strength to be >/= 50% of right.  -PD     LTG 3 Progress  Ongoing  -PD     LTG 4  Return to normal work activities.  -PD     LTG 4 Progress  Ongoing  -PD     LTG 5  Minimal headaches with activity.  -PD     LTG 5 Progress  New  -PD     LTG 6  QuickDASH score to be </= 40.  -PD     LTG 6 Progress  New  -PD        Time Calculation    PT Goal Re-Cert Due Date  05/07/20  -PD       User Key  (r) = Recorded By, (t) = Taken By, (c) = Cosigned By    Initials Name Provider Type    PD Miguel Angel Jimenez, PT Physical Therapist              Time Calculation:       Therapy Charges for Today     Code Description Service Date Service Provider Modifiers Qty    28007376687 HC PT THER PROC EA 15 MIN 4/28/2020 Miguel Angel Jimenez, PT GP 1    36270554336 HC PT WHIRLPOOL 4/28/2020 Miguel Angel Jimenez, PT GP 1    [id number]  PT PARAFFIN BATH 4/28/2020 Miguel Angel Jimenez, PT GP 1              Miguel Angel Jimenez, PT  4/28/2020

## 2020-05-01 ENCOUNTER — TRANSCRIBE ORDERS (OUTPATIENT)
Dept: GENERAL RADIOLOGY | Facility: CLINIC | Age: 70
End: 2020-05-01

## 2020-05-01 DIAGNOSIS — M79.646 PAIN OF FINGER, UNSPECIFIED LATERALITY: Primary | ICD-10-CM

## 2020-05-04 ENCOUNTER — HOSPITAL ENCOUNTER (OUTPATIENT)
Dept: PHYSICAL THERAPY | Facility: HOSPITAL | Age: 70
Setting detail: THERAPIES SERIES
Discharge: HOME | End: 2020-05-04

## 2020-05-04 DIAGNOSIS — S62.317A CLOSED DISPLACED FRACTURE OF BASE OF FIFTH METACARPAL BONE OF LEFT HAND, INITIAL ENCOUNTER: Primary | ICD-10-CM

## 2020-05-04 DIAGNOSIS — I25.10 CORONARY ARTERIOSCLEROSIS: ICD-10-CM

## 2020-05-04 DIAGNOSIS — S06.9X0A MILD TRAUMATIC BRAIN INJURY, WITHOUT LOSS OF CONSCIOUSNESS, INITIAL ENCOUNTER (HCC): ICD-10-CM

## 2020-05-04 PROCEDURE — 97022 WHIRLPOOL THERAPY: CPT

## 2020-05-04 PROCEDURE — 97110 THERAPEUTIC EXERCISES: CPT

## 2020-05-04 PROCEDURE — 97018 PARAFFIN BATH THERAPY: CPT

## 2020-05-04 RX ORDER — PRAVASTATIN SODIUM 10 MG
10 TABLET ORAL DAILY
Qty: 30 TABLET | Refills: 6 | Status: SHIPPED | OUTPATIENT
Start: 2020-05-04 | End: 2020-05-04

## 2020-05-04 RX ORDER — PRAVASTATIN SODIUM 10 MG
10 TABLET ORAL DAILY
Qty: 30 TABLET | Refills: 6 | Status: SHIPPED | OUTPATIENT
Start: 2020-05-04 | End: 2020-10-19 | Stop reason: SDUPTHER

## 2020-05-04 NOTE — THERAPY TREATMENT NOTE
Outpatient Physical Therapy Ortho Treatment Note  [facility]     Patient Name: Chrissy Dawson  : 1950  MRN: [id number]  Today's Date: 2020      Visit Date: 2020  Attendance:  (6 visits approved)  Subjective Improvement: 60% - hand  Next MD Appt: 5/15/2020  Recert Date: 2020  Visit Dx:    ICD-10-CM ICD-9-CM   1. Closed displaced fracture of base of fifth metacarpal bone of left hand, initial encounter S62.317A 815.02   2. Mild traumatic brain injury, without loss of consciousness, initial encounter (CMS/[facility]) S06.9X0A 854.01       Patient Active Problem List   Diagnosis   • Adenopathy, cervical   • Hyperlipidemia   • Coronary arteriosclerosis   • Benign essential hypertension   • Abnormal mammogram of right breast   • Presence of artificial hip joint   • Gait disturbance   • Trochanteric bursitis, right hip        Past Medical History:   Diagnosis Date   • Backache    • Benign essential hypertension     PATIENT DENIES   • Cervical arthritis    • Coronary arteriosclerosis    • Fibrocystic breast    • Ganglion cyst of wrist     Ganglion/synovial cyst - wrist   • Ganglion of wrist    • Hip pain    • History of echocardiogram 10/23/2015    Echocardiogram W/ color flow 27873 (1) - Normal LV function with Ef of 55-60%.Normal RV size and function.Pseudonormal diastolic dysfunciton with borderline [facility].NO sig.valvular regurg or stenosis.   • History of mammogram 09/10/2014    MAMMOGRAM SCREENING 50854 - [facility] ([facility] - JBayron MILLER ([facility])   • History of transfusion    • Hyperlipidemia    • Inguinal pain    • Recurrent angina status post coronary artery bypass graft (CMS/[facility])     Recurrent angina after coronary artery bypass graft   • Urge incontinence of urine         Past Surgical History:   Procedure Laterality Date   • BREAST BIOPSY Right 2017    Procedure: RIGHT BREAST LUMPECTOMY WITH NEEDLE LOCALIZATION AND ULTRASOUND;  Surgeon: Delfino Greer MD;  Location: [facility] OR;   Service:    • BREAST CYST ASPIRATION     • CARDIAC SURGERY     • CORONARY ARTERY BYPASS GRAFT  2000    CABG, arterial, single (1)   • GANGLION CYST EXCISION     • JOINT REPLACEMENT     • ORIF MANDIBULAR FRACTURE     • TOTAL ABDOMINAL HYSTERECTOMY     • TOTAL ABDOMINAL HYSTERECTOMY WITH SALPINGO OOPHORECTOMY  1998    SALVADOR/BSO (1)   • TOTAL HIP ARTHROPLASTY  2011    Total hip replacement (3)       PT Ortho     Row Name 05/04/20 1600       Subjective Pain    Post-Treatment Pain Level  3  -PD      User Key  (r) = Recorded By, (t) = Taken By, (c) = Cosigned By    Initials Name Provider Type    Miguel Angel Uriarte, PT Physical Therapist           Hand Therapy (last 24 hours)      Hand Eval     Row Name 05/04/20 1600             Left Extension AROM    IV- MP AROM  0  -PD      IV- PIP AROM  0  -PD      IV- DIP AROM  0  -PD      IV- TAVERAS Left Extension AROM  0  -PD      V- MP AROM  0  -PD      V- PIP AROM  0  -PD      V- DIP AROM  0  -PD      V- TAVERAS Left Extension AROM  0  -PD         Left Flexion AROM    IV- MP AROM  55  -PD      IV- PIP AROM  95  -PD      IV- DIP AROM  55  -PD      IV- TAVERAS Left Flexion AROM  205  -PD      V- MP AROM  55  -PD      V- PIP AROM  55  -PD      V- DIP AROM  55  -PD      V- TAVERAS Left Flexion AROM  165  -PD        User Key  (r) = Recorded By, (t) = Taken By, (c) = Cosigned By    Initials Name Provider Type    Miguel Angel Uriarte, PT Physical Therapist            PT Assessment/Plan     Row Name 05/04/20 1600          PT Assessment    Functional Limitations  Limitation in home management;Limitations in community activities;Limitations in functional capacity and performance;Performance in leisure activities;Performance in self-care ADL;Performance in work activities  -PD     Impairments  Range of motion;Pain;Dexterity  -PD     Assessment Comments  Lv (PT) adjusted the orthotic by cutting out a piece over the distal radial bone head in order to relieve pressure. pt blair tx well.   -PD     Rehab Potential   Good  -PD     Patient/caregiver participated in establishment of treatment plan and goals  Yes  -PD     Patient would benefit from skilled therapy intervention  Yes  -PD        PT Plan    PT Frequency  1x/week;2x/week  -PD     Predicted Duration of Therapy Intervention (Therapy Eval)  4-6 weeks  -PD     PT Plan Comments  cont with POC. check orthotic fitting.   -PD       User Key  (r) = Recorded By, (t) = Taken By, (c) = Cosigned By    Initials Name Provider Type    PD Miguel Angel Jimenez, PT Physical Therapist            OP Exercises     Row Name 05/04/20 1600             Subjective Comments    Subjective Comments  pt reports that she had an xray done the the results say that it has not changed much, fx line still visible.    -PD         Subjective Pain    Able to rate subjective pain?  yes  -PD      Pre-Treatment Pain Level  3  -PD      Post-Treatment Pain Level  3  -PD         Exercise 1    Exercise Name 1  Fluidotherapy with gentle AROM  -PD      Time 1  15 min  -PD         Exercise 2    Exercise Name 2  orthotic adjustment  -PD      Additional Comments  see assessment  -PD         Exercise 3    Exercise Name 3  Fist   -PD      Cueing 3  Verbal  -PD      Sets 3  1  -PD      Reps 3  15  -PD         Exercise 4    Exercise Name 4  In-and-Out  -PD      Cueing 4  Verbal  -PD      Sets 4  1  -PD      Reps 4  20  -PD         Exercise 5    Exercise Name 5  Wrist flexion/extension AROM  -PD      Cueing 5  Verbal;Demo  -PD      Sets 5  1  -PD      Reps 5  20  -PD         Exercise 6    Exercise Name 6  paraffin bath  -PD      Time 6  5 min  -PD        User Key  (r) = Recorded By, (t) = Taken By, (c) = Cosigned By    Initials Name Provider Type    PD Miguel Angel Jimenez, PT Physical Therapist              PT OP Goals     Row Name 05/04/20 1600          PT Short Term Goals    STG Date to Achieve  05/07/20  -PD     STG 1  Note a >/= 25% subjective improvement.  -PD     STG 1 Progress  Met  -PD     STG 2  QuickDASH score to be </= 60.   -PD     STG 2 Progress  Ongoing  -PD     STG 3  RF PIP active flexion WNLs.  -PD     STG 3 Progress  Met  -PD     STG 4  SF PIP active flexion to be >/= 70 deg.  -PD     STG 4 Progress  Met  -PD     STG 5  SF DIP active flexion to be >/= 50 deg.  -PD     STG 5 Progress  Met  -PD        Long Term Goals    LTG Date to Achieve  05/21/20  -PD     LTG 1  Independent with HEP/self-management.  -PD     LTG 1 Progress  Ongoing  -PD     LTG 2  Digital AROM WNLs.  -PD     LTG 2 Progress  Ongoing  -PD     LTG 3  Left  strength to be >/= 50% of right.  -PD     LTG 3 Progress  Ongoing  -PD     LTG 4  Return to normal work activities.  -PD     LTG 4 Progress  Ongoing  -PD     LTG 5  Minimal headaches with activity.  -PD     LTG 5 Progress  New  -PD     LTG 6  QuickDASH score to be </= 40.  -PD     LTG 6 Progress  New  -PD        Time Calculation    PT Goal Re-Cert Due Date  05/07/20  -PD       User Key  (r) = Recorded By, (t) = Taken By, (c) = Cosigned By    Initials Name Provider Type    Miguel Angel Uriarte, PT Physical Therapist            Time Calculation:      Therapy Charges for Today     Code Description Service Date Service Provider Modifiers Qty    40993942689 HC PT THER PROC EA 15 MIN 5/4/2020 Miguel Angel Jimenez, PT GP 1    86722714334 HC PT WHIRLPOOL 5/4/2020 Miguel Angel Jimenez, PT GP 1    [id number] HC PT PARAFFIN BATH 5/4/2020 Miguel Angel Jimenez, PT GP 1            Miguel Angel Jimenez PT  5/4/2020

## 2020-05-12 ENCOUNTER — APPOINTMENT (OUTPATIENT)
Dept: PHYSICAL THERAPY | Facility: HOSPITAL | Age: 70
End: 2020-05-12

## 2020-05-14 ENCOUNTER — TRANSCRIBE ORDERS (OUTPATIENT)
Dept: GENERAL RADIOLOGY | Facility: CLINIC | Age: 70
End: 2020-05-14

## 2020-05-14 DIAGNOSIS — M79.646 PAIN OF FINGER, UNSPECIFIED LATERALITY: Primary | ICD-10-CM

## 2020-05-19 ENCOUNTER — TRANSCRIBE ORDERS (OUTPATIENT)
Dept: ORTHOPEDIC SURGERY | Facility: CLINIC | Age: 70
End: 2020-05-19

## 2020-05-19 DIAGNOSIS — S62.307A UNSPECIFIED FRACTURE OF FIFTH METACARPAL BONE, LEFT HAND, INITIAL ENCOUNTER FOR CLOSED FRACTURE: Primary | ICD-10-CM

## 2020-05-26 ENCOUNTER — OFFICE VISIT (OUTPATIENT)
Dept: ORTHOPEDIC SURGERY | Facility: CLINIC | Age: 70
End: 2020-05-26

## 2020-05-26 VITALS
HEIGHT: 70 IN | OXYGEN SATURATION: 97 % | TEMPERATURE: 99.1 F | HEART RATE: 41 BPM | BODY MASS INDEX: 25.62 KG/M2 | WEIGHT: 179 LBS | DIASTOLIC BLOOD PRESSURE: 72 MMHG | SYSTOLIC BLOOD PRESSURE: 110 MMHG

## 2020-05-26 DIAGNOSIS — M79.642 LEFT HAND PAIN: ICD-10-CM

## 2020-05-26 DIAGNOSIS — S62.317A CLOSED DISPLACED FRACTURE OF BASE OF FIFTH METACARPAL BONE OF LEFT HAND, INITIAL ENCOUNTER: Primary | ICD-10-CM

## 2020-05-26 PROCEDURE — 99203 OFFICE O/P NEW LOW 30 MIN: CPT | Performed by: ORTHOPAEDIC SURGERY

## 2020-05-26 NOTE — PROGRESS NOTES
Chrissydanis Dawson is a 69 y.o. female   Primary provider:  Fanny Herrera MD       Chief Complaint   Patient presents with   • Left Hand - Pain   • Establish Care     X[facility]valencia RodriguezEpiscopal 5/14/20       HISTORY OF PRESENT ILLNESS: This is the first office visit for evaluation of an injury to the left hand.    Mrs. Dawson is 69 years old and right-hand dominant.  She was on the job on 13 March when she fell with an injury to the left hand.  She sustained a fracture of the small finger metacarpal.  Treatment has included splinting.  Her pain has steadily improved.  She has continued working with the restriction of a brace on the left hand.  She is not using the brace much when not working.  Her pain is primarily over the ulnar aspect of the hand and as noted above has significantly improved.  She has been treated by Dr. Ospina.  She comes today primarily for a second opinion.    Past medical history is unchanged from previous notes.  She has a history of hypertension coronary artery disease.  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        Patient has been treated by Dr Ospina in [facility].    Pain   Episode onset: 3/13/20. The problem occurs intermittently. Associated symptoms comments: Aching, burning, swelling. She has tried acetaminophen and NSAIDs for the symptoms. The treatment provided mild relief.        CONCURRENT MEDICAL HISTORY:    Past Medical History:   Diagnosis Date   • Backache    • Benign essential hypertension     PATIENT DENIES   • Cervical arthritis    • Coronary arteriosclerosis    • Fibrocystic breast    • Ganglion cyst of wrist     Ganglion/synovial cyst - wrist   • Ganglion of wrist    • Hip pain    • History of echocardiogram 10/23/2015    Echocardiogram W/ color flow 54221 (1) - Normal LV function with Ef of 55-60%.Normal RV size and function.Pseudonormal diastolic dysfunciton with borderline [facility].NO sig.valvular regurg or stenosis.   • History of mammogram 09/10/2014     MAMMOGRAM SCREENING 70754 - [facility] (1) - [facility]. [facility] ([facility])   • History of transfusion    • Hyperlipidemia    • Inguinal pain    • Recurrent angina status post coronary artery bypass graft (CMS/HCC)     Recurrent angina after coronary artery bypass graft   • Urge incontinence of urine        Allergies   Allergen Reactions   • Crestor [Rosuvastatin] Shortness Of Breath   • Lipitor [Atorvastatin] Shortness Of Breath         Current Outpatient Medications:   •  aspirin 81 MG tablet, Take 81 mg by mouth., Disp: , Rfl:   •  pravastatin (PRAVACHOL) 10 MG tablet, Take 1 tablet by mouth Daily., Disp: 30 tablet, Rfl: 6  •  butalbital-acetaminophen-caffeine (FIORICET, ESGIC) -40 MG per tablet, Take 1 tablet by mouth Every 6 (Six) Hours As Needed for Headache., Disp: 15 tablet, Rfl: 0  •  Multiple Vitamins-Minerals (CENTRUM SILVER PO), Take 1 tablet by mouth Daily., Disp: , Rfl:     Past Surgical History:   Procedure Laterality Date   • BREAST BIOPSY Right 2/2/2017    Procedure: RIGHT BREAST LUMPECTOMY WITH NEEDLE LOCALIZATION AND ULTRASOUND;  Surgeon: Delfino Greer MD;  Location: [facility] OR;  Service:    • BREAST CYST ASPIRATION     • CARDIAC SURGERY     • CORONARY ARTERY BYPASS GRAFT  2000    CABG, arterial, single (1)   • GANGLION CYST EXCISION     • JOINT REPLACEMENT     • ORIF MANDIBULAR FRACTURE     • TOTAL ABDOMINAL HYSTERECTOMY     • TOTAL ABDOMINAL HYSTERECTOMY WITH SALPINGO OOPHORECTOMY  1998    SALVADOR/BSO (1)   • TOTAL HIP ARTHROPLASTY  2011    Total hip replacement (3)       Family History   Problem Relation Age of Onset   • Heart disease Other    • Hypertension Other    • Hypertension Mother    • Diabetes Father    • Depression Maternal Grandmother    • Heart disease Maternal Grandfather         Social History     Socioeconomic History   • Marital status:      Spouse name: Not on file   • Number of children: Not on file   • Years of education: Not on file   • Highest education level: Not on file  "  Tobacco Use   • Smoking status: Never Smoker   • Smokeless tobacco: Never Used   Substance and Sexual Activity   • Alcohol use: No   • Drug use: No   • Sexual activity: Defer     Comment:         Review of Systems  Review of systems remarkable for mild intermittent pain in the left hand.  PHYSICAL EXAMINATION:       Vitals:    05/26/20 1323   BP: 110/72   BP Location: Left arm   Patient Position: Sitting   Pulse: (!) 41   Temp: 99.1 °F (37.3 °C)   TempSrc: Temporal   SpO2: 97%   Weight: 81.2 kg (179 lb)   Height: 176.5 cm (69.5\")   Body mass index is 26.05 kg/m².    Physical Exam in general she is healthy-appearing alert pleasant and in no apparent distress.    GAIT:     []  Normal  [x]  Antalgic    Assistive device: []  None  []  Walker     []  Crutches  [x]  Cane     []  Wheelchair  []  Stretcher    Ortho Exam is directed to the left upper extremity.  There is a forearm-based orthotic which extends to the proximal aspect of the small finger.  The splint was removed.  Skin below was unremarkable.  She has near full flexion of all fingers and has full extension of all digits.  There is no angular or rotational deformity of the small finger.  There is mild tenderness of the proximal shaft of the small finger metacarpal and mild discomfort with stressing of the metacarpal but no crepitus or clinical motion.  Radial pulses strong.  Sensory exam is intact to soft touch.    Radiographs of the hand dated 14 May of this year were reviewed.  There is a transverse slightly displaced fracture of the proximal shaft of the small finger metacarpal.  There is early callus when compared with previous studies.      Xr Hand 3+ View Left    Result Date: 5/14/2020  Narrative: Three view left hand HISTORY: Follow-up fifth metacarpal fracture. Pain. AP, lateral and oblique views obtained. COMPARISON: April 14, 2020 FINDINGS: Healing minimally displaced transverse fracture base of the fifth metacarpal. No dislocation. " Degenerative changes interphalangeal joints. No other osseous or articular abnormality.     Impression: CONCLUSION: Healing minimally displaced transverse fracture base of the fifth metacarpal. [id number] Electronically signed by:  Lv Beasley MD  5/14/2020 4:35 PM CDT Workstation: [id number]    Xr Finger 2+ View Left    Result Date: 5/1/2020  Narrative: Three view left little finger HISTORY: Fracture follow-up. Pain. AP, lateral and oblique views obtained. COMPARISON: April 14, 2020 FINDINGS: No significant change in the minimally displaced transverse fracture base of the fifth metacarpal. Fracture line still well visualized. No significant callus formation. Minimal degenerative changes interphalangeal joints. No other osseous or articular abnormality.     Impression: CONCLUSION: No significant change in the minimally displaced transverse fracture base of the fifth metacarpal. [id number] Electronically signed by:  Lv Beasley MD  5/1/2020 4:29 PM CDT Workstation: [id number]      [facility]     Patient Name: KASH ROGERS     ATTENDING:     REFERRING: JIM LINO     ORDERING: JIM LINO     -----------------------     PROCEDURE: Left hand     DATE OF EXAM: 3/13/2020     HISTORY: Hand pain     Three views of the left hand were obtained. There is transverse  fracture through the base of the fifth metacarpal with minimal  displacement. No other fracture seen. Joint spaces are  satisfactorily aligned. There are osteoarthritic changes of the  joint space of the digits with narrowing and spurring, most  pronounced within the distal interphalangeal joints of the second  and third fingers.     IMPRESSION:  Minimally displaced transverse fracture base of fifth  metacarpal.        Electronically signed by:  Christ Villaseñor MD  3/13/2020 4:57 PM  CDT Workstation: [id number]      [facility] [place]     Patient Name: KASH ROGERS     ATTENDING:     REFERRING: HOLLAND  JIM     ORDERING: JIM LINO     -----------------------     PROCEDURE: Left fifth finger     DATE OF EXAM: 3/24/2020     HISTORY: Follow-up fracture     Three views of the fifth finger are compared to study of  3/13/2020.     There are no fractures involving the left fifth finger. Mild  osteophytic changes with joint space narrowing is spurring at the  interphalangeal joints is noted.     Minimally displaced transverse fracture base of fifth metacarpal  is again noted without significant change from most recent exam.  Positioning and alignment is similar with no callous formation  evident.     IMPRESSION:  1. There are no fractures involving the fifth finger. Mild  osteoarthritic changes are noted at the interphalangeal joints.  2. No change in the mildly displaced transverse fracture  involving the base of the fifth metacarpal.        Electronically signed by:  Christ Villaseñor MD  3/24/2020 10:26 AM  CDT Workstation: [id number]      Three view left hand  4/14/20     HISTORY: Follow-up fifth metacarpal fracture. Pain.     AP, lateral and oblique views obtained.     COMPARISON: March 13, 2020     FINDINGS:   Unchanged minimally displaced transverse fracture base of the  fifth metacarpal.  Minimal to moderate degenerative changes distal interphalangeal  joints.  Mild degenerative changes proximal interphalangeal joints.  No dislocation.  No other osseous or articular abnormality.     IMPRESSION:  CONCLUSION:  Unchanged minimally displaced transverse fracture base of the  fifth metacarpal.  Minimal to moderate degenerative changes distal interphalangeal  joints.  Mild degenerative changes proximal interphalangeal joints.     [id number]     Electronically signed by:  Lv Beasley MD  4/14/2020 4:18 PM CDT      ASSESSMENT: Healing fracture left small finger metacarpal.  She has had the appropriate treatment.  Prognosis is excellent for healing with continued nonoperative care.    She was encouraged in range of motion  exercises for her fingers.  She will continue to use her splint during high risk activities.  She may continue her current work duties.    Return here in 1 month for x-rays of the hand 3 views.    Diagnoses and all orders for this visit:    Closed displaced fracture of base of fifth metacarpal bone of left hand, initial encounter    Left hand pain          PLAN      Patient's Body mass index is 26.05 kg/m². BMI is within normal parameters. No follow-up required..    Return in about 4 weeks (around 6/23/2020).    Jeovanny Brown MD

## 2020-06-16 ENCOUNTER — LAB (OUTPATIENT)
Dept: LAB | Facility: HOSPITAL | Age: 70
End: 2020-06-16

## 2020-06-16 ENCOUNTER — OFFICE VISIT (OUTPATIENT)
Dept: ORTHOPEDIC SURGERY | Facility: CLINIC | Age: 70
End: 2020-06-16

## 2020-06-16 VITALS
HEIGHT: 70 IN | BODY MASS INDEX: 25.34 KG/M2 | TEMPERATURE: 98.7 F | WEIGHT: 177 LBS | OXYGEN SATURATION: 97 % | HEART RATE: 65 BPM

## 2020-06-16 DIAGNOSIS — Z96.641 PRESENCE OF RIGHT ARTIFICIAL HIP JOINT: ICD-10-CM

## 2020-06-16 DIAGNOSIS — M70.61 TROCHANTERIC BURSITIS, RIGHT HIP: Primary | ICD-10-CM

## 2020-06-16 DIAGNOSIS — M25.551 RIGHT HIP PAIN: ICD-10-CM

## 2020-06-16 LAB
BASOPHILS # BLD AUTO: 0.01 10*3/MM3 (ref 0–0.2)
BASOPHILS NFR BLD AUTO: 0.2 % (ref 0–1.5)
CRP SERPL-MCNC: 0.94 MG/DL (ref 0–0.5)
DEPRECATED RDW RBC AUTO: 39.8 FL (ref 37–54)
EOSINOPHIL # BLD AUTO: 0.04 10*3/MM3 (ref 0–0.4)
EOSINOPHIL NFR BLD AUTO: 1 % (ref 0.3–6.2)
ERYTHROCYTE [DISTWIDTH] IN BLOOD BY AUTOMATED COUNT: 12.9 % (ref 12.3–15.4)
ERYTHROCYTE [SEDIMENTATION RATE] IN BLOOD: 12 MM/HR (ref 0–30)
HCT VFR BLD AUTO: 38.3 % (ref 34–46.6)
HGB BLD-MCNC: 12.7 G/DL (ref 12–15.9)
IMM GRANULOCYTES # BLD AUTO: 0 10*3/MM3 (ref 0–0.05)
IMM GRANULOCYTES NFR BLD AUTO: 0 % (ref 0–0.5)
LYMPHOCYTES # BLD AUTO: 1.73 10*3/MM3 (ref 0.7–3.1)
LYMPHOCYTES NFR BLD AUTO: 42.2 % (ref 19.6–45.3)
MCH RBC QN AUTO: 28.1 PG (ref 26.6–33)
MCHC RBC AUTO-ENTMCNC: 33.2 G/DL (ref 31.5–35.7)
MCV RBC AUTO: 84.7 FL (ref 79–97)
MONOCYTES # BLD AUTO: 0.38 10*3/MM3 (ref 0.1–0.9)
MONOCYTES NFR BLD AUTO: 9.3 % (ref 5–12)
NEUTROPHILS # BLD AUTO: 1.94 10*3/MM3 (ref 1.7–7)
NEUTROPHILS NFR BLD AUTO: 47.3 % (ref 42.7–76)
NRBC BLD AUTO-RTO: 0 /100 WBC (ref 0–0.2)
PLATELET # BLD AUTO: 255 10*3/MM3 (ref 140–450)
PMV BLD AUTO: 12.5 FL (ref 6–12)
RBC # BLD AUTO: 4.52 10*6/MM3 (ref 3.77–5.28)
WBC NRBC COR # BLD: 4.1 10*3/MM3 (ref 3.4–10.8)

## 2020-06-16 PROCEDURE — 20610 DRAIN/INJ JOINT/BURSA W/O US: CPT | Performed by: ORTHOPAEDIC SURGERY

## 2020-06-16 PROCEDURE — 99214 OFFICE O/P EST MOD 30 MIN: CPT | Performed by: ORTHOPAEDIC SURGERY

## 2020-06-16 PROCEDURE — 36415 COLL VENOUS BLD VENIPUNCTURE: CPT | Performed by: ORTHOPAEDIC SURGERY

## 2020-06-16 PROCEDURE — 85025 COMPLETE CBC W/AUTO DIFF WBC: CPT | Performed by: ORTHOPAEDIC SURGERY

## 2020-06-16 PROCEDURE — 86140 C-REACTIVE PROTEIN: CPT | Performed by: ORTHOPAEDIC SURGERY

## 2020-06-16 PROCEDURE — 85652 RBC SED RATE AUTOMATED: CPT | Performed by: ORTHOPAEDIC SURGERY

## 2020-06-16 RX ORDER — TRIAMCINOLONE ACETONIDE 40 MG/ML
80 INJECTION, SUSPENSION INTRA-ARTICULAR; INTRAMUSCULAR
Status: COMPLETED | OUTPATIENT
Start: 2020-06-16 | End: 2020-06-16

## 2020-06-16 RX ORDER — LIDOCAINE HYDROCHLORIDE AND EPINEPHRINE 10; 10 MG/ML; UG/ML
2 INJECTION, SOLUTION INFILTRATION; PERINEURAL
Status: COMPLETED | OUTPATIENT
Start: 2020-06-16 | End: 2020-06-16

## 2020-06-16 RX ORDER — BUPIVACAINE HYDROCHLORIDE 5 MG/ML
3 INJECTION, SOLUTION PERINEURAL
Status: COMPLETED | OUTPATIENT
Start: 2020-06-16 | End: 2020-06-16

## 2020-06-16 RX ADMIN — BUPIVACAINE HYDROCHLORIDE 3 ML: 5 INJECTION, SOLUTION PERINEURAL at 09:43

## 2020-06-16 RX ADMIN — TRIAMCINOLONE ACETONIDE 80 MG: 40 INJECTION, SUSPENSION INTRA-ARTICULAR; INTRAMUSCULAR at 09:43

## 2020-06-16 RX ADMIN — LIDOCAINE HYDROCHLORIDE AND EPINEPHRINE 2 ML: 10; 10 INJECTION, SOLUTION INFILTRATION; PERINEURAL at 09:43

## 2020-06-16 NOTE — PROGRESS NOTES
"Chrissy Amira Dawson is a 70 y.o. female returns for     Chief Complaint   Patient presents with   • Right Hip - Follow-up       HISTORY OF PRESENT ILLNESS: Follow up on right hip pain. Patient is wanting injections. Pain level of 6/10.    Mrs. Dawson did well after her trochanteric bursa injection about a year ago.  Her symptoms have since recurred.  Pain is primarily over the lateral aspect of the hip and proximal thigh.  She describes start up pain.  She had spontaneous worsening of her symptoms yesterday.  She has been using a cane intermittently.  She denies any groin pain.  There is been no recent trauma.  She said until yesterday her start up symptoms would resolve with simply walking a short distance.       CONCURRENT MEDICAL HISTORY:    The following portions of the patient's history were reviewed and updated as appropriate: allergies, current medications, past family history, past medical history, past social history, past surgical history and problem list.         PHYSICAL EXAMINATION:       Pulse 65   Temp 98.7 °F (37.1 °C) (Temporal)   Ht 176.5 cm (69.5\")   Wt 80.3 kg (177 lb)   SpO2 97%   BMI 25.76 kg/m²     Physical Exam he is alert and pleasant.    GAIT:     []  Normal  [x]  Antalgic    Assistive device: []  None  []  Walker     []  Crutches  [x]  Cane     []  Wheelchair  []  Stretcher    Ortho Exam walks with a mild limp favoring the right.  There is mild to moderate tenderness diffusely over the lateral aspect of the hip including over the greater trochanter.  Motion of the hip is smooth but painfully restricted with flexion to about 70 degrees, external rotation 20 to 30 degrees, internal rotation 5 to 10 degrees, and abduction about 20 degrees.    Xr Hip W Or Wo Pelvis 2-3 View Right    Result Date: 6/16/2020  Narrative: Ordering Provider:  Jeovanny Brown MD Ordering Diagnosis/Indication:  Trochanteric bursitis, right hip, Presence of right artificial hip joint, Right hip pain " Procedure:  XR HIP W OR WO PELVIS 2-3 VIEW RIGHT Exam Date:  6/16/20 COMPARISON: Exam of the hip dated June 2019     Impression:  Radiographs of the right hip 3 views done today including AP and 2 Judet views show an uncemented hip arthroplasty.  There has been progressive osteolysis about the acetabular component as well as in the proximal femur.  SHe appears to have bone ingrowth towards the distal aspect of her femoral stem porous coating. 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  2 right trochanteric bursitis.    Natural history of the disorder and treatment options reviewed.  She understands her osteolysis will likely be progressive and as the osteolysis progresses revision will become more difficult.  She is understandably reluctant to consider any surgery at this point.  I recommend obtaining screening lab studies to rule out deep infection.  She was agreeable with this.    Potential benefits of injection therapy for her trochanteric bursitis were discussed.  She wished to proceed with this.    The right hip was prepped.  Skin was infiltrated with 1% Xylocaine with epinephrine.  The trochanteric bursa was injected with a mixture of Marcaine Kenalog and Xylocaine with epinephrine.  There were no complications.    Keep her previously scheduled appointment for follow-up of her hand injury.  At that time we will discuss her lab studies.    Diagnoses and all orders for this visit:    Trochanteric bursitis, right hip  -     XR hip w or wo pelvis 2-3 view right; Future  -     Large Joint Arthrocentesis: R greater trochanteric bursa  -     CBC & Differential  -     Sedimentation Rate  -     C-reactive Protein  -     CBC Auto Differential    Presence of right artificial hip joint  -     XR hip w or wo pelvis 2-3 view right; Future  -     Large Joint Arthrocentesis: R greater trochanteric bursa  -     CBC & Differential  -      Sedimentation Rate  -     C-reactive Protein  -     CBC Auto Differential    Right hip pain  -     XR hip w or wo pelvis 2-3 view right; Future  -     Large Joint Arthrocentesis: R greater trochanteric bursa  -     CBC & Differential  -     Sedimentation Rate  -     C-reactive Protein  -     CBC Auto Differential        Large Joint Arthrocentesis: R greater trochanteric bursa  Date/Time: 6/16/2020 9:43 AM  Consent given by: patient  Site marked: site marked  Timeout: Immediately prior to procedure a time out was called to verify the correct patient, procedure, equipment, support staff and site/side marked as required   Supporting Documentation  Indications: pain   Procedure Details  Location: hip - R greater trochanteric bursa  Preparation: Patient was prepped and draped in the usual sterile fashion  Needle size: 22 G  Approach: lateral  Medications administered: 2 mL lidocaine-EPINEPHrine 1 %-1:690391; 3 mL bupivacaine 0.5 %; 80 mg triamcinolone acetonide 40 MG/ML  Patient tolerance: patient tolerated the procedure well with no immediate complications            PLAN    Patient's Body mass index is 25.76 kg/m². BMI is within normal parameters. No follow-up required..    Return if symptoms worsen or fail to improve.    Jeovanny Brown MD

## 2020-06-24 DIAGNOSIS — S62.317A CLOSED DISPLACED FRACTURE OF BASE OF FIFTH METACARPAL BONE OF LEFT HAND, INITIAL ENCOUNTER: Primary | ICD-10-CM

## 2020-06-25 ENCOUNTER — OFFICE VISIT (OUTPATIENT)
Dept: ORTHOPEDIC SURGERY | Facility: CLINIC | Age: 70
End: 2020-06-25

## 2020-06-25 VITALS — BODY MASS INDEX: 26.22 KG/M2 | HEIGHT: 69 IN | WEIGHT: 177 LBS

## 2020-06-25 DIAGNOSIS — Z96.641 S/P HIP REPLACEMENT, RIGHT: ICD-10-CM

## 2020-06-25 DIAGNOSIS — S62.317D CLOSED DISPLACED FRACTURE OF BASE OF FIFTH METACARPAL BONE OF LEFT HAND WITH ROUTINE HEALING, SUBSEQUENT ENCOUNTER: Primary | ICD-10-CM

## 2020-06-25 DIAGNOSIS — M25.551 RIGHT HIP PAIN: ICD-10-CM

## 2020-06-25 PROCEDURE — 99024 POSTOP FOLLOW-UP VISIT: CPT | Performed by: NURSE PRACTITIONER

## 2020-06-25 NOTE — PROGRESS NOTES
"Chrissy Dawson is a 70 y.o. female returns for follow-up x-rays for closed displaced fracture of base of fifth metacarpal bone of left hand.       Chief Complaint   Patient presents with   • Left Hand - Follow-up       HISTORY OF PRESENT ILLNESS: Patient is a 70-year-old female who presents today for x-rays of her left hand.  2 months ago patient sustained a closed displaced fracture at the base of her fifth metacarpal bone in her left hand.  Patient reports hand is back to baseline.  Patient reports good strength in left hand, patient reports normal range of motion.  She denies burning, numbness, tingling in left hand.    She also presents to follow-up on right hip pain.  She has seen Dr. Brown for this.  She is status post right hip arthroplasty she has not had any recent trauma.  She is using her cane today.  She denies groin pain.  She has had this pain in the past, however symptoms have lingered.  At last visit she had trochanteric bursa injection, which provided little relief.  She is status post right hip arthroplasty in 2011. She has recently had X rays of hip performed.         CONCURRENT MEDICAL HISTORY:    The following portions of the patient's history were reviewed and updated as appropriate: allergies, current medications, past family history, past medical history, past social history, past surgical history and problem list.     ROS  No fevers or chills.  No chest pain or shortness of air.  No GI or  disturbances.    PHYSICAL EXAMINATION:       Ht 175.3 cm (69\")   Wt 80.3 kg (177 lb)   BMI 26.14 kg/m²     Physical Exam   Constitutional: She is oriented to person, place, and time. Vital signs are normal. She appears well-developed and well-nourished. No distress.   HENT:   Head: Normocephalic.   Pulmonary/Chest: Effort normal. No respiratory distress.   Neurological: She is alert and oriented to person, place, and time.   Skin: Skin is warm and dry. She is not diaphoretic.   Psychiatric: She " has a normal mood and affect. Her behavior is normal. Judgment and thought content normal.       GAIT:     []  Normal  [x]  Antalgic    Assistive device: []  None  []  Walker     []  Crutches  [x]  Cane     []  Wheelchair  []  Stretcher    Right Hip Exam     Tenderness   The patient is experiencing tenderness in the lateral.    Other   Erythema: absent      Right Hand Exam   Right hand exam is normal.    Range of Motion   The patient has normal right wrist ROM.     Other   Erythema: absent  Sensation: normal              Xr Hand 3+ View Left    Result Date: 6/26/2020  Narrative: Study: X-ray hand 3+ views, left Comparison: 5/14/2020. Narrative: AP, lateral, oblique views of left hand reveal healed minimally displaced transverse fracture at the base of the fifth metacarpal.  No dislocation.  Mild degenerative changes seen in interphalangeal joints. Deysi DIAZ 6/25/2020     Xr Hip W Or Wo Pelvis 2-3 View Right    Result Date: 6/16/2020  Narrative: Ordering Provider:  Jeovanny Brown MD Ordering Diagnosis/Indication:  Trochanteric bursitis, right hip, Presence of right artificial hip joint, Right hip pain Procedure:  XR HIP W OR WO PELVIS 2-3 VIEW RIGHT Exam Date:  6/16/20 COMPARISON: Exam of the hip dated June 2019     Impression:  Radiographs of the right hip 3 views done today including AP and 2 Judet views show an uncemented hip arthroplasty.  There has been progressive osteolysis about the acetabular component as well as in the proximal femur.  SHe appears to have bone ingrowth towards the distal aspect of her femoral stem porous coating. Jeovanny Brown MD 6/16/20          ASSESSMENT:    Diagnoses and all orders for this visit:    Closed displaced fracture of base of fifth metacarpal bone of left hand with routine healing, subsequent encounter    Right hip pain    S/P hip replacement, right          PLAN    Case reviewed with Dr. Brown who also reviewed x-rays.  Patient does not require any  further follow-up for left hand. Dr. Brown that patient be referred to Dr. Maravilla to discuss revision of right hip arthroplasty.  In interim patient is to continue using cane, NSAIDs as needed, ice as needed.    Patient to return to see Taiwo at first available convenience    JOE Gaxiola

## 2020-07-14 ENCOUNTER — OFFICE VISIT (OUTPATIENT)
Dept: ORTHOPEDIC SURGERY | Facility: CLINIC | Age: 70
End: 2020-07-14

## 2020-07-14 VITALS — HEIGHT: 69 IN | BODY MASS INDEX: 26.22 KG/M2 | WEIGHT: 177 LBS

## 2020-07-14 DIAGNOSIS — T84.030D MECHANICAL LOOSENING OF INTERNAL RIGHT HIP PROSTHETIC JOINT, SUBSEQUENT ENCOUNTER: Primary | ICD-10-CM

## 2020-07-14 DIAGNOSIS — I10 BENIGN ESSENTIAL HYPERTENSION: ICD-10-CM

## 2020-07-14 DIAGNOSIS — Z96.641 PRESENCE OF RIGHT ARTIFICIAL HIP JOINT: ICD-10-CM

## 2020-07-14 DIAGNOSIS — M25.551 RIGHT HIP PAIN: ICD-10-CM

## 2020-07-14 PROCEDURE — 99213 OFFICE O/P EST LOW 20 MIN: CPT | Performed by: ORTHOPAEDIC SURGERY

## 2020-07-14 NOTE — PROGRESS NOTES
"Chrissy Dawson is a 70 y.o. female returns for     Chief Complaint   Patient presents with   • Right Hip - Follow-up       HISTORY OF PRESENT ILLNESS: f/u right hip pain. Patient seen by Dr. Brown on 6/16/2020 was given trochanteric bursa injection, patient in to discuss other treatment options. Having startup pain.  No fever or chills  Limits her activity  Using a cane for support.  Not happy with quality of life.       CONCURRENT MEDICAL HISTORY:    The following portions of the patient's history were reviewed and updated as appropriate: allergies, current medications, past family history, past medical history, past social history, past surgical history and problem list.     ROS  No fevers or chills.  No chest pain or shortness of air.  No GI or  disturbances.    PHYSICAL EXAMINATION:       Ht 175.3 cm (69\")   Wt 80.3 kg (177 lb)   BMI 26.14 kg/m²     Physical Exam   Constitutional: She is oriented to person, place, and time. She appears well-developed and well-nourished.   Neurological: She is alert and oriented to person, place, and time.   Psychiatric: She has a normal mood and affect. Her behavior is normal. Judgment and thought content normal.       GAIT:     []  Normal  [x]  Antalgic    Assistive device: []  None  []  Walker     []  Crutches  [x]  Cane     []  Wheelchair  []  Stretcher    Right Hip Exam     Tenderness   Right hip tenderness location: Tenderness along the lateral aspect of the hip.    Range of Motion   Flexion: 110   External rotation: 50   Internal rotation: 30     Muscle Strength   Flexion: 4/5     Tests   FABBY: negative  Fadir:  Negative FADIR test    Other   Erythema: absent  Sensation: normal  Pulse: present                  Xr Hip W Or Wo Pelvis 2-3 View Right    Result Date: 6/16/2020  Narrative: Ordering Provider:  Jeovanny Brown MD Ordering Diagnosis/Indication:  Trochanteric bursitis, right hip, Presence of right artificial hip joint, Right hip pain Procedure:  XR " HIP W OR WO PELVIS 2-3 VIEW RIGHT Exam Date:  6/16/20 COMPARISON: Exam of the hip dated June 2019     Impression:  Radiographs of the right hip 3 views done today including AP and 2 Judet views show an uncemented hip arthroplasty.  There has been progressive osteolysis about the acetabular component as well as in the proximal femur.  SHe appears to have bone ingrowth towards the distal aspect of her femoral stem porous coating. 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, subsequent encounter  -     CT hip right wo contrast; Future  -     Ambulatory Referral to Physical Therapy Evaluate and treat    Right hip pain  -     CT hip right wo contrast; Future  -     Ambulatory Referral to Physical Therapy Evaluate and treat    Presence of right artificial hip joint  -     CT hip right wo contrast; Future  -     Ambulatory Referral to Physical Therapy Evaluate and treat    Benign essential hypertension  -     CT hip right wo contrast; Future  -     Ambulatory Referral to Physical Therapy Evaluate and treat          PLAN    Need CT scan of right hip to assess for aseptic loosening.  Definite loosening of the acetabulum, question loosening of the stem.  Assessing for surgical intervention.    She has definite start up pain with pain when rising and starting to walk.    She is going to begin physical therapy for range of motion and strengthening in both lower extremities.  We also discussed step training to help with going up the steps that she has in her house.    We discussed the probability of revision total hip arthroplasty.  Trying to determine whether the stem is likely going to be replaced or just the acetabular shell.    Return for after CT scan.    Jeovanny Maravilla MD

## 2020-07-24 ENCOUNTER — HOSPITAL ENCOUNTER (OUTPATIENT)
Dept: CT IMAGING | Facility: HOSPITAL | Age: 70
Discharge: HOME | End: 2020-07-24
Admitting: ORTHOPAEDIC SURGERY

## 2020-07-24 DIAGNOSIS — Z96.641 PRESENCE OF RIGHT ARTIFICIAL HIP JOINT: ICD-10-CM

## 2020-07-24 DIAGNOSIS — T84.030D MECHANICAL LOOSENING OF INTERNAL RIGHT HIP PROSTHETIC JOINT, SUBSEQUENT ENCOUNTER: ICD-10-CM

## 2020-07-24 DIAGNOSIS — I10 BENIGN ESSENTIAL HYPERTENSION: ICD-10-CM

## 2020-07-24 DIAGNOSIS — M25.551 RIGHT HIP PAIN: ICD-10-CM

## 2020-07-24 PROCEDURE — 73700 CT LOWER EXTREMITY W/O DYE: CPT

## 2020-08-04 ENCOUNTER — OFFICE VISIT (OUTPATIENT)
Dept: ORTHOPEDIC SURGERY | Facility: CLINIC | Age: 70
End: 2020-08-04

## 2020-08-04 VITALS — WEIGHT: 179.4 LBS | HEIGHT: 69 IN | BODY MASS INDEX: 26.57 KG/M2

## 2020-08-04 DIAGNOSIS — I10 BENIGN ESSENTIAL HYPERTENSION: ICD-10-CM

## 2020-08-04 DIAGNOSIS — M25.551 RIGHT HIP PAIN: ICD-10-CM

## 2020-08-04 DIAGNOSIS — T84.030D MECHANICAL LOOSENING OF INTERNAL RIGHT HIP PROSTHETIC JOINT, SUBSEQUENT ENCOUNTER: Primary | ICD-10-CM

## 2020-08-04 DIAGNOSIS — Z96.641 PRESENCE OF RIGHT ARTIFICIAL HIP JOINT: ICD-10-CM

## 2020-08-04 PROCEDURE — 99214 OFFICE O/P EST MOD 30 MIN: CPT | Performed by: ORTHOPAEDIC SURGERY

## 2020-08-04 NOTE — PROGRESS NOTES
Chrissy Dawson is a 70 y.o. female returns for     Chief Complaint   Patient presents with   • Right Hip - Follow-up, Pain   • Results       HISTORY OF PRESENT ILLNESS: fu right hip pain, ct done on 7/24/2020.  She continues to have pain with mobility.  She is using a cane at times and a walker at other times.  She has pain in her groin with weightbearing.  No fevers or chills.  No numbness or tingling.  She has constant dull aches with occasional sharp stabbing pains with certain motions.  Increased activity tends to increase her pain.  She has pain with activities of daily living and is unhappy with her quality of life.       CONCURRENT MEDICAL HISTORY:    Past Medical History:   Diagnosis Date   • Backache    • Benign essential hypertension     PATIENT DENIES   • Cervical arthritis    • Coronary arteriosclerosis    • Fibrocystic breast    • Ganglion cyst of wrist     Ganglion/synovial cyst - wrist   • Ganglion of wrist    • Hip pain    • History of echocardiogram 10/23/2015    Echocardiogram W/ color flow 96097 (1) - Normal LV function with Ef of 55-60%.Normal RV size and function.Pseudonormal diastolic dysfunciton with borderline [facility].NO sig.valvular regurg or stenosis.   • History of mammogram 09/10/2014    MAMMOGRAM SCREENING 73667 - [facility] (1) - [facility] ([facility])   • History of transfusion    • Hyperlipidemia    • Inguinal pain    • Recurrent angina status post coronary artery bypass graft (CMS/[facility])     Recurrent angina after coronary artery bypass graft   • Urge incontinence of urine        Allergies   Allergen Reactions   • Crestor [Rosuvastatin] Shortness Of Breath   • Lipitor [Atorvastatin] Shortness Of Breath       Current Outpatient Medications on File Prior to Visit   Medication Sig   • aspirin 81 MG tablet Take 81 mg by mouth.   • cefdinir (OMNICEF) 300 MG capsule Take 1 capsule by mouth 2 (Two) Times a Day.   • Multiple Vitamins-Minerals (CENTRUM SILVER PO) Take 1 tablet by mouth  "Daily.   • pravastatin (PRAVACHOL) 10 MG tablet Take 1 tablet by mouth Daily.   • trimethoprim-polymyxin b (Polytrim) 06705-6.1 UNIT/ML-% ophthalmic solution Administer 1 drop into the left eye 4 (Four) Times a Day for 7 days.     No current facility-administered medications on file prior to visit.        Past Surgical History:   Procedure Laterality Date   • BREAST BIOPSY Right 2/2/2017    Procedure: RIGHT BREAST LUMPECTOMY WITH NEEDLE LOCALIZATION AND ULTRASOUND;  Surgeon: Delfino Greer MD;  Location: [facility];  Service:    • BREAST CYST ASPIRATION     • CARDIAC SURGERY     • CORONARY ARTERY BYPASS GRAFT  2000    CABG, arterial, single (1)   • GANGLION CYST EXCISION     • JOINT REPLACEMENT     • ORIF MANDIBULAR FRACTURE     • TOTAL ABDOMINAL HYSTERECTOMY     • TOTAL ABDOMINAL HYSTERECTOMY WITH SALPINGO OOPHORECTOMY  1998    SALVADOR/BSO (1)   • TOTAL HIP ARTHROPLASTY  2011    Total hip replacement (3)       Family History   Problem Relation Age of Onset   • Heart disease Other    • Hypertension Other    • Hypertension Mother    • Diabetes Father    • Depression Maternal Grandmother    • Heart disease Maternal Grandfather        Social History     Socioeconomic History   • Marital status:      Spouse name: Not on file   • Number of children: Not on file   • Years of education: Not on file   • Highest education level: Not on file   Tobacco Use   • Smoking status: Never Smoker   • Smokeless tobacco: Never Used   Substance and Sexual Activity   • Alcohol use: No   • Drug use: No   • Sexual activity: Defer     Comment:            ROS  No fevers or chills.  No chest pain or shortness of air.  No GI or  disturbances.  Other than right hip pain, all other systems reviewed as negative.    PHYSICAL EXAMINATION:       Ht 175.3 cm (69\")   Wt 81.4 kg (179 lb 6.4 oz)   BMI 26.49 kg/m²     Physical Exam   Constitutional: She is oriented to person, place, and time. She appears well-developed and well-nourished. No " distress.   Cardiovascular: Normal rate, regular rhythm and normal heart sounds.   Pulmonary/Chest: Effort normal and breath sounds normal.   Abdominal: Soft. Bowel sounds are normal.   Neurological: She is alert and oriented to person, place, and time.   Psychiatric: She has a normal mood and affect. Her behavior is normal. Judgment and thought content normal.   Vitals reviewed.      GAIT:     []  Normal  [x]  Antalgic    Assistive device: []  None  []  Walker     []  Crutches  [x]  Cane     []  Wheelchair  []  Stretcher    Right Hip Exam     Tenderness   Right hip tenderness location: Tenderness along the lateral aspect of the hip.    Range of Motion   Flexion: 110   External rotation: 50   Internal rotation: 30     Muscle Strength   Flexion: 4/5     Tests   FABBY: negative  Fadir:  Negative FADIR test    Other   Erythema: absent  Sensation: normal  Pulse: present      Left Hip Exam     Tenderness   The patient is experiencing no tenderness.     Range of Motion   The patient has normal left hip ROM.    Muscle Strength   The patient has normal left hip strength.     Tests   FABBY: negative  Fadir:  Negative FADIR test    Other   Erythema: absent  Sensation: normal  Pulse: present              Ct Hip Right Wo Contrast    Result Date: 7/24/2020  Narrative: CT right hip without contrast History: Right hip pain Axial scans of the right hip were obtained without intravenous contrast.  Coronal and sagital reconstructions were preformed. This exam was performed according to our departmental dose-optimization program, which includes automated exposure control, adjustment of the mA and/or kV according to patient size and/or use of iterative reconstruction technique. DLP: 347.20 Comparison: None. Correlation radiographs June 26, 2019 EKG and 16 2020. Findings: Right hip prosthesis in place. Minimal protrusio acetabulum. Possible loosening of the acetabular component of the prosthesis. Old fracture deformity trochanteric  region with unchanged fragmented cerclage wires. Heterotopic ossification superior to the greater trochanter which may interfere with range of motion. Degenerative change pubic symphysis. No mass. No fluid collection. Small fat-containing right inguinal hernia.     Impression: CONCLUSION: Right hip prosthesis in place. Minimal protrusio acetabulum. Possible loosening of the acetabular component of the prosthesis. Old fracture deformity trochanteric region with unchanged fragmented cerclage wires. Heterotopic ossification superior to the greater trochanter which may interfere with range of motion. Degenerative change pubic symphysis. Small fat-containing right inguinal hernia. [id number] Electronically signed by:  Lv Beasley MD  7/24/2020 8:35 PM CDT Workstation: [id number]            ASSESSMENT:    Diagnoses and all orders for this visit:    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, subsequent encounter  -     MRSA Screen, PCR (Inpatient) - Swab, Nares; Future  -     Tranexamic Acid 1,000 mg in sodium chloride 0.9 % 100 mL  -     Tranexamic Acid 1,000 mg in sodium chloride 0.9 % 100 mL  -     Case Request; Standing    Right hip pain  -     MRSA Screen, PCR (Inpatient) - Swab, Nares; Future  -     Tranexamic Acid 1,000 mg in sodium chloride 0.9 % 100 mL  -     Tranexamic Acid 1,000 mg in sodium chloride 0.9 % 100 mL  -     Case Request; Standing    Presence of right artificial hip joint  -     MRSA Screen, PCR (Inpatient) - Swab, Nares; Future  -     Tranexamic Acid 1,000 mg in sodium chloride 0.9 % 100 mL  -     Tranexamic Acid 1,000 mg in sodium chloride 0.9 % 100 mL  -     Case Request; Standing    Benign essential hypertension  -     MRSA Screen, PCR (Inpatient) - Swab, Nares; Future  -     Tranexamic Acid 1,000 mg in sodium chloride 0.9 % 100 mL  -     Tranexamic Acid 1,000 mg in sodium chloride 0.9 % 100 mL  -     Case Request; Standing    Other orders  -     Follow Anesthesia Guidelines /  Standing Orders; Future  -     Obtain informed consent; Future  -     Follow Anesthesia Guidelines / Standing Orders; Standing  -     Outpatient In A Bed; Standing  -     Complete A PROMIS And HOOS Or KOOS Survey If Having Hip Or Knee Replacement  -     Provide Patient With ERAS Hydration Instructions  -     Chlorhexidine Skin Prep - Educate and Review With Patient; Future  -     Provide instructions to patient regarding NPO status; Future          PLAN    The CT scan does not appear to show loosening of the femoral component.  There is significant loosening of the acetabular component, however.  She does not seem to have any signs of infection.  She has tried activity modification, use of a walker, therapy exercises, strength and conditioning, and is unhappy with her quality of life.  She has pain with activities of daily living and is ready to proceed with surgical intervention.    The patient voiced understanding of the risks, benefits, and alternative forms of treatment that were discussed and the patient consents to proceed with surgery.  All risks, benefits and alternatives were discussed.  Risks including but not exclusive to anesthetic complications, including death, MI, CVA, infection, bleeding DVT, fracture, residual pain and need for future surgery.    This discussion was held with the patient by Jeovanny Maravilla MD and all questions were answered.    Plan revision right total hip arthroplasty.    The expectation will be to exchange the acetabular component and that the femoral component will be stable.  However, preparations will be made for the possibility of exchanging the femoral component as well.    We discussed leg length discrepancy, stability issues, blood loss and possible need for transfusion, in addition to standard surgical risks and benefits.    Patient's Body mass index is 26.49 kg/m². BMI is within normal parameters. No follow-up required..    Return for Post-operative eval.    Jeovanny  Renato Maravilla MD

## 2020-08-05 PROBLEM — T84.030A MECHANICAL LOOSENING OF INTERNAL RIGHT HIP PROSTHETIC JOINT (HCC): Status: ACTIVE | Noted: 2020-08-05

## 2020-08-24 ENCOUNTER — OFFICE VISIT (OUTPATIENT)
Dept: CARDIOLOGY | Facility: CLINIC | Age: 70
End: 2020-08-24

## 2020-08-24 VITALS
HEIGHT: 69 IN | HEART RATE: 85 BPM | DIASTOLIC BLOOD PRESSURE: 66 MMHG | SYSTOLIC BLOOD PRESSURE: 116 MMHG | OXYGEN SATURATION: 98 % | BODY MASS INDEX: 26.69 KG/M2 | WEIGHT: 180.2 LBS

## 2020-08-24 DIAGNOSIS — E78.2 MIXED HYPERLIPIDEMIA: ICD-10-CM

## 2020-08-24 DIAGNOSIS — Z01.810 PREOPERATIVE CARDIOVASCULAR EXAMINATION: Primary | ICD-10-CM

## 2020-08-24 DIAGNOSIS — I25.10 CORONARY ARTERY DISEASE INVOLVING NATIVE CORONARY ARTERY OF NATIVE HEART WITHOUT ANGINA PECTORIS: Primary | ICD-10-CM

## 2020-08-24 DIAGNOSIS — Z01.810 PREOP CARDIOVASCULAR EXAM: ICD-10-CM

## 2020-08-24 DIAGNOSIS — I10 BENIGN ESSENTIAL HYPERTENSION: ICD-10-CM

## 2020-08-24 PROCEDURE — 93000 ELECTROCARDIOGRAM COMPLETE: CPT | Performed by: INTERNAL MEDICINE

## 2020-08-24 PROCEDURE — 99214 OFFICE O/P EST MOD 30 MIN: CPT | Performed by: INTERNAL MEDICINE

## 2020-08-24 RX ORDER — NITROGLYCERIN 0.4 MG/1
TABLET SUBLINGUAL
Qty: 15 TABLET | Refills: 6 | Status: SHIPPED | OUTPATIENT
Start: 2020-08-24

## 2020-08-24 NOTE — PROGRESS NOTES
Cardiovascular Medicine   Vishnu Reynaga M.D., Ph.D., [facility]      The patient returns to cardiovascular clinic for follow-up of the following:    PROBLEM LIST:  1. ASCAD  A. PCI-LAD  B. CABG with LIMA to LAD in 2000 Dr. Montes  2. HLD  A. Statin intolerance to Zocor and Crestor    Chrissy Dawson is a 70 y.o. female who returns in follow-up today. She has a history of atherosclerotic coronary artery disease.  She had a PCI to the LAD.  She subsequently underwent CABG with a LIMA to the LAD in 2000.  This was secondary to ISR. Her last invasive coronary angiography was in October 2010.  At that time her THOMPSON was patent.  LAD showed native vessel disease of 60-70%.  She is currently on aspirin and pravastatin.  She denies easy bruising or bleeding.  She's had no exercise intolerance or dyspnea.  She denies any resting, exertional or nocturnal angina.  She tells me that she still was not interested in using a beta blocker at this point.  Concerning her hyperlipidemia, she remains on pravastatin.  She's had documented statin intolerance to other statin medications.  She's currently tolerating her dose well.  She has had no emergency department visits or inpatient admissions for angina. 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 She has had no activity or exercise intolerance.     Pre-Op Evaluation  Chrissy Dawson is a 70 y.o. female who presents to the office today for a preoperative consultation at the request of surgeon Dr. Maravilla  who plans on performing hip surgery. This consultation is requested for the specific conditions prompting preoperative evaluation (i.e. because of potential affect on operative risk).  Planned anesthesia is GETA. The patient has the following known anesthesia issues: past GETA without issues. Patient has a bleeding risk of: no recent abnormal bleeding, no remote history of abnormal bleeding and no use of Ca-channel blockers. She continues to work 12 hours a day. She  has had no resting, exertional or nocturnal angina. She is meeting ADLs and is physically active. She typically can reach more 6 METS, though this has been hampered recently with her hip pain and using a cane. She tells me that she can still go up flights of stairs without difficulty.     The following portions of the patient's history were reviewed and updated as appropriate: allergies, current medications, past family history, past medical history, past social history, past surgical history and problem list.          Review of Systems   Cardiovascular: Negative.    Respiratory: Negative.      family history includes Depression in her maternal grandmother; Diabetes in her father; Heart disease in her maternal grandfather and another family member; Hypertension in her mother and another family member.   reports that she has never smoked. She has never used smokeless tobacco. She reports that she does not drink alcohol or use drugs.  Allergies   Allergen Reactions   • Crestor [Rosuvastatin] Shortness Of Breath   • Lipitor [Atorvastatin] Shortness Of Breath       Current Outpatient Medications:   •  aspirin 81 MG tablet, Take 81 mg by mouth., Disp: , Rfl:   •  cefdinir (OMNICEF) 300 MG capsule, Take 1 capsule by mouth 2 (Two) Times a Day., Disp: 20 capsule, Rfl: 0  •  Multiple Vitamins-Minerals (CENTRUM SILVER PO), Take 1 tablet by mouth Daily., Disp: , Rfl:   •  pravastatin (PRAVACHOL) 10 MG tablet, Take 1 tablet by mouth Daily., Disp: 30 tablet, Rfl: 6    Physical Exam:  Vitals:    08/24/20 1344   BP: 116/66   Pulse: 85   SpO2: 98%     Body mass index is 26.61 kg/m².   Last Weights:   Wt Readings from Last 3 Encounters:   08/04/20 81.4 kg (179 lb 6.4 oz)   08/01/20 80.3 kg (177 lb)   07/14/20 80.3 kg (177 lb)     Pulse Ox: Normal   General: alert, appears stated age and cooperative  Body Habitus: well-nourished  HEENT: Head: Normocephalic, no lesions, without obvious abnormality. No arcus senilis, xanthelasma or  xanthomas.  JVP: 6 cm of water at 45 degrees   Volume/Pulsation: Normal  Carotid Exam: normal pulsation bilaterally   Carotid Volume: normal    Subclavian Bruit: absent  Vertebral Bruit: absent   Heart rate: normal    Heart Rhythm: regular     Heart Sounds: S1: normal intensity  S2: normal intensity  S3: absent   S4: absent  Opening Snap: absent  A2-OS:  absent.   Pericardial rub: absent    Ejection click: None      Murmurs:  present - I/VI early JOSSE at  base   Extremity: moves all extremities equally.       DATA REVIEWED:     EKG. I personally reviewed and interpreted the EKG.        No significant change from prior.          --------------------------------------------------------------------------------------------------  LABS:   Lab Results   Component Value Date    GLUCOSE 84 12/11/2019    BUN 20 12/11/2019    CREATININE 0.57 12/11/2019    EGFRIFAFRI 127 12/11/2019    BCR 35.1 (H) 12/11/2019    K 4.4 12/11/2019    CO2 30.4 (H) 12/11/2019    CALCIUM 9.8 12/11/2019    ALBUMIN 4.20 12/11/2019    AST 19 12/11/2019    ALT 15 12/11/2019     Lab Results   Component Value Date    WBC 4.10 06/16/2020    HGB 12.7 06/16/2020    HCT 38.3 06/16/2020    MCV 84.7 06/16/2020     06/16/2020     Lab Results   Component Value Date    CHOL 242 (H) 12/11/2019    CHLPL 294 (H) 10/19/2015    TRIG 51 12/11/2019    HDL 80 (H) 12/11/2019     (H) 12/11/2019     Lab Results   Component Value Date    TSH 1.320 03/29/2019     No results found for: CKTOTAL, CKMB, CKMBINDEX, TROPONINI, TROPONINT  Lab Results   Component Value Date    HGBA1C 5.90 (H) 03/29/2019     Lab Results   Component Value Date    DDIMER 773 (H) 08/08/2014     Lab Results   Component Value Date    ALT 15 12/11/2019     Lab Results   Component Value Date    HGBA1C 5.90 (H) 03/29/2019     Lab Results   Component Value Date    CREATININE 0.57 12/11/2019     No results found for: IRON, TIBC, FERRITIN  No results found for: INR, PROTIME    Assessment/Plan      1.  Coronary artery disease involving native coronary artery of native heart without angina pectoris. No anginal symptoms. The patient has been revascularized.  Revascularization:  CABG and PTCI. Last LDL is not at goal. Will repeat lipid profile and reassess.  -Recommended and refused  -ASA  -Pravachol (pravastatin)  -SL nitro prn  -Call 911 immediately for concerning symptoms.  -Lipids and RTC 3 months    2. Cardiac Risk Assessments based on 2019 ACCF guidelines:  A team-based care approach is recommended for the control of risk factors associated with ASCAD.  As such, Chrissy Amira Dawson was requested to have ongoing follow-up with their PCP. A PCP can provide the detailed monitoring that is required for management of risk factors. Essential HTN is a significant risk factor for stroke, heart disease and vascular disease. I've recommended the patient continue current medications, if any, as prescribed by the primary care provider. A diet emphasizing intake of vegetables, fruits, nuts, whole grains and fish is recommended. Physical activity recommendations were provided by literature. They were also provided with information regarding maintaining a healthy weight, heart-healthy dietary pattern and DASH information.  Goal blood pressure less than 130/80.  The patient's BMI is recommended to be calculated at least annually.  The patient's BMI is Body mass index is 26.61 kg/m²..  Tobacco status is assessed at every visit based on established guidelines.  The patient's nicotine status: Social History    Tobacco Use      Smoking status: Never Smoker      Smokeless tobacco: Never Used    3. Preop cardiovascular exam. Cardiac Risk Estimation*: per the Revised Cardiac Risk Index (Circ. 100:1043, 1999), the patient's risk factors for cardiac complications place the patient in: RCI RISK CLASS II (1 risk factor, risk of major cardiac compl. appr. 1.3%). The patient is currently Asymptomatic from a cardiovascular standpoint. The  "patient's current cardiovascular disease(s) are medically optimized. The patient is currently endorsing ability to achieve >4-10 METS. At the discretion of the operating physician, I do think the patient is of acceptable risk to proceed with surgery.     Pre-Op Evaluation Plan  1. Preoperative workup as follows none.  2. Change in medication regimen before surgery: None.  3. Prophylaxis for cardiac events with perioperative beta-blockers for > three RCRI risks: Recommended, but declined.  4. Deep vein thrombosis prophylaxis: Indicated, per surgeon.  5. Intra-op body temperature: Normothermia recommended in non-cardiac surgery.    *Cardiovascular Risk Estimates provided by the RCRI are provisional and no guarantee of outcome. \"Surgical clearance\" is not provided. Only  risk assessment and management options are performed.  The final decision for operative intervention is at the discretion of the operating physician. This office follows the 2014 ACCF guidelines for pre-operative risk evaluation.     Return in about 3 months (around 11/24/2020).        "

## 2020-09-08 ENCOUNTER — TELEPHONE (OUTPATIENT)
Dept: ORTHOPEDIC SURGERY | Facility: CLINIC | Age: 70
End: 2020-09-08

## 2020-09-29 ENCOUNTER — TELEPHONE (OUTPATIENT)
Dept: ORTHOPEDIC SURGERY | Facility: CLINIC | Age: 70
End: 2020-09-29

## 2020-09-29 NOTE — TELEPHONE ENCOUNTER
MARIANN/ DAHIANA      Pt CALLED STATING SHE THINKS SHE IS GOING TO HAVE TO RESCHEDULE HER SURGERY ONCE AGAIN DUE TO COVID     SHE STATES COVID IS RUNNING THROUGH HER PLACE OF EMPLOYMENT RIGHT NOW     PLEASE CALL Pt AN LET HER KNOW THE PROTOCOL FOR WHEN SHE CAN RESCHEDULE HER SURGERY     CALL BACK # [phone]

## 2020-10-08 ENCOUNTER — TELEPHONE (OUTPATIENT)
Dept: CARDIOLOGY | Facility: CLINIC | Age: 70
End: 2020-10-08

## 2020-10-08 NOTE — TELEPHONE ENCOUNTER
Called patient to remind her of the lab work that Dr. Reynaga ordered 08/24/20, there was no answer and no voicemail

## 2020-10-15 ENCOUNTER — LAB (OUTPATIENT)
Dept: LAB | Facility: HOSPITAL | Age: 70
End: 2020-10-15

## 2020-10-15 PROCEDURE — 36415 COLL VENOUS BLD VENIPUNCTURE: CPT | Performed by: INTERNAL MEDICINE

## 2020-10-15 PROCEDURE — 80061 LIPID PANEL: CPT | Performed by: INTERNAL MEDICINE

## 2020-10-16 ENCOUNTER — TELEPHONE (OUTPATIENT)
Dept: CARDIOLOGY | Facility: CLINIC | Age: 70
End: 2020-10-16

## 2020-10-16 DIAGNOSIS — I25.10 CORONARY ARTERIOSCLEROSIS: ICD-10-CM

## 2020-10-16 LAB
CHOLEST SERPL-MCNC: 255 MG/DL (ref 0–200)
HDLC SERPL-MCNC: 82 MG/DL (ref 40–60)
LDLC SERPL CALC-MCNC: 165 MG/DL (ref 0–100)
LDLC/HDLC SERPL: 1.98 {RATIO}
TRIGL SERPL-MCNC: 52 MG/DL (ref 0–150)
VLDLC SERPL-MCNC: 8 MG/DL (ref 5–40)

## 2020-10-16 RX ORDER — PRAVASTATIN SODIUM 10 MG
10 TABLET ORAL DAILY
Qty: 30 TABLET | Refills: 6 | Status: CANCELLED | OUTPATIENT
Start: 2020-10-16

## 2020-10-16 NOTE — TELEPHONE ENCOUNTER
Called patient, gave her results.   She would like a refill of her pravastatin. Will send in for her         ----- Message from Kadi Carey RN sent at 10/16/2020  8:45 AM CDT -----  Im going to send you a few thanks   ----- Message -----  From: Vishnu Reynaga MD PhD  Sent: 10/16/2020   7:39 AM CDT  To: Kadi Carey RN    Please let her know that her LDL was elevated on her lipid profile.  We will discuss this at her next appointment.

## 2020-10-19 DIAGNOSIS — I25.10 CORONARY ARTERIOSCLEROSIS: ICD-10-CM

## 2020-10-19 RX ORDER — PRAVASTATIN SODIUM 10 MG
10 TABLET ORAL DAILY
Qty: 30 TABLET | Refills: 6 | Status: SHIPPED | OUTPATIENT
Start: 2020-10-19 | End: 2020-12-09 | Stop reason: SDUPTHER

## 2020-11-23 ENCOUNTER — OFFICE VISIT (OUTPATIENT)
Dept: FAMILY MEDICINE CLINIC | Facility: CLINIC | Age: 70
End: 2020-11-23

## 2020-11-23 VITALS
HEART RATE: 84 BPM | OXYGEN SATURATION: 96 % | SYSTOLIC BLOOD PRESSURE: 124 MMHG | WEIGHT: 178 LBS | HEIGHT: 70 IN | DIASTOLIC BLOOD PRESSURE: 78 MMHG | BODY MASS INDEX: 25.48 KG/M2

## 2020-11-23 DIAGNOSIS — M62.838 TRAPEZIUS MUSCLE SPASM: Primary | ICD-10-CM

## 2020-11-23 DIAGNOSIS — M67.432 GANGLION CYST OF WRIST, LEFT: ICD-10-CM

## 2020-11-23 PROCEDURE — 99213 OFFICE O/P EST LOW 20 MIN: CPT | Performed by: FAMILY MEDICINE

## 2020-11-23 RX ORDER — CYCLOBENZAPRINE HCL 5 MG
2.5 TABLET ORAL NIGHTLY PRN
Qty: 14 TABLET | Refills: 0 | Status: SHIPPED | OUTPATIENT
Start: 2020-11-23 | End: 2020-12-07

## 2020-11-23 RX ORDER — ASCORBIC ACID 500 MG
500 TABLET ORAL DAILY
COMMUNITY

## 2020-11-23 RX ORDER — LANOLIN ALCOHOL/MO/W.PET/CERES
1000 CREAM (GRAM) TOPICAL DAILY
COMMUNITY
End: 2021-08-12

## 2020-12-01 ENCOUNTER — TELEPHONE (OUTPATIENT)
Dept: FAMILY MEDICINE CLINIC | Facility: CLINIC | Age: 70
End: 2020-12-01

## 2020-12-09 ENCOUNTER — OFFICE VISIT (OUTPATIENT)
Dept: CARDIOLOGY | Facility: CLINIC | Age: 70
End: 2020-12-09

## 2020-12-09 DIAGNOSIS — E78.2 MIXED HYPERLIPIDEMIA: ICD-10-CM

## 2020-12-09 DIAGNOSIS — I25.10 CORONARY ARTERY DISEASE INVOLVING NATIVE CORONARY ARTERY OF NATIVE HEART WITHOUT ANGINA PECTORIS: Primary | ICD-10-CM

## 2020-12-09 DIAGNOSIS — I25.10 CORONARY ARTERIOSCLEROSIS: ICD-10-CM

## 2020-12-09 PROCEDURE — 99442 PR PHYS/QHP TELEPHONE EVALUATION 11-20 MIN: CPT | Performed by: INTERNAL MEDICINE

## 2020-12-09 RX ORDER — PRAVASTATIN SODIUM 40 MG
40 TABLET ORAL DAILY
Qty: 31 TABLET | Refills: 6 | Status: SHIPPED | OUTPATIENT
Start: 2020-12-09 | End: 2021-10-13 | Stop reason: SDUPTHER

## 2020-12-09 NOTE — PROGRESS NOTES
Pulmonary Arterial Hypertension & Heart Failure   Vishnu Reynaga M.D., Ph.D., [facility]              Non-Face-To-Face Scheduled Telephone Service    Chrissy Dawson is a 70 y.o. female who has requested telephone service in lieu of a follow-up appointment for results and advice.  This patient is an established patient.  There is no face-to-face service planned within the next 24 hours.  There also has been no E/M in the past 7 days.    You have chosen to receive care through a telephone visit. Do you consent to use a telephone visit for your medical care today? Yes     This phone visit is conducted due to COIVD. Limitations were explained, including inability to perform a physical examination and inability to perform vital signs. Patient was agreeable to proceeding with these limitations, as I personally explained them.       Chrissy Dawson is a 70 y.o. female who is followed in my clinic typically.  She has coronary artery disease.  She is prescribed aspirin and pravastatin.  No angina.  Her last LDL was not at goal.  Plan was to repeat LDL.  Repeat LDL was unacceptable.  She has had some issues with higher dose statins in the past.  She is currently only on pravastatin.  She is also attempted atorvastatin and Crestor with side effects.    Review of Systems   Cardiovascular: Negative.    Respiratory: Negative.      family history includes Depression in her maternal grandmother; Diabetes in her father; Heart disease in her maternal grandfather and another family member; Hypertension in her mother and another family member.   reports that she has never smoked. She has never used smokeless tobacco. She reports that she does not drink alcohol or use drugs.  Allergies   Allergen Reactions   • Crestor [Rosuvastatin] Shortness Of Breath   • Lipitor [Atorvastatin] Shortness Of Breath       Current Outpatient Medications:   •  aspirin 81 MG tablet, Take 81 mg by mouth., Disp: , Rfl:   •  Multiple  Vitamins-Minerals (CENTRUM SILVER PO), Take 1 tablet by mouth Daily., Disp: , Rfl:   •  nitroglycerin (NITROSTAT) 0.4 MG SL tablet, 1 under the tongue as needed for angina, may repeat q5mins for up three doses, Disp: 15 tablet, Rfl: 6  •  pravastatin (PRAVACHOL) 10 MG tablet, Take 1 tablet by mouth Daily., Disp: 30 tablet, Rfl: 6  •  vitamin B-12 (CYANOCOBALAMIN) 1000 MCG tablet, Take 1,000 mcg by mouth Daily., Disp: , Rfl:   •  vitamin C (ASCORBIC ACID) 500 MG tablet, Take 500 mg by mouth Daily., Disp: , Rfl:       DATA REVIEWED:     TTE/HUBERT:       --------------------------------------------------------------------------------------------------  LABS:     The 10-year CVD risk score (Micah et al., 2008) is: 11.7%    Values used to calculate the score:      Age: 70 years      Sex: Female      Diabetic: No      Tobacco smoker: No      Systolic Blood Pressure: 124 mmHg      Is BP treated: Yes      HDL Cholesterol: 82 mg/dL      Total Cholesterol: 255 mg/dL  Lab Results   Component Value Date    GLUCOSE 84 12/11/2019    BUN 20 12/11/2019    CREATININE 0.57 12/11/2019    EGFRIFAFRI 127 12/11/2019    BCR 35.1 (H) 12/11/2019    K 4.4 12/11/2019    CO2 30.4 (H) 12/11/2019    CALCIUM 9.8 12/11/2019    ALBUMIN 4.20 12/11/2019    AST 19 12/11/2019    ALT 15 12/11/2019     Lab Results   Component Value Date    WBC 4.10 06/16/2020    HGB 12.7 06/16/2020    HCT 38.3 06/16/2020    MCV 84.7 06/16/2020     06/16/2020     Lab Results   Component Value Date    CHOL 255 (H) 10/15/2020    CHLPL 294 (H) 10/19/2015    TRIG 52 10/15/2020    HDL 82 (H) 10/15/2020     (H) 10/15/2020     Lab Results   Component Value Date    TSH 1.320 03/29/2019     No results found for: CKTOTAL, CKMB, CKMBINDEX, TROPONINI, TROPONINT  Lab Results   Component Value Date    HGBA1C 5.90 (H) 03/29/2019     Lab Results   Component Value Date    DDIMER 773 (H) 08/08/2014     Lab Results   Component Value Date    ALT 15 12/11/2019     Lab  Results   Component Value Date    HGBA1C 5.90 (H) 03/29/2019     Lab Results   Component Value Date    CREATININE 0.57 12/11/2019     No results found for: IRON, TIBC, FERRITIN  No results found for: INR, PROTIME    Assessment/Plan     1. Coronary artery disease involving native coronary artery of native heart without angina pectoris/ Mixed hyperlipidemia. CCS 0. LDL is not at goal.  She has been unable to tolerate Crestor and Lipitor. She is on lower dose pravastatin.  Risks and benefits were discussed. Recommended to increase Pravastatin to 40mg. I asked her to get a lipid profile prior to her appt.       Return in about 5 months (around 5/9/2021).        Time: >10    Vishnu Reynaga MD PhD

## 2020-12-15 ENCOUNTER — OFFICE VISIT (OUTPATIENT)
Dept: FAMILY MEDICINE CLINIC | Facility: CLINIC | Age: 70
End: 2020-12-15

## 2020-12-15 VITALS
OXYGEN SATURATION: 98 % | BODY MASS INDEX: 25.62 KG/M2 | DIASTOLIC BLOOD PRESSURE: 74 MMHG | WEIGHT: 179 LBS | HEART RATE: 82 BPM | SYSTOLIC BLOOD PRESSURE: 112 MMHG | HEIGHT: 70 IN

## 2020-12-15 DIAGNOSIS — R60.0 LEG EDEMA, LEFT: Primary | ICD-10-CM

## 2020-12-15 PROCEDURE — 99213 OFFICE O/P EST LOW 20 MIN: CPT | Performed by: FAMILY MEDICINE

## 2020-12-17 ENCOUNTER — TELEPHONE (OUTPATIENT)
Dept: FAMILY MEDICINE CLINIC | Facility: CLINIC | Age: 70
End: 2020-12-17

## 2020-12-17 NOTE — TELEPHONE ENCOUNTER
Returned patient call and transferred her to [facility] in central scheduling.

## 2020-12-17 NOTE — TELEPHONE ENCOUNTER
PATIENT CALLS           REQUESTING A CALL BACK TO BE UPDATED ABOUT AN MRI ORDER FOR HER LEG       GOOD CONTACT NUMBER   [phone] (H)

## 2020-12-28 ENCOUNTER — HOSPITAL ENCOUNTER (OUTPATIENT)
Dept: ULTRASOUND IMAGING | Facility: HOSPITAL | Age: 70
Discharge: HOME | End: 2020-12-28

## 2020-12-28 DIAGNOSIS — R60.0 LEG EDEMA, LEFT: ICD-10-CM

## 2020-12-28 PROCEDURE — 93971 EXTREMITY STUDY: CPT

## 2020-12-28 PROCEDURE — 76999 ECHO EXAMINATION PROCEDURE: CPT

## 2020-12-29 ENCOUNTER — TELEPHONE (OUTPATIENT)
Dept: FAMILY MEDICINE CLINIC | Facility: CLINIC | Age: 70
End: 2020-12-29

## 2020-12-29 NOTE — TELEPHONE ENCOUNTER
No VM Set up on cell number listed  
Per Dr. Herrera, Ms. Dawson has been called with her recent Lower Extremity Ultrasound results and recommendations.   Continue current medications and follow-up as planned or sooner if any problems.       
Please call and let patient know that ultrasound did not show blood clot.  The ultrasound did not show any abnormalities either in the area of swelling, which is good.  Patient can continue to use ice and elevate the leg when she has swelling.  Will continue to monitor and recheck at next appointment.  If patient has worsening swelling or pain prior to then, can be seen sooner.  Thanks, ROHAN Herrera  
Patient/Caregiver provided printed discharge information.

## 2021-02-08 ENCOUNTER — IMMUNIZATION (OUTPATIENT)
Dept: VACCINE CLINIC | Facility: HOSPITAL | Age: 71
End: 2021-02-08

## 2021-02-08 PROCEDURE — 91300 HC SARSCOV02 VAC 30MCG/0.3ML IM: CPT | Performed by: THORACIC SURGERY (CARDIOTHORACIC VASCULAR SURGERY)

## 2021-02-08 PROCEDURE — 0001A: CPT | Performed by: THORACIC SURGERY (CARDIOTHORACIC VASCULAR SURGERY)

## 2021-03-01 ENCOUNTER — IMMUNIZATION (OUTPATIENT)
Dept: VACCINE CLINIC | Facility: HOSPITAL | Age: 71
End: 2021-03-01

## 2021-03-01 PROCEDURE — 91300 HC SARSCOV02 VAC 30MCG/0.3ML IM: CPT | Performed by: THORACIC SURGERY (CARDIOTHORACIC VASCULAR SURGERY)

## 2021-03-01 PROCEDURE — 0002A: CPT | Performed by: THORACIC SURGERY (CARDIOTHORACIC VASCULAR SURGERY)

## 2021-04-06 ENCOUNTER — APPOINTMENT (OUTPATIENT)
Dept: GENERAL RADIOLOGY | Facility: HOSPITAL | Age: 71
End: 2021-04-06

## 2021-04-06 ENCOUNTER — HOSPITAL ENCOUNTER (EMERGENCY)
Facility: HOSPITAL | Age: 71
Discharge: HOME | End: 2021-04-06
Attending: EMERGENCY MEDICINE | Admitting: EMERGENCY MEDICINE

## 2021-04-06 ENCOUNTER — APPOINTMENT (OUTPATIENT)
Dept: CT IMAGING | Facility: HOSPITAL | Age: 71
End: 2021-04-06

## 2021-04-06 VITALS
OXYGEN SATURATION: 96 % | BODY MASS INDEX: 25.2 KG/M2 | HEART RATE: 80 BPM | HEIGHT: 70 IN | RESPIRATION RATE: 18 BRPM | TEMPERATURE: 98.4 F | DIASTOLIC BLOOD PRESSURE: 75 MMHG | WEIGHT: 176 LBS | SYSTOLIC BLOOD PRESSURE: 108 MMHG

## 2021-04-06 DIAGNOSIS — S70.01XA CONTUSION OF RIGHT HIP, INITIAL ENCOUNTER: Primary | ICD-10-CM

## 2021-04-06 LAB
ALBUMIN SERPL-MCNC: 4 G/DL (ref 3.5–5.2)
ALBUMIN/GLOB SERPL: 1.3 G/DL
ALP SERPL-CCNC: 81 U/L (ref 39–117)
ALT SERPL W P-5'-P-CCNC: 9 U/L (ref 1–33)
ANION GAP SERPL CALCULATED.3IONS-SCNC: 7 MMOL/L (ref 5–15)
AST SERPL-CCNC: 15 U/L (ref 1–32)
BASOPHILS # BLD AUTO: 0.01 10*3/MM3 (ref 0–0.2)
BASOPHILS NFR BLD AUTO: 0.2 % (ref 0–1.5)
BILIRUB SERPL-MCNC: 0.4 MG/DL (ref 0–1.2)
BUN SERPL-MCNC: 24 MG/DL (ref 8–23)
BUN/CREAT SERPL: 30 (ref 7–25)
CALCIUM SPEC-SCNC: 9.7 MG/DL (ref 8.6–10.5)
CHLORIDE SERPL-SCNC: 100 MMOL/L (ref 98–107)
CO2 SERPL-SCNC: 28 MMOL/L (ref 22–29)
CREAT SERPL-MCNC: 0.8 MG/DL (ref 0.57–1)
DEPRECATED RDW RBC AUTO: 41.3 FL (ref 37–54)
EOSINOPHIL # BLD AUTO: 0.02 10*3/MM3 (ref 0–0.4)
EOSINOPHIL NFR BLD AUTO: 0.4 % (ref 0.3–6.2)
ERYTHROCYTE [DISTWIDTH] IN BLOOD BY AUTOMATED COUNT: 13.3 % (ref 12.3–15.4)
FLUAV RNA RESP QL NAA+PROBE: NOT DETECTED
FLUBV RNA RESP QL NAA+PROBE: NOT DETECTED
GFR SERPL CREATININE-BSD FRML MDRD: 86 ML/MIN/1.73
GLOBULIN UR ELPH-MCNC: 3 GM/DL
GLUCOSE SERPL-MCNC: 117 MG/DL (ref 65–99)
HCT VFR BLD AUTO: 38 % (ref 34–46.6)
HGB BLD-MCNC: 12.5 G/DL (ref 12–15.9)
HOLD SPECIMEN: NORMAL
HOLD SPECIMEN: NORMAL
IMM GRANULOCYTES # BLD AUTO: 0.02 10*3/MM3 (ref 0–0.05)
IMM GRANULOCYTES NFR BLD AUTO: 0.4 % (ref 0–0.5)
INR PPP: 1.05 (ref 0.8–1.2)
LYMPHOCYTES # BLD AUTO: 1.41 10*3/MM3 (ref 0.7–3.1)
LYMPHOCYTES NFR BLD AUTO: 25.6 % (ref 19.6–45.3)
MCH RBC QN AUTO: 28.1 PG (ref 26.6–33)
MCHC RBC AUTO-ENTMCNC: 32.9 G/DL (ref 31.5–35.7)
MCV RBC AUTO: 85.4 FL (ref 79–97)
MONOCYTES # BLD AUTO: 0.64 10*3/MM3 (ref 0.1–0.9)
MONOCYTES NFR BLD AUTO: 11.6 % (ref 5–12)
NEUTROPHILS NFR BLD AUTO: 3.41 10*3/MM3 (ref 1.7–7)
NEUTROPHILS NFR BLD AUTO: 61.8 % (ref 42.7–76)
NRBC BLD AUTO-RTO: 0 /100 WBC (ref 0–0.2)
PLATELET # BLD AUTO: 231 10*3/MM3 (ref 140–450)
PMV BLD AUTO: 11.1 FL (ref 6–12)
POTASSIUM SERPL-SCNC: 4.7 MMOL/L (ref 3.5–5.2)
PROT SERPL-MCNC: 7 G/DL (ref 6–8.5)
PROTHROMBIN TIME: 14.1 SECONDS (ref 11.1–15.3)
RBC # BLD AUTO: 4.45 10*6/MM3 (ref 3.77–5.28)
SARS-COV-2 RNA RESP QL NAA+PROBE: NOT DETECTED
SODIUM SERPL-SCNC: 135 MMOL/L (ref 136–145)
WBC # BLD AUTO: 5.51 10*3/MM3 (ref 3.4–10.8)
WHOLE BLOOD HOLD SPECIMEN: NORMAL
WHOLE BLOOD HOLD SPECIMEN: NORMAL

## 2021-04-06 PROCEDURE — 73502 X-RAY EXAM HIP UNI 2-3 VIEWS: CPT

## 2021-04-06 PROCEDURE — 87636 SARSCOV2 & INF A&B AMP PRB: CPT | Performed by: PHYSICIAN ASSISTANT

## 2021-04-06 PROCEDURE — 72131 CT LUMBAR SPINE W/O DYE: CPT

## 2021-04-06 PROCEDURE — 25010000002 MORPHINE PER 10 MG: Performed by: STUDENT IN AN ORGANIZED HEALTH CARE EDUCATION/TRAINING PROGRAM

## 2021-04-06 PROCEDURE — 72192 CT PELVIS W/O DYE: CPT

## 2021-04-06 PROCEDURE — 96375 TX/PRO/DX INJ NEW DRUG ADDON: CPT

## 2021-04-06 PROCEDURE — 25010000002 MORPHINE PER 10 MG: Performed by: EMERGENCY MEDICINE

## 2021-04-06 PROCEDURE — 73700 CT LOWER EXTREMITY W/O DYE: CPT

## 2021-04-06 PROCEDURE — 85610 PROTHROMBIN TIME: CPT | Performed by: PHYSICIAN ASSISTANT

## 2021-04-06 PROCEDURE — 96376 TX/PRO/DX INJ SAME DRUG ADON: CPT

## 2021-04-06 PROCEDURE — 80053 COMPREHEN METABOLIC PANEL: CPT | Performed by: PHYSICIAN ASSISTANT

## 2021-04-06 PROCEDURE — 25010000002 ONDANSETRON PER 1 MG: Performed by: PHYSICIAN ASSISTANT

## 2021-04-06 PROCEDURE — 96374 THER/PROPH/DIAG INJ IV PUSH: CPT

## 2021-04-06 PROCEDURE — 85025 COMPLETE CBC W/AUTO DIFF WBC: CPT | Performed by: PHYSICIAN ASSISTANT

## 2021-04-06 PROCEDURE — 99284 EMERGENCY DEPT VISIT MOD MDM: CPT

## 2021-04-06 RX ORDER — ONDANSETRON 2 MG/ML
4 INJECTION INTRAMUSCULAR; INTRAVENOUS ONCE
Status: COMPLETED | OUTPATIENT
Start: 2021-04-06 | End: 2021-04-06

## 2021-04-06 RX ORDER — HYDROCODONE BITARTRATE AND ACETAMINOPHEN 5; 325 MG/1; MG/1
1 TABLET ORAL EVERY 6 HOURS PRN
Qty: 12 TABLET | Refills: 0 | Status: SHIPPED | OUTPATIENT
Start: 2021-04-06 | End: 2021-04-28

## 2021-04-06 RX ORDER — MORPHINE SULFATE 2 MG/ML
2 INJECTION, SOLUTION INTRAMUSCULAR; INTRAVENOUS ONCE
Status: COMPLETED | OUTPATIENT
Start: 2021-04-06 | End: 2021-04-06

## 2021-04-06 RX ORDER — SODIUM CHLORIDE 0.9 % (FLUSH) 0.9 %
10 SYRINGE (ML) INJECTION AS NEEDED
Status: DISCONTINUED | OUTPATIENT
Start: 2021-04-06 | End: 2021-04-07 | Stop reason: HOSPADM

## 2021-04-06 RX ORDER — HYDROCODONE BITARTRATE AND ACETAMINOPHEN 5; 325 MG/1; MG/1
1 TABLET ORAL ONCE
Status: COMPLETED | OUTPATIENT
Start: 2021-04-06 | End: 2021-04-06

## 2021-04-06 RX ADMIN — MORPHINE SULFATE 2 MG: 2 INJECTION, SOLUTION INTRAMUSCULAR; INTRAVENOUS at 21:15

## 2021-04-06 RX ADMIN — ONDANSETRON HYDROCHLORIDE 4 MG: 2 INJECTION, SOLUTION INTRAMUSCULAR; INTRAVENOUS at 18:48

## 2021-04-06 RX ADMIN — HYDROCODONE BITARTRATE AND ACETAMINOPHEN 1 TABLET: 5; 325 TABLET ORAL at 22:51

## 2021-04-06 RX ADMIN — MORPHINE SULFATE 4 MG: 4 INJECTION INTRAVENOUS at 18:48

## 2021-04-06 NOTE — ED PROVIDER NOTES
"Subjective   Patient presents to emergency department for right hip pain. She was pumping gas at the gas station, her right leg \"gave out\" and she fell.  States she is having severe right hip pain radiating into her low back.  Denies numbness/weakness.  States she has had 2 previous (R) total hip replacements by Dr Brown but is going to be managed in the future by Dr Maravilla.        History provided by:  Patient   used: No    Hip Injury  Location:  Right hip, low back  Timing:  Constant  Progression:  Worsening  Chronicity:  New  Associated symptoms: no abdominal pain, no chest pain, no cough, no fever, no nausea, no shortness of breath, no sore throat, no vomiting and no wheezing        Review of Systems   Constitutional: Negative for chills and fever.   HENT: Negative for sore throat and trouble swallowing.    Eyes: Negative for visual disturbance.   Respiratory: Negative for cough, shortness of breath and wheezing.    Cardiovascular: Negative for chest pain.   Gastrointestinal: Negative for abdominal pain, nausea and vomiting.   Genitourinary: Negative for dysuria and flank pain.   Musculoskeletal: Positive for arthralgias and back pain. Negative for neck pain.   Skin: Negative for color change.   Allergic/Immunologic: Negative for immunocompromised state.   Neurological: Negative for syncope, weakness and numbness.   Hematological: Does not bruise/bleed easily.   Psychiatric/Behavioral: Negative for confusion.       Past Medical History:   Diagnosis Date   • Backache    • Benign essential hypertension     PATIENT DENIES   • Cervical arthritis    • Coronary arteriosclerosis    • Fibrocystic breast    • Ganglion cyst of wrist     Ganglion/synovial cyst - wrist   • Ganglion of wrist    • Hip pain    • History of echocardiogram 10/23/2015    Echocardiogram W/ color flow 02800 (1) - Normal LV function with Ef of 55-60%.Normal RV size and function.Pseudonormal diastolic dysfunciton with borderline " "[facility].NO sig.valvular regurg or stenosis.   • History of mammogram 09/10/2014    MAMMOGRAM SCREENING 46558 - [facility] ([facility] - [facility] ([facility])   • History of transfusion    • Hyperlipidemia    • Inguinal pain    • Recurrent angina status post coronary artery bypass graft (CMS/HCC)     Recurrent angina after coronary artery bypass graft   • Urge incontinence of urine        Allergies   Allergen Reactions   • Crestor [Rosuvastatin] Shortness Of Breath   • Lipitor [Atorvastatin] Shortness Of Breath       Past Surgical History:   Procedure Laterality Date   • BREAST BIOPSY Right 2/2/2017    Procedure: RIGHT BREAST LUMPECTOMY WITH NEEDLE LOCALIZATION AND ULTRASOUND;  Surgeon: Delfino Greer MD;  Location: [facility] OR;  Service:    • BREAST CYST ASPIRATION     • CARDIAC SURGERY     • CORONARY ARTERY BYPASS GRAFT  2000    CABG, arterial, single (1)   • GANGLION CYST EXCISION     • JOINT REPLACEMENT     • ORIF MANDIBULAR FRACTURE     • TOTAL ABDOMINAL HYSTERECTOMY     • TOTAL ABDOMINAL HYSTERECTOMY WITH SALPINGO OOPHORECTOMY  1998    SALVADOR/BSO (1)   • TOTAL HIP ARTHROPLASTY  2011    Total hip replacement (3)       Family History   Problem Relation Age of Onset   • Heart disease Other    • Hypertension Other    • Hypertension Mother    • Diabetes Father    • Depression Maternal Grandmother    • Heart disease Maternal Grandfather        Social History     Socioeconomic History   • Marital status:      Spouse name: Not on file   • Number of children: Not on file   • Years of education: Not on file   • Highest education level: Not on file   Tobacco Use   • Smoking status: Never Smoker   • Smokeless tobacco: Never Used   Substance and Sexual Activity   • Alcohol use: No   • Drug use: No   • Sexual activity: Defer     Comment:            Objective      /79   Pulse 85   Temp 98.4 °F (36.9 °C) (Temporal)   Resp 16   Ht 176.5 cm (69.5\")   Wt 79.8 kg (176 lb)   SpO2 99%   BMI 25.62 kg/m²     Physical " Exam  Vitals and nursing note reviewed.   Constitutional:       General: She is in acute distress.      Appearance: Normal appearance. She is normal weight. She is not ill-appearing.   HENT:      Head: Normocephalic and atraumatic.      Mouth/Throat:      Mouth: Mucous membranes are moist.   Eyes:      Conjunctiva/sclera: Conjunctivae normal.   Cardiovascular:      Rate and Rhythm: Normal rate and regular rhythm.      Pulses: Normal pulses.      Heart sounds: Normal heart sounds.   Pulmonary:      Effort: Pulmonary effort is normal. No respiratory distress.      Breath sounds: Normal breath sounds. No wheezing.   Musculoskeletal:         General: Tenderness (right hip) present. No deformity.      Comments: Neurovascular intact distally   Skin:     General: Skin is warm.      Capillary Refill: Capillary refill takes less than 2 seconds.   Neurological:      General: No focal deficit present.      Mental Status: She is alert.   Psychiatric:         Mood and Affect: Mood normal.         Behavior: Behavior normal.         Thought Content: Thought content normal.         Procedures           ED Course  ED Course as of Apr 06 2231   Tue Apr 06, 2021 2112 Patient signed out to me at shift change at 2100 by GARETT Good.  Patient relates she fell injuring her right hip and lower back.  She denies loss of consciousness or other injury.  CT of the lumbar spine, pelvis and right hip are pending.    [DR]   2224 CT of the lumbar spine pelvis and right hip were negative for acute fracture or dislocation.  I offered to admit the patient for observation for pain control.  She prefers outpatient management.  She agrees to follow-up with Dr. Maravilla.  I advised her to return to the emergency department if her symptoms change or worsen.    [DR]   2228 Reviewed [facility] report #[id number]     [DR]      ED Course User Index  [DR] Reinaldo Bailey MD      Labs Reviewed   COMPREHENSIVE METABOLIC PANEL - Abnormal; Notable for the following  components:       Result Value    Glucose 117 (*)     BUN 24 (*)     Sodium 135 (*)     BUN/Creatinine Ratio 30.0 (*)     All other components within normal limits    Narrative:     GFR Normal >60  Chronic Kidney Disease <60  Kidney Failure <15     PROTIME-INR - Normal    Narrative:     Therapeutic range for most indications is 2.0-3.0 INR,  or 2.5-3.5 for mechanical heart valves.   CBC WITH AUTO DIFFERENTIAL - Normal   COVID-19 AND FLU A/B, NP SWAB IN TRANSPORT MEDIA 8-12 HR TAT   RAINBOW DRAW    Narrative:     The following orders were created for panel order [place] Draw.  Procedure                               Abnormality         Status                     ---------                               -----------         ------                     Light Blue Top[[id number]]                                   Final result               Green Top (Gel)[[id number]]                                  Final result               Lavender Top[[id number]]                                     Final result               Gold Top - SST[[id number]]                                   Final result                 Please view results for these tests on the individual orders.   LIGHT BLUE TOP   GREEN TOP   LAVENDER TOP   GOLD TOP - SST   CBC AND DIFFERENTIAL    Narrative:     The following orders were created for panel order CBC & Differential.  Procedure                               Abnormality         Status                     ---------                               -----------         ------                     CBC Auto Differential[[id number]]        Normal              Final result                 Please view results for these tests on the individual orders.     CT Lumbar Spine Without Contrast    Result Date: 4/6/2021  Narrative: EXAM:   CT Lumbar Spine Without Intravenous Contrast CLINICAL HISTORY:   The patient is 70 years old and is Female; fall, low back pain TECHNIQUE:   Axial computed tomography images of the lumbar spine  without intravenous contrast.  Sagittal and coronal reformatted images were created and reviewed.  This CT exam was performed using one or more of the following dose reduction techniques:  automated exposure control, adjustment of the mA and/or kV according to patient size, and/or use of iterative reconstruction technique. COMPARISON:   No relevant prior studies available. FINDINGS:   VERTEBRAE: The vertebral body heights and alignment are maintained.  There is no acute fracture.   DISCS/SPINAL CANAL/NEURAL FORAMINA:  Intervertebral disc space narrowing at L4-L5 is present. Vacuum disc phenomenon at L5-S1 is noted. Mild disc bulge at L4-5 causing neural foraminal narrowing is present. Facet arthropathy of the lumbar spine is present.   SOFT TISSUES:  The soft tissues are normal.   VASCULATURE:  Atherosclerosis of the vasculature is noted.   LUNGS:  Atelectasis within the lung bases is present.   KIDNEYS AND URETERS:  Bilateral parapelvic renal cysts are present.     Impression:   Spondylosis of the lower lumbar spine. Electronically signed by:  Yakelin Ricketts MD  4/6/2021 10:05 PM CDT Workstation: [id number]    CT Pelvis Without Contrast    Result Date: 4/6/2021  Narrative: EXAM:   CT Pelvis Without Intravenous Contrast CLINICAL HISTORY:   The patient is 70 years old and is Female; fall, hip/pelvic pain TECHNIQUE:   Axial computed tomography images of the pelvis without intravenous contrast.  Sagittal and coronal reformatted images were created and reviewed.  This CT exam was performed using one or more of the following dose reduction techniques:  automated exposure control, adjustment of the mA and/or kV according to patient size, and/or use of iterative reconstruction technique. COMPARISON: Hip radiographs performed the same day and June 16, 2020. CT of the head July 24, 2020 FINDINGS:   BONES/JOINTS:  Postsurgical change with bilateral hip prostheses are present. The hardware is engaged. Lucency surrounding the  right acetabular component is noted and unchanged from prior exam. Posttraumatic change of the right intertrochanteric region is present. Mild acetabular protrusio on the right is present. Degenerative change of the pubic symphysis is noted. The SI joints are intact. There is no dislocation or fracture.   SOFT TISSUES:  The soft tissues are normal.   VASCULATURE:  Atherosclerosis of the vasculature is present.   BLADDER:  The bladder is moderately distended.  No stones.     Impression:   Bilateral hip prostheses. No evidence of fracture. Electronically signed by:  Yakelin Ricketts MD  4/6/2021 10:03 PM CDT Workstation: [id number]    CT Lower Extremity Right Without Contrast    Result Date: 4/6/2021  Narrative: EXAM:   CT Right Lower Extremity Without Intravenous Contrast CLINICAL HISTORY:   The patient is 70 years old and is Female; right hip pain, further evaluation of right hip prosthesis TECHNIQUE:   Axial computed tomography images of the right lower extremity without intravenous contrast.  Sagittal and coronal reformatted images were created and reviewed.  This CT exam was performed using one or more of the following dose reduction techniques: automated exposure control, adjustment of the mA and/or kV according to patient size, and/or use of iterative reconstruction technique. COMPARISON:   CT of the right hip July 24, 2020 FINDINGS:   BONES/JOINTS:  A right hip prosthesis is present. The hardware is engaged. Lucency surrounding the acetabular component is noted in similar to prior exam. Mild acetabular protrusio is redemonstrated. Deformity of the trochanteric region consistent with prior fracture is noted and unchanged. There is no acute fracture or dislocation.   SOFT TISSUES:  Unremarkable.     Impression:   No evidence of fracture or dislocation. Right hip prosthesis in place with persistent lucency surrounding the acetabular component and mild acetabula protrusio. The lucency may again be secondary to  loosening and/or infection. Electronically signed by:  Yakelin Ricketts MD  4/6/2021 10:11 PM CDT Workstation: [id number]    XR Hip With or Without Pelvis 2 - 3 View Right    Result Date: 4/6/2021  Narrative: EXAM: XR HIP 2 OR MORE VIEWS UNILATERAL WITH PELVIS HISTORY: 70-year-old female, fall, right hip pain TECHNIQUE: Frontal, and lateral views of the right hip. COMPARISON: X-ray 6/16/2020. CT 7/24/2020 FINDINGS: Bones: No apparent acute fracture or dislocation. Bilateral hip prostheses, fragmented cerclage wires on the right, heterotopic ossification bilaterally, stable mild right acetabular protrusio and stable thin lucency surrounding the right acetabular prosthesis. Soft tissues: No acute findings.     Impression: No apparent acute abnormality. Stable nonspecific thin lucency surrounding the right acetabular prosthesis, loosening not excluded. Consider CT if indicated. Electronically signed by:  Ed Alvares MD  4/6/2021 8:39 PM CDT Workstation: [id number]                                       [facility]    Final diagnoses:   Contusion of right hip, initial encounter       ED Disposition  ED Disposition     ED Disposition Condition Comment    Discharge Stable           Jeovanny Maravilla MD  [address] [place] [address] [place], [place] [address] [phone]    Schedule an appointment as soon as possible for a visit in 1 week           Medication List      New Prescriptions    HYDROcodone-acetaminophen 5-325 MG per tablet  Commonly known as: NORCO  Take 1 tablet by mouth Every 6 (Six) Hours As Needed for Severe Pain .           Where to Get Your Medications      These medications were sent to [id number] - [place], [place] - [address] [facility] AT [facility] - [phone] [facility] [phone] [facility] [facility] [address]    Phone: [phone]   · HYDROcodone-acetaminophen 5-325 MG per tablet          Reinaldo Bailey MD  04/06/21 7364

## 2021-04-07 ENCOUNTER — TELEPHONE (OUTPATIENT)
Dept: ORTHOPEDIC SURGERY | Facility: CLINIC | Age: 71
End: 2021-04-07

## 2021-04-07 NOTE — ED NOTES
"Right hip pain after falling while walking around her car to pump gas today. Pt walks with a cane and states that she was using her cane and her right leg \"just gave out\". Pt landed on right hip. Pt states that she has had two replacements on that hip already. Pt denies hitting her head.     Yoli Sorenson, RELL  04/06/21 2145       Yoli Sorenson RN  04/06/21 2147    "

## 2021-04-07 NOTE — TELEPHONE ENCOUNTER
Pt CALLED REQUESTING TO SEE YOU FOR HER RT HIP PAIN SHE STATES SHE IS READY TO DISCUSS SURGERY     Pt WENT TO  ED 4/6/2021 BECAUSE OF HER INCREASED PAIN   Pt SCHEDULED FOR 5/7/2021     Pt IS WANTING TO KNOW IF WE COULD WORK HER IN AND SEE HER BEFORE THEN     Pt INFORMED  IS OUT OF OFFICE UNTIL NEXT   Pt VOICED UNDERSTANDING AND WILL WAIT FOR A CALL BACK

## 2021-04-13 ENCOUNTER — TELEPHONE (OUTPATIENT)
Dept: FAMILY MEDICINE CLINIC | Facility: CLINIC | Age: 71
End: 2021-04-13

## 2021-04-13 NOTE — TELEPHONE ENCOUNTER
Pt was in ER on 4-6-21 but failed to get a note to return to work.    Can you please contact pt regarding this issue. Has appt on 15th but needs note now.    [phone]

## 2021-04-13 NOTE — TELEPHONE ENCOUNTER
Sent note allowing patient to work from home for 2 weeks, then release to in office.  Thanks, ROHAN Herrera

## 2021-04-15 ENCOUNTER — OFFICE VISIT (OUTPATIENT)
Dept: FAMILY MEDICINE CLINIC | Facility: CLINIC | Age: 71
End: 2021-04-15

## 2021-04-15 VITALS
OXYGEN SATURATION: 94 % | HEART RATE: 79 BPM | SYSTOLIC BLOOD PRESSURE: 109 MMHG | WEIGHT: 178 LBS | HEIGHT: 70 IN | BODY MASS INDEX: 25.48 KG/M2 | DIASTOLIC BLOOD PRESSURE: 70 MMHG

## 2021-04-15 DIAGNOSIS — Z00.00 MEDICARE ANNUAL WELLNESS VISIT, INITIAL: Primary | ICD-10-CM

## 2021-04-15 DIAGNOSIS — Z12.31 ENCOUNTER FOR SCREENING MAMMOGRAM FOR MALIGNANT NEOPLASM OF BREAST: ICD-10-CM

## 2021-04-15 DIAGNOSIS — Z78.0 POSTMENOPAUSAL: ICD-10-CM

## 2021-04-15 DIAGNOSIS — H00.014 HORDEOLUM EXTERNUM LEFT UPPER EYELID: ICD-10-CM

## 2021-04-15 DIAGNOSIS — R32 URINARY INCONTINENCE, UNSPECIFIED TYPE: ICD-10-CM

## 2021-04-15 PROCEDURE — 1159F MED LIST DOCD IN RCRD: CPT | Performed by: FAMILY MEDICINE

## 2021-04-15 PROCEDURE — 1126F AMNT PAIN NOTED NONE PRSNT: CPT | Performed by: FAMILY MEDICINE

## 2021-04-15 PROCEDURE — G0438 PPPS, INITIAL VISIT: HCPCS | Performed by: FAMILY MEDICINE

## 2021-04-15 PROCEDURE — 96160 PT-FOCUSED HLTH RISK ASSMT: CPT | Performed by: FAMILY MEDICINE

## 2021-04-15 PROCEDURE — 99213 OFFICE O/P EST LOW 20 MIN: CPT | Performed by: FAMILY MEDICINE

## 2021-04-15 RX ORDER — CEPHALEXIN 500 MG/1
500 CAPSULE ORAL 2 TIMES DAILY
Qty: 14 CAPSULE | Refills: 0 | Status: SHIPPED | OUTPATIENT
Start: 2021-04-15 | End: 2021-04-22

## 2021-04-15 NOTE — PATIENT INSTRUCTIONS
Medicare Wellness  Personal Prevention Plan of Service     Date of Office Visit:  04/15/2021  Encounter Provider:  Fanny Herrera MD  Place of Service:  [facility]  Patient Name: Chrissy Dawson  :  1950    As part of the Medicare Wellness portion of your visit today, we are providing you with this personalized preventive plan of services (PPPS). This plan is based upon recommendations of the [place] Preventive Services Task Force (USPSTF) and the Advisory Committee on Immunization Practices (ACIP).    This lists the preventive care services that should be considered, and provides dates of when you are due. Items listed as completed are up-to-date and do not require any further intervention.    Health Maintenance   Topic Date Due   • TDAP/TD VACCINES (1 - Tdap) Never done   • ZOSTER VACCINE (1 of 2) Never done   • ANNUAL WELLNESS VISIT  Never done   • DXA SCAN  08/10/2020   • INFLUENZA VACCINE  2021   • LIPID PANEL  10/15/2021   • MAMMOGRAM  2022   • COLONOSCOPY  2026   • HEPATITIS C SCREENING  Completed   • COVID-19 Vaccine  Completed   • Pneumococcal Vaccine 65+  Completed       Orders Placed This Encounter   Procedures   • Mammo Screening Digital Tomosynthesis Bilateral With CAD     Standing Status:   Future     Standing Expiration Date:   4/15/2022     Order Specific Question:   Reason for Exam:     Answer:   screen   • DEXA Bone Density Axial     Standing Status:   Future     Standing Expiration Date:   4/15/2022     Order Specific Question:   Is patient taking or have taken long term Glucocorticoid (steroids)?     Answer:   No     Order Specific Question:   Does the patient have rheumatoid arthritis?     Answer:   No     Order Specific Question:   Reason for Exam:     Answer:   screening osteoporosis     Order Specific Question:   Release to patient     Answer:   Immediate   • Ambulatory Referral to Urology     Referral Priority:    Routine     Referral Type:   Consultation     Referral Reason:   Specialty Services Required     Requested Specialty:   Urology     Number of Visits Requested:   1       Return in about 3 months (around 7/15/2021) for Recheck.

## 2021-04-15 NOTE — PROGRESS NOTES
"Chief Complaint  Medicare Wellness-Initial Visit, Eye Problem, and Urinary Incontinence    Subjective          Chrissy Amira Dawson presents to [facility]  History of Present Illness    Patient seen today with current concern for left eyelid swelling.  Says that this started about a week ago.  Only mildly tender to touch.  Does not seem to be getting worse, but not getting better.  She is also having urinary incontinence. Sometimes with cough/sneeze, sometimes unrelated to activity.  Has to wear pads for incontinence.    Objective   Vital Signs:   /70   Pulse 79   Ht 176.5 cm (69.5\")   Wt 80.7 kg (178 lb)   SpO2 94%   BMI 25.91 kg/m²     Physical Exam  Vitals reviewed.   Constitutional:       General: She is not in acute distress.     Appearance: She is well-developed.   HENT:      Head: Normocephalic and atraumatic.   Eyes:      General:         Left eye: Hordeolum (upper eyelid) present.     Extraocular Movements: Extraocular movements intact.      Conjunctiva/sclera: Conjunctivae normal.   Cardiovascular:      Rate and Rhythm: Normal rate and regular rhythm.      Heart sounds: Normal heart sounds. No murmur heard.     Pulmonary:      Effort: Pulmonary effort is normal. No respiratory distress.      Breath sounds: Normal breath sounds. No wheezing or rales.   Abdominal:      Palpations: Abdomen is soft.      Tenderness: There is no abdominal tenderness.   Skin:     General: Skin is warm and dry.      Findings: No rash.   Neurological:      Mental Status: She is alert and oriented to person, place, and time.        Result Review :   The following data was reviewed by: Fanny Herrera MD on 04/15/2021:  Common labs    Common Labsle 6/16/20 10/15/20 4/6/21 4/6/21      1949 1949   Glucose    117 (A)   BUN    24 (A)   Creatinine    0.80   eGFR African Am    86   Sodium    135 (A)   Potassium    4.7   Chloride    100   Calcium    9.7   Albumin    4.00   Total Bilirubin    " 0.4   Alkaline Phosphatase    81   AST (SGOT)    15   ALT (SGPT)    9   WBC 4.10  5.51    Hemoglobin 12.7  12.5    Hematocrit 38.3  38.0    Platelets 255  231    Total Cholesterol  255 (A)     Triglycerides  52     HDL Cholesterol  82 (A)     LDL Cholesterol   165 (A)     (A) Abnormal value                      Assessment and Plan    Diagnoses and all orders for this visit:    1. Urinary incontinence, unspecified type  -     Ambulatory Referral to Urology    2. Hordeolum externum left upper eyelid  -     cephalexin (Keflex) 500 MG capsule; Take 1 capsule by mouth 2 (Two) Times a Day for 7 days.  Dispense: 14 capsule; Refill: 0    Patient seen for urinary incontinence.  Is causing concerns with daily activities.  Referral placed to urology for further evaluation/management.  She has findings of left upper eyelid consistent with hordeolum.  Some mild tenderness.  Advised warm compresses 3-4 times daily.  If worsening or pain or no improvement after 3-4 days, can consider use of antibiotics.  Wait and see prescription provided today.       Follow Up   Return in about 3 months (around 7/15/2021) for Recheck.  Patient was given instructions and counseling regarding her condition or for health maintenance advice. Please see specific information pulled into the AVS if appropriate.         This document has been electronically signed by Fanny Herrera MD

## 2021-04-20 ENCOUNTER — OFFICE VISIT (OUTPATIENT)
Dept: ORTHOPEDIC SURGERY | Facility: CLINIC | Age: 71
End: 2021-04-20

## 2021-04-20 VITALS — WEIGHT: 178 LBS | BODY MASS INDEX: 25.48 KG/M2 | HEIGHT: 70 IN

## 2021-04-20 DIAGNOSIS — I10 BENIGN ESSENTIAL HYPERTENSION: ICD-10-CM

## 2021-04-20 DIAGNOSIS — T84.030D MECHANICAL LOOSENING OF INTERNAL RIGHT HIP PROSTHETIC JOINT, SUBSEQUENT ENCOUNTER: Primary | ICD-10-CM

## 2021-04-20 DIAGNOSIS — Z96.641 S/P HIP REPLACEMENT, RIGHT: ICD-10-CM

## 2021-04-20 DIAGNOSIS — M25.551 RIGHT HIP PAIN: ICD-10-CM

## 2021-04-20 PROCEDURE — 99214 OFFICE O/P EST MOD 30 MIN: CPT | Performed by: ORTHOPAEDIC SURGERY

## 2021-04-20 NOTE — PROGRESS NOTES
Chrissy Dawson is a 70 y.o. female returns for     Chief Complaint   Patient presents with   • Right Hip - Follow-up, Pain       HISTORY OF PRESENT ILLNESS:  F/u right hip pain. Patient in to discuss surgery options.  She has pain with activities of daily living.  She has pain in her groin especially with weightbearing.  She uses a cane at all times and a walker at some points.  No fevers or chills.  She had total hip arthroplasty done in approximately 1998.  She also had revision total hip arthroplasty in 2011 in [place].  She does not report a specific injury.  She was scheduled to have revision surgery in the fall 2020 but then developed Covid and has not worked herself back up for surgery until now.  She has pain with activities of daily living and is unhappy with her quality of life.     CONCURRENT MEDICAL HISTORY:    Past Medical History:   Diagnosis Date   • Backache    • Benign essential hypertension     PATIENT DENIES   • Cervical arthritis    • Coronary arteriosclerosis    • Fibrocystic breast    • Ganglion cyst of wrist     Ganglion/synovial cyst - wrist   • Ganglion of wrist    • Hip pain    • History of echocardiogram 10/23/2015    Echocardiogram W/ color flow 38774 (1) - Normal LV function with Ef of 55-60%.Normal RV size and function.Pseudonormal diastolic dysfunciton with borderline [facility].NO sig.valvular regurg or stenosis.   • History of mammogram 09/10/2014    MAMMOGRAM SCREENING 03724 - [facility] (1) - [facility] ([facility])   • History of transfusion    • Hyperlipidemia    • Inguinal pain    • Recurrent angina status post coronary artery bypass graft (CMS/[facility])     Recurrent angina after coronary artery bypass graft   • Urge incontinence of urine        Allergies   Allergen Reactions   • Crestor [Rosuvastatin] Shortness Of Breath   • Lipitor [Atorvastatin] Shortness Of Breath       Current Outpatient Medications on File Prior to Visit   Medication Sig   • aspirin 81 MG tablet Take 81 mg  by mouth.   • cephalexin (Keflex) 500 MG capsule Take 1 capsule by mouth 2 (Two) Times a Day for 7 days.   • HYDROcodone-acetaminophen (NORCO) 5-325 MG per tablet Take 1 tablet by mouth Every 6 (Six) Hours As Needed for Severe Pain .   • Multiple Minerals-Vitamins (CITRACAL PLUS PO) Take  by mouth.   • Multiple Vitamins-Minerals (CENTRUM SILVER PO) Take 1 tablet by mouth Daily.   • nitroglycerin (NITROSTAT) 0.4 MG SL tablet 1 under the tongue as needed for angina, may repeat q5mins for up three doses   • pravastatin (PRAVACHOL) 40 MG tablet Take 1 tablet by mouth Daily.   • vitamin B-12 (CYANOCOBALAMIN) 1000 MCG tablet Take 1,000 mcg by mouth Daily.   • vitamin C (ASCORBIC ACID) 500 MG tablet Take 500 mg by mouth Daily.     No current facility-administered medications on file prior to visit.       Past Surgical History:   Procedure Laterality Date   • BREAST BIOPSY Right 2/2/2017    Procedure: RIGHT BREAST LUMPECTOMY WITH NEEDLE LOCALIZATION AND ULTRASOUND;  Surgeon: Delfino Greer MD;  Location: [facility];  Service:    • BREAST CYST ASPIRATION     • CARDIAC SURGERY     • CORONARY ARTERY BYPASS GRAFT  2000    CABG, arterial, single (1)   • GANGLION CYST EXCISION     • JOINT REPLACEMENT     • ORIF MANDIBULAR FRACTURE     • TOTAL ABDOMINAL HYSTERECTOMY     • TOTAL ABDOMINAL HYSTERECTOMY WITH SALPINGO OOPHORECTOMY  1998    SALVADOR/BSO (1)   • TOTAL HIP ARTHROPLASTY  2011    Total hip replacement (3)       Family History   Problem Relation Age of Onset   • Heart disease Other    • Hypertension Other    • Hypertension Mother    • Diabetes Father    • Depression Maternal Grandmother    • Heart disease Maternal Grandfather        Social History     Socioeconomic History   • Marital status:      Spouse name: Not on file   • Number of children: Not on file   • Years of education: Not on file   • Highest education level: Not on file   Tobacco Use   • Smoking status: Never Smoker   • Smokeless tobacco: Never Used   Substance  "and Sexual Activity   • Alcohol use: No   • Drug use: No   • Sexual activity: Defer     Comment:          ROS  No fevers or chills.  No chest pain or shortness of air.  No GI or  disturbances.  None right hip pain, all other systems reviewed as negative.    PHYSICAL EXAMINATION:       Ht 176.5 cm (69.5\")   Wt 80.7 kg (178 lb)   BMI 25.91 kg/m²     Physical Exam  Vitals reviewed.   Constitutional:       General: She is not in acute distress.     Appearance: Normal appearance. She is well-developed.   Cardiovascular:      Rate and Rhythm: Normal rate and regular rhythm.      Heart sounds: Normal heart sounds.   Pulmonary:      Effort: Pulmonary effort is normal.      Breath sounds: Normal breath sounds.   Abdominal:      General: Bowel sounds are normal.      Palpations: Abdomen is soft.   Neurological:      Mental Status: She is alert and oriented to person, place, and time.   Psychiatric:         Behavior: Behavior normal.         Thought Content: Thought content normal.         Judgment: Judgment normal.         GAIT:     []  Normal  []  Antalgic    Assistive device: []  None  []  Walker     []  Crutches  [x]  Cane     []  Wheelchair  []  Stretcher    Right Hip Exam     Tenderness   Right hip tenderness location: Tenderness along the lateral aspect of the hip.    Range of Motion   Flexion: 110   External rotation: 50   Internal rotation: 30     Muscle Strength   Flexion: 4/5     Tests   FABBY: negative  Fadir:  Negative FADIR test    Other   Erythema: absent  Sensation: normal  Pulse: present      Left Hip Exam     Other   Erythema: absent              CT Pelvis Without Contrast    Result Date: 4/6/2021  Narrative: EXAM:   CT Pelvis Without Intravenous Contrast CLINICAL HISTORY:   The patient is 70 years old and is Female; fall, hip/pelvic pain TECHNIQUE:   Axial computed tomography images of the pelvis without intravenous contrast.  Sagittal and coronal reformatted images were created and reviewed.  " This CT exam was performed using one or more of the following dose reduction techniques:  automated exposure control, adjustment of the mA and/or kV according to patient size, and/or use of iterative reconstruction technique. COMPARISON: Hip radiographs performed the same day and June 16, 2020. CT of the head July 24, 2020 FINDINGS:   BONES/JOINTS:  Postsurgical change with bilateral hip prostheses are present. The hardware is engaged. Lucency surrounding the right acetabular component is noted and unchanged from prior exam. Posttraumatic change of the right intertrochanteric region is present. Mild acetabular protrusio on the right is present. Degenerative change of the pubic symphysis is noted. The SI joints are intact. There is no dislocation or fracture.   SOFT TISSUES:  The soft tissues are normal.   VASCULATURE:  Atherosclerosis of the vasculature is present.   BLADDER:  The bladder is moderately distended.  No stones.     Impression:   Bilateral hip prostheses. No evidence of fracture. Electronically signed by:  Yakelin Ricketts MD  4/6/2021 10:03 PM CDT Workstation: [id number]    CT Lower Extremity Right Without Contrast    Result Date: 4/6/2021  Narrative: EXAM:   CT Right Lower Extremity Without Intravenous Contrast CLINICAL HISTORY:   The patient is 70 years old and is Female; right hip pain, further evaluation of right hip prosthesis TECHNIQUE:   Axial computed tomography images of the right lower extremity without intravenous contrast.  Sagittal and coronal reformatted images were created and reviewed.  This CT exam was performed using one or more of the following dose reduction techniques: automated exposure control, adjustment of the mA and/or kV according to patient size, and/or use of iterative reconstruction technique. COMPARISON:   CT of the right hip July 24, 2020 FINDINGS:   BONES/JOINTS:  A right hip prosthesis is present. The hardware is engaged. Lucency surrounding the acetabular component is  noted in similar to prior exam. Mild acetabular protrusio is redemonstrated. Deformity of the trochanteric region consistent with prior fracture is noted and unchanged. There is no acute fracture or dislocation.   SOFT TISSUES:  Unremarkable.     Impression:   No evidence of fracture or dislocation. Right hip prosthesis in place with persistent lucency surrounding the acetabular component and mild acetabula protrusio. The lucency may again be secondary to loosening and/or infection. Electronically signed by:  Yakelin Ricketts MD  4/6/2021 10:11 PM CDT Workstation: [id number]    XR Hip With or Without Pelvis 2 - 3 View Right    Result Date: 4/6/2021  Narrative: EXAM: XR HIP 2 OR MORE VIEWS UNILATERAL WITH PELVIS HISTORY: 70-year-old female, fall, right hip pain TECHNIQUE: Frontal, and lateral views of the right hip. COMPARISON: X-ray 6/16/2020. CT 7/24/2020 FINDINGS: Bones: No apparent acute fracture or dislocation. Bilateral hip prostheses, fragmented cerclage wires on the right, heterotopic ossification bilaterally, stable mild right acetabular protrusio and stable thin lucency surrounding the right acetabular prosthesis. Soft tissues: No acute findings.     Impression: No apparent acute abnormality. Stable nonspecific thin lucency surrounding the right acetabular prosthesis, loosening not excluded. Consider CT if indicated. 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, subsequent encounter  -     Case Request; Standing  -     ceFAZolin (ANCEF) 2 g in sodium chloride 0.9 % 100 mL IVPB  -     MRSA Screen, PCR (Inpatient) - Swab, Nares; Future  -     Tranexamic Acid 1,000 mg in sodium chloride 0.9 % 100 mL  -     Tranexamic Acid 1,000 mg in sodium chloride 0.9 % 100 mL  -     Case Request    Right hip pain  -     Case Request; Standing  -      ceFAZolin (ANCEF) 2 g in sodium chloride 0.9 % 100 mL IVPB  -     MRSA Screen, PCR (Inpatient) - Swab, Nares; Future  -     Tranexamic Acid 1,000 mg in sodium chloride 0.9 % 100 mL  -     Tranexamic Acid 1,000 mg in sodium chloride 0.9 % 100 mL  -     Case Request    Benign essential hypertension  -     Case Request; Standing  -     ceFAZolin (ANCEF) 2 g in sodium chloride 0.9 % 100 mL IVPB  -     MRSA Screen, PCR (Inpatient) - Swab, Nares; Future  -     Tranexamic Acid 1,000 mg in sodium chloride 0.9 % 100 mL  -     Tranexamic Acid 1,000 mg in sodium chloride 0.9 % 100 mL  -     Case Request    S/P hip replacement, right  -     Case Request; Standing  -     ceFAZolin (ANCEF) 2 g in sodium chloride 0.9 % 100 mL IVPB  -     MRSA Screen, PCR (Inpatient) - Swab, Nares; Future  -     Tranexamic Acid 1,000 mg in sodium chloride 0.9 % 100 mL  -     Tranexamic Acid 1,000 mg in sodium chloride 0.9 % 100 mL  -     Case Request    Other orders  -     Follow Anesthesia Guidelines / Standing Orders; Future  -     Follow Anesthesia Guidelines / Standing Orders; Standing  -     Verify NPO Status; Standing  -     Obtain informed consent (if not collected inpatient or PAT); Standing  -     Outpatient In A Bed; Standing  -     Chlorhexidine Skin Prep - Educate and Review With Patient; Future  -     Provide instructions to patient regarding NPO status; Future  -     Provide Patient With ERAS Hydration Instructions          PLAN    It appears that the femoral stem is intact and does not have any sign of loosening.  The acetabular component is clearly loose.  She has multiple broken wires around the greater trochanter and appears to have some heterotopic ossification between the greater trochanter and the side of her pelvis.    She is unhappy with her quality of life and has pain with activities of daily living.    The patient voiced understanding of the risks, benefits, and alternative forms of treatment that were discussed and the  patient consents to proceed with surgery.  All risks, benefits and alternatives were discussed.  Risks include, but not exclusive to anesthetic complications, including death, MI, CVA, infection, bleeding DVT, fracture, residual pain and need for future surgery.    This discussion was held with the patient by Jeovanny Maravilla MD and all questions were answered.    We discussed complicated nature of revision total hip arthroplasty.  Plan would be to assess the stem but it looks like the stem is intact.  We will expose the acetabulum and remove the acetabular component.  It looks like she will probably require bone grafting to fill some of the proximal void of the acetabulum as it has migrated proximally.  Plan on using a multihole acetabular cup with multiple screws.  We discussed extended surgical time, risk of bleeding, leg length discrepancy, and no guarantees were given for pain relief, function, or mobility.    Plan anterior revision total hip arthroplasty.    Patient's Body mass index is 25.91 kg/m². BMI is within normal parameters. No follow-up required..    Return for Post-operative eval.    Jeovanny Maravilla MD

## 2021-04-21 ENCOUNTER — TELEPHONE (OUTPATIENT)
Dept: FAMILY MEDICINE CLINIC | Facility: CLINIC | Age: 71
End: 2021-04-21

## 2021-04-22 RX ORDER — BUPIVACAINE HCL/0.9 % NACL/PF 0.1 %
2 PLASTIC BAG, INJECTION (ML) EPIDURAL ONCE
Status: CANCELLED | OUTPATIENT
Start: 2021-05-19 | End: 2021-04-22

## 2021-04-22 NOTE — TELEPHONE ENCOUNTER
Please call and let patient know that mammogram is normal.  DEXA bone scan shows normal bone density, but she has had a decrease since last test in 2018.  Please encourage healthy diet, adequate calcium/vitamin D and weight bearing exercise (walking) as tolerated.  ThanksROHAN

## 2021-04-23 NOTE — TELEPHONE ENCOUNTER
Per Dr. Herrera, Ms. Dawson has been called with recent Bilateral Screening 3-D Mammogram and DEXA Bone Density Scan results & recommendations.  Continue current medications and follow-up as planned or sooner if any problems.

## 2021-04-27 PROBLEM — Z96.641 S/P HIP REPLACEMENT, RIGHT: Status: ACTIVE | Noted: 2021-04-27

## 2021-04-28 ENCOUNTER — OFFICE VISIT (OUTPATIENT)
Dept: FAMILY MEDICINE CLINIC | Facility: CLINIC | Age: 71
End: 2021-04-28

## 2021-04-28 VITALS
HEART RATE: 75 BPM | BODY MASS INDEX: 25.48 KG/M2 | OXYGEN SATURATION: 98 % | SYSTOLIC BLOOD PRESSURE: 110 MMHG | WEIGHT: 178 LBS | HEIGHT: 70 IN | DIASTOLIC BLOOD PRESSURE: 70 MMHG

## 2021-04-28 DIAGNOSIS — M25.472 LEFT ANKLE SWELLING: Primary | ICD-10-CM

## 2021-04-28 DIAGNOSIS — M67.432 GANGLION CYST OF WRIST, LEFT: ICD-10-CM

## 2021-04-28 PROCEDURE — 99213 OFFICE O/P EST LOW 20 MIN: CPT | Performed by: FAMILY MEDICINE

## 2021-05-12 ENCOUNTER — PRE-ADMISSION TESTING (OUTPATIENT)
Dept: PREADMISSION TESTING | Facility: HOSPITAL | Age: 71
End: 2021-05-12

## 2021-05-12 ENCOUNTER — OFFICE VISIT (OUTPATIENT)
Dept: CARDIOLOGY | Facility: CLINIC | Age: 71
End: 2021-05-12

## 2021-05-12 VITALS
WEIGHT: 185.6 LBS | HEART RATE: 67 BPM | BODY MASS INDEX: 26.57 KG/M2 | DIASTOLIC BLOOD PRESSURE: 66 MMHG | SYSTOLIC BLOOD PRESSURE: 120 MMHG | HEIGHT: 70 IN | OXYGEN SATURATION: 97 %

## 2021-05-12 DIAGNOSIS — T84.030D MECHANICAL LOOSENING OF INTERNAL RIGHT HIP PROSTHETIC JOINT, SUBSEQUENT ENCOUNTER: ICD-10-CM

## 2021-05-12 DIAGNOSIS — I25.10 CORONARY ARTERY DISEASE INVOLVING NATIVE CORONARY ARTERY OF NATIVE HEART WITHOUT ANGINA PECTORIS: Primary | ICD-10-CM

## 2021-05-12 DIAGNOSIS — Z96.641 S/P HIP REPLACEMENT, RIGHT: ICD-10-CM

## 2021-05-12 DIAGNOSIS — E66.3 OVERWEIGHT (BMI 25.0-29.9): ICD-10-CM

## 2021-05-12 DIAGNOSIS — R01.1 HEART MURMUR: ICD-10-CM

## 2021-05-12 DIAGNOSIS — I10 BENIGN ESSENTIAL HYPERTENSION: ICD-10-CM

## 2021-05-12 DIAGNOSIS — E78.2 MIXED HYPERLIPIDEMIA: ICD-10-CM

## 2021-05-12 DIAGNOSIS — M25.551 RIGHT HIP PAIN: ICD-10-CM

## 2021-05-12 PROBLEM — Z01.810 PREOP CARDIOVASCULAR EXAM: Status: RESOLVED | Noted: 2020-08-24 | Resolved: 2021-05-12

## 2021-05-12 LAB
ABO GROUP BLD: NORMAL
BLD GP AB SCN SERPL QL: NEGATIVE
DEPRECATED RDW RBC AUTO: 42.2 FL (ref 37–54)
ERYTHROCYTE [DISTWIDTH] IN BLOOD BY AUTOMATED COUNT: 13.5 % (ref 12.3–15.4)
HCT VFR BLD AUTO: 38.5 % (ref 34–46.6)
HGB BLD-MCNC: 12.5 G/DL (ref 12–15.9)
Lab: NORMAL
MCH RBC QN AUTO: 27.5 PG (ref 26.6–33)
MCHC RBC AUTO-ENTMCNC: 32.5 G/DL (ref 31.5–35.7)
MCV RBC AUTO: 84.8 FL (ref 79–97)
MRSA DNA SPEC QL NAA+PROBE: NEGATIVE
PLATELET # BLD AUTO: 214 10*3/MM3 (ref 140–450)
PMV BLD AUTO: 10.4 FL (ref 6–12)
RBC # BLD AUTO: 4.54 10*6/MM3 (ref 3.77–5.28)
RH BLD: POSITIVE
T&S EXPIRATION DATE: NORMAL
WBC # BLD AUTO: 5.63 10*3/MM3 (ref 3.4–10.8)

## 2021-05-12 PROCEDURE — 99214 OFFICE O/P EST MOD 30 MIN: CPT | Performed by: INTERNAL MEDICINE

## 2021-05-12 PROCEDURE — 36415 COLL VENOUS BLD VENIPUNCTURE: CPT

## 2021-05-12 PROCEDURE — 86900 BLOOD TYPING SEROLOGIC ABO: CPT

## 2021-05-12 PROCEDURE — 86901 BLOOD TYPING SEROLOGIC RH(D): CPT

## 2021-05-12 PROCEDURE — 85027 COMPLETE CBC AUTOMATED: CPT

## 2021-05-12 PROCEDURE — 93000 ELECTROCARDIOGRAM COMPLETE: CPT | Performed by: INTERNAL MEDICINE

## 2021-05-12 PROCEDURE — 87641 MR-STAPH DNA AMP PROBE: CPT

## 2021-05-12 PROCEDURE — 86850 RBC ANTIBODY SCREEN: CPT

## 2021-05-12 RX ORDER — SODIUM CHLORIDE, SODIUM GLUCONATE, SODIUM ACETATE, POTASSIUM CHLORIDE AND MAGNESIUM CHLORIDE 526; 502; 368; 37; 30 MG/100ML; MG/100ML; MG/100ML; MG/100ML; MG/100ML
1000 INJECTION, SOLUTION INTRAVENOUS CONTINUOUS PRN
Status: CANCELLED | OUTPATIENT
Start: 2021-05-19

## 2021-05-12 RX ORDER — OXYCODONE HYDROCHLORIDE AND ACETAMINOPHEN 5; 325 MG/1; MG/1
1 TABLET ORAL EVERY 6 HOURS PRN
COMMUNITY
End: 2021-05-21 | Stop reason: HOSPADM

## 2021-05-12 ASSESSMENT — HOOS JR
HOOS JR SCORE: 36.363
HOOS JR SCORE: 17

## 2021-05-12 NOTE — PATIENT INSTRUCTIONS
Preventing Unhealthy Weight Gain, Adult  Staying at a healthy weight is important to your overall health. When fat builds up in your body, you may become overweight or obese. Being overweight or obese increases your risk of developing certain health problems, such as heart disease, diabetes, sleeping problems, joint problems, and some types of cancer.  Unhealthy weight gain is often the result of making unhealthy food choices or not getting enough exercise. You can make changes to your lifestyle to prevent obesity and stay as healthy as possible.  What nutrition changes can be made?    · Eat only as much as your body needs. To do this:  ? Pay attention to signs that you are hungry or full. Stop eating as soon as you feel full.  ? If you feel hungry, try drinking water first before eating. Drink enough water so your urine is clear or pale yellow.  ? Eat smaller portions. Pay attention to portion sizes when eating out.  ? Look at serving sizes on food labels. Most foods contain more than one serving per container.  ? Eat the recommended number of calories for your gender and activity level. For most active people, a daily total of 2,000 calories is appropriate. If you are trying to lose weight or are not very active, you may need to eat fewer calories. Talk with your health care provider or a diet and nutrition specialist (dietitian) about how many calories you need each day.  · Choose healthy foods, such as:  ? Fruits and vegetables. At each meal, try to fill at least half of your plate with fruits and vegetables.  ? Whole grains, such as whole-wheat bread, brown rice, and quinoa.  ? Lean meats, such as chicken or fish.  ? Other healthy proteins, such as beans, eggs, or tofu.  ? Healthy fats, such as nuts, seeds, fatty fish, and olive oil.  ? Low-fat or fat-free dairy products.  · Check food labels, and avoid food and drinks that:  ? Are high in calories.  ? Have added sugar.  ? Are high in sodium.  ? Have saturated  fats or trans fats.  · Cook foods in healthier ways, such as by baking, broiling, or grilling.  · Make a meal plan for the week, and shop with a grocery list to help you stay on track with your purchases. Try to avoid going to the grocery store when you are hungry.  · When grocery shopping, try to shop around the outside of the store first, where the fresh foods are. Doing this helps you to avoid prepackaged foods, which can be high in sugar, salt (sodium), and fat.  What lifestyle changes can be made?    · Exercise for 30 or more minutes on 5 or more days each week. Exercising may include brisk walking, yard work, biking, running, swimming, and team sports like basketball and soccer. Ask your health care provider which exercises are safe for you.  · Do muscle-strengthening activities, such as lifting weights or using resistance bands, on 2 or more days a week.  · Do not use any products that contain nicotine or tobacco, such as cigarettes and e-cigarettes. If you need help quitting, ask your health care provider.  · Limit alcohol intake to no more than 1 drink a day for nonpregnant women and 2 drinks a day for men. One drink equals 12 oz of beer, 5 oz of wine, or 1½ oz of hard liquor.  · Try to get 7-9 hours of sleep each night.  What other changes can be made?  · Keep a food and activity journal to keep track of:  ? What you ate and how many calories you had. Remember to count the calories in sauces, dressings, and side dishes.  ? Whether you were active, and what exercises you did.  ? Your calorie, weight, and activity goals.  · Check your weight regularly. Track any changes. If you notice you have gained weight, make changes to your diet or activity routine.  · Avoid taking weight-loss medicines or supplements. Talk to your health care provider before starting any new medicine or supplement.  · Talk to your health care provider before trying any new diet or exercise plan.  Why are these changes  important?  Eating healthy, staying active, and having healthy habits can help you to prevent obesity. Those changes also:  · Help you manage stress and emotions.  · Help you connect with friends and family.  · Improve your self-esteem.  · Improve your sleep.  · Prevent long-term health problems.  What can happen if changes are not made?  Being obese or overweight can cause you to develop joint or bone problems, which can make it hard for you to stay active or do activities you enjoy. Being obese or overweight also puts stress on your heart and lungs and can lead to health problems like diabetes, heart disease, and some cancers.  Where to find more information  Talk with your health care provider or a dietitian about healthy eating and healthy lifestyle choices. You may also find information from:  · U.[place]. Department of Agriculture, MyPlate: www.Shapewaysmyplate.gov  · American Heart Association: www.heart.org  · Centers for Disease Control and Prevention: www.cdc.gov  Summary  · Staying at a healthy weight is important to your overall health. It helps you to prevent certain diseases and health problems, such as heart disease, diabetes, joint problems, sleep disorders, and some types of cancer.  · Being obese or overweight can cause you to develop joint or bone problems, which can make it hard for you to stay active or do activities you enjoy.  · You can prevent unhealthy weight gain by eating a healthy diet, exercising regularly, not smoking, limiting alcohol, and getting enough sleep.  · Talk with your health care provider or a dietitian for guidance about healthy eating and healthy lifestyle choices.  This information is not intended to replace advice given to you by your health care provider. Make sure you discuss any questions you have with your health care provider.  Document Revised: 12/21/2018 Document Reviewed: 01/24/2018  Elsevier Patient Education © 2021 [employer]

## 2021-05-12 NOTE — PROGRESS NOTES
Cardiovascular Medicine   Vishnu Reynaga M.D., Ph.D., [facility]      The patient returns to cardiovascular clinic for follow-up of the following:    PROBLEM LIST:  1. ASCAD  A. PCI-LAD  B. CABG with LIMA to LAD in 2000 Dr. Montes  2. HLD  A. Statin intolerance to Zocor and Crestor    Chrissy Amira Dawson is a 70 y.o. female who returns in follow-up today.    She returns today for a history of coronary artery disease.    She has a history of coronary artery disease.  The following is reviewed as a matter of history: She had a PCI to the LAD.  She subsequently underwent CABG with a LIMA to the LAD in 2000.  This was secondary to ISR. Her last invasive coronary angiography was in October 2010.  LVEF was normal.  At that time her THOMPSON was patent.  LAD showed native vessel disease of 60-70%.  She is currently prescribed aspirin.  The patient does not have a history of angina or MI, so she has not used a beta-blocker.  She has had some issues with some statins, though she is currently tolerating pravastatin 40 mg.  She has had no resting, exertional or nocturnal angina.  She continues to work.  There is no exertional dyspnea or exercise intolerance.        The following portions of the patient's history were reviewed and updated as appropriate: allergies, current medications, past family history, past medical history, past social history, past surgical history and problem list.          Review of Systems   Cardiovascular: Negative.    Respiratory: Negative.    All other systems reviewed and are negative.    family history includes Depression in her maternal grandmother; Diabetes in her father; Heart disease in her maternal grandfather and another family member; Hypertension in her mother and another family member.   reports that she has never smoked. She has never used smokeless tobacco. She reports that she does not drink alcohol and does not use drugs.  Allergies   Allergen Reactions   • Crestor [Rosuvastatin]  Shortness Of Breath   • Lipitor [Atorvastatin] Shortness Of Breath       Current Outpatient Medications:   •  aspirin 81 MG tablet, Take 81 mg by mouth., Disp: , Rfl:   •  Multiple Minerals-Vitamins (CITRACAL PLUS PO), Take  by mouth., Disp: , Rfl:   •  Multiple Vitamins-Minerals (CENTRUM SILVER PO), Take 1 tablet by mouth Daily., Disp: , Rfl:   •  nitroglycerin (NITROSTAT) 0.4 MG SL tablet, 1 under the tongue as needed for angina, may repeat q5mins for up three doses, Disp: 15 tablet, Rfl: 6  •  pravastatin (PRAVACHOL) 40 MG tablet, Take 1 tablet by mouth Daily., Disp: 31 tablet, Rfl: 6  •  vitamin B-12 (CYANOCOBALAMIN) 1000 MCG tablet, Take 1,000 mcg by mouth Daily., Disp: , Rfl:   •  vitamin C (ASCORBIC ACID) 500 MG tablet, Take 500 mg by mouth Daily., Disp: , Rfl:     Physical Exam:  Vitals:    05/12/21 1111   BP: 120/66   Pulse: 67   SpO2: 97%     Body mass index is 27.02 kg/m².   Last Weights:   Wt Readings from Last 3 Encounters:   05/12/21 84.2 kg (185 lb 9.6 oz)   04/28/21 80.7 kg (178 lb)   04/20/21 80.7 kg (178 lb)     Pulse Ox: Normal   General: alert, appears stated age and cooperative  Body Habitus: overweight  HEENT: Head: Normocephalic, no lesions, without obvious abnormality. No arcus senilis, xanthelasma or xanthomas.  JVP: 6 cm of water at 45 degrees   Volume/Pulsation: Normal  Carotid Exam: normal pulsation bilaterally   Carotid Volume: normal    Subclavian Bruit: absent  Vertebral Bruit: absent   Heart rate: normal    Heart Rhythm: regular     Heart Sounds: S1: normal intensity  S2: normal intensity  S3: absent   S4: absent  Opening Snap: absent  A2-OS:  absent.   Pericardial rub: absent    Ejection click: None      Murmurs:  present - I/VI early JOSSE at  base   Extremity: moves all extremities equally.       DATA REVIEWED:     EKG. I personally reviewed and interpreted the EKG.    Sinus, interpreted  today            --------------------------------------------------------------------------------------------------  LABS:   Lab Results   Component Value Date    GLUCOSE 117 (H) 04/06/2021    BUN 24 (H) 04/06/2021    CREATININE 0.80 04/06/2021    EGFRIFAFRI 86 04/06/2021    BCR 30.0 (H) 04/06/2021    K 4.7 04/06/2021    CO2 28.0 04/06/2021    CALCIUM 9.7 04/06/2021    ALBUMIN 4.00 04/06/2021    AST 15 04/06/2021    ALT 9 04/06/2021     Lab Results   Component Value Date    WBC 5.51 04/06/2021    HGB 12.5 04/06/2021    HCT 38.0 04/06/2021    MCV 85.4 04/06/2021     04/06/2021     Lab Results   Component Value Date    CHOL 255 (H) 10/15/2020    CHLPL 294 (H) 10/19/2015    TRIG 52 10/15/2020    HDL 82 (H) 10/15/2020     (H) 10/15/2020     Lab Results   Component Value Date    TSH 1.320 03/29/2019     No results found for: CKTOTAL, CKMB, CKMBINDEX, TROPONINI, TROPONINT  Lab Results   Component Value Date    HGBA1C 5.90 (H) 03/29/2019     Lab Results   Component Value Date    DDIMER 773 (H) 08/08/2014     Lab Results   Component Value Date    ALT 9 04/06/2021     Lab Results   Component Value Date    HGBA1C 5.90 (H) 03/29/2019     Lab Results   Component Value Date    CREATININE 0.80 04/06/2021     No results found for: IRON, TIBC, FERRITIN  Lab Results   Component Value Date    INR 1.05 04/06/2021    PROTIME 14.1 04/06/2021       Assessment/Plan          1. Coronary artery disease involving native coronary artery of native heart without angina pectoris. No anginal symptoms. The patient has been revascularized.  Revascularization:  CABG and PTCI.    At her last appointment we discussed her LDL not at goal.  I am very suspicious she is going to require the addition of an injectable.  However, she wanted to make some dietary changes and increased her dose of dose of pravastatin.  The plan was to repeat her lipids.  These were ordered.  Unfortunately, she did not have the labs drawn.  Functional stress  testing has been discussed in the past given the timeframe of her last intervention.  However, she has declined.  -ASA  -Pravachol (pravastatin)  -SL nitro prn  -Call 911 immediately for concerning symptoms.  -I have asked her to have her lipids checked and then follow-up with her new cardiologist to likely discuss injectable therapy    2. JOSSE.  I have discussed with her in the past that she has a JOSSE.  This appears to be a flow murmur.  However, her LVEF has also not been assessed in quite some time.  It was normal on her last invasive coronary angiogram.  I cannot exclude an element of AS.  2D TTE was discussed in the past. She was hesitant to do a TTE. Discussed today and she is agreeable.     The BMI is Body mass index is 27.02 kg/m². ACC/AHA guidelines recommend that height, weight and BMI is calculated at visits.  The patient has been provided with information regarding the elevated risk of cardiovascular disease, type 2 diabetes and all-cause mortality.  Sustained weight loss of 3-5% is likely to result in clinically meaningful reductions in triglycerides, hemoglobin A1c, blood glucose, and the risk of developing type 2 diabetes.  The greater the amount of weight loss typically the greater reduction in blood pressure, reduction and need for medications to control blood pressure, improvements in blood glucose and lipids.  Handout was provided on the patient's BMI.  The patient has been encouraged to follow-up with their primary care provider.        Dr. Diaz in 3 months

## 2021-05-13 ENCOUNTER — TELEPHONE (OUTPATIENT)
Dept: ORTHOPEDIC SURGERY | Facility: CLINIC | Age: 71
End: 2021-05-13

## 2021-05-13 LAB
QT INTERVAL: 404 MS
QTC INTERVAL: 432 MS

## 2021-05-13 NOTE — TELEPHONE ENCOUNTER
Pt CALLED STATING SHE IS WORRIED SHE MAY HAVE A UTI     Pt ALSO STATES SHE IS IN SO MUCH PAIN IT HAS TAKEN HER OFF OF HER FEET RIGHT NOW     Pt IS REQUESTING TO SPEAK WITH  NURSE CONCERNING THIS     CALL BACK # [phone]

## 2021-05-13 NOTE — TELEPHONE ENCOUNTER
Urinalysis done on 5/11/21.    Dr Herrera put her on Keflex for 7 days.  Patient reports improvement in urine odor.  She has finished her antibiotic and can start smelling an odor again when she voids.  She is concerned about infection.    I have spoken with Dr Maravilla.  He has looked at the urinalysis results.  He reports nothing alarming was resulted.    It is up to the patient on whether we proceed with surgery.      Patient has agreed to go ahead with surgery as planned

## 2021-05-16 ENCOUNTER — LAB (OUTPATIENT)
Dept: LAB | Facility: HOSPITAL | Age: 71
End: 2021-05-16

## 2021-05-16 DIAGNOSIS — Z01.818 PREOP TESTING: Primary | ICD-10-CM

## 2021-05-16 LAB — SARS-COV-2 N GENE RESP QL NAA+PROBE: NOT DETECTED

## 2021-05-16 PROCEDURE — C9803 HOPD COVID-19 SPEC COLLECT: HCPCS

## 2021-05-16 PROCEDURE — 87635 SARS-COV-2 COVID-19 AMP PRB: CPT

## 2021-05-18 ENCOUNTER — ANESTHESIA EVENT (OUTPATIENT)
Dept: PERIOP | Facility: HOSPITAL | Age: 71
End: 2021-05-18

## 2021-05-19 ENCOUNTER — ANESTHESIA (OUTPATIENT)
Dept: PERIOP | Facility: HOSPITAL | Age: 71
End: 2021-05-19

## 2021-05-19 ENCOUNTER — APPOINTMENT (OUTPATIENT)
Dept: GENERAL RADIOLOGY | Facility: HOSPITAL | Age: 71
End: 2021-05-19

## 2021-05-19 ENCOUNTER — HOSPITAL ENCOUNTER (OUTPATIENT)
Facility: HOSPITAL | Age: 71
Discharge: HOME | End: 2021-05-21
Attending: ORTHOPAEDIC SURGERY | Admitting: ORTHOPAEDIC SURGERY

## 2021-05-19 DIAGNOSIS — Z96.641 S/P HIP REPLACEMENT, RIGHT: ICD-10-CM

## 2021-05-19 DIAGNOSIS — Z78.9 IMPAIRED MOBILITY AND ACTIVITIES OF DAILY LIVING: ICD-10-CM

## 2021-05-19 DIAGNOSIS — I10 BENIGN ESSENTIAL HYPERTENSION: ICD-10-CM

## 2021-05-19 DIAGNOSIS — Z79.01 ANTICOAGULATED ON WARFARIN: ICD-10-CM

## 2021-05-19 DIAGNOSIS — T84.030D MECHANICAL LOOSENING OF INTERNAL RIGHT HIP PROSTHETIC JOINT, SUBSEQUENT ENCOUNTER: Primary | ICD-10-CM

## 2021-05-19 DIAGNOSIS — Z74.09 IMPAIRED MOBILITY AND ACTIVITIES OF DAILY LIVING: ICD-10-CM

## 2021-05-19 DIAGNOSIS — M25.551 RIGHT HIP PAIN: ICD-10-CM

## 2021-05-19 DIAGNOSIS — Z74.09 IMPAIRED FUNCTIONAL MOBILITY, BALANCE, GAIT, AND ENDURANCE: ICD-10-CM

## 2021-05-19 LAB
ABO GROUP BLD: NORMAL
BLD GP AB SCN SERPL QL: NEGATIVE
HCT VFR BLD AUTO: 34.2 % (ref 34–46.6)
HGB BLD-MCNC: 11.2 G/DL (ref 12–15.9)
Lab: NORMAL
RH BLD: POSITIVE
T&S EXPIRATION DATE: NORMAL

## 2021-05-19 PROCEDURE — A9270 NON-COVERED ITEM OR SERVICE: HCPCS | Performed by: ORTHOPAEDIC SURGERY

## 2021-05-19 PROCEDURE — C1713 ANCHOR/SCREW BN/BN,TIS/BN: HCPCS | Performed by: ORTHOPAEDIC SURGERY

## 2021-05-19 PROCEDURE — 97162 PT EVAL MOD COMPLEX 30 MIN: CPT

## 2021-05-19 PROCEDURE — 25010000002 MIDAZOLAM PER 1 MG: Performed by: NURSE ANESTHETIST, CERTIFIED REGISTERED

## 2021-05-19 PROCEDURE — 25010000002 DEXAMETHASONE PER 1 MG: Performed by: NURSE ANESTHETIST, CERTIFIED REGISTERED

## 2021-05-19 PROCEDURE — 63710000001 OXYCODONE 10 MG TABLET EXTENDED-RELEASE 12 HOUR: Performed by: ORTHOPAEDIC SURGERY

## 2021-05-19 PROCEDURE — 85018 HEMOGLOBIN: CPT | Performed by: ORTHOPAEDIC SURGERY

## 2021-05-19 PROCEDURE — 25010000002 HYDROMORPHONE PER 4 MG: Performed by: NURSE ANESTHETIST, CERTIFIED REGISTERED

## 2021-05-19 PROCEDURE — 25010000002 ONDANSETRON PER 1 MG: Performed by: NURSE ANESTHETIST, CERTIFIED REGISTERED

## 2021-05-19 PROCEDURE — 97166 OT EVAL MOD COMPLEX 45 MIN: CPT

## 2021-05-19 PROCEDURE — C1889 IMPLANT/INSERT DEVICE, NOC: HCPCS | Performed by: ORTHOPAEDIC SURGERY

## 2021-05-19 PROCEDURE — 63710000001 PREGABALIN 75 MG CAPSULE: Performed by: ORTHOPAEDIC SURGERY

## 2021-05-19 PROCEDURE — 25010000002 FENTANYL CITRATE (PF) 50 MCG/ML SOLUTION: Performed by: NURSE ANESTHETIST, CERTIFIED REGISTERED

## 2021-05-19 PROCEDURE — 63710000001 WARFARIN 2.5 MG TABLET: Performed by: ORTHOPAEDIC SURGERY

## 2021-05-19 PROCEDURE — 76000 FLUOROSCOPY <1 HR PHYS/QHP: CPT

## 2021-05-19 PROCEDURE — 25010000002 VANCOMYCIN 1 G RECONSTITUTED SOLUTION: Performed by: NURSE ANESTHETIST, CERTIFIED REGISTERED

## 2021-05-19 PROCEDURE — 63710000001 FAMOTIDINE 40 MG TABLET: Performed by: ORTHOPAEDIC SURGERY

## 2021-05-19 PROCEDURE — 87070 CULTURE OTHR SPECIMN AEROBIC: CPT | Performed by: ORTHOPAEDIC SURGERY

## 2021-05-19 PROCEDURE — 63710000001 OXYCODONE 5 MG TABLET: Performed by: ORTHOPAEDIC SURGERY

## 2021-05-19 PROCEDURE — 86901 BLOOD TYPING SEROLOGIC RH(D): CPT | Performed by: ANESTHESIOLOGY

## 2021-05-19 PROCEDURE — 86900 BLOOD TYPING SEROLOGIC ABO: CPT | Performed by: ANESTHESIOLOGY

## 2021-05-19 PROCEDURE — 85014 HEMATOCRIT: CPT | Performed by: ORTHOPAEDIC SURGERY

## 2021-05-19 PROCEDURE — 25010000002 CEFAZOLIN PER 500 MG: Performed by: ORTHOPAEDIC SURGERY

## 2021-05-19 PROCEDURE — C1776 JOINT DEVICE (IMPLANTABLE): HCPCS | Performed by: ORTHOPAEDIC SURGERY

## 2021-05-19 PROCEDURE — 88300 SURGICAL PATH GROSS: CPT

## 2021-05-19 PROCEDURE — 94799 UNLISTED PULMONARY SVC/PX: CPT

## 2021-05-19 PROCEDURE — 27137 REVISE HIP JOINT REPLACEMENT: CPT | Performed by: ORTHOPAEDIC SURGERY

## 2021-05-19 PROCEDURE — 25010000002 PROPOFOL 10 MG/ML EMULSION: Performed by: NURSE ANESTHETIST, CERTIFIED REGISTERED

## 2021-05-19 PROCEDURE — 63710000001 PRAVASTATIN 40 MG TABLET: Performed by: ORTHOPAEDIC SURGERY

## 2021-05-19 PROCEDURE — 25010000002 HYDROMORPHONE 1 MG/ML SOLUTION: Performed by: NURSE ANESTHETIST, CERTIFIED REGISTERED

## 2021-05-19 PROCEDURE — 63710000001 MELOXICAM 15 MG TABLET: Performed by: ORTHOPAEDIC SURGERY

## 2021-05-19 PROCEDURE — 25010000002 NEOSTIGMINE 10 MG/10ML SOLUTION: Performed by: NURSE ANESTHETIST, CERTIFIED REGISTERED

## 2021-05-19 PROCEDURE — 63710000001 ASPIRIN 81 MG TABLET DELAYED-RELEASE: Performed by: ORTHOPAEDIC SURGERY

## 2021-05-19 PROCEDURE — 87205 SMEAR GRAM STAIN: CPT | Performed by: ORTHOPAEDIC SURGERY

## 2021-05-19 PROCEDURE — 25010000002 SUCCINYLCHOLINE PER 20 MG: Performed by: NURSE ANESTHETIST, CERTIFIED REGISTERED

## 2021-05-19 PROCEDURE — 63710000001 ACETAMINOPHEN 500 MG TABLET: Performed by: ORTHOPAEDIC SURGERY

## 2021-05-19 PROCEDURE — 25010000002 ROPIVACAINE PER 1 MG: Performed by: NURSE ANESTHETIST, CERTIFIED REGISTERED

## 2021-05-19 PROCEDURE — 86850 RBC ANTIBODY SCREEN: CPT | Performed by: ANESTHESIOLOGY

## 2021-05-19 DEVICE — PINNACLE GRIPTION ACETABULAR SHELL MULTI-HOLE 54MM OD
Type: IMPLANTABLE DEVICE | Site: HIP | Status: FUNCTIONAL
Brand: PINNACLE GRIPTION

## 2021-05-19 DEVICE — PINNACLE CANCELLOUS BONE SCREW 6.5MM X 30MM
Type: IMPLANTABLE DEVICE | Site: HIP | Status: FUNCTIONAL
Brand: PINNACLE

## 2021-05-19 DEVICE — DEV CONTRL TISS STRATAFIXSPIRAL PDO BIDIR MH 2/0 24X24CM: Type: IMPLANTABLE DEVICE | Site: HIP | Status: FUNCTIONAL

## 2021-05-19 DEVICE — DEV CONTRL TISS STRATAFIXSPIRALMNCRYL PLSPS2 REV4/0 30CM: Type: IMPLANTABLE DEVICE | Site: HIP | Status: FUNCTIONAL

## 2021-05-19 DEVICE — PINNACLE CANCELLOUS BONE SCREW 6.5MM X 25MM
Type: IMPLANTABLE DEVICE | Site: HIP | Status: FUNCTIONAL
Brand: PINNACLE

## 2021-05-19 DEVICE — PINNACLE CANCELLOUS BONE SCREW 6.5MM X 20MM
Type: IMPLANTABLE DEVICE | Site: HIP | Status: FUNCTIONAL
Brand: PINNACLE

## 2021-05-19 DEVICE — PINNACLE HIP SOLUTIONS ALTRX POLYETHYLENE ACETABULAR LINER +4 NEUTRAL 36MM ID 54MM OD
Type: IMPLANTABLE DEVICE | Site: HIP | Status: FUNCTIONAL
Brand: PINNACLE ALTRX

## 2021-05-19 DEVICE — IMPLANTABLE DEVICE: Type: IMPLANTABLE DEVICE | Site: HIP | Status: FUNCTIONAL

## 2021-05-19 DEVICE — BONE CANC/CORT BLND 80/20 30CC: Type: IMPLANTABLE DEVICE | Site: HIP | Status: FUNCTIONAL

## 2021-05-19 DEVICE — PINNACLE CANCELLOUS BONE SCREW 6.5MM X 15MM
Type: IMPLANTABLE DEVICE | Site: HIP | Status: FUNCTIONAL
Brand: PINNACLE

## 2021-05-19 DEVICE — DEV CONTRL TISS STRATAFIX SPIRAL PDO DBLARM 0 24X24CM: Type: IMPLANTABLE DEVICE | Site: HIP | Status: FUNCTIONAL

## 2021-05-19 RX ORDER — TROSPIUM CHLORIDE 20 MG/1
20 TABLET, FILM COATED ORAL 2 TIMES DAILY
COMMUNITY
Start: 2021-05-11 | End: 2021-08-12

## 2021-05-19 RX ORDER — ASPIRIN 81 MG/1
81 TABLET ORAL 2 TIMES DAILY
Status: DISCONTINUED | OUTPATIENT
Start: 2021-05-19 | End: 2021-05-21 | Stop reason: HOSPADM

## 2021-05-19 RX ORDER — BUPIVACAINE HCL/0.9 % NACL/PF 0.1 %
2 PLASTIC BAG, INJECTION (ML) EPIDURAL ONCE
Status: COMPLETED | OUTPATIENT
Start: 2021-05-19 | End: 2021-05-19

## 2021-05-19 RX ORDER — OXYCODONE HCL 10 MG/1
10 TABLET, FILM COATED, EXTENDED RELEASE ORAL ONCE
Status: COMPLETED | OUTPATIENT
Start: 2021-05-19 | End: 2021-05-19

## 2021-05-19 RX ORDER — FENTANYL CITRATE 50 UG/ML
INJECTION, SOLUTION INTRAMUSCULAR; INTRAVENOUS AS NEEDED
Status: DISCONTINUED | OUTPATIENT
Start: 2021-05-19 | End: 2021-05-19 | Stop reason: SURG

## 2021-05-19 RX ORDER — WARFARIN SODIUM 2.5 MG/1
2.5 TABLET ORAL
Status: DISCONTINUED | OUTPATIENT
Start: 2021-05-19 | End: 2021-05-21 | Stop reason: HOSPADM

## 2021-05-19 RX ORDER — ONDANSETRON 2 MG/ML
INJECTION INTRAMUSCULAR; INTRAVENOUS AS NEEDED
Status: DISCONTINUED | OUTPATIENT
Start: 2021-05-19 | End: 2021-05-19 | Stop reason: SURG

## 2021-05-19 RX ORDER — DEXAMETHASONE SODIUM PHOSPHATE 4 MG/ML
INJECTION, SOLUTION INTRA-ARTICULAR; INTRALESIONAL; INTRAMUSCULAR; INTRAVENOUS; SOFT TISSUE AS NEEDED
Status: DISCONTINUED | OUTPATIENT
Start: 2021-05-19 | End: 2021-05-19 | Stop reason: SURG

## 2021-05-19 RX ORDER — HYDROMORPHONE HCL 110MG/55ML
PATIENT CONTROLLED ANALGESIA SYRINGE INTRAVENOUS AS NEEDED
Status: DISCONTINUED | OUTPATIENT
Start: 2021-05-19 | End: 2021-05-19 | Stop reason: SURG

## 2021-05-19 RX ORDER — FERROUS SULFATE TAB EC 324 MG (65 MG FE EQUIVALENT) 324 (65 FE) MG
324 TABLET DELAYED RESPONSE ORAL
Status: DISCONTINUED | OUTPATIENT
Start: 2021-05-20 | End: 2021-05-21 | Stop reason: HOSPADM

## 2021-05-19 RX ORDER — MELOXICAM 15 MG/1
15 TABLET ORAL ONCE
Status: COMPLETED | OUTPATIENT
Start: 2021-05-19 | End: 2021-05-19

## 2021-05-19 RX ORDER — VANCOMYCIN HYDROCHLORIDE 1 G/20ML
INJECTION, POWDER, LYOPHILIZED, FOR SOLUTION INTRAVENOUS AS NEEDED
Status: DISCONTINUED | OUTPATIENT
Start: 2021-05-19 | End: 2021-05-19 | Stop reason: SURG

## 2021-05-19 RX ORDER — ESMOLOL HYDROCHLORIDE 10 MG/ML
INJECTION INTRAVENOUS AS NEEDED
Status: DISCONTINUED | OUTPATIENT
Start: 2021-05-19 | End: 2021-05-19 | Stop reason: SURG

## 2021-05-19 RX ORDER — OXYCODONE HCL 10 MG/1
10 TABLET, FILM COATED, EXTENDED RELEASE ORAL EVERY 12 HOURS SCHEDULED
Status: DISCONTINUED | OUTPATIENT
Start: 2021-05-19 | End: 2021-05-21 | Stop reason: HOSPADM

## 2021-05-19 RX ORDER — ROCURONIUM BROMIDE 10 MG/ML
INJECTION, SOLUTION INTRAVENOUS AS NEEDED
Status: DISCONTINUED | OUTPATIENT
Start: 2021-05-19 | End: 2021-05-19 | Stop reason: SURG

## 2021-05-19 RX ORDER — ALBUTEROL SULFATE 2.5 MG/3ML
2.5 SOLUTION RESPIRATORY (INHALATION) ONCE AS NEEDED
Status: DISCONTINUED | OUTPATIENT
Start: 2021-05-19 | End: 2021-05-19 | Stop reason: HOSPADM

## 2021-05-19 RX ORDER — NALOXONE HCL 0.4 MG/ML
0.1 VIAL (ML) INJECTION
Status: DISCONTINUED | OUTPATIENT
Start: 2021-05-19 | End: 2021-05-21 | Stop reason: HOSPADM

## 2021-05-19 RX ORDER — SODIUM CHLORIDE, SODIUM GLUCONATE, SODIUM ACETATE, POTASSIUM CHLORIDE AND MAGNESIUM CHLORIDE 526; 502; 368; 37; 30 MG/100ML; MG/100ML; MG/100ML; MG/100ML; MG/100ML
1000 INJECTION, SOLUTION INTRAVENOUS CONTINUOUS PRN
Status: DISCONTINUED | OUTPATIENT
Start: 2021-05-19 | End: 2021-05-21 | Stop reason: HOSPADM

## 2021-05-19 RX ORDER — LIDOCAINE HYDROCHLORIDE 20 MG/ML
INJECTION, SOLUTION INFILTRATION; PERINEURAL AS NEEDED
Status: DISCONTINUED | OUTPATIENT
Start: 2021-05-19 | End: 2021-05-19 | Stop reason: SURG

## 2021-05-19 RX ORDER — MEPERIDINE HYDROCHLORIDE 25 MG/ML
12.5 INJECTION INTRAMUSCULAR; INTRAVENOUS; SUBCUTANEOUS
Status: DISCONTINUED | OUTPATIENT
Start: 2021-05-19 | End: 2021-05-19 | Stop reason: HOSPADM

## 2021-05-19 RX ORDER — ACETAMINOPHEN 500 MG
1000 TABLET ORAL ONCE
Status: COMPLETED | OUTPATIENT
Start: 2021-05-19 | End: 2021-05-19

## 2021-05-19 RX ORDER — ROPIVACAINE HYDROCHLORIDE 5 MG/ML
INJECTION, SOLUTION EPIDURAL; INFILTRATION; PERINEURAL
Status: COMPLETED | OUTPATIENT
Start: 2021-05-19 | End: 2021-05-19

## 2021-05-19 RX ORDER — ONDANSETRON 2 MG/ML
4 INJECTION INTRAMUSCULAR; INTRAVENOUS ONCE AS NEEDED
Status: DISCONTINUED | OUTPATIENT
Start: 2021-05-19 | End: 2021-05-19 | Stop reason: HOSPADM

## 2021-05-19 RX ORDER — ONDANSETRON 4 MG/1
4 TABLET, FILM COATED ORAL EVERY 6 HOURS PRN
Status: DISCONTINUED | OUTPATIENT
Start: 2021-05-19 | End: 2021-05-21 | Stop reason: HOSPADM

## 2021-05-19 RX ORDER — BUPIVACAINE HCL/0.9 % NACL/PF 0.1 %
2 PLASTIC BAG, INJECTION (ML) EPIDURAL EVERY 8 HOURS
Status: DISCONTINUED | OUTPATIENT
Start: 2021-05-19 | End: 2021-05-21 | Stop reason: HOSPADM

## 2021-05-19 RX ORDER — ACETAMINOPHEN 500 MG
1000 TABLET ORAL 4 TIMES DAILY
Status: DISCONTINUED | OUTPATIENT
Start: 2021-05-19 | End: 2021-05-21 | Stop reason: HOSPADM

## 2021-05-19 RX ORDER — WARFARIN SODIUM 2.5 MG/1
2.5 TABLET ORAL
Status: DISCONTINUED | OUTPATIENT
Start: 2021-05-19 | End: 2021-05-19

## 2021-05-19 RX ORDER — OXYCODONE HYDROCHLORIDE 5 MG/1
5 TABLET ORAL EVERY 4 HOURS PRN
Status: DISCONTINUED | OUTPATIENT
Start: 2021-05-19 | End: 2021-05-21 | Stop reason: HOSPADM

## 2021-05-19 RX ORDER — FAMOTIDINE 40 MG/1
40 TABLET, FILM COATED ORAL DAILY
Status: DISCONTINUED | OUTPATIENT
Start: 2021-05-19 | End: 2021-05-21 | Stop reason: HOSPADM

## 2021-05-19 RX ORDER — PROPOFOL 10 MG/ML
VIAL (ML) INTRAVENOUS AS NEEDED
Status: DISCONTINUED | OUTPATIENT
Start: 2021-05-19 | End: 2021-05-19 | Stop reason: SURG

## 2021-05-19 RX ORDER — ONDANSETRON 2 MG/ML
4 INJECTION INTRAMUSCULAR; INTRAVENOUS EVERY 6 HOURS PRN
Status: DISCONTINUED | OUTPATIENT
Start: 2021-05-19 | End: 2021-05-21 | Stop reason: HOSPADM

## 2021-05-19 RX ORDER — SODIUM CHLORIDE 0.9 % (FLUSH) 0.9 %
3-10 SYRINGE (ML) INJECTION AS NEEDED
Status: DISCONTINUED | OUTPATIENT
Start: 2021-05-19 | End: 2021-05-21 | Stop reason: HOSPADM

## 2021-05-19 RX ORDER — NEOSTIGMINE METHYLSULFATE 1 MG/ML
INJECTION, SOLUTION INTRAVENOUS AS NEEDED
Status: DISCONTINUED | OUTPATIENT
Start: 2021-05-19 | End: 2021-05-19 | Stop reason: SURG

## 2021-05-19 RX ORDER — SODIUM CHLORIDE 9 MG/ML
100 INJECTION, SOLUTION INTRAVENOUS CONTINUOUS
Status: DISPENSED | OUTPATIENT
Start: 2021-05-19 | End: 2021-05-20

## 2021-05-19 RX ORDER — PREGABALIN 75 MG/1
75 CAPSULE ORAL ONCE
Status: COMPLETED | OUTPATIENT
Start: 2021-05-19 | End: 2021-05-19

## 2021-05-19 RX ORDER — SODIUM CHLORIDE 0.9 % (FLUSH) 0.9 %
3 SYRINGE (ML) INJECTION EVERY 12 HOURS SCHEDULED
Status: DISCONTINUED | OUTPATIENT
Start: 2021-05-19 | End: 2021-05-21 | Stop reason: HOSPADM

## 2021-05-19 RX ORDER — SUCCINYLCHOLINE CHLORIDE 20 MG/ML
INJECTION INTRAMUSCULAR; INTRAVENOUS AS NEEDED
Status: DISCONTINUED | OUTPATIENT
Start: 2021-05-19 | End: 2021-05-19 | Stop reason: SURG

## 2021-05-19 RX ORDER — PRAVASTATIN SODIUM 40 MG
40 TABLET ORAL NIGHTLY
Status: DISCONTINUED | OUTPATIENT
Start: 2021-05-19 | End: 2021-05-21 | Stop reason: HOSPADM

## 2021-05-19 RX ORDER — MIDAZOLAM HYDROCHLORIDE 1 MG/ML
INJECTION INTRAMUSCULAR; INTRAVENOUS AS NEEDED
Status: DISCONTINUED | OUTPATIENT
Start: 2021-05-19 | End: 2021-05-19 | Stop reason: SURG

## 2021-05-19 RX ADMIN — PROPOFOL 120 MG: 10 INJECTION, EMULSION INTRAVENOUS at 07:15

## 2021-05-19 RX ADMIN — SUCCINYLCHOLINE CHLORIDE 100 MG: 20 INJECTION, SOLUTION INTRAMUSCULAR; INTRAVENOUS at 07:15

## 2021-05-19 RX ADMIN — FENTANYL CITRATE 50 MCG: 50 INJECTION INTRAMUSCULAR; INTRAVENOUS at 10:51

## 2021-05-19 RX ADMIN — ACETAMINOPHEN 1000 MG: 500 TABLET, FILM COATED ORAL at 18:05

## 2021-05-19 RX ADMIN — FENTANYL CITRATE 25 MCG: 50 INJECTION INTRAMUSCULAR; INTRAVENOUS at 09:07

## 2021-05-19 RX ADMIN — FENTANYL CITRATE 50 MCG: 50 INJECTION INTRAMUSCULAR; INTRAVENOUS at 08:08

## 2021-05-19 RX ADMIN — ROCURONIUM BROMIDE 30 MG: 50 INJECTION INTRAVENOUS at 07:24

## 2021-05-19 RX ADMIN — FENTANYL CITRATE 25 MCG: 50 INJECTION INTRAMUSCULAR; INTRAVENOUS at 07:22

## 2021-05-19 RX ADMIN — Medication 2 G: at 11:19

## 2021-05-19 RX ADMIN — GLYCOPYRROLATE 0.4 MG: 0.2 INJECTION, SOLUTION INTRAMUSCULAR; INTRAVITREAL at 11:30

## 2021-05-19 RX ADMIN — PREGABALIN 75 MG: 75 CAPSULE ORAL at 06:30

## 2021-05-19 RX ADMIN — HYDROMORPHONE HYDROCHLORIDE 0.5 MG: 1 INJECTION, SOLUTION INTRAMUSCULAR; INTRAVENOUS; SUBCUTANEOUS at 12:45

## 2021-05-19 RX ADMIN — PRAVASTATIN SODIUM 40 MG: 40 TABLET ORAL at 20:28

## 2021-05-19 RX ADMIN — NEOSTIGMINE METHYLSULFATE 3 MG: 1 INJECTION, SOLUTION INTRAVENOUS at 11:30

## 2021-05-19 RX ADMIN — WARFARIN SODIUM 2.5 MG: 2.5 TABLET ORAL at 18:05

## 2021-05-19 RX ADMIN — MIDAZOLAM HYDROCHLORIDE 1 MG: 2 INJECTION, SOLUTION INTRAMUSCULAR; INTRAVENOUS at 07:11

## 2021-05-19 RX ADMIN — ROCURONIUM BROMIDE 5 MG: 50 INJECTION INTRAVENOUS at 07:15

## 2021-05-19 RX ADMIN — SODIUM CHLORIDE 100 ML/HR: 9 INJECTION, SOLUTION INTRAVENOUS at 15:35

## 2021-05-19 RX ADMIN — OXYCODONE HYDROCHLORIDE 10 MG: 10 TABLET, FILM COATED, EXTENDED RELEASE ORAL at 06:30

## 2021-05-19 RX ADMIN — ONDANSETRON 4 MG: 2 INJECTION INTRAMUSCULAR; INTRAVENOUS at 11:19

## 2021-05-19 RX ADMIN — TRANEXAMIC ACID 1000 MG: 100 INJECTION, SOLUTION INTRAVENOUS at 06:28

## 2021-05-19 RX ADMIN — FENTANYL CITRATE 25 MCG: 50 INJECTION INTRAMUSCULAR; INTRAVENOUS at 09:41

## 2021-05-19 RX ADMIN — Medication 2 G: at 07:19

## 2021-05-19 RX ADMIN — HYDROMORPHONE HYDROCHLORIDE 0.5 MG: 1 INJECTION, SOLUTION INTRAMUSCULAR; INTRAVENOUS; SUBCUTANEOUS at 12:29

## 2021-05-19 RX ADMIN — CEFAZOLIN 2 G: 10 INJECTION, POWDER, FOR SOLUTION INTRAVENOUS at 18:11

## 2021-05-19 RX ADMIN — DEXAMETHASONE SODIUM PHOSPHATE 4 MG: 4 INJECTION, SOLUTION INTRAMUSCULAR; INTRAVENOUS at 07:31

## 2021-05-19 RX ADMIN — HYDROMORPHONE HYDROCHLORIDE 0.5 MG: 2 INJECTION, SOLUTION INTRAMUSCULAR; INTRAVENOUS; SUBCUTANEOUS at 07:49

## 2021-05-19 RX ADMIN — SODIUM CHLORIDE, SODIUM GLUCONATE, SODIUM ACETATE, POTASSIUM CHLORIDE AND MAGNESIUM CHLORIDE: 526; 502; 368; 37; 30 INJECTION, SOLUTION INTRAVENOUS at 08:41

## 2021-05-19 RX ADMIN — VANCOMYCIN HYDROCHLORIDE 1 G: 1 INJECTION, POWDER, LYOPHILIZED, FOR SOLUTION INTRAVENOUS at 11:30

## 2021-05-19 RX ADMIN — LIDOCAINE HYDROCHLORIDE 100 MG: 20 INJECTION, SOLUTION INFILTRATION; PERINEURAL at 07:15

## 2021-05-19 RX ADMIN — ESMOLOL HYDROCHLORIDE 10 MG: 10 INJECTION, SOLUTION INTRAVENOUS at 11:27

## 2021-05-19 RX ADMIN — ACETAMINOPHEN 1000 MG: 500 TABLET, FILM COATED ORAL at 06:30

## 2021-05-19 RX ADMIN — OXYCODONE 5 MG: 5 TABLET ORAL at 18:05

## 2021-05-19 RX ADMIN — FENTANYL CITRATE 50 MCG: 50 INJECTION INTRAMUSCULAR; INTRAVENOUS at 07:15

## 2021-05-19 RX ADMIN — SODIUM CHLORIDE, SODIUM GLUCONATE, SODIUM ACETATE, POTASSIUM CHLORIDE AND MAGNESIUM CHLORIDE 1000 ML: 526; 502; 368; 37; 30 INJECTION, SOLUTION INTRAVENOUS at 06:28

## 2021-05-19 RX ADMIN — HYDROMORPHONE HYDROCHLORIDE 0.5 MG: 2 INJECTION, SOLUTION INTRAMUSCULAR; INTRAVENOUS; SUBCUTANEOUS at 07:59

## 2021-05-19 RX ADMIN — MIDAZOLAM HYDROCHLORIDE 1 MG: 2 INJECTION, SOLUTION INTRAMUSCULAR; INTRAVENOUS at 07:01

## 2021-05-19 RX ADMIN — ESMOLOL HYDROCHLORIDE 10 MG: 10 INJECTION, SOLUTION INTRAVENOUS at 11:11

## 2021-05-19 RX ADMIN — ROPIVACAINE HYDROCHLORIDE 30 ML: 5 INJECTION, SOLUTION EPIDURAL; INFILTRATION; PERINEURAL at 11:43

## 2021-05-19 RX ADMIN — OXYCODONE HYDROCHLORIDE 10 MG: 10 TABLET, FILM COATED, EXTENDED RELEASE ORAL at 20:28

## 2021-05-19 RX ADMIN — FAMOTIDINE 40 MG: 40 TABLET, FILM COATED ORAL at 15:36

## 2021-05-19 RX ADMIN — ACETAMINOPHEN 1000 MG: 500 TABLET, FILM COATED ORAL at 20:28

## 2021-05-19 RX ADMIN — FENTANYL CITRATE 50 MCG: 50 INJECTION INTRAMUSCULAR; INTRAVENOUS at 11:00

## 2021-05-19 RX ADMIN — FENTANYL CITRATE 25 MCG: 50 INJECTION INTRAMUSCULAR; INTRAVENOUS at 09:27

## 2021-05-19 RX ADMIN — MELOXICAM 15 MG: 15 TABLET ORAL at 06:30

## 2021-05-19 RX ADMIN — ASPIRIN 81 MG: 81 TABLET, FILM COATED ORAL at 20:28

## 2021-05-19 NOTE — ANESTHESIA POSTPROCEDURE EVALUATION
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          Post Anesthesia Care and Evaluation    Patient location during evaluation: bedside  Patient participation: complete - patient participated  Level of consciousness: awake  Pain score: 0  Pain management: adequate  Airway patency: patent  Anesthetic complications: No anesthetic complications  PONV Status: none  Cardiovascular status: acceptable  Respiratory status: acceptable  Hydration status: acceptable    Comments: Extubated on arrival to PACU, strong respirations

## 2021-05-19 NOTE — ANESTHESIA PROCEDURE NOTES
Airway  Urgency: elective    Date/Time: 5/19/2021 7:16 AM    General Information and Staff    Patient location during procedure: OR    Indications and Patient Condition  Indications for airway management: airway protection    Preoxygenated: yes  MILS maintained throughout  Mask difficulty assessment: 1 - vent by mask    Final Airway Details  Final airway type: endotracheal airway      Successful airway: ETT  Cuffed: yes   Successful intubation technique: direct laryngoscopy  Facilitating devices/methods: Bougie, intubating stylet and cricoid pressure  Endotracheal tube insertion site: oral  Blade: Nat  Blade size: 3  ETT size (mm): 7.0  Cormack-Lehane Classification: grade III - view of epiglottis only  Placement verified by: chest auscultation and capnometry   Measured from: lips  ETT/EBT  to lips (cm): 20  Number of attempts at approach: 1  Assessment: lips, teeth, and gum same as pre-op and atraumatic intubation

## 2021-05-19 NOTE — ANESTHESIA PROCEDURE NOTES
Peripheral Block      Patient reassessed immediately prior to procedure    Patient location during procedure: OR  Start time: 5/19/2021 11:40 AM  Stop time: 5/19/2021 11:43 AM  Performed by  CRNA: Carmela Gregory SRNA  Assisted by: Shirley Read CRNA  Preanesthetic Checklist  Completed: patient identified, IV checked, site marked, risks and benefits discussed, surgical consent, monitors and equipment checked, pre-op evaluation and timeout performed  Prep:  Pt Position: supine  Sterile barriers:gloves  Prep: ChloraPrep  Patient monitoring: blood pressure monitoring and continuous pulse oximetry  Procedure  Sedation:yes  Performed under: general  Guidance:landmark technique    Laterality:right  Block Type:fascia iliaca compartment  Injection Technique:single-shot  Needle Type:echogenic  Needle Gauge:21 G  Resistance on Injection: none    Medications Used: ropivacaine (NAROPIN) 0.5 % injection, 30 mL  Med admintered at 5/19/2021 11:43 AM      Medications  Comment:Also 10ml of lidocaine 2%     Post Assessment  Injection Assessment: negative aspiration for heme and incremental injection  Patient Tolerance:comfortable throughout block  Complications:no

## 2021-05-19 NOTE — ANESTHESIA PREPROCEDURE EVALUATION
Anesthesia Evaluation     Patient summary reviewed and Nursing notes reviewed   no history of anesthetic complications:  NPO Solid Status: > 8 hours  NPO Liquid Status: > 8 hours           Airway   Mallampati: II  TM distance: >3 FB  Neck ROM: full  Possible difficult intubation  Dental    (+) lower dentures    Pulmonary - normal exam    breath sounds clear to auscultation  (-) asthma, rhonchi, decreased breath sounds, wheezes, not a smoker  Cardiovascular - normal exam  Exercise tolerance: poor (<4 METS)    ECG reviewed  Rhythm: regular  Rate: normal    (+) hypertension well controlled, CAD, CABG > 6 Months, cardiac stents more than 12 months ago hyperlipidemia,   (-) pacemaker, valvular problems/murmurs, past MI, dysrhythmias, angina, murmur, DVT    ROS comment: EKG 5/12/2021:  Normal sinus rhythm  Normal ECG    TTE in Hx:  Echocardiogram W/ color flow 37345 (1) - Normal LV function with Ef of 55-60%.Normal RV size and function.Pseudonormal diastolic dysfunciton with borderline [facility].NO sig.valvular regurg or stenosis.    Neuro/Psych  (-) seizures, TIA, CVA  GI/Hepatic/Renal/Endo    (-)  obesity, diabetes    Musculoskeletal     Abdominal  - normal exam   Substance History      OB/GYN negative ob/gyn ROS         Other   arthritis,      ROS/Med Hx Other: Hgb=12.5    CABG in 2000 x1    No significant valvular regurg or stenosis per echo in Hx. EF=55%      Phys Exam Other: Lower partial make sure she takes out                  Anesthesia Plan    ASA 3     general with block   (Discussed peripheral nerve block (fascia-iliaca) for post op pain relief and patient understands possible complications, risks, & agrees.)  intravenous induction     Anesthetic plan, all risks, benefits, and alternatives have been provided, discussed and informed consent has been obtained with: patient and spouse/significant other.  Use of blood products discussed with patient  Consented to blood products.

## 2021-05-20 LAB
HOLD SPECIMEN: NORMAL
INR PPP: 1.15 (ref 0.8–1.2)
PROTHROMBIN TIME: 15.2 SECONDS (ref 11.1–15.3)
WHOLE BLOOD HOLD SPECIMEN: NORMAL

## 2021-05-20 PROCEDURE — 63710000001 FERROUS SULFATE 324 (65 FE) MG TABLET DELAYED-RELEASE: Performed by: ORTHOPAEDIC SURGERY

## 2021-05-20 PROCEDURE — 97110 THERAPEUTIC EXERCISES: CPT

## 2021-05-20 PROCEDURE — A9270 NON-COVERED ITEM OR SERVICE: HCPCS | Performed by: ORTHOPAEDIC SURGERY

## 2021-05-20 PROCEDURE — 97116 GAIT TRAINING THERAPY: CPT

## 2021-05-20 PROCEDURE — 63710000001 OXYCODONE 10 MG TABLET EXTENDED-RELEASE 12 HOUR: Performed by: ORTHOPAEDIC SURGERY

## 2021-05-20 PROCEDURE — 25010000002 HYDROMORPHONE 1 MG/ML SOLUTION: Performed by: ORTHOPAEDIC SURGERY

## 2021-05-20 PROCEDURE — 97535 SELF CARE MNGMENT TRAINING: CPT

## 2021-05-20 PROCEDURE — 97530 THERAPEUTIC ACTIVITIES: CPT

## 2021-05-20 PROCEDURE — 63710000001 OXYCODONE 5 MG TABLET: Performed by: ORTHOPAEDIC SURGERY

## 2021-05-20 PROCEDURE — 94760 N-INVAS EAR/PLS OXIMETRY 1: CPT

## 2021-05-20 PROCEDURE — 63710000001 PRAVASTATIN 40 MG TABLET: Performed by: ORTHOPAEDIC SURGERY

## 2021-05-20 PROCEDURE — 99024 POSTOP FOLLOW-UP VISIT: CPT | Performed by: ORTHOPAEDIC SURGERY

## 2021-05-20 PROCEDURE — 63710000001 ACETAMINOPHEN 500 MG TABLET: Performed by: ORTHOPAEDIC SURGERY

## 2021-05-20 PROCEDURE — 63710000001 WARFARIN 2.5 MG TABLET: Performed by: ORTHOPAEDIC SURGERY

## 2021-05-20 PROCEDURE — 63710000001 ASPIRIN 81 MG TABLET DELAYED-RELEASE: Performed by: ORTHOPAEDIC SURGERY

## 2021-05-20 PROCEDURE — 63710000001 FAMOTIDINE 40 MG TABLET: Performed by: ORTHOPAEDIC SURGERY

## 2021-05-20 PROCEDURE — 85610 PROTHROMBIN TIME: CPT | Performed by: ORTHOPAEDIC SURGERY

## 2021-05-20 PROCEDURE — 25010000002 CEFAZOLIN PER 500 MG: Performed by: ORTHOPAEDIC SURGERY

## 2021-05-20 PROCEDURE — 94799 UNLISTED PULMONARY SVC/PX: CPT

## 2021-05-20 RX ADMIN — OXYCODONE 5 MG: 5 TABLET ORAL at 15:00

## 2021-05-20 RX ADMIN — OXYCODONE HYDROCHLORIDE 10 MG: 10 TABLET, FILM COATED, EXTENDED RELEASE ORAL at 20:05

## 2021-05-20 RX ADMIN — HYDROMORPHONE HYDROCHLORIDE 0.5 MG: 1 INJECTION, SOLUTION INTRAMUSCULAR; INTRAVENOUS; SUBCUTANEOUS at 01:11

## 2021-05-20 RX ADMIN — ASPIRIN 81 MG: 81 TABLET, FILM COATED ORAL at 08:35

## 2021-05-20 RX ADMIN — OXYCODONE 5 MG: 5 TABLET ORAL at 04:59

## 2021-05-20 RX ADMIN — CEFAZOLIN 2 G: 10 INJECTION, POWDER, FOR SOLUTION INTRAVENOUS at 03:03

## 2021-05-20 RX ADMIN — HYDROMORPHONE HYDROCHLORIDE 0.5 MG: 1 INJECTION, SOLUTION INTRAMUSCULAR; INTRAVENOUS; SUBCUTANEOUS at 05:35

## 2021-05-20 RX ADMIN — OXYCODONE 5 MG: 5 TABLET ORAL at 08:35

## 2021-05-20 RX ADMIN — PRAVASTATIN SODIUM 40 MG: 40 TABLET ORAL at 20:05

## 2021-05-20 RX ADMIN — ASPIRIN 81 MG: 81 TABLET, FILM COATED ORAL at 20:05

## 2021-05-20 RX ADMIN — ACETAMINOPHEN 1000 MG: 500 TABLET, FILM COATED ORAL at 08:35

## 2021-05-20 RX ADMIN — ACETAMINOPHEN 1000 MG: 500 TABLET, FILM COATED ORAL at 18:01

## 2021-05-20 RX ADMIN — FAMOTIDINE 40 MG: 40 TABLET, FILM COATED ORAL at 08:35

## 2021-05-20 RX ADMIN — CEFAZOLIN 2 G: 10 INJECTION, POWDER, FOR SOLUTION INTRAVENOUS at 18:02

## 2021-05-20 RX ADMIN — ACETAMINOPHEN 1000 MG: 500 TABLET, FILM COATED ORAL at 20:05

## 2021-05-20 RX ADMIN — ACETAMINOPHEN 1000 MG: 500 TABLET, FILM COATED ORAL at 12:06

## 2021-05-20 RX ADMIN — WARFARIN SODIUM 2.5 MG: 2.5 TABLET ORAL at 18:02

## 2021-05-20 RX ADMIN — CEFAZOLIN 2 G: 10 INJECTION, POWDER, FOR SOLUTION INTRAVENOUS at 11:27

## 2021-05-20 RX ADMIN — FERROUS SULFATE TAB EC 324 MG (65 MG FE EQUIVALENT) 324 MG: 324 (65 FE) TABLET DELAYED RESPONSE at 08:35

## 2021-05-20 RX ADMIN — SODIUM CHLORIDE 100 ML/HR: 9 INJECTION, SOLUTION INTRAVENOUS at 02:33

## 2021-05-21 ENCOUNTER — ANTICOAGULATION VISIT (OUTPATIENT)
Dept: PHARMACY | Facility: HOSPITAL | Age: 71
End: 2021-05-21

## 2021-05-21 VITALS
HEART RATE: 88 BPM | WEIGHT: 180.6 LBS | DIASTOLIC BLOOD PRESSURE: 46 MMHG | SYSTOLIC BLOOD PRESSURE: 96 MMHG | OXYGEN SATURATION: 97 % | RESPIRATION RATE: 16 BRPM | BODY MASS INDEX: 26.75 KG/M2 | HEIGHT: 69 IN | TEMPERATURE: 98 F

## 2021-05-21 LAB
HOLD SPECIMEN: NORMAL
INR PPP: 1.17 (ref 0.8–1.2)
LAB AP CASE REPORT: NORMAL
PATH REPORT.FINAL DX SPEC: NORMAL
PROTHROMBIN TIME: 15.4 SECONDS (ref 11.1–15.3)
WHOLE BLOOD HOLD SPECIMEN: NORMAL

## 2021-05-21 PROCEDURE — 25010000002 HYDROMORPHONE 1 MG/ML SOLUTION: Performed by: ORTHOPAEDIC SURGERY

## 2021-05-21 PROCEDURE — 63710000001 FAMOTIDINE 40 MG TABLET: Performed by: ORTHOPAEDIC SURGERY

## 2021-05-21 PROCEDURE — 63710000001 ACETAMINOPHEN 500 MG TABLET: Performed by: ORTHOPAEDIC SURGERY

## 2021-05-21 PROCEDURE — 63710000001 OXYCODONE 5 MG TABLET: Performed by: ORTHOPAEDIC SURGERY

## 2021-05-21 PROCEDURE — 63710000001 FERROUS SULFATE 324 (65 FE) MG TABLET DELAYED-RELEASE: Performed by: ORTHOPAEDIC SURGERY

## 2021-05-21 PROCEDURE — 97110 THERAPEUTIC EXERCISES: CPT

## 2021-05-21 PROCEDURE — A9270 NON-COVERED ITEM OR SERVICE: HCPCS | Performed by: ORTHOPAEDIC SURGERY

## 2021-05-21 PROCEDURE — 63710000001 OXYCODONE 10 MG TABLET EXTENDED-RELEASE 12 HOUR: Performed by: ORTHOPAEDIC SURGERY

## 2021-05-21 PROCEDURE — 85610 PROTHROMBIN TIME: CPT | Performed by: ORTHOPAEDIC SURGERY

## 2021-05-21 PROCEDURE — 99024 POSTOP FOLLOW-UP VISIT: CPT | Performed by: ORTHOPAEDIC SURGERY

## 2021-05-21 PROCEDURE — 25010000002 CEFAZOLIN PER 500 MG: Performed by: ORTHOPAEDIC SURGERY

## 2021-05-21 PROCEDURE — 97535 SELF CARE MNGMENT TRAINING: CPT

## 2021-05-21 PROCEDURE — 97530 THERAPEUTIC ACTIVITIES: CPT

## 2021-05-21 PROCEDURE — 63710000001 ASPIRIN 81 MG TABLET DELAYED-RELEASE: Performed by: ORTHOPAEDIC SURGERY

## 2021-05-21 PROCEDURE — 97116 GAIT TRAINING THERAPY: CPT

## 2021-05-21 RX ORDER — OXYCODONE AND ACETAMINOPHEN 7.5; 325 MG/1; MG/1
1 TABLET ORAL EVERY 6 HOURS PRN
Qty: 30 TABLET | Refills: 0 | Status: SHIPPED | OUTPATIENT
Start: 2021-05-21 | End: 2021-06-01 | Stop reason: SDUPTHER

## 2021-05-21 RX ORDER — WARFARIN SODIUM 4 MG/1
TABLET ORAL
Qty: 45 TABLET | Refills: 1 | Status: SHIPPED | OUTPATIENT
Start: 2021-05-21 | End: 2021-08-12

## 2021-05-21 RX ADMIN — FAMOTIDINE 40 MG: 40 TABLET, FILM COATED ORAL at 10:09

## 2021-05-21 RX ADMIN — HYDROMORPHONE HYDROCHLORIDE 0.5 MG: 1 INJECTION, SOLUTION INTRAMUSCULAR; INTRAVENOUS; SUBCUTANEOUS at 05:55

## 2021-05-21 RX ADMIN — ASPIRIN 81 MG: 81 TABLET, FILM COATED ORAL at 10:09

## 2021-05-21 RX ADMIN — FERROUS SULFATE TAB EC 324 MG (65 MG FE EQUIVALENT) 324 MG: 324 (65 FE) TABLET DELAYED RESPONSE at 10:09

## 2021-05-21 RX ADMIN — OXYCODONE 5 MG: 5 TABLET ORAL at 02:31

## 2021-05-21 RX ADMIN — CEFAZOLIN 2 G: 10 INJECTION, POWDER, FOR SOLUTION INTRAVENOUS at 02:31

## 2021-05-21 RX ADMIN — ACETAMINOPHEN 1000 MG: 500 TABLET, FILM COATED ORAL at 10:09

## 2021-05-21 RX ADMIN — OXYCODONE HYDROCHLORIDE 10 MG: 10 TABLET, FILM COATED, EXTENDED RELEASE ORAL at 10:09

## 2021-05-21 NOTE — PROGRESS NOTES
Anticoagulation- Warfarin     Completed Discharge Warfarin Counseling    Discussed the followin. Reason for Medication and Length of therapy  2. Drug-Drug Interactions  3. Drug-Diet Interactions  4. Drug- Alcohol Interactions  5. Signs and Symptoms of Bleeding  6. Fall risk- go to the Emergency Room  7. Home procedures:  Patient is going home without home health  8. Sent the Rx to: [facility] for warfarin 4 mg, Number: 45  Si tablet every night or As Directed + 1 refills  9. Gave Warfarin booklet  10. Answered all Questions  11. Called in lab orders to [facility]    Kalyan Tena PharmD  21  11:42 CDT

## 2021-05-22 LAB
BACTERIA SPEC AEROBE CULT: NORMAL
GRAM STN SPEC: NORMAL
GRAM STN SPEC: NORMAL

## 2021-05-23 ENCOUNTER — TELEPHONE (OUTPATIENT)
Dept: ORTHOPEDIC SURGERY | Facility: CLINIC | Age: 71
End: 2021-05-23

## 2021-05-23 NOTE — TELEPHONE ENCOUNTER
PHONE CALL:    Patient was contacted by phone to ensure that there were no issues.  All questions were answered.  Patient conveyed that they were doing well and that there were no current problems.  Patient will keep their current follow-up appointment.    05/23/21 at 17:48 CDT by Jeovanny Maravilla MD

## 2021-05-24 ENCOUNTER — TELEPHONE (OUTPATIENT)
Dept: ORTHOPEDIC SURGERY | Facility: CLINIC | Age: 71
End: 2021-05-24

## 2021-05-24 ENCOUNTER — ANTICOAGULATION VISIT (OUTPATIENT)
Dept: PHARMACY | Facility: HOSPITAL | Age: 71
End: 2021-05-24

## 2021-05-24 NOTE — TELEPHONE ENCOUNTER
Taiwo    Patient called stating there were no instructions on her discharge papers about her Coumadin.  Please call

## 2021-05-24 NOTE — TELEPHONE ENCOUNTER
Tried calling to notify patient that the pharmacy should be calling to discuss dosing. No answer. Left voicemail to return call.

## 2021-05-24 NOTE — PROGRESS NOTES
Spoke with Ms Dawson.  [facility] also her [facility] day.  Will follow up tomorrow with patient.  No significant findings from the weekend.      Shaquille Carias, PharmD  05/24/21  16:14 CDT

## 2021-05-25 ENCOUNTER — LAB (OUTPATIENT)
Dept: LAB | Facility: HOSPITAL | Age: 71
End: 2021-05-25

## 2021-05-25 ENCOUNTER — ANTICOAGULATION VISIT (OUTPATIENT)
Dept: PHARMACY | Facility: HOSPITAL | Age: 71
End: 2021-05-25

## 2021-05-25 ENCOUNTER — HOSPITAL ENCOUNTER (OUTPATIENT)
Dept: PHYSICAL THERAPY | Facility: HOSPITAL | Age: 71
Setting detail: THERAPIES SERIES
Discharge: HOME | End: 2021-05-25

## 2021-05-25 DIAGNOSIS — Z74.09 IMPAIRED FUNCTIONAL MOBILITY, BALANCE, GAIT, AND ENDURANCE: ICD-10-CM

## 2021-05-25 DIAGNOSIS — Z74.09 IMPAIRED MOBILITY AND ACTIVITIES OF DAILY LIVING: ICD-10-CM

## 2021-05-25 DIAGNOSIS — I10 BENIGN ESSENTIAL HYPERTENSION: ICD-10-CM

## 2021-05-25 DIAGNOSIS — T84.030D MECHANICAL LOOSENING OF INTERNAL RIGHT HIP PROSTHETIC JOINT, SUBSEQUENT ENCOUNTER: Primary | ICD-10-CM

## 2021-05-25 DIAGNOSIS — Z78.9 IMPAIRED MOBILITY AND ACTIVITIES OF DAILY LIVING: ICD-10-CM

## 2021-05-25 DIAGNOSIS — Z96.641 S/P HIP REPLACEMENT, RIGHT: ICD-10-CM

## 2021-05-25 DIAGNOSIS — Z79.01 ANTICOAGULATED ON WARFARIN: ICD-10-CM

## 2021-05-25 LAB
INR PPP: 1.48 (ref 0.8–1.2)
PROTHROMBIN TIME: 18.7 SECONDS (ref 11.1–15.3)

## 2021-05-25 PROCEDURE — 85610 PROTHROMBIN TIME: CPT

## 2021-05-25 PROCEDURE — 36415 COLL VENOUS BLD VENIPUNCTURE: CPT

## 2021-05-25 PROCEDURE — 97162 PT EVAL MOD COMPLEX 30 MIN: CPT

## 2021-05-25 NOTE — PROGRESS NOTES
Spoke with Ms Jose. Reviewed current lab.  No s/s of bleeding. No new meds. No missed doses.     INR 1.48, subtherapeutic, due to new start warfarin, trending upwards appropriately.  Will continue current regimen, warfarin 4 mg nightly.  Reviewed INR goals.  No significant findings.    Reviewed schedule for following week. Pt read back regimen and verbalized understanding. All questions answered. Next INR on 6/1 provided by [facility].    Shaquille Carias, PharmD  05/25/21  15:52 CDT

## 2021-05-25 NOTE — THERAPY EVALUATION
Outpatient Physical Therapy Ortho Initial Evaluation  [facility]     Patient Name: Chrissy Dawson  : 1950  MRN: [id number]  Today's Date: 2021      Visit Date: 2021     ATTENDANCE:   SUBJECTIVE IMPROVEMENT: not assessed at initial evaluation  NEXT MD APPOINTMENT: 2021  RECERT DATE: 6/15/2021    THERAPY DIAGNOSIS: R SALTY- 2021        Patient Active Problem List   Diagnosis   • Adenopathy, cervical   • Hyperlipidemia   • Benign essential hypertension   • Abnormal mammogram of right breast   • Presence of right artificial hip joint   • Gait disturbance   • Trochanteric bursitis, right hip   • Right hip pain   • 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 (CMS/HCC)   • S/P hip replacement, right   • Overweight (BMI 25.0-29.9)   • Heart murmur        Past Medical History:   Diagnosis Date   • Backache    • Benign essential hypertension     PATIENT DENIES   • Cervical arthritis    • Coronary arteriosclerosis    • Fibrocystic breast    • Ganglion cyst of wrist     Ganglion/synovial cyst - wrist   • Ganglion of wrist    • Hip pain    • History of echocardiogram 10/23/2015    Echocardiogram W/ color flow 88843 (1) - Normal LV function with Ef of 55-60%.Normal RV size and function.Pseudonormal diastolic dysfunciton with borderline [facility].NO sig.valvular regurg or stenosis.   • History of mammogram 09/10/2014    MAMMOGRAM SCREENING 90826 - [facility] (1) - [facility] ([facility])   • History of transfusion    • Hyperlipidemia    • Inguinal pain    • Recurrent angina status post coronary artery bypass graft (CMS/HCC)     Recurrent angina after coronary artery bypass graft   • Urge incontinence of urine         Past Surgical History:   Procedure Laterality Date   • BREAST BIOPSY Right 2017    Procedure: RIGHT BREAST LUMPECTOMY WITH NEEDLE LOCALIZATION AND ULTRASOUND;  Surgeon: Delfino Greer MD;  Location: [facility] OR;  Service:    • BREAST CYST ASPIRATION     •  CARDIAC SURGERY     • CORONARY ARTERY BYPASS GRAFT  2000    CABG, arterial, single (1)   • GANGLION CYST EXCISION     • JOINT REPLACEMENT     • ORIF MANDIBULAR FRACTURE     • TOTAL ABDOMINAL HYSTERECTOMY     • TOTAL ABDOMINAL HYSTERECTOMY WITH SALPINGO OOPHORECTOMY  1998    SALVADOR/BSO (1)   • TOTAL HIP ARTHROPLASTY  2011    Total hip replacement (3)       Visit Dx:     ICD-10-CM ICD-9-CM   1. 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 Benign essential hypertension  I10 401.1   3. S/P hip replacement, right  Z96.641 V43.64   4. Impaired mobility and activities of daily living  Z74.09 V49.89    Z78.9    5. Impaired functional mobility, balance, gait, and endurance  Z74.09 V49.89         Patient History     Row Name 05/25/21 1500             History    Chief Complaint  Pain;Muscle weakness  -      Type of Pain  Hip pain  -AC      Brief Description of Current Complaint  Pt notes that first SALTY was 1998 with revision in 2011. L SALTY in 2008. Notes that 2nd total hip was completed due to loosening of the prvious prosthetic which was increasing pain and was making it difficult to walk. POt notes lives with  who can help pout as needed and daughter is also in town to help as needed. Surgery 5/19 with 2 night stay, She did receive PT in the hospital who gave HEP which she reports she has been completing. She notes 5 steps to enter with handrail. She notes walk in shower with shower chair.   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   -AC      Surgery/Hospitalization  5/19/2021  -AC         Pain     Pain  Location  Hip  -AC      Pain at Present  10  -AC      Pain at Best  0;1  -AC      Pain Frequency  Constant/continuous  -AC      Pain Description  Aching;Sharp;Shooting  -AC      What Performance Factors Make the Current Problem(s) WORSE?  getting up/down, walking   -AC      What Performance Factors Make the Current Problem(s) BETTER?  pain medication, ice,   -AC      Is your sleep disturbed?  Yes  -AC      What position do you sleep in?  Other (comment) reclined bed.   -AC      Difficulties at work?  unable currently   -AC      Difficulties with ADL's?  yes- increased pain.   -AC         Fall Risk Assessment    Any falls in the past year:  No  -AC         Daily Activities    Primary Language  English  -AC        User Key  (r) = Recorded By, (t) = Taken By, (c) = Cosigned By    Initials Name Provider Type    AC Keya Ross, PT Physical Therapist          PT Ortho     Row Name 05/25/21 1500       Subjective Comments    Subjective Comments  see pt hx  -AC       Precautions and Contraindications    Precautions/Limitations  no known precautions/limitations  -AC    Precautions  per MD: slowly progress as tolerated.   -AC       Subjective Pain    Able to rate subjective pain?  yes  -AC    Pre-Treatment Pain Level  10  -AC    Subjective Pain Comment  pain pill due in 30 minutes  -AC       Posture/Observations    Posture/Observations Comments  pt presents in w/c this date. With bandage covering incision, not removed this date. pt demo's good understanding of infection signs/symptoms this date. Pt is able to sit to stand transfer from w/c with Jyoti and ambulates with front wheel rolling walker to treatment table as well as from treatment room out to car at end of session.  She ambulates with a slow gait speed decreased hip flexion extension an antalgic gait pattern due to right lower extremity pain.  However she is able to ambulate approximately 60 feet without rest and stepped off curb with min assist for safety and  verbal cueing for sequencing.  -AC       General ROM    GENERAL ROM COMMENTS  0-60- hip flexion/extension increased pain.   -AC       MMT Right Lower Ext    Rt Lower Extremity Comments   grossly 3/5 for knee flexion/extension and hip abduction/adduction. 3-/5 hip flexion.   -AC       MMT Left Lower Ext    Lt Lower Extremity Comments   hip 4-/5, knee- 4+/5. ankle 4+/5   -AC       Sensation    Additional Comments  pt notes no continued numbness/tingling since surgery.   -AC      User Key  (r) = Recorded By, (t) = Taken By, (c) = Cosigned By    Initials Name Provider Type    AC Keya Ross, PT Physical Therapist                      Therapy Education  Education Details: hip flexion with heel sides, hip add squeeze, clamshells  Given: HEP  Program: New  How Provided: Verbal, Demonstration, Written  Provided to: Patient, Caregiver  Level of Understanding: Verbalized, Demonstrated     PT OP Goals     Row Name 05/25/21 1600          PT Short Term Goals    STG Date to Achieve  06/22/21  -AC     STG 1  Pt is independent with HEP.   -AC     STG 1 Progress  New  -AC     STG 2  Patient is able to complete 5 times sit to stand from standard height chair.   -AC     STG 2 Progress  New  -AC     STG 3  Patient is able to complete 6-minute walk test.  -AC     STG 3 Progress  New  -AC     STG 4  Patient is able to complete right hip flexion to 90.   -AC     STG 4 Progress  New  -AC     STG 5  Patient is able to complete straight leg raise x5 on right lower extremity.  -AC     STG 5 Progress  New  -AC        Long Term Goals    LTG Date to Achieve  07/20/21  -AC     LTG 1  6-minute walk test 1000 feet or greater.  -AC     LTG 1 Progress  New  -AC     LTG 2  5 times sit to stand without upper extremity use from standard height chair in </=15 seconds  -AC     LTG 2 Progress  New  -AC     LTG 3  LEFS improved to 40/80 or greater.   -AC     LTG 3 Progress  New  -AC     LTG 4  Pt is able to ambulate 270 ft without AD and without LOB.    -AC     LTG 4 Progress  New  -AC     LTG 5  Pt notes subjective improvement 80% or greater.   -AC     LTG 5 Progress  New  -AC        Time Calculation    PT Goal Re-Cert Due Date  06/15/21  -AC       User Key  (r) = Recorded By, (t) = Taken By, (c) = Cosigned By    Initials Name Provider Type    AC Keya Ross, PT Physical Therapist          PT Assessment/Plan     Row Name 05/25/21 1700          PT Assessment    Functional Limitations  Impaired gait;Decreased safety during functional activities;Impaired locomotion;Limitation in home management;Limitations in functional capacity and performance;Performance in leisure activities;Performance in self-care ADL  -AC     Impairments  Balance;Gait;Impaired flexibility;Muscle strength;Joint mobility;Pain  -AC     Assessment Comments  Patient is a 7-year-old female who presents today status post right total hip replacement.  This is her second total hip on the right side due to loosening of the prior prosthetic.  She presents today with decreased hip range of motion with increased pain.  She has limited hip strength and pain with knee extension active range of motion.  She is able to complete sit to stand from wheelchair with use of assistive device and min assist.  However stands from his mat table without assist.  She ambulates with slowed gait speed and antalgic gait pattern approximately 60 feet to car today however does have increased pain with gait.  She will benefit from skilled physical therapy to improve lower extremity strength and flexibility as well as hip range of motion to improve gait mechanics and return to prior level of function with decreased pain.  -AC     Please refer to paper survey for additional self-reported information  Yes  -AC     Rehab Potential  Good  -AC     Patient/caregiver participated in establishment of treatment plan and goals  Yes  -AC     Patient would benefit from skilled therapy intervention  Yes  -AC        PT Plan    PT  Frequency  2x/week  -AC     Predicted Duration of Therapy Intervention (PT)  6-8 weeks   -     PT Plan Comments  LE stretching/hip ROM. LE strength as tolerated. gait and balance training. manual and modalities as needed.   -       User Key  (r) = Recorded By, (t) = Taken By, (c) = Cosigned By    Initials Name Provider Type    Keya Liu, PT Physical Therapist            OP Exercises     Row Name 05/25/21 1500             Subjective Comments    Subjective Comments  see pt hx  -AC         Subjective Pain    Able to rate subjective pain?  yes  -      Pre-Treatment Pain Level  10  -      Subjective Pain Comment  pain pill due in 30 minutes  -        User Key  (r) = Recorded By, (t) = Taken By, (c) = Cosigned By    Initials Name Provider Type    Keya Liu PT Physical Therapist                      Time Calculation:     Start Time: 1520  Stop Time: 1602  Time Calculation (min): 42 min  Untimed Charges  PT Eval/Re-eval Minutes: 42  Total Minutes  Untimed Charges Total Minutes: 42   Total Minutes: 42     Therapy Charges for Today     Code Description Service Date Service Provider Modifiers Qty    [id number]  PT EVAL MOD COMPLEXITY 3 5/25/2021 Keya Ross, PT GP 1          Part of this note may be an electronic transcription/translation of spoken language to printed text using the Dragon Dictation System             Keya Ross PT  5/25/2021

## 2021-05-26 ENCOUNTER — TELEPHONE (OUTPATIENT)
Dept: FAMILY MEDICINE CLINIC | Facility: CLINIC | Age: 71
End: 2021-05-26

## 2021-05-26 RX ORDER — POLYETHYLENE GLYCOL 3350 17 G/17G
17 POWDER, FOR SOLUTION ORAL DAILY
Qty: 30 EACH | Refills: 2 | OUTPATIENT
Start: 2021-05-26 | End: 2022-01-09

## 2021-05-26 NOTE — TELEPHONE ENCOUNTER
Cass had hip replacement last week and she said she has not had a bowel movement in a week and she is miserable.  She wanted to know if Dr Herrera could recommend something for her

## 2021-05-26 NOTE — TELEPHONE ENCOUNTER
Will send prescription for Miralax.  She should take this medication daily while on pain medication.  If no bowel movement in 24 hours, please ask patient to call back.  ThanksROHAN

## 2021-05-27 ENCOUNTER — APPOINTMENT (OUTPATIENT)
Dept: PHYSICAL THERAPY | Facility: HOSPITAL | Age: 71
End: 2021-05-27

## 2021-05-27 NOTE — TELEPHONE ENCOUNTER
Per Dr. Herrera, Ms Dawson has been called with new recommendations.   She states she finally got some relief from an enema this morning.   I did recommend that she continue the Miralax, daily or at least every other day while she is on the pain medication following her hip surgery.

## 2021-05-28 ENCOUNTER — OFFICE VISIT (OUTPATIENT)
Dept: FAMILY MEDICINE CLINIC | Facility: CLINIC | Age: 71
End: 2021-05-28

## 2021-05-28 VITALS
DIASTOLIC BLOOD PRESSURE: 60 MMHG | WEIGHT: 180 LBS | SYSTOLIC BLOOD PRESSURE: 100 MMHG | OXYGEN SATURATION: 95 % | BODY MASS INDEX: 26.66 KG/M2 | HEART RATE: 86 BPM | HEIGHT: 69 IN

## 2021-05-28 DIAGNOSIS — Z09 HOSPITAL DISCHARGE FOLLOW-UP: Primary | ICD-10-CM

## 2021-05-28 DIAGNOSIS — K59.03 DRUG-INDUCED CONSTIPATION: ICD-10-CM

## 2021-05-28 PROCEDURE — 99213 OFFICE O/P EST LOW 20 MIN: CPT | Performed by: NURSE PRACTITIONER

## 2021-05-28 RX ORDER — DOCUSATE SODIUM 100 MG/1
100 CAPSULE, LIQUID FILLED ORAL 2 TIMES DAILY
Qty: 60 CAPSULE | Refills: 2 | Status: SHIPPED | OUTPATIENT
Start: 2021-05-28 | End: 2021-08-12

## 2021-05-28 NOTE — PROGRESS NOTES
Chief Complaint  No chief complaint on file.    Subjective     {Problem List  Visit Diagnosis   Encounters  Notes  Medications  Labs  Result Review Imaging  Media :23}     Chrissy Dawson presents to [facility]  History of Present Illness    Review of Systems      Objective   Vital Signs:   There were no vitals taken for this visit.    Physical Exam   Result Review :{Labs  Result Review  Imaging  Med Tab  Media :23}   {The following data was reviewed by (Optional):48305}  {Ambulatory Labs (Optional):56561}  {Data reviewed (Optional):58673:::1}          Assessment and Plan {CC Problem List  Visit Diagnosis  ROS  Review (Popup)  [facility] Maintenance  Quality  BestPractice  Medications  SmartSets  SnapShot Encounters  Media :23}   There are no diagnoses linked to this encounter.  {Time Spent (Optional):50189}  Follow Up {Instructions Charge Capture  Follow-up Communications :23}  No follow-ups on file.  Patient was given instructions and counseling regarding her condition or for health maintenance advice. Please see specific information pulled into the AVS if appropriate.           This document has been electronically signed by JOE Andrea on May 28, 2021 10:35 CDT

## 2021-05-28 NOTE — PROGRESS NOTES
Transitional Care Follow Up Visit  Subjective     Chrissy Amira Dawson is a 70 y.o. female who presents for a transitional care management visit.    Within 48 business hours after discharge our office contacted her via telephone to coordinate her care and needs.      I reviewed and discussed the details of that call along with the discharge summary, hospital problems, inpatient lab results, inpatient diagnostic studies, and consultation reports with Chrissy.     Current outpatient and discharge medications have been reconciled for the patient.  Reviewed by: JOE Andrea      No flowsheet data found.  Risk for Readmission (LACE) Score: 3 (5/21/2021  5:01 AM)      History of Present Illness   Course During Hospital Stay:                   {Common H&P Review Areas:82571}    Review of Systems    Objective   Physical Exam    Assessment/Plan   {Assess/PlanSmartLinks:77544}

## 2021-05-28 NOTE — PROGRESS NOTES
19Transitional Care Follow Up Visit  Subjective     Chrissy Amira Dawson is a 70 y.o. female who presents for a transitional care management visit.    Within 48 business hours after discharge our office contacted her via telephone to coordinate her care and needs.      I reviewed and discussed the details of that call along with the discharge summary, hospital problems, inpatient lab results, inpatient diagnostic studies, and consultation reports with Chrissy.     Current outpatient and discharge medications have been reconciled for the patient.  Reviewed by: JOE Andrea      No flowsheet data found.  Risk for Readmission (LACE) Score: 3 (5/21/2021  5:01 AM)       HPI: Hospital follow up for : anterior revision total hip arthroplasty             Course During Hospital Stay:      Copied from hospital chart:     Admit date: 5/19/2021  Discharged: 5/21/2021      Status Post: Procedure(s) (LRB):  anterior revision total hip arthroplasty             Current Outpatient Medications on File Prior to Visit   Medication Sig   • aspirin 81 MG tablet Take 81 mg by mouth.   • cephalexin (Keflex) 500 MG capsule Take 1 capsule by mouth 2 (Two) Times a Day for 7 days.   • HYDROcodone-acetaminophen (NORCO) 5-325 MG per tablet Take 1 tablet by mouth Every 6 (Six) Hours As Needed for Severe Pain .   • Multiple Minerals-Vitamins (CITRACAL PLUS PO) Take  by mouth.   • Multiple Vitamins-Minerals (CENTRUM SILVER PO) Take 1 tablet by mouth Daily.   • nitroglycerin (NITROSTAT) 0.4 MG SL tablet 1 under the tongue as needed for angina, may repeat q5mins for up three doses   • pravastatin (PRAVACHOL) 40 MG tablet Take 1 tablet by mouth Daily.   • vitamin B-12 (CYANOCOBALAMIN) 1000 MCG tablet Take 1,000 mcg by mouth Daily.   • vitamin C (ASCORBIC ACID) 500 MG tablet Take 500 mg by mouth Daily.      No current facility-administered medications on file prior to visit.           Scheduled Meds:  Continuous Infusions:No current  facility-administered medications for this visit.        The following portions of the patient's history were reviewed and updated as appropriate: allergies, current medications, past family history, past medical history, past social history, past surgical history and problem list.    Review of Systems   Constitutional: Negative for activity change, appetite change and chills.   HENT: Negative for congestion, ear pain, sore throat and trouble swallowing.    Eyes: Negative for discharge, itching and visual disturbance.   Respiratory: Negative for apnea, cough and wheezing.    Cardiovascular: Negative for chest pain and leg swelling.   Gastrointestinal: Positive for constipation. Negative for abdominal distention, diarrhea and nausea.   Endocrine: Negative for cold intolerance, heat intolerance and polyuria.   Genitourinary: Negative for dysuria, frequency and urgency.   Musculoskeletal: Negative for arthralgias, back pain and myalgias.   Skin: Negative for color change, pallor and wound.   Neurological: Negative for dizziness, seizures, syncope, weakness and light-headedness.   Psychiatric/Behavioral: Negative for agitation, confusion and sleep disturbance. The patient is not nervous/anxious.        Objective   Physical Exam  Vitals and nursing note reviewed.   Constitutional:       Appearance: She is well-developed.   HENT:      Head: Normocephalic and atraumatic.   Eyes:      General: Lids are normal.      Conjunctiva/sclera: Conjunctivae normal.   Neck:      Thyroid: No thyroid mass or thyromegaly.      Trachea: Trachea normal. No tracheal tenderness.   Cardiovascular:      Rate and Rhythm: Normal rate.      Pulses: Normal pulses.      Heart sounds: Normal heart sounds.   Pulmonary:      Effort: Pulmonary effort is normal. No respiratory distress.      Breath sounds: Normal breath sounds. No wheezing.   Abdominal:      General: There is no distension.      Palpations: Abdomen is soft. There is no mass.    Musculoskeletal:         General: Normal range of motion.      Cervical back: Normal range of motion. No edema.   Lymphadenopathy:      Head:      Right side of head: No submental, submandibular or tonsillar adenopathy.      Left side of head: No submental, submandibular or tonsillar adenopathy.   Skin:     General: Skin is warm and dry.      Coloration: Skin is not pale.      Findings: Wound present. No abrasion, erythema or lesion.          Neurological:      Mental Status: She is alert and oriented to person, place, and time.   Psychiatric:         Mood and Affect: Mood is not anxious. Affect is not inappropriate.         Speech: Speech normal.         Behavior: Behavior normal.         Thought Content: Thought content normal.         Judgment: Judgment normal. Judgment is not impulsive.         Assessment/Plan   Diagnoses and all orders for this visit:    1. Hospital discharge follow-up (Primary)    2. Drug-induced constipation  -     docusate sodium (Colace) 100 MG capsule; Take 1 capsule by mouth 2 (Two) Times a Day.  Dispense: 60 capsule; Refill: 2         Diagnosis Plan   1. Hospital discharge follow-up     2. Drug-induced constipation  docusate sodium (Colace) 100 MG capsule       Continue prescribed medications   Continue to follow up with Ortho   Continue therapy   We will add colace to help with constipation   May continue to use Miralax as needed            This document has been electronically signed by JOE Andrea on May 28, 2021 15:07 CDT

## 2021-06-01 ENCOUNTER — ANTICOAGULATION VISIT (OUTPATIENT)
Dept: PHARMACY | Facility: HOSPITAL | Age: 71
End: 2021-06-01

## 2021-06-01 ENCOUNTER — LAB (OUTPATIENT)
Dept: LAB | Facility: HOSPITAL | Age: 71
End: 2021-06-01

## 2021-06-01 ENCOUNTER — HOSPITAL ENCOUNTER (OUTPATIENT)
Dept: PHYSICAL THERAPY | Facility: HOSPITAL | Age: 71
Setting detail: THERAPIES SERIES
Discharge: HOME | End: 2021-06-01

## 2021-06-01 DIAGNOSIS — Z74.09 IMPAIRED FUNCTIONAL MOBILITY, BALANCE, GAIT, AND ENDURANCE: ICD-10-CM

## 2021-06-01 DIAGNOSIS — T84.030D MECHANICAL LOOSENING OF INTERNAL RIGHT HIP PROSTHETIC JOINT, SUBSEQUENT ENCOUNTER: ICD-10-CM

## 2021-06-01 DIAGNOSIS — Z96.641 S/P HIP REPLACEMENT, RIGHT: ICD-10-CM

## 2021-06-01 DIAGNOSIS — I10 BENIGN ESSENTIAL HYPERTENSION: ICD-10-CM

## 2021-06-01 DIAGNOSIS — Z74.09 IMPAIRED MOBILITY AND ACTIVITIES OF DAILY LIVING: ICD-10-CM

## 2021-06-01 DIAGNOSIS — Z79.01 ANTICOAGULATED ON WARFARIN: ICD-10-CM

## 2021-06-01 DIAGNOSIS — T84.030D MECHANICAL LOOSENING OF INTERNAL RIGHT HIP PROSTHETIC JOINT, SUBSEQUENT ENCOUNTER: Primary | ICD-10-CM

## 2021-06-01 DIAGNOSIS — Z78.9 IMPAIRED MOBILITY AND ACTIVITIES OF DAILY LIVING: ICD-10-CM

## 2021-06-01 LAB
CHOLEST SERPL-MCNC: 194 MG/DL (ref 0–200)
HDLC SERPL-MCNC: 55 MG/DL (ref 40–60)
INR PPP: 1.54 (ref 0.8–1.2)
LDLC SERPL CALC-MCNC: 119 MG/DL (ref 0–100)
LDLC/HDLC SERPL: 2.12 {RATIO}
PROTHROMBIN TIME: 19.3 SECONDS (ref 11.1–15.3)
TRIGL SERPL-MCNC: 112 MG/DL (ref 0–150)
VLDLC SERPL-MCNC: 20 MG/DL (ref 5–40)

## 2021-06-01 PROCEDURE — 80061 LIPID PANEL: CPT | Performed by: INTERNAL MEDICINE

## 2021-06-01 PROCEDURE — 36415 COLL VENOUS BLD VENIPUNCTURE: CPT | Performed by: INTERNAL MEDICINE

## 2021-06-01 PROCEDURE — 85610 PROTHROMBIN TIME: CPT

## 2021-06-01 PROCEDURE — 97110 THERAPEUTIC EXERCISES: CPT

## 2021-06-01 RX ORDER — OXYCODONE AND ACETAMINOPHEN 7.5; 325 MG/1; MG/1
1 TABLET ORAL EVERY 6 HOURS PRN
Qty: 12 TABLET | Refills: 0 | Status: SHIPPED | OUTPATIENT
Start: 2021-06-01 | End: 2021-06-04 | Stop reason: SDUPTHER

## 2021-06-01 NOTE — TELEPHONE ENCOUNTER
DR WAITE PATIENT IS REQUESTING A REFILL OF OXYCODONE 7.5/325 MG AT [facility].  SHE HAS AN APPOINTMENT WITH [facility]  FOR A POST OP HIP THE END OF THIS WEEK.

## 2021-06-01 NOTE — PROGRESS NOTES
Spoke with Mrs. Dawson. Reviewed current lab.  No s/s of bleeding. No new meds. No missed doses. Reviewed schedule for following week. Pt read back regimen and verbalized understanding. All questions answered. Next INR on 06/08 provided by [facility].    Breana Jones, PharmD  06/01/21  13:52 CDT

## 2021-06-01 NOTE — THERAPY TREATMENT NOTE
Outpatient Physical Therapy Ortho Treatment Note  [facility]     Patient Name: Chrissy Dawson  : 1950  MRN: [id number]  Today's Date: 2021      Visit Date: 2021    Visit Dx:    ICD-10-CM ICD-9-CM   1. 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 (CMS/HCC)   • S/P hip replacement, right   • Overweight (BMI 25.0-29.9)   • Heart murmur        Past Medical History:   Diagnosis Date   • Backache    • Benign essential hypertension     PATIENT DENIES   • Cervical arthritis    • Coronary arteriosclerosis    • Fibrocystic breast    • Ganglion cyst of wrist     Ganglion/synovial cyst - wrist   • Ganglion of wrist    • Hip pain    • History of echocardiogram 10/23/2015    Echocardiogram W/ color flow 94250 (1) - Normal LV function with Ef of 55-60%.Normal RV size and function.Pseudonormal diastolic dysfunciton with borderline [facility].NO sig.valvular regurg or stenosis.   • History of mammogram 09/10/2014    MAMMOGRAM SCREENING 28254 - [facility] (1) - LIZZ MILLER ([facility])   • History of transfusion    • Hyperlipidemia    • Inguinal pain    • Recurrent angina status post coronary artery bypass graft (CMS/HCC)     Recurrent angina after coronary artery bypass graft   • Urge incontinence of urine         Past Surgical History:   Procedure Laterality Date   • BREAST BIOPSY Right 2017    Procedure: RIGHT BREAST LUMPECTOMY WITH NEEDLE LOCALIZATION AND ULTRASOUND;  Surgeon: Delfino Greer MD;  Location: [facility];  Service:    • BREAST CYST ASPIRATION     • CARDIAC SURGERY     • CORONARY  ARTERY BYPASS GRAFT  2000    CABG, arterial, single (1)   • GANGLION CYST EXCISION     • JOINT REPLACEMENT     • ORIF MANDIBULAR FRACTURE     • TOTAL ABDOMINAL HYSTERECTOMY     • TOTAL ABDOMINAL HYSTERECTOMY WITH SALPINGO OOPHORECTOMY  1998    SALVADOR/BSO (1)   • TOTAL HIP ARTHROPLASTY  2011    Total hip replacement (3)   • TOTAL HIP ARTHROPLASTY REVISION Right 5/19/2021    Procedure: anterior revision total hip arthroplasty        (lg-head c-arm and hana table );  Surgeon: Jeovanny Maravilla MD;  Location: [facility];  Service: Orthopedics;  Laterality: Right;                       PT Assessment/Plan     Row Name 06/01/21 0800          PT Assessment    Assessment Comments  Pt tolerated ex's well. Pt performs ex very slowly but effectively with lots of instructiion given. Pt amb very slow with slumpt posture which she is able to correct when cued.  -TW        PT Plan    PT Frequency  2x/week  -TW     Predicted Duration of Therapy Intervention (PT)  6-8 weeks  -TW     PT Plan Comments  Cont and progress as pt is able.  -TW       User Key  (r) = Recorded By, (t) = Taken By, (c) = Cosigned By    Initials Name Provider Type    TW Derrick Mares, PTA Physical Therapy Assistant            OP Exercises     Row Name 06/01/21 0800             Subjective Comments    Subjective Comments  Pt states increase pain today but is willing to do   -TW         Subjective Pain    Able to rate subjective pain?  yes  -TW      Pre-Treatment Pain Level  6  -TW      Post-Treatment Pain Level  7  -TW         Exercise 1    Exercise Name 1  Ankle pumps  -TW      Sets 1  3  -TW      Reps 1  25  -TW         Exercise 2    Exercise Name 2  LAQ  -TW      Sets 2  3  -TW      Reps 2  10  -TW         Exercise 3    Exercise Name 3  QS/GS  -TW      Sets 3  2  -TW      Reps 3  10  -TW      Time 3  5'' hold  -TW         Exercise 4    Exercise Name 4  Hip ABD  -TW      Sets 4  3  -TW      Reps 4  10  -TW         Exercise 5    Exercise Name 5  Sit to  stand  -TW      Sets 5  2  -TW      Reps 5  5  -TW         Exercise 6    Exercise Name 6  Walking with RW  -TW      Additional Comments  350ft  -TW        User Key  (r) = Recorded By, (t) = Taken By, (c) = Cosigned By    Initials Name Provider Type    Derrick Blake PTA Physical Therapy Assistant                       PT OP Goals     Row Name 06/01/21 0800          PT Short Term Goals    STG Date to Achieve  06/22/21  -TW     STG 1  Pt is independet with HEP.   -TW     STG 1 Progress  Not Met  -TW     STG 2  Patient is able to complete 5 times sit to stand from standard height chair.   -TW     STG 2 Progress  Not Met  -TW     STG 3  Patient is able to complete 6-minute walk test.  -TW     STG 3 Progress  Not Met  -TW     STG 4  Patient is able to complete right hip flexion to 90.   -TW     STG 4 Progress  Not Met  -TW     STG 5  Patient is able to complete straight leg raise x5 on right lower extremity.  -TW     STG 5 Progress  Not Met  -TW        Long Term Goals    LTG Date to Achieve  07/20/21  -TW     LTG 1  6-minute walk test 1000 feet or greater.  -TW     LTG 1 Progress  Not Met  -TW     LTG 2  5 times sit to stand without upper extremity use from standard height chair in </=15 seconds  -TW     LTG 2 Progress  Not Met  -TW     LTG 3  LEFS improved to 40/80 or greater.   -TW     LTG 3 Progress  Not Met  -TW     LTG 4  Pt is able to ambulate 270 ft without AD and without LOB.   -TW     LTG 4 Progress  Not Met  -TW     LTG 5  Pt notes subjective improvment 80% or greater.   -TW     LTG 5 Progress  Not Met  -TW       User Key  (r) = Recorded By, (t) = Taken By, (c) = Cosigned By    Initials Name Provider Type    Derrick Blake PTA Physical Therapy Assistant                         Time Calculation:   Start Time: 0803  Stop Time: 0845  Time Calculation (min): 42 min  Total Timed Code Minutes- PT: 42 minute(s)  Therapy Charges for Today     Code Description Service Date Service Provider Modifiers  Qty    58921230596  PT THER PROC EA 15 MIN 6/1/2021 Derrick Mares, PTA GP 3                    Derrick Mares PTA  6/1/2021

## 2021-06-02 ENCOUNTER — APPOINTMENT (OUTPATIENT)
Dept: PHYSICAL THERAPY | Facility: HOSPITAL | Age: 71
End: 2021-06-02

## 2021-06-03 ENCOUNTER — HOSPITAL ENCOUNTER (OUTPATIENT)
Dept: PHYSICAL THERAPY | Facility: HOSPITAL | Age: 71
Setting detail: THERAPIES SERIES
Discharge: HOME | End: 2021-06-03

## 2021-06-03 DIAGNOSIS — Z96.641 PRESENCE OF RIGHT ARTIFICIAL HIP JOINT: ICD-10-CM

## 2021-06-03 DIAGNOSIS — M25.551 RIGHT HIP PAIN: Primary | ICD-10-CM

## 2021-06-03 DIAGNOSIS — T84.030D MECHANICAL LOOSENING OF INTERNAL RIGHT HIP PROSTHETIC JOINT, SUBSEQUENT ENCOUNTER: Primary | ICD-10-CM

## 2021-06-03 PROCEDURE — 97110 THERAPEUTIC EXERCISES: CPT

## 2021-06-03 PROCEDURE — 97530 THERAPEUTIC ACTIVITIES: CPT

## 2021-06-03 NOTE — THERAPY TREATMENT NOTE
Outpatient Physical Therapy Ortho Treatment Note  [facility]     Patient Name: Chrissy Dawson  : 1950  MRN: [id number]  Today's Date: 6/3/2021      Visit Date: 2021     Subjective Improvement: 0%  Attendance:  3/ (Eval+ 12 approved until 2021)  Next MD Visit : 2021  Recert Date:  06/15/2021      Therapy Diagnosis:  R SALTY Revision 2021        Visit Dx:    ICD-10-CM ICD-9-CM   1. 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 (CMS/HCC)   • S/P hip replacement, right   • Overweight (BMI 25.0-29.9)   • Heart murmur        Past Medical History:   Diagnosis Date   • Backache    • Benign essential hypertension     PATIENT DENIES   • Cervical arthritis    • Coronary arteriosclerosis    • Fibrocystic breast    • Ganglion cyst of wrist     Ganglion/synovial cyst - wrist   • Ganglion of wrist    • Hip pain    • History of echocardiogram 10/23/2015    Echocardiogram W/ color flow 75461 (1) - Normal LV function with Ef of 55-60%.Normal RV size and function.Pseudonormal diastolic dysfunciton with borderline [facility].NO sig.valvular regurg or stenosis.   • History of mammogram 09/10/2014    MAMMOGRAM SCREENING 61474 - [facility] ([facility] - LIZZ MILLER ([facility])   • History of transfusion    • Hyperlipidemia    • Inguinal pain    • Recurrent angina status post coronary artery bypass graft (CMS/HCC)     Recurrent angina after coronary artery bypass graft   • Urge incontinence of urine         Past Surgical History:   Procedure Laterality Date   • BREAST BIOPSY Right 2017    Procedure:  RIGHT BREAST LUMPECTOMY WITH NEEDLE LOCALIZATION AND ULTRASOUND;  Surgeon: Delfino Greer MD;  Location: [facility];  Service:    • BREAST CYST ASPIRATION     • CARDIAC SURGERY     • CORONARY ARTERY BYPASS GRAFT  2000    CABG, arterial, single (1)   • GANGLION CYST EXCISION     • JOINT REPLACEMENT     • ORIF MANDIBULAR FRACTURE     • TOTAL ABDOMINAL HYSTERECTOMY     • TOTAL ABDOMINAL HYSTERECTOMY WITH SALPINGO OOPHORECTOMY  1998    SALVADOR/BSO (1)   • TOTAL HIP ARTHROPLASTY  2011    Total hip replacement (3)   • TOTAL HIP ARTHROPLASTY REVISION Right 5/19/2021    Procedure: anterior revision total hip arthroplasty        (lg-head c-arm and hana table );  Surgeon: Jeovanny Maravilla MD;  Location: [facility];  Service: Orthopedics;  Laterality: Right;       PT Ortho     Row Name 06/03/21 0900       Precautions and Contraindications    Precautions/Limitations  no known precautions/limitations  -    Precautions  per MD: slowly progress as tolerated.   -       Posture/Observations    Posture/Observations Comments  patient presents with RW, slight antalgic, slow and guarded.   -      User Key  (r) = Recorded By, (t) = Taken By, (c) = Cosigned By    Initials Name Provider Type    Nadiya Velazquez PTA Physical Therapy Assistant                      PT Assessment/Plan     Row Name 06/03/21 0900          PT Assessment    Assessment Comments  progressing with gait and exercises for the right hip; Encouraged pt about the  compliance of HEP and the importance; Education also given about scar massgae and care once bandage is removed; Exercises perfomred this date to help assist with functional strength to be able to get up and down and out of her bed at home;   -        PT Plan    PT Frequency  2x/week  -     Predicted Duration of Therapy Intervention (PT)  6-8 weeks  -     PT Plan Comments  Continue to progress with gait and strength in the R hip and LE, progressing balance as able. Possible 6 min walk test next  -      "  User Key  (r) = Recorded By, (t) = Taken By, (c) = Cosigned By    Initials Name Provider Type    Nadiya Velazquez PTA Physical Therapy Assistant            OP Exercises     Row Name 06/03/21 0900             Subjective Comments    Subjective Comments  Patient reports that she is doing well; Doing more walking at home but not the best about doing her exercises at home but trying to improve;   -KH         Subjective Pain    Able to rate subjective pain?  yes  -KH      Pre-Treatment Pain Level  4 took pain meds prior to PT  -KH      Post-Treatment Pain Level  -- \"better\"  -KH         Exercise 1    Exercise Name 1  pro ll LE ROM  -KH      Time 1  8 mins  -KH      Additional Comments  level 1; seat 13  -KH         Exercise 2    Exercise Name 2  Bridges  -KH      Cueing 2  Verbal  -KH      Sets 2  2  -KH      Reps 2  10  -KH         Exercise 3    Exercise Name 3  Hooklying Hip abd w/ T-band  -KH      Sets 3  2  -KH      Reps 3  10  -KH      Additional Comments  red  -KH         Exercise 4    Exercise Name 4  Hooklying alt LE lhip flexion  -KH      Cueing 4  Verbal  -KH      Sets 4  2  -KH      Reps 4  10  -KH         Exercise 5    Exercise Name 5  sit to stands  -KH      Cueing 5  Verbal  -KH      Sets 5  2  -KH      Reps 5  5  -KH         Exercise 6    Exercise Name 6  LAQ  -KH      Cueing 6  Verbal  -KH      Sets 6  2  -KH      Reps 6  10  -KH         Exercise 7    Exercise Name 7  gait with RW  -KH      Additional Comments  200ft x1  -KH        User Key  (r) = Recorded By, (t) = Taken By, (c) = Cosigned By    Initials Name Provider Type    Nadiya Velazquez PTA Physical Therapy Assistant                       PT OP Goals     Row Name 06/03/21 0900          PT Short Term Goals    STG Date to Achieve  06/22/21  -     STG 1  Pt is independet with HEP.   -KH     STG 1 Progress  Not Met  -KH     STG 2  Patient is able to complete 5 times sit to stand from standard height chair.   -KH     STG 2 Progress  Not Met  -KH     " STG 3  Patient is able to complete 6-minute walk test.  -     STG 3 Progress  Not Met  -     STG 4  Patient is able to complete right hip flexion to 90.   -     STG 4 Progress  Not Met  -     STG 5  Patient is able to complete straight leg raise x5 on right lower extremity.  -     STG 5 Progress  Not Met  -        Long Term Goals    LTG Date to Achieve  07/20/21  -     LTG 1  6-minute walk test 1000 feet or greater.  -     LTG 1 Progress  Not Met  -     LTG 2  5 times sit to stand without upper extremity use from standard height chair in </=15 seconds  -     LTG 2 Progress  Not Met  -     LTG 3  LEFS improved to 40/80 or greater.   -     LTG 3 Progress  Not Met  -     LTG 4  Pt is able to ambulate 270 ft without AD and without LOB.   -     LTG 4 Progress  Not Met  -     LTG 5  Pt notes subjective improvment 80% or greater.   -     LTG 5 Progress  Not Met  -        Time Calculation    PT Goal Re-Cert Due Date  06/15/21  -       User Key  (r) = Recorded By, (t) = Taken By, (c) = Cosigned By    Initials Name Provider Type    Nadiya Velazquez PTA Physical Therapy Assistant                         Time Calculation:   Start Time: 0938  Stop Time: 1020  Time Calculation (min): 42 min  Total Timed Code Minutes- PT: 42 minute(s)  Therapy Charges for Today     Code Description Service Date Service Provider Modifiers Qty    16500634118 HC PT THER PROC EA 15 MIN 6/3/2021 Nadiya Kitchen PTA GP 2    50800719813 HC PT THERAPEUTIC ACT EA 15 MIN 6/3/2021 Nadiya Kitchen PTA GP 1                    Nadiya Kitchen PTA  6/3/2021

## 2021-06-04 ENCOUNTER — OFFICE VISIT (OUTPATIENT)
Dept: ORTHOPEDIC SURGERY | Facility: CLINIC | Age: 71
End: 2021-06-04

## 2021-06-04 VITALS — HEIGHT: 69 IN | BODY MASS INDEX: 27.44 KG/M2 | WEIGHT: 185.3 LBS

## 2021-06-04 DIAGNOSIS — T84.030D MECHANICAL LOOSENING OF INTERNAL RIGHT HIP PROSTHETIC JOINT, SUBSEQUENT ENCOUNTER: ICD-10-CM

## 2021-06-04 DIAGNOSIS — Z96.641 PRESENCE OF RIGHT ARTIFICIAL HIP JOINT: ICD-10-CM

## 2021-06-04 DIAGNOSIS — Z96.641 S/P HIP REPLACEMENT, RIGHT: ICD-10-CM

## 2021-06-04 DIAGNOSIS — Z96.649 S/P REVISION OF TOTAL HIP: ICD-10-CM

## 2021-06-04 DIAGNOSIS — M25.551 RIGHT HIP PAIN: Primary | ICD-10-CM

## 2021-06-04 PROCEDURE — 99024 POSTOP FOLLOW-UP VISIT: CPT | Performed by: NURSE PRACTITIONER

## 2021-06-04 RX ORDER — OXYCODONE AND ACETAMINOPHEN 7.5; 325 MG/1; MG/1
1 TABLET ORAL EVERY 6 HOURS PRN
Qty: 12 TABLET | Refills: 0 | Status: SHIPPED | OUTPATIENT
Start: 2021-06-04 | End: 2021-06-07

## 2021-06-04 NOTE — PROGRESS NOTES
Chrissy Dawson is a 70 y.o. female      Chief Complaint   Patient presents with   • Right Hip - Post-op       HISTORY OF PRESENT ILLNESS:   05/19/21 (2w 2d) Jeovanny Maravilla MD   anterior revision total hip arthroplasty        (lg-head c-arm and hana table ) - Right       Patient is a 70-year-old female who presents today for postop evaluation after undergoing right hip revision on 5/19/2021 by Dr. Maravilla.  Patient is 16 days postop.  She denies fever, nausea, vomiting.  She has no complaints of burning, tingling, numbness.  She has no issues with incision site.  She admits to taking warfarin as prescribed.  She is already started physical therapy.  She has no unusual complaints.    Allergies   Allergen Reactions   • Crestor [Rosuvastatin] Shortness Of Breath   • Lipitor [Atorvastatin] Shortness Of Breath         Current Outpatient Medications:   •  aspirin 81 MG tablet, Take 81 mg by mouth., Disp: , Rfl:   •  docusate sodium (Colace) 100 MG capsule, Take 1 capsule by mouth 2 (Two) Times a Day., Disp: 60 capsule, Rfl: 2  •  Multiple Minerals-Vitamins (CITRACAL PLUS PO), Take  by mouth., Disp: , Rfl:   •  Multiple Vitamins-Minerals (CENTRUM SILVER PO), Take 1 tablet by mouth Daily., Disp: , Rfl:   •  nitroglycerin (NITROSTAT) 0.4 MG SL tablet, 1 under the tongue as needed for angina, may repeat q5mins for up three doses (Patient taking differently: Place 0.4 mg under the tongue Take As Directed. 1 under the tongue as needed for angina, may repeat q5mins for up three doses), Disp: 15 tablet, Rfl: 6  •  oxyCODONE-acetaminophen (PERCOCET) 7.5-325 MG per tablet, Take 1 tablet by mouth Every 6 (Six) Hours As Needed for Moderate Pain  for up to 3 days., Disp: 12 tablet, Rfl: 0  •  polyethylene glycol (MIRALAX) 17 g packet, Take 17 g by mouth Daily., Disp: 30 each, Rfl: 2  •  pravastatin (PRAVACHOL) 40 MG tablet, Take 1 tablet by mouth Daily. (Patient taking differently: Take 40 mg by mouth Every Night.), Disp:  31 tablet, Rfl: 6  •  trospium (SANCTURA) 20 MG tablet, Take 20 mg by mouth 2 (two) times a day., Disp: , Rfl:   •  vitamin B-12 (CYANOCOBALAMIN) 1000 MCG tablet, Take 1,000 mcg by mouth Daily., Disp: , Rfl:   •  vitamin C (ASCORBIC ACID) 500 MG tablet, Take 500 mg by mouth Daily., Disp: , Rfl:   •  warfarin (Coumadin) 4 MG tablet, Take 1 tablet by mouth every night or as directed., Disp: 45 tablet, Rfl: 1  •  Warfarin Sodium (PHARMACY TO DOSE WARFARIN), Continuous As Needed (6 weeks). Indications: Prevention of Unwanted Clot in Veins, Disp: , Rfl:     No fevers or chills.  No nausea or vomiting.  Right hip pain.  No shortness of breath.       PHYSICAL EXAMINATION:       Chrissy Dawson is a 70 y.o. female    Patient is awake and alert, answers questions appropriately and is in no apparent distress.    GAIT:     []  Normal  [x]  Antalgic    Assistive device: []  None  [x]  Walker     []  Crutches  []  Cane     [x]  Wheelchair  []  Stretcher    Right Hip Exam     Comments:  Strength and range of motion testing deferred.  Incision site is clean, dry, intact, well approximated and secured with Prineo dressing  No evidence of infection  Distal pulses intact  Negative Homans' sign  Calf is soft and nontender                  Study: XR hip with or without pelvis, right  Comparison: 5/19/2021  Narrative: Bilateral hips are acceptable in alignment and position.  Bilateral hips are status post SALTY.  No evidence of hardware failure or loosening.  Degenerative changes seen throughout hips and visible portion of lumbar spine.  No evidence of fracture or dislocation.  Deysi Aleman, APRN 6/7/2021.      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    S/P hip replacement, right  -     oxyCODONE-acetaminophen (PERCOCET) 7.5-325 MG per tablet; Take 1 tablet by mouth Every 6 (Six) Hours As Needed for Moderate Pain  for up to 3 days.          PLAN    X-rays reviewed and satisfactory.  Patient seems to be progressing appropriately.  Incision site care explained to patient.  Patient instructed to continue warfarin as prescribed and dosed by pharmacy.  Patient to continue physical therapy.  Patient instructed to slowly progress activity as tolerated, patient reminded that she has no true restrictions however she needs to take things slow and not push past pain.  Patient verbalized understanding of instructions.  Patient to return in 4 weeks for postop recheck with Dr. Maravilla.    Refill of Percocet given after discussing risk, benefits, alternatives.  Internal Jan reviewed through [facility] PDMP.    Return in about 4 weeks (around 7/2/2021).    JOE Gaxiola

## 2021-06-08 ENCOUNTER — LAB (OUTPATIENT)
Dept: LAB | Facility: HOSPITAL | Age: 71
End: 2021-06-08

## 2021-06-08 ENCOUNTER — OFFICE VISIT (OUTPATIENT)
Dept: ORTHOPEDIC SURGERY | Facility: CLINIC | Age: 71
End: 2021-06-08

## 2021-06-08 ENCOUNTER — HOSPITAL ENCOUNTER (OUTPATIENT)
Dept: PHYSICAL THERAPY | Facility: HOSPITAL | Age: 71
Setting detail: THERAPIES SERIES
Discharge: HOME | End: 2021-06-08

## 2021-06-08 ENCOUNTER — ANTICOAGULATION VISIT (OUTPATIENT)
Dept: PHARMACY | Facility: HOSPITAL | Age: 71
End: 2021-06-08

## 2021-06-08 VITALS — BODY MASS INDEX: 27 KG/M2 | OXYGEN SATURATION: 98 % | WEIGHT: 182.3 LBS | HEART RATE: 85 BPM | HEIGHT: 69 IN

## 2021-06-08 DIAGNOSIS — M25.551 RIGHT HIP PAIN: Primary | ICD-10-CM

## 2021-06-08 DIAGNOSIS — T84.030D MECHANICAL LOOSENING OF INTERNAL RIGHT HIP PROSTHETIC JOINT, SUBSEQUENT ENCOUNTER: Primary | ICD-10-CM

## 2021-06-08 DIAGNOSIS — Z96.641 PRESENCE OF RIGHT ARTIFICIAL HIP JOINT: ICD-10-CM

## 2021-06-08 DIAGNOSIS — Z96.641 S/P HIP REPLACEMENT, RIGHT: ICD-10-CM

## 2021-06-08 DIAGNOSIS — Z96.649 S/P REVISION OF TOTAL HIP: ICD-10-CM

## 2021-06-08 DIAGNOSIS — Z79.01 ANTICOAGULATED ON WARFARIN: ICD-10-CM

## 2021-06-08 LAB
INR PPP: 1.58 (ref 0.8–1.2)
PROTHROMBIN TIME: 19.7 SECONDS (ref 11.1–15.3)

## 2021-06-08 PROCEDURE — 85610 PROTHROMBIN TIME: CPT

## 2021-06-08 PROCEDURE — 36415 COLL VENOUS BLD VENIPUNCTURE: CPT

## 2021-06-08 PROCEDURE — 97110 THERAPEUTIC EXERCISES: CPT

## 2021-06-08 PROCEDURE — 99024 POSTOP FOLLOW-UP VISIT: CPT | Performed by: ORTHOPAEDIC SURGERY

## 2021-06-08 RX ORDER — CEPHALEXIN 500 MG/1
500 CAPSULE ORAL 3 TIMES DAILY
Qty: 30 CAPSULE | Refills: 0 | Status: SHIPPED | OUTPATIENT
Start: 2021-06-08 | End: 2021-06-18

## 2021-06-08 NOTE — PROGRESS NOTES
Chrissy Dawson is a 71 y.o. female is s/p       Chief Complaint   Patient presents with   • Right Hip - Post-op       HISTORY OF PRESENT ILLNESS:   05/19/21 (2w 6d) Jeovanny Maravilla MD   anterior revision total hip arthroplasty        (lg-head c-arm and hana table ) - Right     Patient being seen for right hip post-op. Patient doing PT at [facility] 2 days/week.   Was seen in therapy this morning and felt to have some drainage.  She was sent over for evaluation.  She reports improvement in her pain.  She also reports improvement in mobility.  No fevers or chills.       Allergies   Allergen Reactions   • Crestor [Rosuvastatin] Shortness Of Breath   • Lipitor [Atorvastatin] Shortness Of Breath         Current Outpatient Medications:   •  aspirin 81 MG tablet, Take 81 mg by mouth., Disp: , Rfl:   •  cephalexin (KEFLEX) 500 MG capsule, Take 1 capsule by mouth 3 (Three) Times a Day for 10 days., Disp: 30 capsule, Rfl: 0  •  docusate sodium (Colace) 100 MG capsule, Take 1 capsule by mouth 2 (Two) Times a Day., Disp: 60 capsule, Rfl: 2  •  Multiple Minerals-Vitamins (CITRACAL PLUS PO), Take  by mouth., Disp: , Rfl:   •  Multiple Vitamins-Minerals (CENTRUM SILVER PO), Take 1 tablet by mouth Daily., Disp: , Rfl:   •  nitroglycerin (NITROSTAT) 0.4 MG SL tablet, 1 under the tongue as needed for angina, may repeat q5mins for up three doses (Patient taking differently: Place 0.4 mg under the tongue Take As Directed. 1 under the tongue as needed for angina, may repeat q5mins for up three doses), Disp: 15 tablet, Rfl: 6  •  oxyCODONE-acetaminophen (PERCOCET) 7.5-325 MG per tablet, Take 1 tablet by mouth Every 6 (Six) Hours As Needed for Moderate Pain ., Disp: 30 tablet, Rfl: 0  •  polyethylene glycol (MIRALAX) 17 g packet, Take 17 g by mouth Daily., Disp: 30 each, Rfl: 2  •  pravastatin (PRAVACHOL) 40 MG tablet, Take 1 tablet by mouth Daily. (Patient taking differently: Take 40 mg by mouth Every Night.), Disp:  31 tablet, Rfl: 6  •  trospium (SANCTURA) 20 MG tablet, Take 20 mg by mouth 2 (two) times a day., Disp: , Rfl:   •  vitamin B-12 (CYANOCOBALAMIN) 1000 MCG tablet, Take 1,000 mcg by mouth Daily., Disp: , Rfl:   •  vitamin C (ASCORBIC ACID) 500 MG tablet, Take 500 mg by mouth Daily., Disp: , Rfl:   •  warfarin (Coumadin) 4 MG tablet, Take 1 tablet by mouth every night or as directed., Disp: 45 tablet, Rfl: 1  •  Warfarin Sodium (PHARMACY TO DOSE WARFARIN), Continuous As Needed (6 weeks). Indications: Prevention of Unwanted Clot in Veins, Disp: , Rfl:     No fevers or chills.  No nausea or vomiting.      PHYSICAL EXAMINATION:       Chrissy Dawson is a 71 y.o. female    Patient is awake and alert, answers questions appropriately and is in no apparent distress.    GAIT:     []  Normal  [x]  Antalgic    Assistive device: []  None  [x]  Walker     []  Crutches  []  Cane     []  Wheelchair  []  Stretcher    Right Hip Exam     Comments:  No erythema around the incision.  The incision is well-healed with the exception of the very inferior aspect of the incision which has a very small area of granulation tissue.  No active drainage at this point.                Peripheral Block    Result Date: 5/19/2021  Narrative: Shirley Read CRNA     5/19/2021 11:47 AM Peripheral Block Patient reassessed immediately prior to procedure Patient location during procedure: OR Start time: 5/19/2021 11:40 AM Stop time: 5/19/2021 11:43 AM Performed by CRNA: Carmela Gregory SRNA Assisted by: Shirley Read CRNA Preanesthetic Checklist Completed: patient identified, IV checked, site marked, risks and benefits discussed, surgical consent, monitors and equipment checked, pre-op evaluation and timeout performed Prep: Pt Position: supine Sterile barriers:gloves Prep: ChloraPrep Patient monitoring: blood pressure monitoring and continuous pulse oximetry Procedure Sedation:yes Performed under: general Guidance:landmark technique  Laterality:right Block Type:fascia iliaca compartment Injection Technique:single-shot Needle Type:echogenic Needle Gauge:21 G Resistance on Injection: none Medications Used: ropivacaine (NAROPIN) 0.5 % injection, 30 mL Med admintered at 5/19/2021 11:43 AM Medications Comment:Also 10ml of lidocaine 2% Post Assessment Injection Assessment: negative aspiration for heme and incremental injection Patient Tolerance:comfortable throughout block Complications:no     XR Hip With or Without Pelvis 2 - 3 View Right    Result Date: 6/7/2021  Narrative: Study: XR hip with or without pelvis, right Comparison: 5/19/2021 Narrative: Bilateral hips are acceptable in alignment and position.  Bilateral hips are status post SALTY.  No evidence of hardware failure or loosening.  Degenerative changes seen throughout hips and visible portion of lumbar spine.  No evidence of fracture or dislocation.  Deysi Aleman, JOE 6/7/2021.          ASSESSMENT:    Diagnoses and all orders for this visit:    Right hip pain    Presence of right artificial hip joint    S/P revision of total hip    Other orders  -     cephalexin (KEFLEX) 500 MG capsule; Take 1 capsule by mouth 3 (Three) Times a Day for 10 days.          PLAN    We discussed continuing with physical therapy.  Continue general strength and conditioning exercises.  We discussed starting Keflex prophylactically.  She was instructed that if her pain worsened or if she had increased in drainage then she needed to notify us immediately.  Otherwise, we would slowly progress as tolerated.  Recheck in 3 weeks.      Return in about 3 weeks (around 6/29/2021) for recheck.    Jeovanny Maravilla MD

## 2021-06-08 NOTE — PROGRESS NOTES
Spoke with patient and her . Reviewed current lab.  No s/s of bleeding. No new meds. No missed doses. Patient reports eating salad and mixed greens pretty much every night so likely reason that we have not been able to reach INR target. Will increase her dose this week to meet her dietary intake. Reviewed schedule for following week. Pt read back regimen and verbalized understanding. All questions answered. Next INR on 6/15 provided by [facility].    Kalyan Tena, PharmD  06/08/21  16:39 CDT

## 2021-06-08 NOTE — THERAPY TREATMENT NOTE
Outpatient Physical Therapy Ortho Treatment Note  [facility]     Patient Name: Chrissy Dawson  : 1950  MRN: [id number]  Today's Date: 2021      Visit Date: 2021     Subjective Improvement: 0%  Attendance:  / (Eval+ 12 approved until 2021)  Next MD Visit : 2021  Recert Date:  06/15/2021        Therapy Diagnosis:  R SALTY Revision 2021    Visit Dx:    ICD-10-CM ICD-9-CM   1. 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 (CMS/HCC)   • S/P hip replacement, right   • Overweight (BMI 25.0-29.9)   • Heart murmur        Past Medical History:   Diagnosis Date   • Backache    • Benign essential hypertension     PATIENT DENIES   • Cervical arthritis    • Coronary arteriosclerosis    • Fibrocystic breast    • Ganglion cyst of wrist     Ganglion/synovial cyst - wrist   • Ganglion of wrist    • Hip pain    • History of echocardiogram 10/23/2015    Echocardiogram W/ color flow 17121 (1) - Normal LV function with Ef of 55-60%.Normal RV size and function.Pseudonormal diastolic dysfunciton with borderline [facility].NO sig.valvular regurg or stenosis.   • History of mammogram 09/10/2014    MAMMOGRAM SCREENING 57608 - [facility] (1) - [facility] ([facility])   • History of transfusion    • Hyperlipidemia    • Inguinal pain    • Recurrent angina status post coronary artery bypass graft (CMS/HCC)     Recurrent angina after coronary artery bypass graft   • Urge incontinence of urine         Past Surgical History:   Procedure Laterality Date    • BREAST BIOPSY Right 2/2/2017    Procedure: RIGHT BREAST LUMPECTOMY WITH NEEDLE LOCALIZATION AND ULTRASOUND;  Surgeon: Delfino Greer MD;  Location: [facility];  Service:    • BREAST CYST ASPIRATION     • CARDIAC SURGERY     • CORONARY ARTERY BYPASS GRAFT  2000    CABG, arterial, single (1)   • GANGLION CYST EXCISION     • JOINT REPLACEMENT     • ORIF MANDIBULAR FRACTURE     • TOTAL ABDOMINAL HYSTERECTOMY     • TOTAL ABDOMINAL HYSTERECTOMY WITH SALPINGO OOPHORECTOMY  1998    SALVADOR/BSO (1)   • TOTAL HIP ARTHROPLASTY  2011    Total hip replacement (3)   • TOTAL HIP ARTHROPLASTY REVISION Right 5/19/2021    Procedure: anterior revision total hip arthroplasty        (lg-head c-arm and hana table );  Surgeon: Jeovanny Maravilla MD;  Location: [facility];  Service: Orthopedics;  Laterality: Right;       PT Ortho     Row Name 06/08/21 0800       Precautions and Contraindications    Precautions/Limitations  no known precautions/limitations  -    Precautions  per MD: slowly progress as tolerated.   -       Posture/Observations    Posture/Observations Comments  patient presents with RW, improved heel toe gait, mild antalgia noted; Incision bandage intact and pus like noted at distal end, PTA cut bandage back and pus came off on bandage and fluid leaked from end of incision that was yellow and bloody in nature, once cleaned, no drainage noted; incision was left open, only superficial opening noted, but MD was called; Small pocket of swelling was noted at the end of the incision with mild heat.   -      User Key  (r) = Recorded By, (t) = Taken By, (c) = Cosigned By    Initials Name Provider Type    Nadiya Velazquez PTA Physical Therapy Assistant                      PT Assessment/Plan     Row Name 06/08/21 0900          PT Assessment    Assessment Comments  MD office was notified and they expressed they would like to see her; She was sent over to office after PT this date with her ; Pt was given written illustraion  "for new HEP (see therapy tab) and did well with progression of standing exercises; still weakness noted in glute med and hip flexors but progressing;   -NARINDER        PT Plan    PT Frequency  2x/week  -NARINDER     Predicted Duration of Therapy Intervention (PT)  6-8 weeks  -NARINDER     PT Plan Comments  Assess tolerance to new HEP and progress to gait with cane; Await MD instruction about incision;   -NARINDER       User Key  (r) = Recorded By, (t) = Taken By, (c) = Cosigned By    Initials Name Provider Type    Nadiya Velazquez PTA Physical Therapy Assistant          Modalities     Row Name 06/08/21 0800             Subjective Pain    Post-Treatment Pain Level  2  -        User Key  (r) = Recorded By, (t) = Taken By, (c) = Cosigned By    Initials Name Provider Type    Nadiya Velazquez PTA Physical Therapy Assistant        OP Exercises     Row Name 06/08/21 0800             Subjective Comments    Subjective Comments  patient reports that she is doing well; Able to get up and down from bed and out of the car by herself; She does state that she is only taking about 2 pain pill per day now which is good for her; Pt then discussed that she is concerned about her incision and the distal end; Mrs. Dawson reports that she noticed yesterday that it was \"pus\" like, tried to trim it away but her scissors weren't sharp enough;   -NARINDER         Subjective Pain    Able to rate subjective pain?  yes  -NARINDER      Pre-Treatment Pain Level  2  -      Post-Treatment Pain Level  2  -        User Key  (r) = Recorded By, (t) = Taken By, (c) = Cosigned By    Initials Name Provider Type    Nadiya Velazquez PTA Physical Therapy Assistant                       PT OP Goals     Row Name 06/08/21 0800          PT Short Term Goals    STG Date to Achieve  06/22/21  -     STG 1  Pt is independet with HEP.   -     STG 1 Progress  Not Met  -NARINDER     STG 2  Patient is able to complete 5 times sit to stand from standard height chair.   -NARINDER     STG 2 Progress  Not Met  -KH  "    STG 3  Patient is able to complete 6-minute walk test.  -     STG 3 Progress  Not Met  -     STG 4  Patient is able to complete right hip flexion to 90.   -     STG 4 Progress  Not Met  -     STG 5  Patient is able to complete straight leg raise x5 on right lower extremity.  -     STG 5 Progress  Not Met  -        Long Term Goals    LTG Date to Achieve  07/20/21  -     LTG 1  6-minute walk test 1000 feet or greater.  -     LTG 1 Progress  Not Met  -     LTG 2  5 times sit to stand without upper extremity use from standard height chair in </=15 seconds  -     LTG 2 Progress  Not Met  -     LTG 3  LEFS improved to 40/80 or greater.   -     LTG 3 Progress  Not Met  -     LTG 4  Pt is able to ambulate 270 ft without AD and without LOB.   -     LTG 4 Progress  Not Met  -     LTG 5  Pt notes subjective improvment 80% or greater.   -     LTG 5 Progress  Not Met  -        Time Calculation    PT Goal Re-Cert Due Date  06/15/21  -       User Key  (r) = Recorded By, (t) = Taken By, (c) = Cosigned By    Initials Name Provider Type    Nadiya Velazquez PTA Physical Therapy Assistant          Therapy Education  Education Details: standing hip 3 way B; CR, sit to stands, clam shells, reverse clam shells, hooklying march, bridges,  Given: HEP  Program: New  How Provided: Written, Demonstration, Verbal  Provided to: Patient  Level of Understanding: Verbalized, Demonstrated, Teach back education performed              Time Calculation:   Start Time: 0800  Stop Time: 0840  Time Calculation (min): 40 min  Therapy Charges for Today     Code Description Service Date Service Provider Modifiers Qty    35529495434 HC PT THER PROC EA 15 MIN 6/8/2021 Nadiya Kitchen PTA GP 3                    Nadiya Kitchen PTA  6/8/2021

## 2021-06-09 ENCOUNTER — TELEPHONE (OUTPATIENT)
Dept: ORTHOPEDIC SURGERY | Facility: CLINIC | Age: 71
End: 2021-06-09

## 2021-06-09 NOTE — TELEPHONE ENCOUNTER
Pt CALLED STATING THE PHARMACY INFORMED HER THAT HER COUMADIN LEVELS ARE NOT WHERE THEY ARE SUPPOSED TO BE    Pt IS VERY CONCERNED WITH THIS BECAUSE SHE DOES NOT WANT TO GET A BLOOD CLOT     SHE STATES SHE HAS PUT YUNIOR HOSE ON TO HELP IN THE MEAN TIME SHE IS WANTING TO KNOW IF THERE IS SOMETHING ELSE SHE SHOULD BE DOING OR TAKING     SHE ALSO STATES IN THE PAST WHEN SHE HAS HAD SURGERY SHE HAS HAD TO GIVE HERSELF INJECTIONS IN HER STOMACH     PLEASE ADVISE   CALL BACK # [phone]

## 2021-06-10 ENCOUNTER — HOSPITAL ENCOUNTER (OUTPATIENT)
Dept: PHYSICAL THERAPY | Facility: HOSPITAL | Age: 71
Setting detail: THERAPIES SERIES
Discharge: HOME | End: 2021-06-10

## 2021-06-10 DIAGNOSIS — Z96.641 S/P HIP REPLACEMENT, RIGHT: ICD-10-CM

## 2021-06-10 DIAGNOSIS — T84.030D MECHANICAL LOOSENING OF INTERNAL RIGHT HIP PROSTHETIC JOINT, SUBSEQUENT ENCOUNTER: Primary | ICD-10-CM

## 2021-06-10 PROCEDURE — 97110 THERAPEUTIC EXERCISES: CPT

## 2021-06-10 PROCEDURE — 97116 GAIT TRAINING THERAPY: CPT

## 2021-06-10 NOTE — THERAPY TREATMENT NOTE
Outpatient Physical Therapy Ortho Treatment Note  [facility]     Patient Name: Chrissy Dawson  : 1950  MRN: [id number]  Today's Date: 6/10/2021      Visit Date: 06/10/2021     Subjective Improvement: 0%  Attendance:   (Eval+ 12 approved until 2021)  Next MD Visit : 2021  Recert Date:  06/15/2021        Therapy Diagnosis:  R SALTY Revision 2021    Visit Dx:    ICD-10-CM ICD-9-CM   1. 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 (CMS/HCC)   • S/P hip replacement, right   • Overweight (BMI 25.0-29.9)   • Heart murmur        Past Medical History:   Diagnosis Date   • Backache    • Benign essential hypertension     PATIENT DENIES   • Cervical arthritis    • Coronary arteriosclerosis    • Fibrocystic breast    • Ganglion cyst of wrist     Ganglion/synovial cyst - wrist   • Ganglion of wrist    • Hip pain    • History of echocardiogram 10/23/2015    Echocardiogram W/ color flow 18532 (1) - Normal LV function with Ef of 55-60%.Normal RV size and function.Pseudonormal diastolic dysfunciton with borderline [facility].NO sig.valvular regurg or stenosis.   • History of mammogram 09/10/2014    MAMMOGRAM SCREENING 87116 - [facility] (1) - LIZZ MILLER ([facility])   • History of transfusion    • Hyperlipidemia    • Inguinal pain    • Recurrent angina status post coronary artery bypass graft (CMS/HCC)     Recurrent angina after coronary artery bypass graft   • Urge incontinence of urine         Past Surgical History:   Procedure Laterality Date    • BREAST BIOPSY Right 2/2/2017    Procedure: RIGHT BREAST LUMPECTOMY WITH NEEDLE LOCALIZATION AND ULTRASOUND;  Surgeon: Delfino Greer MD;  Location: [facility];  Service:    • BREAST CYST ASPIRATION     • CARDIAC SURGERY     • CORONARY ARTERY BYPASS GRAFT  2000    CABG, arterial, single (1)   • GANGLION CYST EXCISION     • JOINT REPLACEMENT     • ORIF MANDIBULAR FRACTURE     • TOTAL ABDOMINAL HYSTERECTOMY     • TOTAL ABDOMINAL HYSTERECTOMY WITH SALPINGO OOPHORECTOMY  1998    SALVADOR/BSO (1)   • TOTAL HIP ARTHROPLASTY  2011    Total hip replacement (3)   • TOTAL HIP ARTHROPLASTY REVISION Right 5/19/2021    Procedure: anterior revision total hip arthroplasty        (lg-head c-arm and hana table );  Surgeon: Jeovanny Maravilla MD;  Location: [facility];  Service: Orthopedics;  Laterality: Right;       PT Ortho     Row Name 06/10/21 0800       Precautions and Contraindications    Precautions/Limitations  no known precautions/limitations  -    Precautions  per MD: slowly progress as tolerated.   -       Posture/Observations    Posture/Observations Comments  present with RW, slow cadance but good stride  -      User Key  (r) = Recorded By, (t) = Taken By, (c) = Cosigned By    Initials Name Provider Type    Nadiya Velazquez PTA Physical Therapy Assistant                      PT Assessment/Plan     Row Name 06/10/21 0800          PT Assessment    Assessment Comments  progressing well with gait and strength; Able to complete 2.5 laps at end of treatment without increased pain or fatigue; Encouraged pt to bring cane at next visit;   -        PT Plan    PT Frequency  2x/week  -     Predicted Duration of Therapy Intervention (PT)  6-8 weeks  -     PT Plan Comments  Progress gait with cane and increased ROM and stength as able. possible lateral step ups next  -       User Key  (r) = Recorded By, (t) = Taken By, (c) = Cosigned By    Initials Name Provider Type    Nadiya Velazquez PTA Physical Therapy Assistant     "      Modalities     Row Name 06/10/21 0800             Subjective Comments    Subjective Comments  Patient reports that the MD placed her on antibodics for 10 days; She reports that she is feeling good; Still getting out of the car ok but getting back in is hard to pick her leg up but she is working on it. Little stiff in the front of the hip this morning;   -KH         Subjective Pain    Pre-Treatment Pain Level  2  -KH        User Key  (r) = Recorded By, (t) = Taken By, (c) = Cosigned By    Initials Name Provider Type    Nadiya Velazquez PTA Physical Therapy Assistant        OP Exercises     Row Name 06/10/21 0800             Subjective Comments    Subjective Comments  Patient reports that the MD placed her on antibodics for 10 days; She reports that she is feeling good; Still getting out of the car ok but getting back in is hard to pick her leg up but she is working on it. Little stiff in the front of the hip this morning;   -KH         Subjective Pain    Able to rate subjective pain?  yes  -KH      Pre-Treatment Pain Level  2  -KH      Post-Treatment Pain Level  2  -KH         Total Minutes    06032 - Gait Training Minutes   15  -KH      09443 - PT Therapeutic Exercise Minutes  38  -KH         Exercise 1    Exercise Name 1  pro ll LE strength  -KH      Time 1  10 mins  -KH      Additional Comments  level 2.5; seat 13  -KH         Exercise 2    Exercise Name 2  standing hip 3 way B  -KH      Cueing 2  Verbal  -KH      Sets 2  2  -KH      Reps 2  10 each   -KH         Exercise 3    Exercise Name 3  step ups fwd  -KH      Cueing 3  Verbal  -KH      Sets 3  2  -KH      Reps 3  10  -KH      Additional Comments  4\" step   -KH         Exercise 4    Exercise Name 4  LAQ w/ add squeeze  -KH      Cueing 4  Verbal  -KH      Sets 4  2  -KH      Reps 4  10  -KH         Exercise 5    Exercise Name 5  ham curls with t-band  -KH      Cueing 5  Verbal  -KH      Sets 5  2  -KH      Reps 5  10  -KH      Additional Comments  red  " -         Exercise 6    Exercise Name 6  gait with RW around clinic  -      Reps 6  2.5 laps (675 ft)  -        User Key  (r) = Recorded By, (t) = Taken By, (c) = Cosigned By    Initials Name Provider Type    Nadiya Velazquez PTA Physical Therapy Assistant                       PT OP Goals     Row Name 06/10/21 0800          PT Short Term Goals    STG Date to Achieve  06/22/21  -     STG 1  Pt is independet with HEP.   -     STG 1 Progress  Not Met  -     STG 2  Patient is able to complete 5 times sit to stand from standard height chair.   -     STG 2 Progress  Met  -     STG 3  Patient is able to complete 6-minute walk test.  -     STG 3 Progress  Not Met  -     STG 4  Patient is able to complete right hip flexion to 90.   -     STG 4 Progress  Not Met  -     STG 5  Patient is able to complete straight leg raise x5 on right lower extremity.  -     STG 5 Progress  Not Met  -        Long Term Goals    LTG Date to Achieve  07/20/21  -     LTG 1  6-minute walk test 1000 feet or greater.  -     LTG 1 Progress  Not Met  -     LTG 2  5 times sit to stand without upper extremity use from standard height chair in </=15 seconds  -     LTG 2 Progress  Not Met  -     LTG 3  LEFS improved to 40/80 or greater.   -     LTG 3 Progress  Not Met  -     LTG 4  Pt is able to ambulate 270 ft without AD and without LOB.   -     LTG 4 Progress  Not Met  -     LTG 5  Pt notes subjective improvment 80% or greater.   -     LTG 5 Progress  Not Met  -        Time Calculation    PT Goal Re-Cert Due Date  06/15/21  -       User Key  (r) = Recorded By, (t) = Taken By, (c) = Cosigned By    Initials Name Provider Type    Nadiya Velazquez PTA Physical Therapy Assistant                         Time Calculation:   Start Time: 0755  Stop Time: 0848  Time Calculation (min): 53 min  Timed Charges  34942 - PT Therapeutic Exercise Minutes: 38  62169 - Gait Training Minutes : 15  Total Minutes  Timed  Charges Total Minutes: 53   Total Minutes: 53  Therapy Charges for Today     Code Description Service Date Service Provider Modifiers Qty    66451028231  PT THER PROC EA 15 MIN 6/10/2021 Nadiya Kitchen PTA GP 3    [id number]  GAIT TRAINING EA 15 MIN 6/10/2021 Nadiya Kitchen PTA GP 1                    Nadiya Kitchen PTA  6/10/2021

## 2021-06-11 RX ORDER — OXYCODONE AND ACETAMINOPHEN 7.5; 325 MG/1; MG/1
1 TABLET ORAL EVERY 6 HOURS PRN
Qty: 30 TABLET | Refills: 0 | Status: SHIPPED | OUTPATIENT
Start: 2021-06-11 | End: 2021-06-22 | Stop reason: SDUPTHER

## 2021-06-11 NOTE — TELEPHONE ENCOUNTER
Pt CALLED REQUESTING A REFILL OF   OXYCODONE(PERCOET) 7.5  LAST FILLED 6/4/2021    TO [facility]   Pt CALL BACK # [phone]

## 2021-06-15 ENCOUNTER — LAB (OUTPATIENT)
Dept: LAB | Facility: HOSPITAL | Age: 71
End: 2021-06-15

## 2021-06-15 ENCOUNTER — HOSPITAL ENCOUNTER (OUTPATIENT)
Dept: PHYSICAL THERAPY | Facility: HOSPITAL | Age: 71
Setting detail: THERAPIES SERIES
Discharge: HOME | End: 2021-06-15

## 2021-06-15 ENCOUNTER — ANTICOAGULATION VISIT (OUTPATIENT)
Dept: PHARMACY | Facility: HOSPITAL | Age: 71
End: 2021-06-15

## 2021-06-15 DIAGNOSIS — I10 BENIGN ESSENTIAL HYPERTENSION: ICD-10-CM

## 2021-06-15 DIAGNOSIS — M25.551 RIGHT HIP PAIN: ICD-10-CM

## 2021-06-15 DIAGNOSIS — T84.030D MECHANICAL LOOSENING OF INTERNAL RIGHT HIP PROSTHETIC JOINT, SUBSEQUENT ENCOUNTER: ICD-10-CM

## 2021-06-15 DIAGNOSIS — Z96.641 S/P HIP REPLACEMENT, RIGHT: ICD-10-CM

## 2021-06-15 DIAGNOSIS — Z96.641 PRESENCE OF RIGHT ARTIFICIAL HIP JOINT: ICD-10-CM

## 2021-06-15 DIAGNOSIS — Z79.01 ANTICOAGULATED ON WARFARIN: ICD-10-CM

## 2021-06-15 DIAGNOSIS — T84.030D MECHANICAL LOOSENING OF INTERNAL RIGHT HIP PROSTHETIC JOINT, SUBSEQUENT ENCOUNTER: Primary | ICD-10-CM

## 2021-06-15 LAB
INR PPP: 1.66 (ref 0.8–1.2)
PROTHROMBIN TIME: 20.5 SECONDS (ref 11.1–15.3)

## 2021-06-15 PROCEDURE — 97110 THERAPEUTIC EXERCISES: CPT

## 2021-06-15 PROCEDURE — 97116 GAIT TRAINING THERAPY: CPT

## 2021-06-15 PROCEDURE — 36415 COLL VENOUS BLD VENIPUNCTURE: CPT

## 2021-06-15 PROCEDURE — 85610 PROTHROMBIN TIME: CPT

## 2021-06-15 NOTE — PROGRESS NOTES
Spoke with Mrs. Dawson. Reviewed current lab.  No s/s of bleeding. No new meds. No missed doses. Reviewed schedule for following week. Pt read back regimen and verbalized understanding. All questions answered. Next INR on 6/22 provided by [facility].    Breana Jones, PharmD  06/15/21  15:22 CDT

## 2021-06-15 NOTE — THERAPY TREATMENT NOTE
Outpatient Physical Therapy Ortho Treatment Note  [facility]     Patient Name: Chrissy Dawson  : 1950  MRN: [id number]  Today's Date: 6/15/2021      Visit Date: 06/15/2021     Subjective Improvement: 0%  Attendance:   (Eval+ 12 approved until 2021)  Next MD Visit : 2021  Recert Date:  06/15/2021        Therapy Diagnosis:  R SALTY Revision 2021    Visit Dx:    ICD-10-CM ICD-9-CM   1. 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 (CMS/HCC)   • S/P hip replacement, right   • Overweight (BMI 25.0-29.9)   • Heart murmur        Past Medical History:   Diagnosis Date   • Backache    • Benign essential hypertension     PATIENT DENIES   • Cervical arthritis    • Coronary arteriosclerosis    • Fibrocystic breast    • Ganglion cyst of wrist     Ganglion/synovial cyst - wrist   • Ganglion of wrist    • Hip pain    • History of echocardiogram 10/23/2015    Echocardiogram W/ color flow 48616 (1) - Normal LV function with Ef of 55-60%.Normal RV size and function.Pseudonormal diastolic dysfunciton with borderline [facility].NO sig.valvular regurg or stenosis.   • History of mammogram 09/10/2014    MAMMOGRAM SCREENING 32818 - [facility] (1) - LZIZ MILLER ([facility] [address])   • History of transfusion    • Hyperlipidemia    • Inguinal pain    • Recurrent angina status post coronary artery bypass graft (CMS/HCC)     Recurrent angina after coronary artery bypass graft   • Urge incontinence of urine         Past Surgical History:   Procedure Laterality Date    • BREAST BIOPSY Right 2/2/2017    Procedure: RIGHT BREAST LUMPECTOMY WITH NEEDLE LOCALIZATION AND ULTRASOUND;  Surgeon: Delfino Greer MD;  Location: [facility];  Service:    • BREAST CYST ASPIRATION     • CARDIAC SURGERY     • CORONARY ARTERY BYPASS GRAFT  2000    CABG, arterial, single (1)   • GANGLION CYST EXCISION     • JOINT REPLACEMENT     • ORIF MANDIBULAR FRACTURE     • TOTAL ABDOMINAL HYSTERECTOMY     • TOTAL ABDOMINAL HYSTERECTOMY WITH SALPINGO OOPHORECTOMY  1998    SALVADOR/BSO (1)   • TOTAL HIP ARTHROPLASTY  2011    Total hip replacement (3)   • TOTAL HIP ARTHROPLASTY REVISION Right 5/19/2021    Procedure: anterior revision total hip arthroplasty        (lg-head c-arm and hana table );  Surgeon: Jeovanny Maravilla MD;  Location: [facility];  Service: Orthopedics;  Laterality: Right;       PT Ortho     Row Name 06/15/21 0800       Subjective Comments    Subjective Comments  Patient reports that she is a little stiff this morning but is able to get in and out of her car by herself; Reports that she took a tylenol prior to coming to PT today;   -       Precautions and Contraindications    Precautions/Limitations  no known precautions/limitations  -    Precautions  per MD: slowly progress as tolerated.   -       Subjective Pain    Pre-Treatment Pain Level  1  -    Post-Treatment Pain Level  1  -       Posture/Observations    Posture/Observations Comments  present with RW, slow cadance but good stride  -      User Key  (r) = Recorded By, (t) = Taken By, (c) = Cosigned By    Initials Name Provider Type    Nadiya Velazquez PTA Physical Therapy Assistant                      PT Assessment/Plan     Row Name 06/15/21 0800          PT Assessment    Assessment Comments  pt with good balance and step length with gait with cane; Educated on scar massage to help with loosening of the R anterior hip; Responded well to manual PROM and gave pt permission to ambulate with SPC at home as she feels comfortable. Pts  " present and verbalized understanding of this as well; Also did a curb step down independently  -        PT Plan    PT Frequency  2x/week  -KH     Predicted Duration of Therapy Intervention (PT)  6-8 weeks  -     PT Plan Comments  Continue to progress gait with SPC: Progress strength and continue with PROM if benefical to patient;   -       User Key  (r) = Recorded By, (t) = Taken By, (c) = Cosigned By    Initials Name Provider Type    Nadiya Velazquez PTA Physical Therapy Assistant            OP Exercises     Row Name 06/15/21 0800             Subjective Comments    Subjective Comments  Patient reports that she is a little stiff this morning but is able to get in and out of her car by herself; Reports that she took a tylenol prior to coming to PT today;   -         Subjective Pain    Able to rate subjective pain?  yes  -      Pre-Treatment Pain Level  1  -KH      Post-Treatment Pain Level  1  -         Total Minutes    00158 - Gait Training Minutes   10  -KH      47700 - PT Therapeutic Exercise Minutes  30  -KH         Exercise 1    Exercise Name 1  pro ll LE strength  -      Time 1  10 mins  -KH      Additional Comments  level 2; seat 12  -KH         Exercise 2    Exercise Name 2  // bars gait w/ cane  -KH      Time 2  3 mins  -KH         Exercise 3    Exercise Name 3  gait training with SPC  -      Additional Comments  310ft SBA  -KH         Exercise 4    Exercise Name 4  sit to stands (no UE, high low table)  -      Sets 4  2  -KH      Reps 4  5  -KH      Additional Comments  table as high as it would go in rehab gym  -         Exercise 5    Exercise Name 5  supine DKTC with feet on ball  -KH      Sets 5  2  -KH      Reps 5  10  -KH         Exercise 6    Exercise Name 6  supine ham sets w/ feet on ball  -KH      Cueing 6  Verbal  -KH      Sets 6  2  -KH      Reps 6  10  -KH      Time 6  3\" holds  -KH         Exercise 7    Exercise Name 7  PROM to R hip supine  -KH      Time 7  10 mins  " -      Additional Comments  all planes  -         Exercise 8    Exercise Name 8  gait with SPC w/ SBA with curb step down to car  -      Reps 8  100 ft  -      Time 8  1 curb step  -        User Key  (r) = Recorded By, (t) = Taken By, (c) = Cosigned By    Initials Name Provider Type    Nadiya Velazquez PTA Physical Therapy Assistant                       PT OP Goals     Row Name 06/15/21 0800          PT Short Term Goals    STG Date to Achieve  06/22/21  -     STG 1  Pt is independet with HEP.   -     STG 1 Progress  Not Met  -     STG 2  Patient is able to complete 5 times sit to stand from standard height chair.   -     STG 2 Progress  Met  -     STG 3  Patient is able to complete 6-minute walk test.  -     STG 3 Progress  Not Met  -     STG 4  Patient is able to complete right hip flexion to 90.   -     STG 4 Progress  Not Met  -     STG 5  Patient is able to complete straight leg raise x5 on right lower extremity.  -     STG 5 Progress  Not Met  -        Long Term Goals    LTG Date to Achieve  07/20/21  -     LTG 1  6-minute walk test 1000 feet or greater.  -     LTG 1 Progress  Not Met  -     LTG 2  5 times sit to stand without upper extremity use from standard height chair in </=15 seconds  -     LTG 2 Progress  Not Met  -     LTG 3  LEFS improved to 40/80 or greater.   -     LTG 3 Progress  Not Met  -     LTG 4  Pt is able to ambulate 270 ft without AD and without LOB.   -     LTG 4 Progress  Not Met  -     LTG 5  Pt notes subjective improvment 80% or greater.   -     LTG 5 Progress  Not Met  -        Time Calculation    PT Goal Re-Cert Due Date  06/15/21  -       User Key  (r) = Recorded By, (t) = Taken By, (c) = Cosigned By    Initials Name Provider Type    Nadiya Velazquez PTA Physical Therapy Assistant                         Time Calculation:   Start Time: 0806  Stop Time: 0846  Time Calculation (min): 40 min  Timed Charges  14966 - PT Therapeutic  Exercise Minutes: 30  63014 - Gait Training Minutes : 10  Total Minutes  Timed Charges Total Minutes: 40   Total Minutes: 40  Therapy Charges for Today     Code Description Service Date Service Provider Modifiers Qty    05507011953  PT THER PROC EA 15 MIN 6/15/2021 Nadiya Kitchen PTA GP 2    [id number]  GAIT TRAINING EA 15 MIN 6/15/2021 Nadiya Kitchen PTA GP 1                    Nadiya Kitchen PTA  6/15/2021

## 2021-06-17 ENCOUNTER — HOSPITAL ENCOUNTER (OUTPATIENT)
Dept: PHYSICAL THERAPY | Facility: HOSPITAL | Age: 71
Setting detail: THERAPIES SERIES
Discharge: HOME | End: 2021-06-17

## 2021-06-17 DIAGNOSIS — T84.030D MECHANICAL LOOSENING OF INTERNAL RIGHT HIP PROSTHETIC JOINT, SUBSEQUENT ENCOUNTER: Primary | ICD-10-CM

## 2021-06-17 DIAGNOSIS — Z78.9 IMPAIRED MOBILITY AND ACTIVITIES OF DAILY LIVING: ICD-10-CM

## 2021-06-17 DIAGNOSIS — Z74.09 IMPAIRED MOBILITY AND ACTIVITIES OF DAILY LIVING: ICD-10-CM

## 2021-06-17 DIAGNOSIS — I10 BENIGN ESSENTIAL HYPERTENSION: ICD-10-CM

## 2021-06-17 DIAGNOSIS — Z96.641 S/P HIP REPLACEMENT, RIGHT: ICD-10-CM

## 2021-06-17 DIAGNOSIS — Z74.09 IMPAIRED FUNCTIONAL MOBILITY, BALANCE, GAIT, AND ENDURANCE: ICD-10-CM

## 2021-06-17 PROCEDURE — 97110 THERAPEUTIC EXERCISES: CPT

## 2021-06-17 PROCEDURE — 97116 GAIT TRAINING THERAPY: CPT

## 2021-06-17 NOTE — THERAPY PROGRESS REPORT/RE-CERT
Outpatient Physical Therapy Ortho Progress Note  [facility]     Patient Name: Chrissy Dawson  : 1950  MRN: [id number]  Today's Date: 2021      Visit Date: 2021       ATTENDANCE:  (eval+12 approved until 2021)  SUBJECTIVE IMPROVEMENT: 70%  NEXT MD APPOINTMENT: 2021  RECERT DATE: 2021    THERAPY DIAGNOSIS: R total hip revision 2021      Visit Dx:    ICD-10-CM ICD-9-CM   1. 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 S/P hip replacement, right  Z96.641 V43.64   3. Impaired mobility and activities of daily living  Z74.09 V49.89    Z78.9    4. Impaired functional mobility, balance, gait, and endurance  Z74.09 V49.89   5. 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 (CMS/HCC)   • S/P hip replacement, right   • Overweight (BMI 25.0-29.9)   • Heart murmur        Past Medical History:   Diagnosis Date   • Backache    • Benign essential hypertension     PATIENT DENIES   • Cervical arthritis    • Coronary arteriosclerosis    • Fibrocystic breast    • Ganglion cyst of wrist     Ganglion/synovial cyst - wrist   • Ganglion of wrist    • Hip pain    • History of echocardiogram 10/23/2015    Echocardiogram W/ color flow 62993 (1) - Normal LV function with Ef of 55-60%.Normal RV size and function.Pseudonormal diastolic dysfunciton with borderline [facility].NO sig.valvular regurg or stenosis.   • History of mammogram 09/10/2014    MAMMOGRAM SCREENING 64944 - [facility] (1) - LIZZ MILLER ([facility])   • History of transfusion    • Hyperlipidemia    • Inguinal pain    •  "Recurrent angina status post coronary artery bypass graft (CMS/[facility])     Recurrent angina after coronary artery bypass graft   • Urge incontinence of urine         Past Surgical History:   Procedure Laterality Date   • BREAST BIOPSY Right 2/2/2017    Procedure: RIGHT BREAST LUMPECTOMY WITH NEEDLE LOCALIZATION AND ULTRASOUND;  Surgeon: Delfino Greer MD;  Location: [facility];  Service:    • BREAST CYST ASPIRATION     • CARDIAC SURGERY     • CORONARY ARTERY BYPASS GRAFT  2000    CABG, arterial, single (1)   • GANGLION CYST EXCISION     • JOINT REPLACEMENT     • ORIF MANDIBULAR FRACTURE     • TOTAL ABDOMINAL HYSTERECTOMY     • TOTAL ABDOMINAL HYSTERECTOMY WITH SALPINGO OOPHORECTOMY  1998    SALVADOR/BSO (1)   • TOTAL HIP ARTHROPLASTY  2011    Total hip replacement (3)   • TOTAL HIP ARTHROPLASTY REVISION Right 5/19/2021    Procedure: anterior revision total hip arthroplasty        (lg-head c-arm and hana table );  Surgeon: Jeovanny Maravilla MD;  Location: [facility];  Service: Orthopedics;  Laterality: Right;       PT Ortho     Row Name 06/17/21 0800       Subjective Comments    Subjective Comments  pt notes that she has been doing well with her HEP however she was very sore/stiff yesterday so she did less exercise than she normally would. She notes about 70% subjective improvement at this time, feels this revision has been very helpful compared to previous replacements. she notes using FWRW very minimally unless she feels fatigued/off balance. She notes continuing to take antibiotic for incision site infection and notes mild tenderness along the incision.   -AC       Precautions and Contraindications    Precautions/Limitations  no known precautions/limitations  -AC    Precautions  per MD: slowly progress as tolerated.   -AC       Subjective Pain    Able to rate subjective pain?  yes  -AC    Pre-Treatment Pain Level  1  -AC    Post-Treatment Pain Level  1  -AC    Subjective Pain Comment  \"feels looser\" after treatment   " -AC       Posture/Observations    Posture/Observations Comments  Pt presents with FWRW, slowed speed with gait however good gait mechanics. occasional forward lean, corrects with verbal cuing.   -AC       General ROM    GENERAL ROM COMMENTS  R hip flexion to 70 degrees active assist. approx. 90 with lunge stretch today.   -AC       MMT Right Lower Ext    Rt Lower Extremity Comments   continues to have decreased hip strength requiring assist to pull onto phoebe able for sit to supine transfer however SLR completed today with good height, fatigues quickly.   -AC      User Key  (r) = Recorded By, (t) = Taken By, (c) = Cosigned By    Initials Name Provider Type    AC Keya Ross, PT Physical Therapist                      PT Assessment/Plan     Row Name 06/17/21 0800          PT Assessment    Functional Limitations  Impaired gait;Impaired locomotion;Limitation in home management;Performance in leisure activities;Performance in self-care ADL  -AC     Impairments  Range of motion;Muscle strength;Gait;Balance;Impaired flexibility  -AC     Assessment Comments  re-evaluation completed today with 4/5 STGs met and 1/5 LTGs met. She continues to have decreased R hip flexion ROM. Initiated standing lunge stretch to increase hip flexion stretching. She continues to have R hip weakness and decreased functional mobility noted with 5 TSTS and 6 MWT which are slowed. She is sbale to complete 5TSTS from standard height chair with BUE use to push to standing. She is showing good progress at this time and will continue to benefit from skilled PT to continue to improve LE strength, balance, and gait to return to PLOF.   -AC     Please refer to paper survey for additional self-reported information  Yes  -AC     Rehab Potential  Good  -AC     Patient/caregiver participated in establishment of treatment plan and goals  Yes  -AC     Patient would benefit from skilled therapy intervention  Yes  -AC        PT Plan    PT Frequency  2x/week  " -AC     Predicted Duration of Therapy Intervention (PT)  6-8 weeks   -AC     PT Plan Comments  continue to progress strength, ROM, balance, and gait training as possible.   -AC       User Key  (r) = Recorded By, (t) = Taken By, (c) = Cosigned By    Initials Name Provider Type    AC Keya Ross, PT Physical Therapist            OP Exercises     Row Name 06/17/21 0800             Subjective Comments    Subjective Comments  pt notes that she has been doing well with her HEP however she was very sore/stiff yesterday so she did less exercise than she normally would. She notes about 70% subjective improvement at this time, feels this revision has been very helpful compared to previous replacements. she notes using FWRW very minimally unless she feels fatigued/off balance. She notes continuing to take antibiotic for incision site infection and notes mild tenderness along the incision.   -AC         Subjective Pain    Able to rate subjective pain?  yes  -AC      Pre-Treatment Pain Level  1  -AC      Post-Treatment Pain Level  1  -AC      Subjective Pain Comment  \"feels looser\" after treatment   -AC         Total Minutes    71564 - Gait Training Minutes   10  -AC      20061 - PT Therapeutic Exercise Minutes  37  -AC         Exercise 1    Exercise Name 1  6 MWT   -AC      Additional Comments  1.75 laps   -AC         Exercise 2    Exercise Name 2  5 TSTS   -AC      Sets 2  2  -AC      Additional Comments  38.18s, 34.57  -AC         Exercise 3    Exercise Name 3  ROM   -AC      Additional Comments  hip flexion 70 degrees with strap for assist   -AC         Exercise 4    Exercise Name 4  heel slides for hip flexion   -AC      Sets 4  1  -AC      Reps 4  10  -AC      Time 4  10s hold   -AC         Exercise 5    Exercise Name 5  supine SLR   -AC      Sets 5  2  -AC      Reps 5  10  -AC         Exercise 6    Exercise Name 6  clamshells   -AC      Sets 6  2  -AC      Reps 6  10  -AC         Exercise 7    Exercise Name 7  " standing lunge stretch   -AC      Sets 7  3  -AC      Time 7  30s  -AC         Exercise 8    Exercise Name 8  standing HS stretch h  -AC      Sets 8  3  -AC      Time 8  30s  -AC         Exercise 9    Exercise Name 9  step ups   -AC      Sets 9  1  -AC      Reps 9  20  -AC      Additional Comments  fdw/lateral 4inch step   -AC         Exercise 10    Exercise Name 10  LEFS   -AC      Time 10  5 min  -AC         Exercise 11    Exercise Name 11  gait to side door with SPC.   -AC      Additional Comments  good gait mechanics, mild forward lean.   -        User Key  (r) = Recorded By, (t) = Taken By, (c) = Cosigned By    Initials Name Provider Type    AC Keya Ross, PT Physical Therapist                       PT OP Goals     Row Name 06/17/21 0800          PT Short Term Goals    STG Date to Achieve  06/22/21  -     STG 1  Pt is independent with HEP.   -     STG 1 Progress  Met;Ongoing  -     STG 2  Patient is able to complete 5 times sit to stand from standard height chair.   -AC     STG 2 Progress  Met  -     STG 3  Patient is able to complete 6-minute walk test.  -AC     STG 3 Progress  Met  -     STG 4  Patient is able to complete right hip flexion to 90.   -AC     STG 4 Progress  Not Met  -AC     STG 4 Progress Comments  70 degrees today.   -     STG 5  Patient is able to complete straight leg raise x5 on right lower extremity.  -     STG 5 Progress  Met  -        Long Term Goals    LTG Date to Achieve  07/20/21  -     LTG 1  6-minute walk test 1000 feet or greater.  -AC     LTG 1 Progress  Not Met  -     LTG 2  5 times sit to stand from standard height chair in </=15 seconds  -AC     LTG 2 Progress  Goal Revised;Not Met  -AC     LTG 2 Progress Comments  revised to allow for UE use during activity.   -AC     LTG 3  LEFS improved to 40/80 or greater.   -     LTG 3 Progress  Met  -     LTG 3 Progress Comments  50/80 today   -     LTG 4  Pt is able to ambulate 270 ft without AD and  without LOB.   -AC     LTG 4 Progress  Not Met  -AC     LTG 4 Progress Comments  requires FWRW or SPC today.   -AC     LTG 5  Pt notes subjective improvement 80% or greater.   -AC     LTG 5 Progress  Not Met  -     LTG 5 Progress Comments  70%   -AC        Time Calculation    PT Goal Re-Cert Due Date  07/08/21  -       User Key  (r) = Recorded By, (t) = Taken By, (c) = Cosigned By    Initials Name Provider Type    Keya Liu, PT Physical Therapist               Outcome Measure Options: 5x Sit to Stand, 6 Minute Walk Test, Lower Extremity Functional Scale (LEFS)  5 Times Sit to Stand  5 Times Sit to Stand (seconds): 34.57 seconds  5 Times Sit to Stand Comments: standrad height chair BUE modA to push to stand.   6 Minute Walk Test  Distance: 472.5  Device Used: rolling walker  # of Rest Breaks: 0  6 Minute Walk Comments: slowed speed, good gait mecahnics/step length. no antalgic gait noted. pt feels stiff this morning however no true pain with 6 MWT   Lower Extremity Functional Index  Any of your usual work, housework or school activities: Moderate difficulty  Your usual hobbies, recreational or sporting activities: No difficulty  Getting into or out of the bath: A little bit of difficulty  Walking between rooms: No difficulty  Putting on your shoes or socks: Moderate difficulty  Squatting: Extreme difficulty or unable to perform activity  Lifting an object, like a bag of groceries from the floor: No difficulty  Performing light activities around your home: A little bit of difficulty  Performing heavy activities around your home: Moderate difficulty  Getting into or out of a car: No difficulty  Walking 2 blocks: No difficulty  Walking a mile: Moderate difficulty  Going up or down 10 stairs (about 1 flight of stairs): No difficulty  Standing for 1 hour: Moderate difficulty  Sitting for 1 hour: No difficulty  Running on even ground: Extreme difficulty or unable to perform activity  Running on uneven  ground: Extreme difficulty or unable to perform activity  Making sharp turns while running fast: Moderate difficulty  Hopping: Extreme difficulty or unable to perform activity  Rolling over in bed: No difficulty  Total: 50      Time Calculation:   Start Time: 0800  Stop Time: 0847  Time Calculation (min): 47 min  Timed Charges  87161 - PT Therapeutic Exercise Minutes: 37  90075 - Gait Training Minutes : 10  Total Minutes  Timed Charges Total Minutes: 47   Total Minutes: 47  Therapy Charges for Today     Code Description Service Date Service Provider Modifiers Qty    74339921069  PT THER PROC EA 15 MIN 6/17/2021 Keya Ross, PT GP 2    [id number]  GAIT TRAINING EA 15 MIN 6/17/2021 Keya Ross, PT GP 1                   Keya Ross, PT  6/17/2021

## 2021-06-22 ENCOUNTER — LAB (OUTPATIENT)
Dept: LAB | Facility: HOSPITAL | Age: 71
End: 2021-06-22

## 2021-06-22 ENCOUNTER — ANTICOAGULATION VISIT (OUTPATIENT)
Dept: PHARMACY | Facility: HOSPITAL | Age: 71
End: 2021-06-22

## 2021-06-22 ENCOUNTER — HOSPITAL ENCOUNTER (OUTPATIENT)
Dept: PHYSICAL THERAPY | Facility: HOSPITAL | Age: 71
Setting detail: THERAPIES SERIES
Discharge: HOME | End: 2021-06-22

## 2021-06-22 DIAGNOSIS — Z78.9 IMPAIRED MOBILITY AND ACTIVITIES OF DAILY LIVING: ICD-10-CM

## 2021-06-22 DIAGNOSIS — Z96.649 S/P REVISION OF TOTAL HIP: Primary | ICD-10-CM

## 2021-06-22 DIAGNOSIS — Z74.09 IMPAIRED FUNCTIONAL MOBILITY, BALANCE, GAIT, AND ENDURANCE: ICD-10-CM

## 2021-06-22 DIAGNOSIS — Z79.01 ANTICOAGULATED ON WARFARIN: ICD-10-CM

## 2021-06-22 DIAGNOSIS — Z74.09 IMPAIRED MOBILITY AND ACTIVITIES OF DAILY LIVING: ICD-10-CM

## 2021-06-22 DIAGNOSIS — T84.030D MECHANICAL LOOSENING OF INTERNAL RIGHT HIP PROSTHETIC JOINT, SUBSEQUENT ENCOUNTER: Primary | ICD-10-CM

## 2021-06-22 DIAGNOSIS — Z96.641 S/P HIP REPLACEMENT, RIGHT: ICD-10-CM

## 2021-06-22 LAB
INR PPP: 1.88 (ref 0.8–1.2)
PROTHROMBIN TIME: 21.2 SECONDS (ref 11.1–15.3)

## 2021-06-22 PROCEDURE — 97110 THERAPEUTIC EXERCISES: CPT

## 2021-06-22 PROCEDURE — 97140 MANUAL THERAPY 1/> REGIONS: CPT

## 2021-06-22 PROCEDURE — 36415 COLL VENOUS BLD VENIPUNCTURE: CPT

## 2021-06-22 PROCEDURE — 85610 PROTHROMBIN TIME: CPT

## 2021-06-22 RX ORDER — OXYCODONE AND ACETAMINOPHEN 7.5; 325 MG/1; MG/1
1 TABLET ORAL EVERY 6 HOURS PRN
Qty: 30 TABLET | Refills: 0 | Status: SHIPPED | OUTPATIENT
Start: 2021-06-22 | End: 2021-08-12

## 2021-06-22 NOTE — PROGRESS NOTES
Spoke with Ms. Dawson over the telephone    Reviewed current lab/INR which is within goal (Goal 1.7-2.5)    Pt reports no s/s of bleeding.   No changes to their medications.  No missed doses of warfarin.   No extreme changes in their diet    Reviewed schedule for the week    No more INRs are needed as patient will finish up her warfarin next week and could not go to lab until Tuesday  Pt read back regimen and verbalized understanding    Maurisio Ladd, PharmD  06/22/21  18:27 CDT

## 2021-06-22 NOTE — THERAPY TREATMENT NOTE
Outpatient Physical Therapy Ortho Treatment Note  [facility]     Patient Name: Chrissy Dawson  : 1950  MRN: [id number]  Today's Date: 2021      Visit Date: 2021     ATTENDANCE:  (eval+12 approved until 2021)  SUBJECTIVE IMPROVEMENT: 70%  NEXT MD APPOINTMENT: 2021  RECERT DATE: 2021     THERAPY DIAGNOSIS: R total hip revision 2021    Visit Dx:    ICD-10-CM ICD-9-CM   1. 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 S/P hip replacement, right  Z96.641 V43.64   3. Impaired mobility and activities of daily living  Z74.09 V49.89    Z78.9    4. 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 (CMS/HCC)   • S/P hip replacement, right   • Overweight (BMI 25.0-29.9)   • Heart murmur        Past Medical History:   Diagnosis Date   • Backache    • Benign essential hypertension     PATIENT DENIES   • Cervical arthritis    • Coronary arteriosclerosis    • Fibrocystic breast    • Ganglion cyst of wrist     Ganglion/synovial cyst - wrist   • Ganglion of wrist    • Hip pain    • History of echocardiogram 10/23/2015    Echocardiogram W/ color flow 20778 (1) - Normal LV function with Ef of 55-60%.Normal RV size and function.Pseudonormal diastolic dysfunciton with borderline [facility].NO sig.valvular regurg or stenosis.   • History of mammogram 09/10/2014    MAMMOGRAM SCREENING 95329 - [facility] (1) - [facility] ([facility])   • History of transfusion    • Hyperlipidemia    • Inguinal pain    • Recurrent angina status post coronary artery bypass  graft (CMS/HCC)     Recurrent angina after coronary artery bypass graft   • Urge incontinence of urine         Past Surgical History:   Procedure Laterality Date   • BREAST BIOPSY Right 2/2/2017    Procedure: RIGHT BREAST LUMPECTOMY WITH NEEDLE LOCALIZATION AND ULTRASOUND;  Surgeon: Delfino Greer MD;  Location: [facility];  Service:    • BREAST CYST ASPIRATION     • CARDIAC SURGERY     • CORONARY ARTERY BYPASS GRAFT  2000    CABG, arterial, single (1)   • GANGLION CYST EXCISION     • JOINT REPLACEMENT     • ORIF MANDIBULAR FRACTURE     • TOTAL ABDOMINAL HYSTERECTOMY     • TOTAL ABDOMINAL HYSTERECTOMY WITH SALPINGO OOPHORECTOMY  1998    SALVADOR/BSO (1)   • TOTAL HIP ARTHROPLASTY  2011    Total hip replacement (3)   • TOTAL HIP ARTHROPLASTY REVISION Right 5/19/2021    Procedure: anterior revision total hip arthroplasty        (lg-head c-arm and hana table );  Surgeon: Jeovanny Maravilla MD;  Location: [facility];  Service: Orthopedics;  Laterality: Right;       PT Ortho     Row Name 06/22/21 0800       Precautions and Contraindications    Precautions/Limitations  no known precautions/limitations  -    Precautions  per MD: slowly progress as tolerated.   -       Posture/Observations    Posture/Observations Comments  patient presents with SPC, good cadance and step length  -      User Key  (r) = Recorded By, (t) = Taken By, (c) = Cosigned By    Initials Name Provider Type    Nadiya Velazquez PTA Physical Therapy Assistant                      PT Assessment/Plan     Row Name 06/22/21 0800          PT Assessment    Assessment Comments  decreased treatment today secondary to increased pain in the right hip; started STM around scar which seemed to help; message was also sent to MD about pain (MD HARPEROT); advised patient to back off exercises that caused increaesd pain and to go back to the walker for a few days to help decrease pain, but to monitor it and let PT know at next visit and we will modify HEP; D/C standing hip  "abd and supine SLR this date  -        PT Plan    PT Frequency  2x/week  -     Predicted Duration of Therapy Intervention (PT)  6-8 weeks  -     PT Plan Comments  5 more approved visits, but will need an extension after 06/26/2021; Monitor pain in the R hip; Will progress as pain allows; Await MD response;   -       User Key  (r) = Recorded By, (t) = Taken By, (c) = Cosigned By    Initials Name Provider Type    Nadiya Velazquez PTA Physical Therapy Assistant          Modalities     Row Name 06/22/21 0800             Subjective Comments    Subjective Comments  Patient reports that she has had increased pain in the anterior hip around incsion that started this weekend; Had to start taking pain meds again; Had pins and needles in the posterior thigh that woke her from her sleep this morning but has not had any more since; mainly using her cane at home, doing some walking without her cane and when she was hurting she used the walker some;   -         Subjective Pain    Post-Treatment Pain Level  -- :\"better\"  -        User Key  (r) = Recorded By, (t) = Taken By, (c) = Cosigned By    Initials Name Provider Type    Nadiya Velazquez PTA Physical Therapy Assistant        OP Exercises     Row Name 06/22/21 0800             Subjective Comments    Subjective Comments  Patient reports that she has had increased pain in the anterior hip around incsion that started this weekend; Had to start taking pain meds again; Had pins and needles in the posterior thigh that woke her from her sleep this morning but has not had any more since; mainly using her cane at home, doing some walking without her cane and when she was hurting she used the walker some;   -         Subjective Pain    Able to rate subjective pain?  yes  -      Pre-Treatment Pain Level  4  -      Post-Treatment Pain Level  -- :\"better\"  -         Total Minutes    69648 - PT Therapeutic Exercise Minutes  37  -      95839 - PT Manual Therapy Minutes  10  " -         Exercise 1    Exercise Name 1  pro ll LE ROM/Strength   -      Time 1  10 mins  -KH      Additional Comments  level 2; seat 12  -KH         Exercise 2    Exercise Name 2  scar/STM around distal end   -KH      Time 2  8 mins  -KH         Exercise 3    Exercise Name 3  Manual stretching of the quad and hip flexor in SL  -KH      Time 3  2 mins  -KH         Exercise 4    Exercise Name 4  Clam Shells  -KH      Sets 4  2  -KH      Reps 4  15  -KH         Exercise 5    Exercise Name 5  Bridges  -KH      Sets 5  2  -KH      Reps 5  15  -KH         Exercise 6    Exercise Name 6  hooklying alt hip flexion  -KH      Sets 6  2  -KH      Reps 6  10  -KH         Exercise 7    Exercise Name 7  LTR  -KH      Cueing 7  Verbal  -KH      Sets 7  2  -KH      Reps 7  10  -KH        User Key  (r) = Recorded By, (t) = Taken By, (c) = Cosigned By    Initials Name Provider Type    Nadiya Velazquez PTA Physical Therapy Assistant                      Manual Rx (last 36 hours)      Manual Treatments     Row Name 06/22/21 0800             Total Minutes    77976 - PT Manual Therapy Minutes  10  -KH        User Key  (r) = Recorded By, (t) = Taken By, (c) = Cosigned By    Initials Name Provider Type    Nadiya Velazquez PTA Physical Therapy Assistant          PT OP Goals     Row Name 06/22/21 0800          PT Short Term Goals    STG Date to Achieve  06/22/21  -     STG 1  Pt is independet with HEP.   -     STG 1 Progress  Met;Ongoing  -     STG 2  Patient is able to complete 5 times sit to stand from standard height chair.   -     STG 2 Progress  Met  -     STG 3  Patient is able to complete 6-minute walk test.  -     STG 3 Progress  Met  -     STG 4  Patient is able to complete right hip flexion to 90.   -     STG 4 Progress  Not Met  -     STG 5  Patient is able to complete straight leg raise x5 on right lower extremity.  -     STG 5 Progress  Met  -        Long Term Goals    LTG Date to Achieve  07/20/21  -      LTG 1  6-minute walk test 1000 feet or greater.  -     LTG 1 Progress  Not Met  -     LTG 2  5 times sit to stand from standard height chair in </=15 seconds  -     LTG 2 Progress  Goal Revised;Not Met  -     LTG 3  LEFS improved to 40/80 or greater.   -     LTG 3 Progress  Met  -     LTG 4  Pt is able to ambulate 270 ft without AD and without LOB.   -     LTG 4 Progress  Not Met  -     LTG 5  Pt notes subjective improvment 80% or greater.   -     LTG 5 Progress  Not Met  -        Time Calculation    PT Goal Re-Cert Due Date  07/08/21  -       User Key  (r) = Recorded By, (t) = Taken By, (c) = Cosigned By    Initials Name Provider Type    Nadiya Velazquez PTA Physical Therapy Assistant                         Time Calculation:   Start Time: 0800  Stop Time: 0847  Time Calculation (min): 47 min  Timed Charges  33200 - PT Therapeutic Exercise Minutes: 37  39129 - PT Manual Therapy Minutes: 10  Total Minutes  Timed Charges Total Minutes: 47   Total Minutes: 47  Therapy Charges for Today     Code Description Service Date Service Provider Modifiers Qty    35378182755 HC PT THER PROC EA 15 MIN 6/22/2021 Nadiya Kitchen PTA GP 2    39883845262 HC PT MANUAL THERAPY EA 15 MIN 6/22/2021 Nadiya Kitchen PTA GP 1                    Nadiya Kitchen PTA  6/22/2021

## 2021-06-24 ENCOUNTER — HOSPITAL ENCOUNTER (OUTPATIENT)
Dept: PHYSICAL THERAPY | Facility: HOSPITAL | Age: 71
Setting detail: THERAPIES SERIES
Discharge: HOME | End: 2021-06-24

## 2021-06-24 ENCOUNTER — OFFICE VISIT (OUTPATIENT)
Dept: ORTHOPEDIC SURGERY | Facility: CLINIC | Age: 71
End: 2021-06-24

## 2021-06-24 VITALS — BODY MASS INDEX: 26.96 KG/M2 | WEIGHT: 182 LBS | HEIGHT: 69 IN

## 2021-06-24 DIAGNOSIS — M79.604 PAIN OF RIGHT LOWER EXTREMITY: ICD-10-CM

## 2021-06-24 DIAGNOSIS — M70.61 TROCHANTERIC BURSITIS OF RIGHT HIP: ICD-10-CM

## 2021-06-24 DIAGNOSIS — Z78.9 IMPAIRED MOBILITY AND ACTIVITIES OF DAILY LIVING: ICD-10-CM

## 2021-06-24 DIAGNOSIS — Z96.641 PRESENCE OF RIGHT ARTIFICIAL HIP JOINT: ICD-10-CM

## 2021-06-24 DIAGNOSIS — Z96.641 S/P HIP REPLACEMENT, RIGHT: ICD-10-CM

## 2021-06-24 DIAGNOSIS — Z74.09 IMPAIRED MOBILITY AND ACTIVITIES OF DAILY LIVING: ICD-10-CM

## 2021-06-24 DIAGNOSIS — T84.030D MECHANICAL LOOSENING OF INTERNAL RIGHT HIP PROSTHETIC JOINT, SUBSEQUENT ENCOUNTER: Primary | ICD-10-CM

## 2021-06-24 DIAGNOSIS — Z96.649 STATUS POST REVISION OF TOTAL HIP REPLACEMENT: Primary | ICD-10-CM

## 2021-06-24 DIAGNOSIS — Z74.09 IMPAIRED FUNCTIONAL MOBILITY, BALANCE, GAIT, AND ENDURANCE: ICD-10-CM

## 2021-06-24 DIAGNOSIS — M25.551 RIGHT HIP PAIN: ICD-10-CM

## 2021-06-24 PROCEDURE — 97110 THERAPEUTIC EXERCISES: CPT

## 2021-06-24 PROCEDURE — 99024 POSTOP FOLLOW-UP VISIT: CPT | Performed by: NURSE PRACTITIONER

## 2021-06-24 PROCEDURE — 96372 THER/PROPH/DIAG INJ SC/IM: CPT | Performed by: NURSE PRACTITIONER

## 2021-06-24 RX ORDER — TRIAMCINOLONE ACETONIDE 40 MG/ML
80 INJECTION, SUSPENSION INTRA-ARTICULAR; INTRAMUSCULAR ONCE
Status: COMPLETED | OUTPATIENT
Start: 2021-06-24 | End: 2021-06-24

## 2021-06-24 RX ADMIN — TRIAMCINOLONE ACETONIDE 80 MG: 40 INJECTION, SUSPENSION INTRA-ARTICULAR; INTRAMUSCULAR at 10:18

## 2021-06-24 NOTE — PROGRESS NOTES
Chrissy Dawson is a 71 y.o. female is s/p  anterior revision total hip arthroplasty-right     Chief Complaint   Patient presents with   • Wound Check     right hip       HISTORY OF PRESENT ILLNESS: Patient presents to office for recheck of her right hip.  Patient is approximately 5 weeks that is post anterior revision of right total hip arthroplasty performed per Dr. Maravilla on 5/19/2021.  Patient is here today for follow-up of her right hip pain as it has been persistent and worsening and physical therapy was concerned about possible infection or other complications.  Patient was last seen in office per Dr. Maravilla on 6/8/2021 and had some drainage at that time so she was prescribed a prophylactic 10-day course of Keflex, which she has completed.  Patient has continued with her current course of physical therapy.  Patient has attended 9/10 visits of PT with 70% subjective improvement.  Patient reports persistent pain that she localizes to the posterior and lateral aspect of her hip and also states that it intermittently radiates down to her right ankle.  Patient states the pain is excruciating at times.  Patient denies any numbness or tingling symptoms.  Patient has continued to take Percocet for pain control and states it only helps for 2 to 3 hours and then she has to take supplemental Tylenol.  Patient reports minimal anterior hip pain or groin pain.  Patient has continued to ambulate with use of a walker and also with a cane.  No falls or injuries reported.  Pain scale today is 5/10.    Allergies   Allergen Reactions   • Crestor [Rosuvastatin] Shortness Of Breath   • Lipitor [Atorvastatin] Shortness Of Breath         Current Outpatient Medications:   •  aspirin 81 MG tablet, Take 81 mg by mouth., Disp: , Rfl:   •  docusate sodium (Colace) 100 MG capsule, Take 1 capsule by mouth 2 (Two) Times a Day., Disp: 60 capsule, Rfl: 2  •  Multiple Minerals-Vitamins (CITRACAL PLUS PO), Take  by mouth., Disp: , Rfl:   •   Multiple Vitamins-Minerals (CENTRUM SILVER PO), Take 1 tablet by mouth Daily., Disp: , Rfl:   •  nitroglycerin (NITROSTAT) 0.4 MG SL tablet, 1 under the tongue as needed for angina, may repeat q5mins for up three doses (Patient taking differently: Place 0.4 mg under the tongue Take As Directed. 1 under the tongue as needed for angina, may repeat q5mins for up three doses), Disp: 15 tablet, Rfl: 6  •  oxyCODONE-acetaminophen (PERCOCET) 7.5-325 MG per tablet, Take 1 tablet by mouth Every 6 (Six) Hours As Needed for Moderate Pain ., Disp: 30 tablet, Rfl: 0  •  polyethylene glycol (MIRALAX) 17 g packet, Take 17 g by mouth Daily., Disp: 30 each, Rfl: 2  •  pravastatin (PRAVACHOL) 40 MG tablet, Take 1 tablet by mouth Daily. (Patient taking differently: Take 40 mg by mouth Every Night.), Disp: 31 tablet, Rfl: 6  •  trospium (SANCTURA) 20 MG tablet, Take 20 mg by mouth 2 (two) times a day., Disp: , Rfl:   •  vitamin B-12 (CYANOCOBALAMIN) 1000 MCG tablet, Take 1,000 mcg by mouth Daily., Disp: , Rfl:   •  vitamin C (ASCORBIC ACID) 500 MG tablet, Take 500 mg by mouth Daily., Disp: , Rfl:   •  warfarin (Coumadin) 4 MG tablet, Take 1 tablet by mouth every night or as directed., Disp: 45 tablet, Rfl: 1  •  Warfarin Sodium (PHARMACY TO DOSE WARFARIN), Continuous As Needed (6 weeks). Indications: Prevention of Unwanted Clot in Veins, Disp: , Rfl:     No fevers or chills.  No nausea or vomiting. Right hip pain. Right leg pain.       PHYSICAL EXAMINATION:       Chrissy Dwason is a 71 y.o. female    Patient is awake and alert, answers questions appropriately and is in no apparent distress.    GAIT:     []  Normal  []  Antalgic    Assistive device: []  None  []  Walker     []  Crutches  []  Cane     [x]  Wheelchair  []  Stretcher    Right Hip Exam     Tenderness   The patient is experiencing tenderness in the lateral and greater trochanter.    Muscle Strength   Right hip normal muscle strength: deferred.    Other   Erythema:  absent  Sensation: normal  Pulse: present    Comments:  Pain and limitations with range of motion, progressing as expected post hip arthroplasty.  Mild to moderate generalized swelling noted throughout the hip and upper leg, mostly noticeable when compared to the left side.  Mild tenderness to the posterior hip and anterior hip with exquisite tenderness at the lateral hip overlying the greater trochanter.  Mild warmth is noted localized around the incision.  The surgical incision is well-healed and well approximated with no drainage noted.  No erythema is noted in any area of the hip, including at the incision.            XR Spine Lumbar 2 or 3 View    Result Date: 6/25/2021  Narrative: AP and lateral views of the lumbar spine reveal no evidence of acute fracture or subluxation.  There are multilevel degenerative changes noted.  There is evidence of facet arthropathy noted at multiple levels throughout the lumbar spine.  There is evidence of mild degenerative disc disease at multiple levels, more pronounced at L4-L5.  Vertebral body heights appear well-maintained.  The spine appears normally aligned.  No acute bony radiologic abnormalities are noted at this time.  No acute interval changes are noted when compared with prior CT images from 4/6/2021.  No prior x-rays of lumbar spine are available for review.06/25/21 at 17:19 CDT by JOE Alvarenga     XR Hip With or Without Pelvis 2 - 3 View Right    Result Date: 6/7/2021  Narrative: Study: XR hip with or without pelvis, right Comparison: 5/19/2021 Narrative: Bilateral hips are acceptable in alignment and position.  Bilateral hips are status post SALTY.  No evidence of hardware failure or loosening.  Degenerative changes seen throughout hips and visible portion of lumbar spine.  No evidence of fracture or dislocation.  JOE Uribe 6/7/2021.        ASSESSMENT:    Diagnoses and all orders for this visit:    Status post revision of total hip replacement  -     XR  Spine Lumbar 2 or 3 View  -     triamcinolone acetonide (KENALOG-40) injection 80 mg    Right hip pain  -     XR Spine Lumbar 2 or 3 View  -     triamcinolone acetonide (KENALOG-40) injection 80 mg    Presence of right artificial hip joint  -     XR Spine Lumbar 2 or 3 View  -     triamcinolone acetonide (KENALOG-40) injection 80 mg    Pain of right lower extremity    Trochanteric bursitis of right hip    PLAN    Patient is here today for recheck of her right hip but she has continued to have persistent right hip pain that is excruciating at times per her description and radiates to her ankle.  She localizes the most pain and tenderness to the lateral hip but she has exquisite tenderness overlying the greater trochanter.  We discussed possibility of trochanteric bursitis, which she also has a history of.  We also discussed possibility of radiculopathy from her lumbar spine.  She denies any back pain.  She has no numbness or tingling symptoms, but she does complain of pain that radiates to the level of her ankle at times.  X-rays of the lumbar spine are performed in office today reviewed with no acute findings noted.  Patient does have some warmth at the surgical incision that is mild.  There are no other signs of infection noted.  There is no erythema.  The surgical incision is well-healed and well approximated with no drainage noted.  The patient recently completed a 10-day course of Keflex as prescribed per Dr. Maravilla as she was having some increased drainage.  There are no overt signs of infection noted.  No fever or chills reported.  Recommend to continue with her current course of physical therapy.  We discussed that physical therapy can also add some use of modalities and stretching exercises hopefully help with likely trochanteric bursitis.  Recommend IM injection today of Kenalog for management of pain/inflammation.  We discussed the possibility of a right hip bursa injection at some point if her  pain/tenderness persists for today we will continue to avoid steroids in this area due to her recent hip revision.  Continue with home exercises as instructed.  Continue with use of her cane or walker as needed for modified weightbearing and ambulatory assistance as needed.  Otherwise, she can gradually progress her weightbearing and activity as pain allows.  Recommend ice therapy to the lateral right hip intermittently 3-4 times daily for 15 minutes at a time to minimize pain/inflammation.  Continue with Percocet as needed for moderate to severe pain.  Continue with Tylenol as needed for milder pain for supplementation of pain control.  Patient is reminded not to exceed more than 4000 mg of acetaminophen in a 24-hour period.  We will avoid oral NSAIDs due to her history of coronary artery disease and CABG.  Patient already has a follow-up appointment scheduled with Dr. Maravilla for 7/8/2021 so she will return the office at that time for recheck of her right hip.  I encouraged her to follow-up sooner as needed for any new or worsening symptoms or any concerns.    EMR Dragon/Transciption Disclaimer: Some of this note may be an electronic transcription/translation of spoken language to printed text.  The electronic translation of spoken language may permit erroneous, or at times, nonsensical words or phrases to be inadvertently transcribed. Although I have reviewed the note for such errors, some may still exist.     Return for follow up on 7/8/2021 with Dr. Maravilla as scheduled.        This document has been electronically signed by JOE Alvarenga on June 27, 2021 15:10 CDT      JOE Alvarenga

## 2021-06-24 NOTE — THERAPY TREATMENT NOTE
Outpatient Physical Therapy Ortho Treatment Note  [facility]     Patient Name: Chrissy Dawson  : 1950  MRN: [id number]  Today's Date: 2021      Visit Date: 2021     ATTENDANCE: 9/10 (eval+12 approved until 2021)  SUBJECTIVE IMPROVEMENT: 70%  NEXT MD APPOINTMENT: 2021  RECERT DATE: 2021     THERAPY DIAGNOSIS: R total hip revision 2021    Visit Dx:    ICD-10-CM ICD-9-CM   1. 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 S/P hip replacement, right  Z96.641 V43.64   3. Impaired mobility and activities of daily living  Z74.09 V49.89    Z78.9    4. 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 (CMS/HCC)   • S/P hip replacement, right   • Overweight (BMI 25.0-29.9)   • Heart murmur        Past Medical History:   Diagnosis Date   • Backache    • Benign essential hypertension     PATIENT DENIES   • Cervical arthritis    • Coronary arteriosclerosis    • Fibrocystic breast    • Ganglion cyst of wrist     Ganglion/synovial cyst - wrist   • Ganglion of wrist    • Hip pain    • History of echocardiogram 10/23/2015    Echocardiogram W/ color flow 78279 (1) - Normal LV function with Ef of 55-60%.Normal RV size and function.Pseudonormal diastolic dysfunciton with borderline [facility].NO sig.valvular regurg or stenosis.   • History of mammogram 09/10/2014    MAMMOGRAM SCREENING 41565 - [facility] ([facility] - [facility] ([facility])   • History of transfusion    • Hyperlipidemia    • Inguinal pain    • Recurrent angina status post coronary artery bypass  graft (CMS/HCC)     Recurrent angina after coronary artery bypass graft   • Urge incontinence of urine         Past Surgical History:   Procedure Laterality Date   • BREAST BIOPSY Right 2/2/2017    Procedure: RIGHT BREAST LUMPECTOMY WITH NEEDLE LOCALIZATION AND ULTRASOUND;  Surgeon: Delfino Greer MD;  Location: [facility];  Service:    • BREAST CYST ASPIRATION     • CARDIAC SURGERY     • CORONARY ARTERY BYPASS GRAFT  2000    CABG, arterial, single (1)   • GANGLION CYST EXCISION     • JOINT REPLACEMENT     • ORIF MANDIBULAR FRACTURE     • TOTAL ABDOMINAL HYSTERECTOMY     • TOTAL ABDOMINAL HYSTERECTOMY WITH SALPINGO OOPHORECTOMY  1998    SALVADOR/BSO (1)   • TOTAL HIP ARTHROPLASTY  2011    Total hip replacement (3)   • TOTAL HIP ARTHROPLASTY REVISION Right 5/19/2021    Procedure: anterior revision total hip arthroplasty        (lg-head c-arm and hana table );  Surgeon: Jeovanny Maravilla MD;  Location: [facility];  Service: Orthopedics;  Laterality: Right;       PT Ortho     Row Name 06/24/21 0800       Precautions and Contraindications    Precautions/Limitations  no known precautions/limitations  -    Precautions  per MD: slowly progress as tolerated.   -       Posture/Observations    Posture/Observations Comments  patient with SPC into PT: shortened stride and step length with trendlenberg noted  -      User Key  (r) = Recorded By, (t) = Taken By, (c) = Cosigned By    Initials Name Provider Type    Nadiya Velazquez PTA Physical Therapy Assistant                      PT Assessment/Plan     Row Name 06/24/21 0800          PT Assessment    Assessment Comments  PT session ended early due to MD seeing her at 0850 and patients increased pain; ME was performed to correct posterior torsion on the L which did improved her gait; pt continues with tightness int he R hamstring; Incision warm and tender to touch as well as the posterior hip and lateral hip; ADvised pt and her  to continue gait with walker at home and to  "see MD then call PT to state what she advised;   -        PT Plan    PT Frequency  2x/week  -     Predicted Duration of Therapy Intervention (PT)  6-8 weeks  -     PT Plan Comments  4 more approved; Will need extension; Will continue as advised after pt sees MD;   -       User Key  (r) = Recorded By, (t) = Taken By, (c) = Cosigned By    Initials Name Provider Type    Nadiya Velazquez PTA Physical Therapy Assistant            OP Exercises     Row Name 06/24/21 0800             Subjective Comments    Subjective Comments  Patient reports to PT with her  and on her cane this date, still having increased pain in the R hip; Reports that it is worse than a toothache and she is concerned about it. States that she is finished with her antiboditics at this time;  reports that she has manily been using her walker since the weekend at home; Mrs. Dawson states that the only exercise that really bothers her is the one where she kicks her leg out to side in standing;    -         Subjective Pain    Pre-Treatment Pain Level  3 on pain meds  -      Post-Treatment Pain Level  -- \"same\"  -         Total Minutes    76699 - PT Therapeutic Exercise Minutes  26  -         Exercise 1    Exercise Name 1  pro ll LE ROM/Strength   -      Time 1  12 mins  -      Additional Comments  level 2; seat 12  -         Exercise 2    Exercise Name 2  checked incision and LL  -KH      Additional Comments  ME done to posterior torsion on L  -KH        User Key  (r) = Recorded By, (t) = Taken By, (c) = Cosigned By    Initials Name Provider Type    Nadiya Velazquez PTA Physical Therapy Assistant                       PT OP Goals     Row Name 06/24/21 0800          PT Short Term Goals    STG Date to Achieve  06/22/21  -     STG 1  Pt is independet with HEP.   -     STG 1 Progress  Met;Ongoing  -     STG 2  Patient is able to complete 5 times sit to stand from standard height chair.   -     STG 2 Progress  Met  -     " STG 3  Patient is able to complete 6-minute walk test.  -     STG 3 Progress  Met  -     STG 4  Patient is able to complete right hip flexion to 90.   -     STG 4 Progress  Not Met  -     STG 5  Patient is able to complete straight leg raise x5 on right lower extremity.  -     STG 5 Progress  Met  -        Long Term Goals    LTG Date to Achieve  07/20/21  -     LTG 1  6-minute walk test 1000 feet or greater.  -     LTG 1 Progress  Not Met  -     LTG 2  5 times sit to stand from standard height chair in </=15 seconds  -     LTG 2 Progress  Goal Revised;Not Met  -     LTG 3  LEFS improved to 40/80 or greater.   -     LTG 3 Progress  Met  -     LTG 4  Pt is able to ambulate 270 ft without AD and without LOB.   -     LTG 4 Progress  Not Met  -     LTG 5  Pt notes subjective improvment 80% or greater.   -     LTG 5 Progress  Not Met  -        Time Calculation    PT Goal Re-Cert Due Date  07/08/21  -       User Key  (r) = Recorded By, (t) = Taken By, (c) = Cosigned By    Initials Name Provider Type    Nadiya Velazquez PTA Physical Therapy Assistant                         Time Calculation:   Start Time: 0804  Stop Time: 0830  Time Calculation (min): 26 min  Timed Charges  86767 - PT Therapeutic Exercise Minutes: 26  Total Minutes  Timed Charges Total Minutes: 26   Total Minutes: 26  Therapy Charges for Today     Code Description Service Date Service Provider Modifiers Qty    48972460868 HC PT THER PROC EA 15 MIN 6/24/2021 Nadiya Kitchen PTA GP 2                    Nadiya Kitchen PTA  6/24/2021

## 2021-06-27 PROBLEM — M70.61 TROCHANTERIC BURSITIS OF RIGHT HIP: Status: ACTIVE | Noted: 2021-06-27

## 2021-06-27 PROBLEM — M79.604 PAIN OF RIGHT LOWER EXTREMITY: Status: ACTIVE | Noted: 2021-06-27

## 2021-06-29 ENCOUNTER — HOSPITAL ENCOUNTER (OUTPATIENT)
Dept: PHYSICAL THERAPY | Facility: HOSPITAL | Age: 71
Setting detail: THERAPIES SERIES
Discharge: HOME | End: 2021-06-29

## 2021-06-29 ENCOUNTER — LAB (OUTPATIENT)
Dept: LAB | Facility: HOSPITAL | Age: 71
End: 2021-06-29

## 2021-06-29 DIAGNOSIS — Z79.01 ANTICOAGULATED ON WARFARIN: ICD-10-CM

## 2021-06-29 DIAGNOSIS — Z96.641 S/P HIP REPLACEMENT, RIGHT: ICD-10-CM

## 2021-06-29 DIAGNOSIS — T84.030D MECHANICAL LOOSENING OF INTERNAL RIGHT HIP PROSTHETIC JOINT, SUBSEQUENT ENCOUNTER: Primary | ICD-10-CM

## 2021-06-29 DIAGNOSIS — Z78.9 IMPAIRED MOBILITY AND ACTIVITIES OF DAILY LIVING: ICD-10-CM

## 2021-06-29 DIAGNOSIS — Z74.09 IMPAIRED FUNCTIONAL MOBILITY, BALANCE, GAIT, AND ENDURANCE: ICD-10-CM

## 2021-06-29 DIAGNOSIS — Z74.09 IMPAIRED MOBILITY AND ACTIVITIES OF DAILY LIVING: ICD-10-CM

## 2021-06-29 LAB
INR PPP: 1.84 (ref 0.8–1.2)
PROTHROMBIN TIME: 20.9 SECONDS (ref 11.1–15.3)

## 2021-06-29 PROCEDURE — 85610 PROTHROMBIN TIME: CPT

## 2021-06-29 PROCEDURE — 97110 THERAPEUTIC EXERCISES: CPT

## 2021-06-29 PROCEDURE — 36415 COLL VENOUS BLD VENIPUNCTURE: CPT

## 2021-07-01 ENCOUNTER — TELEPHONE (OUTPATIENT)
Dept: PHARMACY | Facility: HOSPITAL | Age: 71
End: 2021-07-01

## 2021-07-01 ENCOUNTER — HOSPITAL ENCOUNTER (OUTPATIENT)
Dept: PHYSICAL THERAPY | Facility: HOSPITAL | Age: 71
Setting detail: THERAPIES SERIES
Discharge: HOME | End: 2021-07-01

## 2021-07-01 DIAGNOSIS — Z74.09 IMPAIRED MOBILITY AND ACTIVITIES OF DAILY LIVING: ICD-10-CM

## 2021-07-01 DIAGNOSIS — Z74.09 IMPAIRED FUNCTIONAL MOBILITY, BALANCE, GAIT, AND ENDURANCE: ICD-10-CM

## 2021-07-01 DIAGNOSIS — Z96.641 S/P HIP REPLACEMENT, RIGHT: ICD-10-CM

## 2021-07-01 DIAGNOSIS — Z78.9 IMPAIRED MOBILITY AND ACTIVITIES OF DAILY LIVING: ICD-10-CM

## 2021-07-01 DIAGNOSIS — T84.030D MECHANICAL LOOSENING OF INTERNAL RIGHT HIP PROSTHETIC JOINT, SUBSEQUENT ENCOUNTER: Primary | ICD-10-CM

## 2021-07-01 PROCEDURE — 97110 THERAPEUTIC EXERCISES: CPT

## 2021-07-01 NOTE — THERAPY TREATMENT NOTE
Outpatient Physical Therapy Ortho Treatment Note  [facility]     Patient Name: Chrissy Dawson  : 1950  MRN: [id number]  Today's Date: 2021      Visit Date: 2021     Subjective Improvement 50%  Visits 11/12  Visits approved eval +12 until 2021  RTMD 2021  Recert Date 2021    S/P R SALTY revision on 2021    Visit Dx:    ICD-10-CM ICD-9-CM   1. 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 S/P hip replacement, right  Z96.641 V43.64   3. Impaired mobility and activities of daily living  Z74.09 V49.89    Z78.9    4. 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 (CMS/HCC)   • S/P hip replacement, right   • Overweight (BMI 25.0-29.9)   • Heart murmur   • Status post revision of total hip replacement   • Pain of right lower extremity   • Trochanteric bursitis of right hip        Past Medical History:   Diagnosis Date   • Backache    • Benign essential hypertension     PATIENT DENIES   • Cervical arthritis    • Coronary arteriosclerosis    • Fibrocystic breast    • Ganglion cyst of wrist     Ganglion/synovial cyst - wrist   • Ganglion of wrist    • Hip pain    • History of echocardiogram 10/23/2015    Echocardiogram W/ color flow 22883 (1) - Normal LV function with Ef of 55-60%.Normal RV size and function.Pseudonormal diastolic dysfunciton with borderline [facility].NO sig.valvular regurg or stenosis.   • History of mammogram 09/10/2014    MAMMOGRAM SCREENING 52499 - [facility] ([facility] - [facility] ([facility])   • History of transfusion    •  Hyperlipidemia    • Inguinal pain    • Recurrent angina status post coronary artery bypass graft (CMS/HCC)     Recurrent angina after coronary artery bypass graft   • Urge incontinence of urine         Past Surgical History:   Procedure Laterality Date   • BREAST BIOPSY Right 2/2/2017    Procedure: RIGHT BREAST LUMPECTOMY WITH NEEDLE LOCALIZATION AND ULTRASOUND;  Surgeon: Delfino Greer MD;  Location: [facility];  Service:    • BREAST CYST ASPIRATION     • CARDIAC SURGERY     • CORONARY ARTERY BYPASS GRAFT  2000    CABG, arterial, single (1)   • GANGLION CYST EXCISION     • JOINT REPLACEMENT     • ORIF MANDIBULAR FRACTURE     • TOTAL ABDOMINAL HYSTERECTOMY     • TOTAL ABDOMINAL HYSTERECTOMY WITH SALPINGO OOPHORECTOMY  1998    SALVADOR/BSO (1)   • TOTAL HIP ARTHROPLASTY  2011    Total hip replacement (3)   • TOTAL HIP ARTHROPLASTY REVISION Right 5/19/2021    Procedure: anterior revision total hip arthroplasty        (lg-head c-arm and hana table );  Surgeon: Jeovanny Maravilla MD;  Location: [facility];  Service: Orthopedics;  Laterality: Right;       PT Ortho     Row Name 07/01/21 0800       Precautions and Contraindications    Precautions  per MD: slowly progress as tolerated.   -CP       Posture/Observations    Posture/Observations Comments  improved cadance with SPC, mild trendelenberg  -CP      User Key  (r) = Recorded By, (t) = Taken By, (c) = Cosigned By    Initials Name Provider Type    CP Breana Lutz, PTA Physical Therapy Assistant                      PT Assessment/Plan     Row Name 07/01/21 1015          PT Assessment    Assessment Comments  Very good tolerance to ther ex today. Patient reported no increase in pain after ther ex.  Slow gait noted.  sit to stand transfers required B UE assist.  -CP        PT Plan    PT Frequency  2x/week  -CP     Predicted Duration of Therapy Intervention (PT)  6-8 weeks  -CP     PT Plan Comments  Cont with POC.  Progress strength and gait as tolerated  -CP       User Key  " (r) = Recorded By, (t) = Taken By, (c) = Cosigned By    Initials Name Provider Type    CP Breana Lutz, ALVINA Physical Therapy Assistant          Modalities     Row Name 07/01/21 0900             Subjective Comments    Subjective Comments  Patient states that she has increase pain after sitting and trying to stand.  After standing and moving, her pain will decrease.  -CP         Subjective Pain    Able to rate subjective pain?  yes  -CP      Pre-Treatment Pain Level  2  -CP      Subjective Pain Comment  pain pill at 7:30 a.m. today  -CP        User Key  (r) = Recorded By, (t) = Taken By, (c) = Cosigned By    Initials Name Provider Type    CP Breana Lutz, ALVINA Physical Therapy Assistant        OP Exercises     Row Name 07/01/21 1017 07/01/21 0900          Subjective Comments    Subjective Comments  --  Patient states that she has increase pain after sitting and trying to stand.  After standing and moving, her pain will decrease.  -CP        Subjective Pain    Able to rate subjective pain?  --  yes  -CP     Pre-Treatment Pain Level  --  2  -CP     Post-Treatment Pain Level  --  2  -CP     Subjective Pain Comment  --  pain pill at 7:30 a.m. today  -CP        Total Minutes    37970 - PT Therapeutic Exercise Minutes  45  -CP  --        Exercise 1    Exercise Name 1  --  pro II level   -CP     Time 1  --  10  -CP     Additional Comments  --  level 2 seat 13  -CP        Exercise 2    Exercise Name 2  --  incline stretch  -CP     Cueing 2  --  Verbal  -CP     Sets 2  --  3  -CP     Time 2  --  30  -CP        Exercise 3    Exercise Name 3  --  standing HS stretch  -CP     Cueing 3  --  Verbal  -CP     Sets 3  --  3  -CP     Time 3  --  30  -CP        Exercise 4    Exercise Name 4  --  step up 4\"  -CP     Cueing 4  --  Verbal;Demo  -CP     Sets 4  --  2  -CP     Reps 4  --  10  -CP        Exercise 5    Exercise Name 5  --  lat step up 4\"  -CP     Cueing 5  --  Verbal;Demo  -CP     Sets 5  --  2  -CP     Reps 5  --  " 10  -CP        Exercise 6    Exercise Name 6  --  CR/TR  -CP     Cueing 6  --  Verbal;Demo  -CP     Sets 6  --  2  -CP     Reps 6  --  10  -CP     Additional Comments  --  1 lb AW R LE  -CP        Exercise 7    Exercise Name 7  --  LAQ  -CP     Cueing 7  --  Verbal;Tactile  -CP     Sets 7  --  2  -CP     Reps 7  --  10  -CP     Additional Comments  --  1 lb AW R LE  -CP        Exercise 8    Exercise Name 8  --  side stepping at taping table  -CP     Cueing 8  --  Verbal;Demo  -CP     Reps 8  --  5  -CP     Additional Comments  --  1 lb AW B LE  -CP        Exercise 9    Exercise Name 9  --  marching at taping table  -CP     Cueing 9  --  Verbal;Demo  -CP     Sets 9  --  2  -CP     Reps 9  --  10  -CP     Time 9  --  1 lb AW R LE  -CP       User Key  (r) = Recorded By, (t) = Taken By, (c) = Cosigned By    Initials Name Provider Type    Breana Nash, PTA Physical Therapy Assistant                       PT OP Goals     Row Name 07/01/21 0900          PT Short Term Goals    STG Date to Achieve  06/22/21  -CP     STG 1  Pt is independet with HEP.   -CP     STG 1 Progress  Met;Ongoing  -CP     STG 2  Patient is able to complete 5 times sit to stand from standard height chair.   -CP     STG 2 Progress  Met  -CP     STG 3  Patient is able to complete 6-minute walk test.  -CP     STG 3 Progress  Met  -CP     STG 4  Patient is able to complete right hip flexion to 90.   -CP     STG 4 Progress  Not Met  -CP     STG 5  Patient is able to complete straight leg raise x5 on right lower extremity.  -CP     STG 5 Progress  Met  -CP        Long Term Goals    LTG Date to Achieve  07/20/21  -CP     LTG 1  6-minute walk test 1000 feet or greater.  -CP     LTG 1 Progress  Not Met  -CP     LTG 2  5 times sit to stand from standard height chair in </=15 seconds  -CP     LTG 2 Progress  Goal Revised;Not Met  -CP     LTG 3  LEFS improved to 40/80 or greater.   -CP     LTG 3 Progress  Met  -CP     LTG 4  Pt is able to ambulate 270 ft  without AD and without LOB.   -CP     LTG 4 Progress  Not Met  -CP     LTG 5  Pt notes subjective improvment 80% or greater.   -CP     LTG 5 Progress  Not Met  -CP        Time Calculation    PT Goal Re-Cert Due Date  07/08/21  -CP       User Key  (r) = Recorded By, (t) = Taken By, (c) = Cosigned By    Initials Name Provider Type    CP Breana Lutz PTA Physical Therapy Assistant                         Time Calculation:   Start Time: 0850  Stop Time: 0935  Time Calculation (min): 45 min  Total Timed Code Minutes- PT: 45 minute(s)  Timed Charges  08360 - PT Therapeutic Exercise Minutes: 45  Total Minutes  Timed Charges Total Minutes: 45   Total Minutes: 45  Therapy Charges for Today     Code Description Service Date Service Provider Modifiers Qty    12355904785 HC PT THER PROC EA 15 MIN 7/1/2021 Breana Lutz PTA GP 3                    Breana Lutz PTA  7/1/2021

## 2021-07-01 NOTE — TELEPHONE ENCOUNTER
Discussed with Miss Dawson about her Warfarin therapy completion on 7/1. Miss Dawson was aware of the stop date but had already taken her last remaining dose yesterday. Given her INR was therapeutic on Tuesday and has had 6 mg for the last 2 days at the end of therapy I mentioned she would be ok if she did not have her remaining dose tonight as it would take a few days for the warfarin's affects to wear off. Miss Dawson did not have any signs of bleeding or bruising. Discussed techniques of how to dispose of remaining Warfarin to prevent harm to animals and other people. No further questions for us at this time.

## 2021-07-07 ENCOUNTER — HOSPITAL ENCOUNTER (OUTPATIENT)
Dept: PHYSICAL THERAPY | Facility: HOSPITAL | Age: 71
Setting detail: THERAPIES SERIES
Discharge: HOME | End: 2021-07-07

## 2021-07-07 DIAGNOSIS — Z78.9 IMPAIRED MOBILITY AND ACTIVITIES OF DAILY LIVING: ICD-10-CM

## 2021-07-07 DIAGNOSIS — Z96.641 S/P HIP REPLACEMENT, RIGHT: ICD-10-CM

## 2021-07-07 DIAGNOSIS — Z74.09 IMPAIRED MOBILITY AND ACTIVITIES OF DAILY LIVING: ICD-10-CM

## 2021-07-07 DIAGNOSIS — Z74.09 IMPAIRED FUNCTIONAL MOBILITY, BALANCE, GAIT, AND ENDURANCE: ICD-10-CM

## 2021-07-07 DIAGNOSIS — I10 BENIGN ESSENTIAL HYPERTENSION: ICD-10-CM

## 2021-07-07 DIAGNOSIS — T84.030D MECHANICAL LOOSENING OF INTERNAL RIGHT HIP PROSTHETIC JOINT, SUBSEQUENT ENCOUNTER: Primary | ICD-10-CM

## 2021-07-07 PROCEDURE — 97110 THERAPEUTIC EXERCISES: CPT

## 2021-07-07 NOTE — THERAPY PROGRESS REPORT/RE-CERT
Outpatient Physical Therapy Ortho Progress Note  [facility]     Patient Name: Chrissy Dawson  : 1950  MRN: [id number]  Today's Date: 2021      Visit Date: 2021     ATTENDANCE: ( eval+ 12 approved)   SUBJECTIVE IMPROVEMENT: 60-65%  NEXT MD APPOINTMENT: 2021  RECERT DATE: 2021    THERAPY DIAGNOSIS: s/p R SALTY revision 2021      Visit Dx:    ICD-10-CM ICD-9-CM   1. 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 Benign essential hypertension  I10 401.1   3. S/P hip replacement, right  Z96.641 V43.64   4. Impaired mobility and activities of daily living  Z74.09 V49.89    Z78.9    5. 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 (CMS/HCC)   • S/P hip replacement, right   • Overweight (BMI 25.0-29.9)   • Heart murmur   • Status post revision of total hip replacement   • Pain of right lower extremity   • Trochanteric bursitis of right hip        Past Medical History:   Diagnosis Date   • Backache    • Benign essential hypertension     PATIENT DENIES   • Cervical arthritis    • Coronary arteriosclerosis    • Fibrocystic breast    • Ganglion cyst of wrist     Ganglion/synovial cyst - wrist   • Ganglion of wrist    • Hip pain    • History of echocardiogram 10/23/2015    Echocardiogram W/ color flow 41102 (1) - Normal LV function with Ef of 55-60%.Normal RV size and function.Pseudonormal diastolic dysfunciton with borderline [facility].NO sig.valvular regurg or stenosis.   • History of mammogram 09/10/2014    MAMMOGRAM SCREENING 77986 - [facility]  (1) - [facility]ILLER ([facility])   • History of transfusion    • Hyperlipidemia    • Inguinal pain    • Recurrent angina status post coronary artery bypass graft (CMS/HCC)     Recurrent angina after coronary artery bypass graft   • Urge incontinence of urine         Past Surgical History:   Procedure Laterality Date   • BREAST BIOPSY Right 2/2/2017    Procedure: RIGHT BREAST LUMPECTOMY WITH NEEDLE LOCALIZATION AND ULTRASOUND;  Surgeon: Delfino Greer MD;  Location: [facility];  Service:    • BREAST CYST ASPIRATION     • CARDIAC SURGERY     • CORONARY ARTERY BYPASS GRAFT  2000    CABG, arterial, single (1)   • GANGLION CYST EXCISION     • JOINT REPLACEMENT     • ORIF MANDIBULAR FRACTURE     • TOTAL ABDOMINAL HYSTERECTOMY     • TOTAL ABDOMINAL HYSTERECTOMY WITH SALPINGO OOPHORECTOMY  1998    SALVADOR/BSO (1)   • TOTAL HIP ARTHROPLASTY  2011    Total hip replacement (3)   • TOTAL HIP ARTHROPLASTY REVISION Right 5/19/2021    Procedure: anterior revision total hip arthroplasty        (lg-head c-arm and hana table );  Surgeon: Jeovanny Maravilla MD;  Location: [facility];  Service: Orthopedics;  Laterality: Right;       PT Ortho     Row Name 07/07/21 0800       Subjective Comments    Subjective Comments  Pt notes that she does feel she is getting better. Notes no further back pain/bursitis pain at this time. Reports about 60-65% subjective improvement however RLE continues to feel very weak at this time. reports she is able to do some lower body dressing indpt at this time. only taking pain pill in the morning and at night now.   -AC       Precautions and Contraindications    Precautions  per MD: slowly progress as tolerated.   -AC       Subjective Pain    Pre-Treatment Pain Level  2  -AC    Post-Treatment Pain Level  2  -AC       Posture/Observations    Posture/Observations Comments  minimal gait deviation with SPC. slowed gait speed.   -AC       General ROM    GENERAL ROM COMMENTS  R hip flexion AAROM 95.   -AC       MMT  (Manual Muscle Testing)    General MMT Comments  3+/5 hip flexion. 4-/5 all other hip grossly.   -AC      User Key  (r) = Recorded By, (t) = Taken By, (c) = Cosigned By    Initials Name Provider Type    AC Keya Ross, PT Physical Therapist                      PT Assessment/Plan     Row Name 07/07/21 0900          PT Assessment    Functional Limitations  Impaired gait;Decreased safety during functional activities;Limitation in home management;Limitations in community activities;Limitations in functional capacity and performance;Performance in leisure activities  -AC     Impairments  Balance;Gait;Impaired flexibility;Muscle strength;Range of motion;Pain  -AC     Assessment Comments  re-evaluation completed today with all STGs met and 1 LTG met to day. She continues to struggle with gait speed, gait without AD, and standing from a standard herightchair. She continues to note 60-65% subjective improvement and continues to feel that RLE is very weak. She was educated to increase standing activity and sit to stands from low chair rather than high chairs at home to help improve LE strength. She continues to have difficulty with hip flexion strength, hip flexion/abduction ROM, and balance/stability in standing/gait and requires AD at this time. Next follow-up with MD tomorrow for post-op check up. She continues to remain appropriate for skilled PT to improve LE strength, functional mobility, and gait to return to PLOF and improve independence with ADLs.   -AC     Rehab Potential  Good  -AC     Patient/caregiver participated in establishment of treatment plan and goals  Yes  -AC     Patient would benefit from skilled therapy intervention  Yes  -AC        PT Plan    PT Frequency  2x/week  -AC     Predicted Duration of Therapy Intervention (PT)  3-4 more weeks   -AC     PT Plan Comments  continue with POC progressing strength and flexibility/ROM as tolerated. balance and gait training with and without AD as able.    -AC       User Key  (r) = Recorded By, (t) = Taken By, (c) = Cosigned By    Initials Name Provider Type    AC Keya Ross, PT Physical Therapist            OP Exercises     Row Name 07/07/21 0800             Subjective Comments    Subjective Comments  Pt notes that she does feel she is getting better. Notes no further back pain/bursitis pain at this time. Reports about 60-65% subjective improvement however RLE continues to feel very weak at this time. reports she is able to do some lower body dressing indpt at this time. only taking pain pill in the morning and at night now.   -AC         Subjective Pain    Able to rate subjective pain?  yes  -AC      Pre-Treatment Pain Level  2  -AC      Post-Treatment Pain Level  2  -AC         Total Minutes    30557 - PT Therapeutic Exercise Minutes  46  -AC         Exercise 1    Exercise Name 1  6 MWT   -AC      Time 1  540 ft.   -AC         Exercise 2    Exercise Name 2  5 TSTS   -AC      Sets 2  2x  -AC      Additional Comments  54 (frequent stopping and talking). 28.69s   -AC         Exercise 3    Exercise Name 3  cybex hip abduction   -AC      Sets 3  2  -AC      Reps 3  10  -AC      Additional Comments  15#  -AC         Exercise 4    Exercise Name 4  cybex hip adduction   -AC      Sets 4  2  -AC      Reps 4  10  -AC      Additional Comments  15#   -AC         Exercise 5    Exercise Name 5  attempted cybex leg press- unable to complete due to limited hip flexion ROM   -AC         Exercise 6    Exercise Name 6  step ups 6 inch step   -AC      Sets 6  2  -AC      Reps 6  10  -AC         Exercise 7    Exercise Name 7  lat step ups   -AC      Sets 7  2  -AC      Reps 7  10  -AC      Additional Comments  6 inch step   -AC         Exercise 8    Exercise Name 8  standing hip flexion lunge stretch   -AC      Sets 8  3  -AC      Time 8  30s  -AC         Exercise 9    Exercise Name 9  standing marching   -AC      Sets 9  2  -AC      Reps 9  20  -AC        User Key  (r) =  Recorded By, (t) = Taken By, (c) = Cosigned By    Initials Name Provider Type    AC Keya Ross, PT Physical Therapist                       PT OP Goals     Row Name 07/07/21 0800          PT Short Term Goals    STG Date to Achieve  06/22/21  -     STG 1  Pt is independent with HEP.   -AC     STG 1 Progress  Met;Ongoing  -AC     STG 2  Patient is able to complete 5 times sit to stand from standard height chair.   -AC     STG 2 Progress  Met  -AC     STG 3  Patient is able to complete 6-minute walk test.  -AC     STG 3 Progress  Met  -AC     STG 4  Patient is able to complete right hip flexion to 90.   -AC     STG 4 Progress  Met  -     STG 5  Patient is able to complete straight leg raise x5 on right lower extremity.  -     STG 5 Progress  Met  -        Long Term Goals    LTG Date to Achieve  07/20/21  -     LTG 1  6-minute walk test 1000 feet or greater.  -     LTG 1 Progress  Not Met  -     LTG 1 Progress Comments  540 ft today   -     LTG 2  5 times sit to stand from standard height chair in </=15 seconds  -AC     LTG 2 Progress  Not Met  -     LTG 2 Progress Comments  28s   -     LTG 3  LEFS improved to 40/80 or greater.   -     LTG 3 Progress  Met  -     LTG 4  Pt is able to ambulate 270 ft without AD and without LOB.   -     LTG 4 Progress  Not Met  -     LTG 4 Progress Comments  continues to require hurrycane for ambulation due to unsteadiness  -     LTG 5  Pt notes subjective improvement 80% or greater.   -     LTG 5 Progress  Not Met  -     LTG 5 Progress Comments  60-65% today   -        Time Calculation    PT Goal Re-Cert Due Date  07/28/21  -       User Key  (r) = Recorded By, (t) = Taken By, (c) = Cosigned By    Initials Name Provider Type    Keya Liu, PT Physical Therapist               Outcome Measure Options: 5x Sit to Stand, 6 Minute Walk Test  5 Times Sit to Stand  5 Times Sit to Stand (seconds): 28.69 seconds  5 Times Sit to Stand Comments:  standard height chair. BUE push to stand.   6 Minute Walk Test  Distance: 540  Device Used: other (see comments) (vanessa)  # of Rest Breaks: 0  6 Minute Walk Comments: slowed spped. good gait mecahnics          Time Calculation:   Start Time: 0800  Stop Time: 0846  Time Calculation (min): 46 min  Timed Charges  79626 - PT Therapeutic Exercise Minutes: 46  Total Minutes  Timed Charges Total Minutes: 46   Total Minutes: 46  Therapy Charges for Today     Code Description Service Date Service Provider Modifiers Qty    60317973828 HC PT THER PROC EA 15 MIN 7/7/2021 Keya Ross, PT GP 3                   Keya Ross, PT  7/7/2021

## 2021-07-08 ENCOUNTER — OFFICE VISIT (OUTPATIENT)
Dept: ORTHOPEDIC SURGERY | Facility: CLINIC | Age: 71
End: 2021-07-08

## 2021-07-08 VITALS — WEIGHT: 172 LBS | HEIGHT: 69 IN | BODY MASS INDEX: 25.48 KG/M2

## 2021-07-08 DIAGNOSIS — Z96.649 STATUS POST REVISION OF TOTAL HIP REPLACEMENT: Primary | ICD-10-CM

## 2021-07-08 PROCEDURE — 99024 POSTOP FOLLOW-UP VISIT: CPT | Performed by: ORTHOPAEDIC SURGERY

## 2021-07-08 NOTE — PROGRESS NOTES
Chrissy Dawson is a 71 y.o. female is s/p       Chief Complaint   Patient presents with   • Right Hip - Post-op       HISTORY OF PRESENT ILLNESS:     05/19/21 (7w 1d) Jeovanny Maravilla MD    anterior revision total hip arthroplasty       Wound is totally closed  Pain is better  Using a cane   No fever or chills       Allergies   Allergen Reactions   • Crestor [Rosuvastatin] Shortness Of Breath   • Lipitor [Atorvastatin] Shortness Of Breath         Current Outpatient Medications:   •  aspirin 81 MG tablet, Take 81 mg by mouth., Disp: , Rfl:   •  docusate sodium (Colace) 100 MG capsule, Take 1 capsule by mouth 2 (Two) Times a Day., Disp: 60 capsule, Rfl: 2  •  Multiple Minerals-Vitamins (CITRACAL PLUS PO), Take  by mouth., Disp: , Rfl:   •  Multiple Vitamins-Minerals (CENTRUM SILVER PO), Take 1 tablet by mouth Daily., Disp: , Rfl:   •  nitroglycerin (NITROSTAT) 0.4 MG SL tablet, 1 under the tongue as needed for angina, may repeat q5mins for up three doses (Patient taking differently: Place 0.4 mg under the tongue Take As Directed. 1 under the tongue as needed for angina, may repeat q5mins for up three doses), Disp: 15 tablet, Rfl: 6  •  oxyCODONE-acetaminophen (PERCOCET) 7.5-325 MG per tablet, Take 1 tablet by mouth Every 6 (Six) Hours As Needed for Moderate Pain ., Disp: 30 tablet, Rfl: 0  •  polyethylene glycol (MIRALAX) 17 g packet, Take 17 g by mouth Daily., Disp: 30 each, Rfl: 2  •  pravastatin (PRAVACHOL) 40 MG tablet, Take 1 tablet by mouth Daily. (Patient taking differently: Take 40 mg by mouth Every Night.), Disp: 31 tablet, Rfl: 6  •  trospium (SANCTURA) 20 MG tablet, Take 20 mg by mouth 2 (two) times a day., Disp: , Rfl:   •  vitamin B-12 (CYANOCOBALAMIN) 1000 MCG tablet, Take 1,000 mcg by mouth Daily., Disp: , Rfl:   •  vitamin C (ASCORBIC ACID) 500 MG tablet, Take 500 mg by mouth Daily., Disp: , Rfl:   •  warfarin (Coumadin) 4 MG tablet, Take 1 tablet by mouth every night or as directed.,  Disp: 45 tablet, Rfl: 1  •  Warfarin Sodium (PHARMACY TO DOSE WARFARIN), Continuous As Needed (6 weeks). Indications: Prevention of Unwanted Clot in Veins, Disp: , Rfl:     No fevers or chills.  No nausea or vomiting.      PHYSICAL EXAMINATION:       Chrissy Dawson is a 71 y.o. female    Patient is awake and alert, answers questions appropriately and is in no apparent distress.    GAIT:     []  Normal  [x]  Antalgic    Assistive device: []  None  []  Walker     []  Crutches  [x]  Cane     []  Wheelchair  []  Stretcher    Right Hip Exam     Tenderness   The patient is experiencing no tenderness.     Range of Motion   Flexion: 100     Other   Scars: present                      ASSESSMENT:    Diagnoses and all orders for this visit:    Status post revision of total hip replacement          PLAN    Continue to slowly prgress motion and activity as tolerated  Continue to advance mobility  No true restrictions  F/u in 8 weeks with xrays    Return in about 8 weeks (around 9/2/2021) for Recheck with repeat xrays.    Jeovanny Maravilla MD

## 2021-07-09 ENCOUNTER — HOSPITAL ENCOUNTER (OUTPATIENT)
Dept: PHYSICAL THERAPY | Facility: HOSPITAL | Age: 71
Setting detail: THERAPIES SERIES
Discharge: HOME | End: 2021-07-09

## 2021-07-09 DIAGNOSIS — T84.030D MECHANICAL LOOSENING OF INTERNAL RIGHT HIP PROSTHETIC JOINT, SUBSEQUENT ENCOUNTER: Primary | ICD-10-CM

## 2021-07-09 DIAGNOSIS — Z96.641 S/P HIP REPLACEMENT, RIGHT: ICD-10-CM

## 2021-07-09 DIAGNOSIS — Z78.9 IMPAIRED MOBILITY AND ACTIVITIES OF DAILY LIVING: ICD-10-CM

## 2021-07-09 DIAGNOSIS — Z74.09 IMPAIRED FUNCTIONAL MOBILITY, BALANCE, GAIT, AND ENDURANCE: ICD-10-CM

## 2021-07-09 DIAGNOSIS — Z74.09 IMPAIRED MOBILITY AND ACTIVITIES OF DAILY LIVING: ICD-10-CM

## 2021-07-09 PROCEDURE — 97530 THERAPEUTIC ACTIVITIES: CPT

## 2021-07-09 PROCEDURE — 97110 THERAPEUTIC EXERCISES: CPT

## 2021-07-09 NOTE — THERAPY TREATMENT NOTE
Outpatient Physical Therapy Ortho Treatment Note  [facility]     Patient Name: Chrissy Dawson  : 1950  MRN: [id number]  Today's Date: 2021      Visit Date: 2021     ATTENDANCE:  ( eval+ 12 approved)   SUBJECTIVE IMPROVEMENT: 60-65%  NEXT MD APPOINTMENT: PRN  RECERT DATE: 2021     THERAPY DIAGNOSIS: s/p R SALTY revision 2021    Visit Dx:    ICD-10-CM ICD-9-CM   1. 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 S/P hip replacement, right  Z96.641 V43.64   3. Impaired mobility and activities of daily living  Z74.09 V49.89    Z78.9    4. 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 (CMS/HCC)   • S/P hip replacement, right   • Overweight (BMI 25.0-29.9)   • Heart murmur   • Status post revision of total hip replacement   • Pain of right lower extremity   • Trochanteric bursitis of right hip        Past Medical History:   Diagnosis Date   • Backache    • Benign essential hypertension     PATIENT DENIES   • Cervical arthritis    • Coronary arteriosclerosis    • Fibrocystic breast    • Ganglion cyst of wrist     Ganglion/synovial cyst - wrist   • Ganglion of wrist    • Hip pain    • History of echocardiogram 10/23/2015    Echocardiogram W/ color flow 02827 (1) - Normal LV function with Ef of 55-60%.Normal RV size and function.Pseudonormal diastolic dysfunciton with borderline [facility].NO sig.valvular regurg or stenosis.   • History of mammogram 09/10/2014    MAMMOGRAM SCREENING 82576 - [facility] (1) - [facility]ILLER ([facility])   • History of  transfusion    • Hyperlipidemia    • Inguinal pain    • Recurrent angina status post coronary artery bypass graft (CMS/HCC)     Recurrent angina after coronary artery bypass graft   • Urge incontinence of urine         Past Surgical History:   Procedure Laterality Date   • BREAST BIOPSY Right 2/2/2017    Procedure: RIGHT BREAST LUMPECTOMY WITH NEEDLE LOCALIZATION AND ULTRASOUND;  Surgeon: Delfino Greer MD;  Location: [facility];  Service:    • BREAST CYST ASPIRATION     • CARDIAC SURGERY     • CORONARY ARTERY BYPASS GRAFT  2000    CABG, arterial, single (1)   • GANGLION CYST EXCISION     • JOINT REPLACEMENT     • ORIF MANDIBULAR FRACTURE     • TOTAL ABDOMINAL HYSTERECTOMY     • TOTAL ABDOMINAL HYSTERECTOMY WITH SALPINGO OOPHORECTOMY  1998    SALVADOR/BSO (1)   • TOTAL HIP ARTHROPLASTY  2011    Total hip replacement (3)   • TOTAL HIP ARTHROPLASTY REVISION Right 5/19/2021    Procedure: anterior revision total hip arthroplasty        (lg-head c-arm and hana table );  Surgeon: Jeovanny Maravilla MD;  Location: [facility];  Service: Orthopedics;  Laterality: Right;       PT Ortho     Row Name 07/09/21 0800       Precautions and Contraindications    Precautions/Limitations  no known precautions/limitations  -    Precautions  per MD: slowly progress as tolerated.   -       Posture/Observations    Posture/Observations Comments  minimal gait deviaitons with SPC. slowed gait speed.   -      User Key  (r) = Recorded By, (t) = Taken By, (c) = Cosigned By    Initials Name Provider Type    Nadiya Velazquez PTA Physical Therapy Assistant                      PT Assessment/Plan     Row Name 07/09/21 0800          PT Assessment    Assessment Comments  improved CKC and gait activities this date with minimal to no rest in between; Continues to lack in hip flexion strength to be able to  R LE correcty through full ROM but exercises done this date to improve and added to HEP; Pt to obtain an ankle weight to progress these at  "home;   -KH        PT Plan    PT Frequency  2x/week  -KH     Predicted Duration of Therapy Intervention (PT)  3-4 more weeks   -     PT Plan Comments  Await more visits; Will call when approved; COntinue to progress balance, gait without assistive device and LE strength and endurance;   -       User Key  (r) = Recorded By, (t) = Taken By, (c) = Cosigned By    Initials Name Provider Type    Nadiya Velazquez PTA Physical Therapy Assistant            OP Exercises     Row Name 07/09/21 0800             Subjective Comments    Subjective Comments  Patient reports that she had a good day yesterday and did not get her naps like she usually does but felt that she moved around a lot more; Today is not as good as yesterday but she states that it is going to get there;   -KH         Subjective Pain    Able to rate subjective pain?  yes  -KH      Pre-Treatment Pain Level  3  -KH      Post-Treatment Pain Level  2  -KH         Total Minutes    16610 - PT Therapeutic Exercise Minutes  30  -KH      55165 - PT Therapeutic Activity Minutes  15  -KH         Exercise 1    Exercise Name 1  pro ll LE strength   -      Time 1  10 mins  -KH      Additional Comments  level 2; seat 13  -KH         Exercise 2    Exercise Name 2  step taps B  -KH      Sets 2  2  -KH      Reps 2  10  -KH      Additional Comments  6\" step for hip flexion strength to improve gait and the lifting of the leg to get in and out of areas at home  -         Exercise 3    Exercise Name 3  step ups fwd & lat  -KH      Cueing 3  Verbal  -KH      Sets 3  2  -KH      Reps 3  10 each   -KH      Additional Comments  4\" step   -KH         Exercise 4    Exercise Name 4  Ramp Fwd up and down  -KH      Sets 4  5 trips  -KH         Exercise 5    Exercise Name 5  LAQ   -KH      Sets 5  3  -KH      Reps 5  10  -KH      Time 5  3\" holds  -KH      Additional Comments  1# aw  -KH         Exercise 6    Exercise Name 6  seated March  -KH      Sets 6  3  -KH      Reps 6  10  -KH   "    Additional Comments  1# aw resting when needed  -        User Key  (r) = Recorded By, (t) = Taken By, (c) = Cosigned By    Initials Name Provider Type    Nadiya Velazquez PTA Physical Therapy Assistant                       PT OP Goals     Row Name 07/09/21 0800          PT Short Term Goals    STG Date to Achieve  06/22/21  -     STG 1  Pt is independet with HEP.   -     STG 1 Progress  Met;Ongoing  -     STG 2  Patient is able to complete 5 times sit to stand from standard height chair.   -     STG 2 Progress  Met  -     STG 3  Patient is able to complete 6-minute walk test.  -     STG 3 Progress  Met  -     STG 4  Patient is able to complete right hip flexion to 90.   -     STG 4 Progress  Met  -     STG 5  Patient is able to complete straight leg raise x5 on right lower extremity.  -     STG 5 Progress  Met  -        Long Term Goals    LTG Date to Achieve  07/20/21  -     LTG 1  6-minute walk test 1000 feet or greater.  -     LTG 1 Progress  Not Met  -     LTG 2  5 times sit to stand from standard height chair in </=15 seconds  -     LTG 2 Progress  Not Met  -     LTG 3  LEFS improved to 40/80 or greater.   -     LTG 3 Progress  Met  -     LTG 4  Pt is able to ambulate 270 ft without AD and without LOB.   -     LTG 4 Progress  Not Met  -     LTG 5  Pt notes subjective improvment 80% or greater.   -     LTG 5 Progress  Not Met  -        Time Calculation    PT Goal Re-Cert Due Date  07/28/21  -       User Key  (r) = Recorded By, (t) = Taken By, (c) = Cosigned By    Initials Name Provider Type    Nadiya Velazquez PTA Physical Therapy Assistant                         Time Calculation:   Start Time: 0800  Stop Time: 0845  Time Calculation (min): 45 min  Timed Charges  70900 - PT Therapeutic Exercise Minutes: 30  12010 - PT Therapeutic Activity Minutes: 15  Total Minutes  Timed Charges Total Minutes: 45   Total Minutes: 45  Therapy Charges for Today     Code Description  Service Date Service Provider Modifiers Qty    57856192764 HC PT THER PROC EA 15 MIN 7/9/2021 Nadiya Kitchen, ALVINA GP 2    06901802769 HC PT THERAPEUTIC ACT EA 15 MIN 7/9/2021 Nadiya Kitchen PTA GP 1                    Nadiya Kitchen, ALVINA  7/9/2021

## 2021-07-14 ENCOUNTER — HOSPITAL ENCOUNTER (OUTPATIENT)
Dept: PHYSICAL THERAPY | Facility: HOSPITAL | Age: 71
Setting detail: THERAPIES SERIES
Discharge: HOME | End: 2021-07-14

## 2021-07-14 DIAGNOSIS — Z96.641 S/P HIP REPLACEMENT, RIGHT: ICD-10-CM

## 2021-07-14 DIAGNOSIS — Z78.9 IMPAIRED MOBILITY AND ACTIVITIES OF DAILY LIVING: ICD-10-CM

## 2021-07-14 DIAGNOSIS — Z74.09 IMPAIRED MOBILITY AND ACTIVITIES OF DAILY LIVING: ICD-10-CM

## 2021-07-14 DIAGNOSIS — T84.030D MECHANICAL LOOSENING OF INTERNAL RIGHT HIP PROSTHETIC JOINT, SUBSEQUENT ENCOUNTER: Primary | ICD-10-CM

## 2021-07-14 DIAGNOSIS — Z74.09 IMPAIRED FUNCTIONAL MOBILITY, BALANCE, GAIT, AND ENDURANCE: ICD-10-CM

## 2021-07-14 PROCEDURE — 97110 THERAPEUTIC EXERCISES: CPT

## 2021-07-14 NOTE — THERAPY TREATMENT NOTE
Outpatient Physical Therapy Ortho Treatment Note  [facility]     Patient Name: Chrissy Dawson  : 1950  MRN: [id number]  Today's Date: 2021      Visit Date: 2021     ATTENDANCE: 14/15 ( 3 more approved)   SUBJECTIVE IMPROVEMENT: 60-65%  NEXT MD APPOINTMENT: PRN  RECERT DATE: 2021     THERAPY DIAGNOSIS: s/p R SALTY revision 2021    Visit Dx:    ICD-10-CM ICD-9-CM   1. 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 S/P hip replacement, right  Z96.641 V43.64   3. Impaired mobility and activities of daily living  Z74.09 V49.89    Z78.9    4. 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 (CMS/HCC)   • S/P hip replacement, right   • Overweight (BMI 25.0-29.9)   • Heart murmur   • Status post revision of total hip replacement   • Pain of right lower extremity   • Trochanteric bursitis of right hip        Past Medical History:   Diagnosis Date   • Backache    • Benign essential hypertension     PATIENT DENIES   • Cervical arthritis    • Coronary arteriosclerosis    • Fibrocystic breast    • Ganglion cyst of wrist     Ganglion/synovial cyst - wrist   • Ganglion of wrist    • Hip pain    • History of echocardiogram 10/23/2015    Echocardiogram W/ color flow 80107 (1) - Normal LV function with Ef of 55-60%.Normal RV size and function.Pseudonormal diastolic dysfunciton with borderline [facility].NO sig.valvular regurg or stenosis.   • History of mammogram 09/10/2014    MAMMOGRAM SCREENING 29255 - [facility] ([facility] - [facility] ([facility])   • History of  transfusion    • Hyperlipidemia    • Inguinal pain    • Recurrent angina status post coronary artery bypass graft (CMS/HCC)     Recurrent angina after coronary artery bypass graft   • Urge incontinence of urine         Past Surgical History:   Procedure Laterality Date   • BREAST BIOPSY Right 2/2/2017    Procedure: RIGHT BREAST LUMPECTOMY WITH NEEDLE LOCALIZATION AND ULTRASOUND;  Surgeon: Delfino Greer MD;  Location: [facility];  Service:    • BREAST CYST ASPIRATION     • CARDIAC SURGERY     • CORONARY ARTERY BYPASS GRAFT  2000    CABG, arterial, single (1)   • GANGLION CYST EXCISION     • JOINT REPLACEMENT     • ORIF MANDIBULAR FRACTURE     • TOTAL ABDOMINAL HYSTERECTOMY     • TOTAL ABDOMINAL HYSTERECTOMY WITH SALPINGO OOPHORECTOMY  1998    SALVADOR/BSO (1)   • TOTAL HIP ARTHROPLASTY  2011    Total hip replacement (3)   • TOTAL HIP ARTHROPLASTY REVISION Right 5/19/2021    Procedure: anterior revision total hip arthroplasty        (lg-head c-arm and hana table );  Surgeon: Jeovanny Maravilla MD;  Location: [facility];  Service: Orthopedics;  Laterality: Right;       PT Ortho     Row Name 07/14/21 0800       Precautions and Contraindications    Precautions/Limitations  no known precautions/limitations  -    Precautions  per MD: slowly progress as tolerated.   -       Subjective Pain    Post-Treatment Pain Level  2  -       Posture/Observations    Posture/Observations Comments  minimal gait deviaitons with SPC. slowed gait speed.   -      User Key  (r) = Recorded By, (t) = Taken By, (c) = Cosigned By    Initials Name Provider Type    Nadiya Velazquez PTA Physical Therapy Assistant                      PT Assessment/Plan     Row Name 07/14/21 0800          PT Assessment    Assessment Comments  initated cybex equipment and educated pt to them for her to continue at [facility] upon discharge;   -        PT Plan    PT Frequency  2x/week  -NARINDER     Predicted Duration of Therapy Intervention (PT)  3-4 more weeks  -     PT  Plan Comments  3 more visits then d/c to independent program  -       User Key  (r) = Recorded By, (t) = Taken By, (c) = Cosigned By    Initials Name Provider Type    Nadiya Velazquez PTA Physical Therapy Assistant            OP Exercises     Row Name 07/14/21 0800             Subjective Comments    Subjective Comments  Patient reports that she is doing well; Feeling better all the time; Reports that she is going join the [facility] for when PT is finished;   -KH         Subjective Pain    Able to rate subjective pain?  yes  -KH      Pre-Treatment Pain Level  2  -KH      Post-Treatment Pain Level  2  -KH         Total Minutes    11792 - PT Therapeutic Exercise Minutes  40  -KH         Exercise 1    Exercise Name 1  pro ll LE strength   -KH      Time 1  10 mins  -KH      Additional Comments  level 3; seat 13  -KH         Exercise 2    Exercise Name 2  cybex ham curl  -KH      Sets 2  2  -KH      Reps 2  10  -KH      Additional Comments  30#  -KH         Exercise 3    Exercise Name 3  cybex hip abd  -KH      Cueing 3  Verbal  -KH      Sets 3  3  -KH      Reps 3  10  -KH      Additional Comments  20#  -KH         Exercise 4    Exercise Name 4  cybex hip add  -KH      Sets 4  3  -KH      Reps 4  10  -KH      Additional Comments  20#  -KH         Exercise 5    Exercise Name 5  cybex leg press  -KH      Sets 5  3  -KH      Reps 5  10  -KH      Additional Comments  75# (sled 5, seat laid all the back)  -KH         Exercise 6    Exercise Name 6  Recumbent Bike  -KH      Time 6  3 mins  -KH        User Key  (r) = Recorded By, (t) = Taken By, (c) = Cosigned By    Initials Name Provider Type    Nadiya Velazquez PTA Physical Therapy Assistant                       PT OP Goals     Row Name 07/14/21 0800          PT Short Term Goals    STG Date to Achieve  06/22/21  -     STG 1  Pt is independet with HEP.   -KH     STG 1 Progress  Met;Ongoing  -     STG 2  Patient is able to complete 5 times sit to stand from standard height chair.    -     STG 2 Progress  Met  -     STG 3  Patient is able to complete 6-minute walk test.  -     STG 3 Progress  Met  -     STG 4  Patient is able to complete right hip flexion to 90.   -     STG 4 Progress  Met  -     STG 5  Patient is able to complete straight leg raise x5 on right lower extremity.  -     STG 5 Progress  Met  -        Long Term Goals    LTG Date to Achieve  07/20/21  -     LTG 1  6-minute walk test 1000 feet or greater.  -     LTG 1 Progress  Not Met  -     LTG 2  5 times sit to stand from standard height chair in </=15 seconds  -     LTG 2 Progress  Not Met  -     LTG 3  LEFS improved to 40/80 or greater.   -     LTG 3 Progress  Met  -     LTG 4  Pt is able to ambulate 270 ft without AD and without LOB.   -     LTG 4 Progress  Not Met  -     LTG 5  Pt notes subjective improvment 80% or greater.   -     LTG 5 Progress  Not Met  -        Time Calculation    PT Goal Re-Cert Due Date  07/28/21  -       User Key  (r) = Recorded By, (t) = Taken By, (c) = Cosigned By    Initials Name Provider Type    Nadiya Velazquez PTA Physical Therapy Assistant                         Time Calculation:   Start Time: 0800  Stop Time: 0840  Time Calculation (min): 40 min  Timed Charges  35587 - PT Therapeutic Exercise Minutes: 40  Total Minutes  Timed Charges Total Minutes: 40   Total Minutes: 40  Therapy Charges for Today     Code Description Service Date Service Provider Modifiers Qty    29563615809 HC PT THER PROC EA 15 MIN 7/14/2021 Nadiya Kitchen PTA GP 3                    Nadiya Kitchen PTA  7/14/2021

## 2021-07-16 ENCOUNTER — HOSPITAL ENCOUNTER (OUTPATIENT)
Dept: PHYSICAL THERAPY | Facility: HOSPITAL | Age: 71
Setting detail: THERAPIES SERIES
Discharge: HOME | End: 2021-07-16

## 2021-07-16 ENCOUNTER — APPOINTMENT (OUTPATIENT)
Dept: PHYSICAL THERAPY | Facility: HOSPITAL | Age: 71
End: 2021-07-16

## 2021-07-16 DIAGNOSIS — Z74.09 IMPAIRED MOBILITY AND ACTIVITIES OF DAILY LIVING: ICD-10-CM

## 2021-07-16 DIAGNOSIS — Z78.9 IMPAIRED MOBILITY AND ACTIVITIES OF DAILY LIVING: ICD-10-CM

## 2021-07-16 DIAGNOSIS — Z74.09 IMPAIRED FUNCTIONAL MOBILITY, BALANCE, GAIT, AND ENDURANCE: ICD-10-CM

## 2021-07-16 DIAGNOSIS — Z96.641 S/P HIP REPLACEMENT, RIGHT: ICD-10-CM

## 2021-07-16 DIAGNOSIS — T84.030D MECHANICAL LOOSENING OF INTERNAL RIGHT HIP PROSTHETIC JOINT, SUBSEQUENT ENCOUNTER: Primary | ICD-10-CM

## 2021-07-16 NOTE — THERAPY TREATMENT NOTE
Outpatient Physical Therapy Ortho Treatment Note  [facility]     Patient Name: Chrissy Dawson  : 1950  MRN: [id number]  Today's Date: 2021      Visit Date: 2021  Subjective Improvement: 60-65%  MD visit: PRN  Visit Number: 15/16  Total Approved:Medicare  Recert Date: 21  Visit Dx:    ICD-10-CM ICD-9-CM   1. 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 S/P hip replacement, right  Z96.641 V43.64   3. Impaired mobility and activities of daily living  Z74.09 V49.89    Z78.9    4. 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 (CMS/HCC)   • S/P hip replacement, right   • Overweight (BMI 25.0-29.9)   • Heart murmur   • Status post revision of total hip replacement   • Pain of right lower extremity   • Trochanteric bursitis of right hip        Past Medical History:   Diagnosis Date   • Backache    • Benign essential hypertension     PATIENT DENIES   • Cervical arthritis    • Coronary arteriosclerosis    • Fibrocystic breast    • Ganglion cyst of wrist     Ganglion/synovial cyst - wrist   • Ganglion of wrist    • Hip pain    • History of echocardiogram 10/23/2015    Echocardiogram W/ color flow 54012 (1) - Normal LV function with Ef of 55-60%.Normal RV size and function.Pseudonormal diastolic dysfunciton with borderline [facility].NO sig.valvular regurg or stenosis.   • History of mammogram 09/10/2014    MAMMOGRAM SCREENING 89671 - [facility] - [facility] ([facility])   • History of transfusion    • Hyperlipidemia    • Inguinal pain    • Recurrent  angina status post coronary artery bypass graft (CMS/[facility])     Recurrent angina after coronary artery bypass graft   • Urge incontinence of urine         Past Surgical History:   Procedure Laterality Date   • BREAST BIOPSY Right 2/2/2017    Procedure: RIGHT BREAST LUMPECTOMY WITH NEEDLE LOCALIZATION AND ULTRASOUND;  Surgeon: Delfino Greer MD;  Location: [facility];  Service:    • BREAST CYST ASPIRATION     • CARDIAC SURGERY     • CORONARY ARTERY BYPASS GRAFT  2000    CABG, arterial, single (1)   • GANGLION CYST EXCISION     • JOINT REPLACEMENT     • ORIF MANDIBULAR FRACTURE     • TOTAL ABDOMINAL HYSTERECTOMY     • TOTAL ABDOMINAL HYSTERECTOMY WITH SALPINGO OOPHORECTOMY  1998    SALVADOR/BSO (1)   • TOTAL HIP ARTHROPLASTY  2011    Total hip replacement (3)   • TOTAL HIP ARTHROPLASTY REVISION Right 5/19/2021    Procedure: anterior revision total hip arthroplasty        (lg-head c-arm and hana table );  Surgeon: Jeovanny Maravilla MD;  Location: [facility];  Service: Orthopedics;  Laterality: Right;       PT Ortho     Row Name 07/16/21 0800       Precautions and Contraindications    Precautions/Limitations  no known precautions/limitations  -TL    Precautions  per MD: slowly progress as tolerated.   -TL       Subjective Pain    Able to rate subjective pain?  yes  -TL    Pre-Treatment Pain Level  3  -TL    Post-Treatment Pain Level  3  -TL      User Key  (r) = Recorded By, (t) = Taken By, (c) = Cosigned By    Initials Name Provider Type    TL Monika Hernandez PTA Physical Therapy Assistant                      PT Assessment/Plan     Row Name 07/16/21 0800          PT Assessment    Assessment Comments  pt has met all short term goals and today partially met 1 and 2 LTG's. Pt unable to make time requirement on both sit to stand or walking goal but able to complete task. pt aware of d/c after 2 visits this date. Pt still limping with gait with the first 10 steps but then does better with increase step length.  -TL        PT  Plan    PT Frequency  2x/week  -TL     Predicted Duration of Therapy Intervention (PT)  3-4 weeks  -TL     PT Plan Comments  2 more visit. Continue to work toward goals and POC.  -TL       User Key  (r) = Recorded By, (t) = Taken By, (c) = Cosigned By    Initials Name Provider Type    Monika Evans PTA Physical Therapy Assistant          Modalities     Row Name 07/16/21 0800             Ice    Patient denies application of Ice  Yes  -TL        User Key  (r) = Recorded By, (t) = Taken By, (c) = Cosigned By    Initials Name Provider Type    Monika Evans PTA Physical Therapy Assistant        OP Exercises     Row Name 07/16/21 0800             Subjective Comments    Subjective Comments  pt reports doing better today. Pt was hurting more yesterday.  -TL         Subjective Pain    Able to rate subjective pain?  yes  -TL      Pre-Treatment Pain Level  3  -TL      Post-Treatment Pain Level  3  -TL         Total Minutes    71630 - PT Therapeutic Exercise Minutes  10  -TL      27714 - PT Therapeutic Activity Minutes  33  -TL         Exercise 1    Exercise Name 1  pro ll LE strength   -TL      Time 1  10mins  -TL         Exercise 2    Exercise Name 2  sit to stand test  -TL      Reps 2  5 reps sit to stands  -TL      Time 2  32.20  -TL      Additional Comments  reg height chair  -TL         Exercise 3    Exercise Name 3  sit to stands from mat table height elevated  -TL      Reps 3  5   -TL      Time 3  15.98  -TL         Exercise 4    Exercise Name 4  6 min walk test 1000 feet  -TL      Time 4  in 8 mins 53 sec  -TL         Exercise 5    Exercise Name 5  clams  -TL      Sets 5  2  -TL      Reps 5  10  -TL      Time 5  5 sec hold  -TL        User Key  (r) = Recorded By, (t) = Taken By, (c) = Cosigned By    Initials Name Provider Type    Monika Evans PTA Physical Therapy Assistant                       PT OP Goals     Row Name 07/16/21 0800          PT Short Term Goals    STG Date to Achieve  06/22/21   -TL     STG 1  Pt is independet with HEP.   -TL     STG 1 Progress  Met;Ongoing  -TL     STG 2  Patient is able to complete 5 times sit to stand from standard height chair.   -TL     STG 2 Progress  Met  -TL     STG 3  Patient is able to complete 6-minute walk test.  -TL     STG 3 Progress  Met  -TL     STG 4  Patient is able to complete right hip flexion to 90.   -TL     STG 4 Progress  Met  -TL     STG 5  Patient is able to complete straight leg raise x5 on right lower extremity.  -TL     STG 5 Progress  Met  -TL        Long Term Goals    LTG Date to Achieve  07/20/21  -TL     LTG 1  6-minute walk test 1000 feet or greater.  -TL     LTG 1 Progress  Partially Met;Progressing  -TL     LTG 1 Progress Comments  pt able to complete 1000 feet but in 8min 53 sec with cane  -TL     LTG 2  5 times sit to stand from standard height chair in </=15 seconds  -TL     LTG 2 Progress  Partially Met;Ongoing;Progressing  -TL     LTG 2 Progress Comments  Pt partially met goal . Completed x5 sit to stands in 32.20  -TL     LTG 3  LEFS improved to 40/80 or greater.   -TL     LTG 3 Progress  Met  -TL     LTG 4  Pt is able to ambulate 270 ft without AD and without LOB.   -TL     LTG 4 Progress  Not Met  -TL     LTG 5  Pt notes subjective improvment 80% or greater.   -TL     LTG 5 Progress  Not Met  -TL        Time Calculation    PT Goal Re-Cert Due Date  07/28/21  -TL       User Key  (r) = Recorded By, (t) = Taken By, (c) = Cosigned By    Initials Name Provider Type    Monika Evans, PTA Physical Therapy Assistant          Therapy Education  Given: HEP, Symptoms/condition management, Pain management  Program: Reinforced  How Provided: Verbal  Provided to: Patient  Level of Understanding: Verbalized              Time Calculation:   Start Time: 0800  Stop Time: 0844  Time Calculation (min): 44 min  Timed Charges  19420 - PT Therapeutic Exercise Minutes: 10  02630 - PT Therapeutic Activity Minutes: 33  Total Minutes  Timed Charges  Total Minutes: 43   Total Minutes: 43                Monika Hernandez, PTA  7/16/2021

## 2021-07-20 ENCOUNTER — TELEPHONE (OUTPATIENT)
Dept: ORTHOPEDIC SURGERY | Facility: CLINIC | Age: 71
End: 2021-07-20

## 2021-07-20 NOTE — TELEPHONE ENCOUNTER
Cass called wanting to see if [facility] would recommend her getting a steroid shot or something for her RT Hip it is still giving her pain.    Contact PT @ [phone]

## 2021-07-21 ENCOUNTER — HOSPITAL ENCOUNTER (OUTPATIENT)
Dept: PHYSICAL THERAPY | Facility: HOSPITAL | Age: 71
Setting detail: THERAPIES SERIES
Discharge: HOME | End: 2021-07-21

## 2021-07-21 DIAGNOSIS — T84.030D MECHANICAL LOOSENING OF INTERNAL RIGHT HIP PROSTHETIC JOINT, SUBSEQUENT ENCOUNTER: Primary | ICD-10-CM

## 2021-07-21 DIAGNOSIS — Z78.9 IMPAIRED MOBILITY AND ACTIVITIES OF DAILY LIVING: ICD-10-CM

## 2021-07-21 DIAGNOSIS — Z74.09 IMPAIRED FUNCTIONAL MOBILITY, BALANCE, GAIT, AND ENDURANCE: ICD-10-CM

## 2021-07-21 DIAGNOSIS — Z74.09 IMPAIRED MOBILITY AND ACTIVITIES OF DAILY LIVING: ICD-10-CM

## 2021-07-21 DIAGNOSIS — Z96.641 S/P HIP REPLACEMENT, RIGHT: ICD-10-CM

## 2021-07-21 PROCEDURE — 97116 GAIT TRAINING THERAPY: CPT

## 2021-07-21 PROCEDURE — 97530 THERAPEUTIC ACTIVITIES: CPT

## 2021-07-21 PROCEDURE — 97110 THERAPEUTIC EXERCISES: CPT

## 2021-07-21 NOTE — THERAPY TREATMENT NOTE
Outpatient Physical Therapy Ortho Treatment Note  [facility]     Patient Name: Chrissy Dawson  : 1950  MRN: [id number]  Today's Date: 2021      Visit Date: 2021     ATTENDANCE: 15/16 ( 1 more approved)   SUBJECTIVE IMPROVEMENT: 75%  NEXT MD APPOINTMENT: PRN  RECERT DATE: 2021     THERAPY DIAGNOSIS: s/p R SALTY revision 2021       Visit Dx:    ICD-10-CM ICD-9-CM   1. 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 S/P hip replacement, right  Z96.641 V43.64   3. Impaired mobility and activities of daily living  Z74.09 V49.89    Z78.9    4. 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 (CMS/HCC)   • S/P hip replacement, right   • Overweight (BMI 25.0-29.9)   • Heart murmur   • Status post revision of total hip replacement   • Pain of right lower extremity   • Trochanteric bursitis of right hip        Past Medical History:   Diagnosis Date   • Backache    • Benign essential hypertension     PATIENT DENIES   • Cervical arthritis    • Coronary arteriosclerosis    • Fibrocystic breast    • Ganglion cyst of wrist     Ganglion/synovial cyst - wrist   • Ganglion of wrist    • Hip pain    • History of echocardiogram 10/23/2015    Echocardiogram W/ color flow 35344 (1) - Normal LV function with Ef of 55-60%.Normal RV size and function.Pseudonormal diastolic dysfunciton with borderline [facility].NO sig.valvular regurg or stenosis.   • History of mammogram 09/10/2014    MAMMOGRAM SCREENING 23558 - [facility] ([facility] - [facility] ([facility])   • History of  transfusion    • Hyperlipidemia    • Inguinal pain    • Recurrent angina status post coronary artery bypass graft (CMS/HCC)     Recurrent angina after coronary artery bypass graft   • Urge incontinence of urine         Past Surgical History:   Procedure Laterality Date   • BREAST BIOPSY Right 2/2/2017    Procedure: RIGHT BREAST LUMPECTOMY WITH NEEDLE LOCALIZATION AND ULTRASOUND;  Surgeon: Delfino Greer MD;  Location: [facility];  Service:    • BREAST CYST ASPIRATION     • CARDIAC SURGERY     • CORONARY ARTERY BYPASS GRAFT  2000    CABG, arterial, single (1)   • GANGLION CYST EXCISION     • JOINT REPLACEMENT     • ORIF MANDIBULAR FRACTURE     • TOTAL ABDOMINAL HYSTERECTOMY     • TOTAL ABDOMINAL HYSTERECTOMY WITH SALPINGO OOPHORECTOMY  1998    SALVADOR/BSO (1)   • TOTAL HIP ARTHROPLASTY  2011    Total hip replacement (3)   • TOTAL HIP ARTHROPLASTY REVISION Right 5/19/2021    Procedure: anterior revision total hip arthroplasty        (lg-head c-arm and hana table );  Surgeon: Jeovanny Maravilla MD;  Location: [facility];  Service: Orthopedics;  Laterality: Right;       PT Ortho     Row Name 07/21/21 0800       Precautions and Contraindications    Precautions/Limitations  no known precautions/limitations  -    Precautions  per MD: slowly progress as tolerated.   -       Posture/Observations    Posture/Observations Comments  minimal gait deviaitons with SPC. slowed gait speed.   -      User Key  (r) = Recorded By, (t) = Taken By, (c) = Cosigned By    Initials Name Provider Type    Nadiya Velazquez PTA Physical Therapy Assistant                      PT Assessment/Plan     Row Name 07/21/21 0800          PT Assessment    Assessment Comments  progressing towards LTGs, independent with HEP and educated today to add step through exercises to HEP to improve step length; pt continues to have trendelenberg with gait and shortened step length but improves with VC;   -        PT Plan    PT Frequency  2x/week  -NARINDER     Predicted  "Duration of Therapy Intervention (PT)  3-4 weeks  -     PT Plan Comments  One more approved visit then d/c to independent program and HEP  -       User Key  (r) = Recorded By, (t) = Taken By, (c) = Cosigned By    Initials Name Provider Type    Nadiya Velazquez PTA Physical Therapy Assistant          Modalities     Row Name 07/21/21 0800             Subjective Pain    Post-Treatment Pain Level  -- \"better\"  -        User Key  (r) = Recorded By, (t) = Taken By, (c) = Cosigned By    Initials Name Provider Type    Nadiya Velazquez PTA Physical Therapy Assistant        OP Exercises     Row Name 07/21/21 0800             Subjective Comments    Subjective Comments  Reports that she is 75% better; Patient reports that she still gets really stiff from sit to stand and feels that those first couple of steps are still rough. reports that she is having more pressure than pain; still having the pain in the posterior hip that radiates down the R thigh to the right knee; Spoke with MD and they want to hold off on steriod shot for at least three months. Reports that her knee try to give out on her the other day;   -         Subjective Pain    Able to rate subjective pain?  yes  -      Pre-Treatment Pain Level  3 More pressure than pain  -      Post-Treatment Pain Level  -- \"better\"  -         Total Minutes    88367 - Gait Training Minutes   10  -KH      92806 - PT Therapeutic Exercise Minutes  33  -KH      27512 - PT Therapeutic Activity Minutes  10  -KH         Exercise 1    Exercise Name 1  pro ll LE strength   -      Time 1  10 mins  -      Additional Comments  level 3; seat 14  -KH         Exercise 2    Exercise Name 2  gait w/o AD  -KH      Reps 2  270 ft w/ SBA-CGA  -         Exercise 3    Exercise Name 3  LAQ  -KH      Sets 3  3  -KH      Reps 3  10  -KH      Additional Comments  1# aw  -KH         Exercise 4    Exercise Name 4  step throughs R/L  -KH      Sets 4  1  -KH      Reps 4  10 each   -KH         " Exercise 5    Exercise Name 5  gait out to vehicle with SPC  -      Time 5  400 ft  -        User Key  (r) = Recorded By, (t) = Taken By, (c) = Cosigned By    Initials Name Provider Type    Nadiya Velazquez PTA Physical Therapy Assistant                       PT OP Goals     Row Name 07/21/21 0800          PT Short Term Goals    STG Date to Achieve  06/22/21  -     STG 1  Pt is independet with HEP.   -     STG 1 Progress  Met;Ongoing  -     STG 2  Patient is able to complete 5 times sit to stand from standard height chair.   -     STG 2 Progress  Met  -     STG 3  Patient is able to complete 6-minute walk test.  -     STG 3 Progress  Met  -     STG 4  Patient is able to complete right hip flexion to 90.   -     STG 4 Progress  Met  -     STG 5  Patient is able to complete straight leg raise x5 on right lower extremity.  -     STG 5 Progress  Met  -        Long Term Goals    LTG Date to Achieve  07/20/21  -     LTG 1  6-minute walk test 1000 feet or greater.  -     LTG 1 Progress  Partially Met;Progressing  -     LTG 2  5 times sit to stand from standard height chair in </=15 seconds  -     LTG 2 Progress  Partially Met;Ongoing;Progressing  -     LTG 3  LEFS improved to 40/80 or greater.   -     LTG 3 Progress  Met  -     LTG 4  Pt is able to ambulate 270 ft without AD and without LOB.   -     LTG 4 Progress  Partially Met  -     LTG 4 Progress Comments  able to make the entire lap but still antalgic  -     LTG 5  Pt notes subjective improvment 80% or greater.   -     LTG 5 Progress  Progressing  -     LTG 5 Progress Comments  75% better overall  -        Time Calculation    PT Goal Re-Cert Due Date  07/28/21  -       User Key  (r) = Recorded By, (t) = Taken By, (c) = Cosigned By    Initials Name Provider Type    Nadiya Velazquez PTA Physical Therapy Assistant                         Time Calculation:   Start Time: 0851  Stop Time: 0944  Time Calculation (min): 53  min  Timed Charges  14818 - PT Therapeutic Exercise Minutes: 33  01554 - Gait Training Minutes : 10  93190 - PT Therapeutic Activity Minutes: 10  Total Minutes  Timed Charges Total Minutes: 53   Total Minutes: 53  Therapy Charges for Today     Code Description Service Date Service Provider Modifiers Qty    90295168933 HC PT THER PROC EA 15 MIN 7/21/2021 Nadiya Kitchen, PTA GP 2    65958076987 HC GAIT TRAINING EA 15 MIN 7/21/2021 Nadiya Kitchen, PTA GP 1    46256979826 HC PT THERAPEUTIC ACT EA 15 MIN 7/21/2021 Nadiya Kitchen PTA GP 1                    Nadiya Kitchen PTA  7/21/2021

## 2021-07-23 ENCOUNTER — HOSPITAL ENCOUNTER (OUTPATIENT)
Dept: PHYSICAL THERAPY | Facility: HOSPITAL | Age: 71
Setting detail: THERAPIES SERIES
Discharge: HOME | End: 2021-07-23

## 2021-07-23 DIAGNOSIS — T84.030D MECHANICAL LOOSENING OF INTERNAL RIGHT HIP PROSTHETIC JOINT, SUBSEQUENT ENCOUNTER: Primary | ICD-10-CM

## 2021-07-23 DIAGNOSIS — Z96.641 S/P HIP REPLACEMENT, RIGHT: ICD-10-CM

## 2021-07-23 DIAGNOSIS — Z78.9 IMPAIRED MOBILITY AND ACTIVITIES OF DAILY LIVING: ICD-10-CM

## 2021-07-23 DIAGNOSIS — Z74.09 IMPAIRED FUNCTIONAL MOBILITY, BALANCE, GAIT, AND ENDURANCE: ICD-10-CM

## 2021-07-23 DIAGNOSIS — Z74.09 IMPAIRED MOBILITY AND ACTIVITIES OF DAILY LIVING: ICD-10-CM

## 2021-07-23 NOTE — THERAPY DISCHARGE NOTE
Outpatient Physical Therapy Ortho Treatment Note/Discharge Summary  [facility]     Patient Name: Chrissy Dawson  : 1950  MRN: [id number]  Today's Date: 2021      Visit Date: 2021  Subjective Improvement: 80%  MD visit: PRN  Visit Number:   Total Approved:medicare 4 visits  Recert Date: 21  Visit Dx:    ICD-10-CM ICD-9-CM   1. 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 S/P hip replacement, right  Z96.641 V43.64   3. Impaired mobility and activities of daily living  Z74.09 V49.89    Z78.9    4. 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 (CMS/HCC)   • S/P hip replacement, right   • Overweight (BMI 25.0-29.9)   • Heart murmur   • Status post revision of total hip replacement   • Pain of right lower extremity   • Trochanteric bursitis of right hip        Past Medical History:   Diagnosis Date   • Backache    • Benign essential hypertension     PATIENT DENIES   • Cervical arthritis    • Coronary arteriosclerosis    • Fibrocystic breast    • Ganglion cyst of wrist     Ganglion/synovial cyst - wrist   • Ganglion of wrist    • Hip pain    • History of echocardiogram 10/23/2015    Echocardiogram W/ color flow 32588 (1) - Normal LV function with Ef of 55-60%.Normal RV size and function.Pseudonormal diastolic dysfunciton with borderline [facility].NO sig.valvular regurg or stenosis.   • History of mammogram 09/10/2014    MAMMOGRAM SCREENING 86085 - [facility] ([facility] - [facility] ([facility])   • History of transfusion    • Hyperlipidemia    •  Inguinal pain    • Recurrent angina status post coronary artery bypass graft (CMS/HCC)     Recurrent angina after coronary artery bypass graft   • Urge incontinence of urine         Past Surgical History:   Procedure Laterality Date   • BREAST BIOPSY Right 2/2/2017    Procedure: RIGHT BREAST LUMPECTOMY WITH NEEDLE LOCALIZATION AND ULTRASOUND;  Surgeon: Delfino Greer MD;  Location: [facility];  Service:    • BREAST CYST ASPIRATION     • CARDIAC SURGERY     • CORONARY ARTERY BYPASS GRAFT  2000    CABG, arterial, single (1)   • GANGLION CYST EXCISION     • JOINT REPLACEMENT     • ORIF MANDIBULAR FRACTURE     • TOTAL ABDOMINAL HYSTERECTOMY     • TOTAL ABDOMINAL HYSTERECTOMY WITH SALPINGO OOPHORECTOMY  1998    SALVADOR/BSO (1)   • TOTAL HIP ARTHROPLASTY  2011    Total hip replacement (3)   • TOTAL HIP ARTHROPLASTY REVISION Right 5/19/2021    Procedure: anterior revision total hip arthroplasty        (lg-head c-arm and hana table );  Surgeon: Jeovanny Maravilla MD;  Location: [facility];  Service: Orthopedics;  Laterality: Right;       PT Ortho     Row Name 07/23/21 1000       Subjective Comments    Subjective Comments  Pt was aware of d/c this date. Pt will continue her strengthening at home and has joined the [facility].  -TL       Precautions and Contraindications    Precautions/Limitations  no known precautions/limitations  -TL    Precautions  per MD: slowly progress as tolerated.   -TL       Subjective Pain    Able to rate subjective pain?  yes  -TL    Pre-Treatment Pain Level  3  -TL    Post-Treatment Pain Level  3  -TL       Posture/Observations    Posture/Observations Comments  minimal gait deviaitons with SPC. slowed gait speed.   -TL      User Key  (r) = Recorded By, (t) = Taken By, (c) = Cosigned By    Initials Name Provider Type    TL Monika Hernandez PTA Physical Therapy Assistant                      PT Assessment/Plan     Row Name 07/23/21 1000          PT Assessment    Assessment Comments  Pt has met all short  term goals and 1,3 and 5 LTG's. pt aware of last visit today approved. Pt wants to be d/c. Pt reports 80% improved.  -TL        PT Plan    PT Plan Comments  d/c date.  -TL       User Key  (r) = Recorded By, (t) = Taken By, (c) = Cosigned By    Initials Name Provider Type    Monika Evans PTA Physical Therapy Assistant              OP Exercises     Row Name 07/23/21 1000 07/23/21 0900          Subjective Comments    Subjective Comments  Pt was aware of d/c this date. Pt will continue her strengthening at home and has joined the [employer].  -TL  --        Subjective Pain    Able to rate subjective pain?  yes  -TL  --     Pre-Treatment Pain Level  3  -TL  --     Post-Treatment Pain Level  3  -TL  --        Total Minutes    99814 - Gait Training Minutes   10  -TL  --     91575 - PT Therapeutic Exercise Minutes  35  -TL  --        Exercise 1    Exercise Name 1  --  pro ll LE strength   -TL     Time 1  --  15 mins  -TL     Additional Comments  --  level 3  -TL        Exercise 2    Exercise Name 2  --  prone on elbows  -TL     Time 2  --  3mins  -TL        Exercise 3    Exercise Name 3  --  sit to stands from reg chair height  -TL     Sets 3  --  1  -TL     Reps 3  --  5 sit to stands  -TL     Time 3  --  23.63  -TL        Exercise 4    Exercise Name 4  --  Gait 6min walk test  -TL     Reps 4  --  4 upor22748)  -TL     Time 4  --  5.56 min/sec  -TL     Additional Comments  --  with cane  -TL        Exercise 5    Exercise Name 5  --  review HEP with pt verbally.  -TL       User Key  (r) = Recorded By, (t) = Taken By, (c) = Cosigned By    Initials Name Provider Type    Monika Evans PTA Physical Therapy Assistant                         PT OP Goals     Row Name 07/23/21 0900          PT Short Term Goals    STG Date to Achieve  06/22/21  -TL     STG 1  Pt is independet with HEP.   -TL     STG 1 Progress  Met;Ongoing  -TL     STG 2  Patient is able to complete 5 times sit to stand from standard height chair.   -TL      STG 2 Progress  Met  -TL     STG 3  Patient is able to complete 6-minute walk test.  -TL     STG 3 Progress  Met  -TL     STG 4  Patient is able to complete right hip flexion to 90.   -TL     STG 4 Progress  Met  -TL     STG 5  Patient is able to complete straight leg raise x5 on right lower extremity.  -TL     STG 5 Progress  Met  -TL        Long Term Goals    LTG Date to Achieve  07/20/21  -TL     LTG 1  6-minute walk test 1000 feet or greater.  -TL     LTG 1 Progress  Met  -TL     LTG 1 Progress Comments  5.56  -TL     LTG 2  5 times sit to stand from standard height chair in </=15 seconds  -TL     LTG 2 Progress  Partially Met;Ongoing;Progressing  -TL     LTG 2 Progress Comments  23.16  -TL     LTG 3  LEFS improved to 40/80 or greater.   -TL     LTG 3 Progress  Met  -TL     LTG 4  Pt is able to ambulate 270 ft without AD and without LOB.   -TL     LTG 4 Progress  Partially Met  -TL     LTG 5  Pt notes subjective improvment 80% or greater.   -TL     LTG 5 Progress  Met;Progressing;Not Met  -TL     LTG 5 Progress Comments  pt reports 80% improved.  -TL       User Key  (r) = Recorded By, (t) = Taken By, (c) = Cosigned By    Initials Name Provider Type    Monika Evans, PTA Physical Therapy Assistant          Therapy Education  Education Details: prone on elbows , reveiw HEP  Given: HEP, Symptoms/condition management, Pain management, Posture/body mechanics  Program: New, Reinforced  How Provided: Verbal, Demonstration, Written  Provided to: Patient  Level of Understanding: Teach back education performed, Verbalized, Demonstrated              Time Calculation:   Start Time: 0845  Stop Time: 0930  Time Calculation (min): 45 min  Timed Charges  21806 - PT Therapeutic Exercise Minutes: 35  26786 - Gait Training Minutes : 10  Total Minutes  Timed Charges Total Minutes: 45   Total Minutes: 45            OP PT Discharge Summary  Reason for Discharge: Maximum functional potential achieved  Outcomes Achieved: Patient  able to partially acheive established goals  Discharge Destination: Home with home program  Discharge Instructions/Additional Comments: Pt instructed on HEP. Pt reports that she joined the [employer] to continue strengthening. PTA encourage pt to call with any questions or concerns.      Monika Hernandez, PTA  7/23/2021

## 2021-07-28 ENCOUNTER — APPOINTMENT (OUTPATIENT)
Dept: PHYSICAL THERAPY | Facility: HOSPITAL | Age: 71
End: 2021-07-28

## 2021-08-12 ENCOUNTER — OFFICE VISIT (OUTPATIENT)
Dept: FAMILY MEDICINE CLINIC | Facility: CLINIC | Age: 71
End: 2021-08-12

## 2021-08-12 VITALS
HEIGHT: 69 IN | WEIGHT: 167 LBS | HEART RATE: 80 BPM | OXYGEN SATURATION: 97 % | DIASTOLIC BLOOD PRESSURE: 80 MMHG | BODY MASS INDEX: 24.73 KG/M2 | SYSTOLIC BLOOD PRESSURE: 130 MMHG

## 2021-08-12 DIAGNOSIS — R25.1 TREMOR OF LEFT HAND: Primary | ICD-10-CM

## 2021-08-12 DIAGNOSIS — Z96.641 S/P HIP REPLACEMENT, RIGHT: ICD-10-CM

## 2021-08-12 PROCEDURE — 99213 OFFICE O/P EST LOW 20 MIN: CPT | Performed by: FAMILY MEDICINE

## 2021-08-12 RX ORDER — PRIMIDONE 50 MG/1
50 TABLET ORAL NIGHTLY
Qty: 30 TABLET | Refills: 2 | Status: SHIPPED | OUTPATIENT
Start: 2021-08-12 | End: 2021-11-05

## 2021-08-17 ENCOUNTER — TELEPHONE (OUTPATIENT)
Dept: FAMILY MEDICINE CLINIC | Facility: CLINIC | Age: 71
End: 2021-08-17

## 2021-08-17 LAB
BH CV ECHO MEAS - ACS: 1.9 CM
BH CV ECHO MEAS - AO MAX PG (FULL): 3.3 MMHG
BH CV ECHO MEAS - AO MAX PG: 6.7 MMHG
BH CV ECHO MEAS - AO MEAN PG (FULL): 2 MMHG
BH CV ECHO MEAS - AO MEAN PG: 4 MMHG
BH CV ECHO MEAS - AO ROOT AREA (BSA CORRECTED): 1.8
BH CV ECHO MEAS - AO ROOT AREA: 9.1 CM^2
BH CV ECHO MEAS - AO ROOT DIAM: 3.4 CM
BH CV ECHO MEAS - AO V2 MAX: 129 CM/SEC
BH CV ECHO MEAS - AO V2 MEAN: 87.6 CM/SEC
BH CV ECHO MEAS - AO V2 VTI: 30.8 CM
BH CV ECHO MEAS - ASC AORTA: 3.1 CM
BH CV ECHO MEAS - AVA(I,A): 2.7 CM^2
BH CV ECHO MEAS - AVA(I,D): 2.7 CM^2
BH CV ECHO MEAS - AVA(V,A): 2.5 CM^2
BH CV ECHO MEAS - AVA(V,D): 2.5 CM^2
BH CV ECHO MEAS - BSA(HAYCOCK): 1.9 M^2
BH CV ECHO MEAS - BSA: 1.9 M^2
BH CV ECHO MEAS - BZI_BMI: 24.7 KILOGRAMS/M^2
BH CV ECHO MEAS - BZI_METRIC_HEIGHT: 175.3 CM
BH CV ECHO MEAS - BZI_METRIC_WEIGHT: 75.8 KG
BH CV ECHO MEAS - EDV(CUBED): 37.9 ML
BH CV ECHO MEAS - EDV(MOD-SP2): 33.8 ML
BH CV ECHO MEAS - EDV(MOD-SP4): 65.4 ML
BH CV ECHO MEAS - EDV(TEICH): 46.1 ML
BH CV ECHO MEAS - EF(CUBED): 69.2 %
BH CV ECHO MEAS - EF(MOD-SP2): 50.9 %
BH CV ECHO MEAS - EF(MOD-SP4): 61.5 %
BH CV ECHO MEAS - EF(TEICH): 62 %
BH CV ECHO MEAS - EPSS: 0.2 CM
BH CV ECHO MEAS - ESV(CUBED): 11.7 ML
BH CV ECHO MEAS - ESV(MOD-SP2): 16.6 ML
BH CV ECHO MEAS - ESV(MOD-SP4): 25.2 ML
BH CV ECHO MEAS - ESV(TEICH): 17.5 ML
BH CV ECHO MEAS - FS: 32.4 %
BH CV ECHO MEAS - IVS/LVPW: 1.3
BH CV ECHO MEAS - IVSD: 1.4 CM
BH CV ECHO MEAS - LA DIMENSION: 3.8 CM
BH CV ECHO MEAS - LA/AO: 1.1
BH CV ECHO MEAS - LV DIASTOLIC VOL/BSA (35-75): 34.2 ML/M^2
BH CV ECHO MEAS - LV MASS(C)D: 128 GRAMS
BH CV ECHO MEAS - LV MASS(C)DI: 66.9 GRAMS/M^2
BH CV ECHO MEAS - LV MAX PG: 3.4 MMHG
BH CV ECHO MEAS - LV MEAN PG: 2 MMHG
BH CV ECHO MEAS - LV SYSTOLIC VOL/BSA (12-30): 13.2 ML/M^2
BH CV ECHO MEAS - LV V1 MAX: 91.6 CM/SEC
BH CV ECHO MEAS - LV V1 MEAN: 62.5 CM/SEC
BH CV ECHO MEAS - LV V1 VTI: 23.9 CM
BH CV ECHO MEAS - LVIDD: 3.4 CM
BH CV ECHO MEAS - LVIDS: 2.3 CM
BH CV ECHO MEAS - LVLD AP2: 6.3 CM
BH CV ECHO MEAS - LVLD AP4: 7.2 CM
BH CV ECHO MEAS - LVLS AP2: 5.5 CM
BH CV ECHO MEAS - LVLS AP4: 6.4 CM
BH CV ECHO MEAS - LVOT AREA (M): 3.5 CM^2
BH CV ECHO MEAS - LVOT AREA: 3.5 CM^2
BH CV ECHO MEAS - LVOT DIAM: 2.1 CM
BH CV ECHO MEAS - LVPWD: 1.1 CM
BH CV ECHO MEAS - MR MAX PG: 72.9 MMHG
BH CV ECHO MEAS - MR MAX VEL: 427 CM/SEC
BH CV ECHO MEAS - MV A MAX VEL: 84 CM/SEC
BH CV ECHO MEAS - MV DEC SLOPE: 337 CM/SEC^2
BH CV ECHO MEAS - MV E MAX VEL: 74.1 CM/SEC
BH CV ECHO MEAS - MV E/A: 0.88
BH CV ECHO MEAS - MV MAX PG: 4.4 MMHG
BH CV ECHO MEAS - MV MEAN PG: 2 MMHG
BH CV ECHO MEAS - MV P1/2T MAX VEL: 81.4 CM/SEC
BH CV ECHO MEAS - MV P1/2T: 70.7 MSEC
BH CV ECHO MEAS - MV V2 MAX: 105 CM/SEC
BH CV ECHO MEAS - MV V2 MEAN: 57.1 CM/SEC
BH CV ECHO MEAS - MV V2 VTI: 23.8 CM
BH CV ECHO MEAS - MVA P1/2T LCG: 2.7 CM^2
BH CV ECHO MEAS - MVA(P1/2T): 3.1 CM^2
BH CV ECHO MEAS - MVA(VTI): 3.5 CM^2
BH CV ECHO MEAS - PA MAX PG (FULL): 1.6 MMHG
BH CV ECHO MEAS - PA MAX PG: 3.1 MMHG
BH CV ECHO MEAS - PA MEAN PG (FULL): 1 MMHG
BH CV ECHO MEAS - PA MEAN PG: 2 MMHG
BH CV ECHO MEAS - PA V2 MAX: 87.5 CM/SEC
BH CV ECHO MEAS - PA V2 MEAN: 61.5 CM/SEC
BH CV ECHO MEAS - PA V2 VTI: 17.8 CM
BH CV ECHO MEAS - PI END-D VEL: 78.7 CM/SEC
BH CV ECHO MEAS - RAP SYSTOLE: 3 MMHG
BH CV ECHO MEAS - RV MAX PG: 1.4 MMHG
BH CV ECHO MEAS - RV MEAN PG: 1 MMHG
BH CV ECHO MEAS - RV V1 MAX: 60.1 CM/SEC
BH CV ECHO MEAS - RV V1 MEAN: 37.7 CM/SEC
BH CV ECHO MEAS - RV V1 VTI: 12.4 CM
BH CV ECHO MEAS - RVSP: 24.5 MMHG
BH CV ECHO MEAS - SI(AO): 146.2 ML/M^2
BH CV ECHO MEAS - SI(CUBED): 13.7 ML/M^2
BH CV ECHO MEAS - SI(LVOT): 43.3 ML/M^2
BH CV ECHO MEAS - SI(MOD-SP2): 9 ML/M^2
BH CV ECHO MEAS - SI(MOD-SP4): 21 ML/M^2
BH CV ECHO MEAS - SI(TEICH): 14.9 ML/M^2
BH CV ECHO MEAS - SV(AO): 279.6 ML
BH CV ECHO MEAS - SV(CUBED): 26.2 ML
BH CV ECHO MEAS - SV(LVOT): 82.8 ML
BH CV ECHO MEAS - SV(MOD-SP2): 17.2 ML
BH CV ECHO MEAS - SV(MOD-SP4): 40.2 ML
BH CV ECHO MEAS - SV(TEICH): 28.6 ML
BH CV ECHO MEAS - TR MAX VEL: 232 CM/SEC
BH CV VAS BP LEFT ARM: NORMAL MMHG

## 2021-08-17 NOTE — TELEPHONE ENCOUNTER
Pt is calling over Concerns with the medication Primidone 50 mg, pt states that she is not doing very good with it.  it is making her gittery and states that she feels like she is in another world.    Cass Dawson  [phone]

## 2021-08-18 ENCOUNTER — TELEPHONE (OUTPATIENT)
Dept: FAMILY MEDICINE CLINIC | Facility: CLINIC | Age: 71
End: 2021-08-18

## 2021-08-18 NOTE — TELEPHONE ENCOUNTER
Please let patient know that she should discontinue if not tolerating well.  Thanks, ROHAN Herrera

## 2021-08-26 ENCOUNTER — TELEPHONE (OUTPATIENT)
Dept: FAMILY MEDICINE CLINIC | Facility: CLINIC | Age: 71
End: 2021-08-26

## 2021-08-26 NOTE — TELEPHONE ENCOUNTER
Pt requesting results from the echo she done last week. She has not received a call back yet.    Thanks

## 2021-08-27 NOTE — TELEPHONE ENCOUNTER
Please let patient know that echo showed slightly low ejection fraction and grade 1 diastolic dysfunction (relaxation of the heart).  She can discuss in more detail with ordering physician/cardiology at this appointment next week.  ThanksROHAN

## 2021-08-27 NOTE — TELEPHONE ENCOUNTER
Per Dr. Javier Md. Ghanshyam has been called with recent Echo  results & recommendations.  Continue current medications and follow-up as planned or sooner if any problems.

## 2021-09-20 ENCOUNTER — OFFICE VISIT (OUTPATIENT)
Dept: CARDIOLOGY | Facility: CLINIC | Age: 71
End: 2021-09-20

## 2021-09-20 VITALS
BODY MASS INDEX: 24.59 KG/M2 | OXYGEN SATURATION: 99 % | SYSTOLIC BLOOD PRESSURE: 120 MMHG | WEIGHT: 166 LBS | HEART RATE: 87 BPM | HEIGHT: 69 IN | DIASTOLIC BLOOD PRESSURE: 70 MMHG

## 2021-09-20 DIAGNOSIS — E78.2 MIXED HYPERLIPIDEMIA: Primary | ICD-10-CM

## 2021-09-20 DIAGNOSIS — I25.718 CORONARY ARTERY DISEASE OF AUTOLOGOUS VEIN BYPASS GRAFT WITH STABLE ANGINA PECTORIS (HCC): ICD-10-CM

## 2021-09-20 DIAGNOSIS — I34.0 MITRAL VALVE INSUFFICIENCY, UNSPECIFIED ETIOLOGY: ICD-10-CM

## 2021-09-20 PROCEDURE — 99213 OFFICE O/P EST LOW 20 MIN: CPT | Performed by: INTERNAL MEDICINE

## 2021-09-20 NOTE — PROGRESS NOTES
Chrissy Dawson  71 y.o. female    09/20/2021  Chief Complaint   Patient presents with   • Coronary Artery Disease     transitioning care from Dr. Reynaga, had echo done in May (would like to discuss results)       History of Present Illness  History of CABG in 2000    -        SUBJECTIVE  Patient is doing well.  She had a historical PCI followed by CABG in 2000.  She overall has done well.  She is having no chest pain or shortness of air.  Heart murmur had been heard earlier and echo was done Dr. Reynaga and I believe I read it and it showed normal LV systolic function.  Diastolic indices consistent with her age.  And mild calcification of the aortic and mitral valve.  She had mild mitral regurgitation.  He had increased her pravastatin to 40 mg daily and response to a cholesterol panel done in April.  She has not had this rechecked.  Overall she is doing well.  She had hip surgery in May and is recovering from this.  Allergies   Allergen Reactions   • Crestor [Rosuvastatin] Shortness Of Breath   • Lipitor [Atorvastatin] Shortness Of Breath         Past Medical History:   Diagnosis Date   • Backache    • Benign essential hypertension     PATIENT DENIES   • Cervical arthritis    • Coronary arteriosclerosis    • Fibrocystic breast    • Ganglion cyst of wrist     Ganglion/synovial cyst - wrist   • Ganglion of wrist    • Hip pain    • History of echocardiogram 10/23/2015    Echocardiogram W/ color flow 90450 (1) - Normal LV function with Ef of 55-60%.Normal RV size and function.Pseudonormal diastolic dysfunciton with borderline [facility].NO sig.valvular regurg or stenosis.   • History of mammogram 09/10/2014    MAMMOGRAM SCREENING 02335 - [facility] (1) - [facility] ([facility])   • History of transfusion    • Hyperlipidemia    • Inguinal pain    • Recurrent angina status post coronary artery bypass graft (CMS/[facility])     Recurrent angina after coronary artery bypass graft   • Urge incontinence of urine           Past Surgical History:   Procedure Laterality Date   • BREAST BIOPSY Right 2/2/2017    Procedure: RIGHT BREAST LUMPECTOMY WITH NEEDLE LOCALIZATION AND ULTRASOUND;  Surgeon: Delfino Greer MD;  Location: [facility];  Service:    • BREAST CYST ASPIRATION     • CARDIAC SURGERY     • CORONARY ARTERY BYPASS GRAFT  2000    CABG, arterial, single (1)   • GANGLION CYST EXCISION     • JOINT REPLACEMENT     • ORIF MANDIBULAR FRACTURE     • TOTAL ABDOMINAL HYSTERECTOMY     • TOTAL ABDOMINAL HYSTERECTOMY WITH SALPINGO OOPHORECTOMY  1998    SALVADOR/BSO (1)   • TOTAL HIP ARTHROPLASTY  2011    Total hip replacement (3)   • TOTAL HIP ARTHROPLASTY REVISION Right 5/19/2021    Procedure: anterior revision total hip arthroplasty        (lg-head c-arm and hana table );  Surgeon: Jeovanny Maravilla MD;  Location: [facility];  Service: Orthopedics;  Laterality: Right;         Family History   Problem Relation Age of Onset   • Heart disease Other    • Hypertension Other    • Hypertension Mother    • Diabetes Father    • Depression Maternal Grandmother    • Heart disease Maternal Grandfather          Social History     Socioeconomic History   • Marital status:      Spouse name: Not on file   • Number of children: Not on file   • Years of education: Not on file   • Highest education level: Not on file   Tobacco Use   • Smoking status: Never Smoker   • Smokeless tobacco: Never Used   Vaping Use   • Vaping Use: Never used   Substance and Sexual Activity   • Alcohol use: No   • Drug use: No   • Sexual activity: Defer     Comment:          Prior to Admission medications    Medication Sig Start Date End Date Taking? Authorizing Provider   aspirin 81 MG tablet Take 81 mg by mouth.   Yes ProviderOlga MD   Calcium Citrate (CITRACAL PO) Take  by mouth.   Yes ProviderOlga MD   fluticasone (FLONASE) 50 MCG/ACT nasal spray 2 sprays into the nostril(s) as directed by provider Daily. 9/19/21  Yes Je Redd MD  "  montelukast (SINGULAIR) 10 MG tablet Take 1 tablet by mouth Every Night. 9/19/21  Yes Je Redd MD   Multiple Minerals-Vitamins (CITRACAL PLUS PO) Take  by mouth.   Yes ProviderOlga MD   nitroglycerin (NITROSTAT) 0.4 MG SL tablet 1 under the tongue as needed for angina, may repeat q5mins for up three doses  Patient taking differently: Place 0.4 mg under the tongue Take As Directed. 1 under the tongue as needed for angina, may repeat q5mins for up three doses 8/24/20  Yes Vishnu Reynaga MD PhD   polyethylene glycol (MIRALAX) 17 g packet Take 17 g by mouth Daily. 5/26/21  Yes Fanny Herrera MD   pravastatin (PRAVACHOL) 40 MG tablet Take 1 tablet by mouth Daily.  Patient taking differently: Take 40 mg by mouth Every Night. 12/9/20  Yes Vishnu Reynaga MD PhD   primidone (MYSOLINE) 50 MG tablet Take 1 tablet by mouth Every Night. 8/12/21  Yes Fanny Herrera MD   vitamin C (ASCORBIC ACID) 500 MG tablet Take 500 mg by mouth Daily.   Yes ProviderOlga MD   Multiple Vitamins-Minerals (CENTRUM SILVER PO) Take 1 tablet by mouth Daily.    Olga Ruiz MD         OBJECTIVE    /70 (BP Location: Left arm, Patient Position: Sitting)   Pulse 87   Ht 175.3 cm (69\")   Wt 75.3 kg (166 lb)   SpO2 99%   BMI 24.51 kg/m²         Review of Systems     Constitutional:  Denies recent weight loss, weight gain, fever or chills, no change in exercise tolerance     HENT:  Denies any hearing loss, epistaxis, hoarseness, or difficulty speaking.     Eyes: Wears eyeglasses or contact lenses     Respiratory:  Denies dyspnea with exertion,no cough, wheezing, or hemoptysis.     Cardiovascular: Negative for palpations, chest pain, orthopnea, PND, peripheral edema, syncope, or claudication.     Gastrointestinal:  Denies change in bowel habits, dyspepsia, ulcer disease, hematochezia, or melena.     Endocrine: Negative for cold intolerance, heat intolerance, polydipsia, polyphagia and " polyuria. Denies any history of weight change, heat/cold intolerance, polydipsia, polyuria     Genitourinary: Negative.      Musculoskeletal: Denies any history of arthritic symptoms or back problems     Skin:  Denies any change in hair or nails, rashes, or skin lesions.     Allergic/Immunologic: Negative.  Negative for environmental allergies, food allergies and immunocompromised state.     Neurological:  Denies any history of recurrent headaches, strokes, TIA, or seizure disorder.     Hematological: Denies any food allergies, seasonal allergies, bleeding disorders, or lymphadenopathy.     Psychiatric/Behavioral: Denies any history of depression, substance abuse, or change in cognitive function.         Physical Exam     Constitutional: Cooperative, alert and oriented, well-developed, well-nourished, in no acute distress.     HENT:   Head: Normocephalic, normal hair patterns, no masses or tenderness.  Ears, Nose, and Throat: No gross abnormalities. No pallor or cyanosis. Dentition good.   Eyes: EOMS intact, PERRL, conjunctivae and lids unremarkable. Fundoscopic exam and visual fields not performed.   Neck: No palpable masses or adenopathy, no thyromegaly, no JVD, carotid pulses are full and equal bilaterally and without  Bruits.     Cardiovascular: Regular rhythm, S1 and S2 normal, no S3 or S4. Apical impulse not displaced. No murmurs, gallops, or rubs detected.     Pulmonary/Chest: Chest: normal symmetry, no tenderness to palpation, normal respiratory excursion, no intercostal retraction, no use of accessory muscles.            Pulmonary: Normal breath sounds. No rales or ronchi.    Abdominal: Abdomen soft, bowel sounds normoactive, no masses, no hepatosplenomegaly, non-tender, no bruits.     Musculoskeletal: No deformities, clubbing, cyanosis, erythema, or edema observed. There are no spinal abnormalities noted. Normal muscle strength and tone. Pulses full and equal in all extremities, no bruits auscultated.      Neurological: No gross motor or sensory deficits noted, affect appropriate, oriented to time, person, place.     Skin: Warm and dry to the touch, no apparent skin lesions or masses noted.     Psychiatric: She has a normal mood and affect. Her behavior is normal. Judgment and thought content normal.         Procedures      Lab Results   Component Value Date    WBC 5.63 05/12/2021    HGB 11.2 (L) 05/19/2021    HCT 34.2 05/19/2021    MCV 84.8 05/12/2021     05/12/2021     Lab Results   Component Value Date    GLUCOSE 117 (H) 04/06/2021    BUN 24 (H) 04/06/2021    CREATININE 0.80 04/06/2021    EGFRIFAFRI 86 04/06/2021    BCR 30.0 (H) 04/06/2021    CO2 28.0 04/06/2021    CALCIUM 9.7 04/06/2021    ALBUMIN 4.00 04/06/2021    AST 15 04/06/2021    ALT 9 04/06/2021     Lab Results   Component Value Date    CHOL 194 06/01/2021    CHOL 255 (H) 10/15/2020    CHOL 242 (H) 12/11/2019     Lab Results   Component Value Date    TRIG 112 06/01/2021    TRIG 52 10/15/2020    TRIG 51 12/11/2019     Lab Results   Component Value Date    HDL 55 06/01/2021    HDL 82 (H) 10/15/2020    HDL 80 (H) 12/11/2019     No components found for: LDLCALC  Lab Results   Component Value Date     (H) 06/01/2021     (H) 10/15/2020     (H) 12/11/2019     No results found for: HDLLDLRATIO  No components found for: CHOLHDL  Lab Results   Component Value Date    HGBA1C 5.90 (H) 03/29/2019     Lab Results   Component Value Date    TSH 1.320 03/29/2019           ASSESSMENT AND PLAN      Diagnoses and all orders for this visit:    1. Mixed hyperlipidemia (Primary)  -     Lipid Panel  -     Hepatic Function Panel  -     Thyroid Panel With TSH; Future    2. 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 Mitral valve insufficiency, unspecified etiology    Overall she is doing well.  She is having no angina.  I have went over the findings of her echo and explained to her that all in  all given her age that her heart function is normal.  The diastolic indices are consistent with her age and she has no symptoms from that.  I have went over her echo also and explained to her that the mitral regurgitation is mild and only probably needs to be checked every 3 or so years.    In response to her increase in her Pravachol done in April by Dr. Reynaga we will go ahead and check a lipid panel and hepatic panel and also check a thyroid function test with TSH as since she has had her hip surgery she has not had an appetite like she had before.    Patient's Body mass index is 24.51 kg/m². indicating that she is within normal range (BMI 18.5-24.9). No BMI management plan needed..                Luis Diaz MD  9/20/2021  14:39 CDT

## 2021-09-22 ENCOUNTER — LAB (OUTPATIENT)
Dept: LAB | Facility: HOSPITAL | Age: 71
End: 2021-09-22

## 2021-09-22 DIAGNOSIS — E78.2 MIXED HYPERLIPIDEMIA: ICD-10-CM

## 2021-09-22 PROCEDURE — 84479 ASSAY OF THYROID (T3 OR T4): CPT

## 2021-09-22 PROCEDURE — 36415 COLL VENOUS BLD VENIPUNCTURE: CPT | Performed by: INTERNAL MEDICINE

## 2021-09-22 PROCEDURE — 80076 HEPATIC FUNCTION PANEL: CPT | Performed by: INTERNAL MEDICINE

## 2021-09-22 PROCEDURE — 84443 ASSAY THYROID STIM HORMONE: CPT

## 2021-09-22 PROCEDURE — 84436 ASSAY OF TOTAL THYROXINE: CPT

## 2021-09-22 PROCEDURE — 80061 LIPID PANEL: CPT | Performed by: INTERNAL MEDICINE

## 2021-09-23 ENCOUNTER — TELEPHONE (OUTPATIENT)
Dept: CARDIOLOGY | Facility: CLINIC | Age: 71
End: 2021-09-23

## 2021-09-23 LAB
ALBUMIN SERPL-MCNC: 4.6 G/DL (ref 3.5–5.2)
ALP SERPL-CCNC: 92 U/L (ref 39–117)
ALT SERPL W P-5'-P-CCNC: 11 U/L (ref 1–33)
AST SERPL-CCNC: 19 U/L (ref 1–32)
BILIRUB CONJ SERPL-MCNC: <0.2 MG/DL (ref 0–0.3)
BILIRUB INDIRECT SERPL-MCNC: NORMAL MG/DL
BILIRUB SERPL-MCNC: 0.2 MG/DL (ref 0–1.2)
CHOLEST SERPL-MCNC: 172 MG/DL (ref 0–200)
HDLC SERPL-MCNC: 64 MG/DL (ref 40–60)
LDLC SERPL CALC-MCNC: 95 MG/DL (ref 0–100)
LDLC/HDLC SERPL: 1.48 {RATIO}
PROT SERPL-MCNC: 7.8 G/DL (ref 6–8.5)
T-UPTAKE NFR SERPL: 1.12 TBI (ref 0.8–1.3)
T4 SERPL-MCNC: 8.06 MCG/DL (ref 4.5–11.7)
TRIGL SERPL-MCNC: 68 MG/DL (ref 0–150)
TSH SERPL DL<=0.05 MIU/L-ACNC: 0.87 UIU/ML (ref 0.27–4.2)
VLDLC SERPL-MCNC: 13 MG/DL (ref 5–40)

## 2021-09-23 NOTE — TELEPHONE ENCOUNTER
Contacted PT per Dr. Diaz about lab results. Results are as follows; looks good. PT was unavailable and did not have VM so will call back this afternoon.            Luis Diaz MD [place], [place], MA  Looks good

## 2021-10-13 DIAGNOSIS — I25.10 CORONARY ARTERIOSCLEROSIS: ICD-10-CM

## 2021-10-13 RX ORDER — PRAVASTATIN SODIUM 40 MG
40 TABLET ORAL NIGHTLY
Qty: 90 TABLET | Refills: 3 | Status: SHIPPED | OUTPATIENT
Start: 2021-10-13 | End: 2023-01-12

## 2021-10-29 DIAGNOSIS — Z96.649 STATUS POST REVISION OF TOTAL HIP REPLACEMENT: Primary | ICD-10-CM

## 2021-11-02 ENCOUNTER — OFFICE VISIT (OUTPATIENT)
Dept: ORTHOPEDIC SURGERY | Facility: CLINIC | Age: 71
End: 2021-11-02

## 2021-11-02 VITALS — BODY MASS INDEX: 24.69 KG/M2 | WEIGHT: 166.7 LBS | HEIGHT: 69 IN

## 2021-11-02 DIAGNOSIS — M79.604 PAIN OF RIGHT LOWER EXTREMITY: ICD-10-CM

## 2021-11-02 DIAGNOSIS — I10 BENIGN ESSENTIAL HYPERTENSION: ICD-10-CM

## 2021-11-02 DIAGNOSIS — Z96.649 STATUS POST REVISION OF TOTAL HIP REPLACEMENT: Primary | ICD-10-CM

## 2021-11-02 PROCEDURE — 99213 OFFICE O/P EST LOW 20 MIN: CPT | Performed by: ORTHOPAEDIC SURGERY

## 2021-11-02 NOTE — PROGRESS NOTES
"Chrissy Dawson is a 71 y.o. female returns for     Chief Complaint   Patient presents with   • Right Hip - Follow-up       HISTORY OF PRESENT ILLNESS:  Patient in for follow-up on RIGHT total hip   Xray completed upon arrival.   Patient states that she is doing better than she was prior to surgery.  She still has some pain with activity.  She has some pain when the weather changes.  She states that she gets up and has a slow start but gets better with mobility.  She has some tingling as well.  Overall, she is pleased and seems to be progressing still.     CONCURRENT MEDICAL HISTORY:    The following portions of the patient's history were reviewed and updated as appropriate: allergies, current medications, past family history, past medical history, past social history, past surgical history and problem list.     ROS  No fevers or chills.  No chest pain or shortness of air.  No GI or  disturbances.    PHYSICAL EXAMINATION:       Ht 175.3 cm (69\")   Wt 75.6 kg (166 lb 11.2 oz)   BMI 24.62 kg/m²     Physical Exam  Constitutional:       General: She is not in acute distress.     Appearance: Normal appearance.   Pulmonary:      Effort: Pulmonary effort is normal. No respiratory distress.   Neurological:      Mental Status: She is alert and oriented to person, place, and time.         GAIT:     []  Normal  [x]  Antalgic    Assistive device: []  None  []  Walker     []  Crutches  [x]  Cane     []  Wheelchair  []  Stretcher    Right Hip Exam     Tenderness   The patient is experiencing no tenderness.     Range of Motion   Flexion: 100     Muscle Strength   Flexion: 4/5     Tests   FABBY: negative  Fadir:  Negative FADIR test    Other   Erythema: absent  Sensation: normal  Pulse: present              XR Hip With or Without Pelvis 2 - 3 View Right    Result Date: 11/2/2021  Narrative: Ordering Provider:  Jeovanny Maravilla MD Ordering Diagnosis/Indication:  Status post revision of total hip replacement " Procedure:  XR HIP W OR WO PELVIS 2-3 VIEW RIGHT Exam Date:  11/2/21 COMPARISON:  Todays X-rays were compared to previous images dated June 24, 2021.     Impression:  AP standing of the pelvis with AP and lateral of the right hip show acceptable position alignment of a right total hip arthroplasty.  No sign of implant loosening or failure noted.  No interval change noted in comparison to prior x-ray.  There are multiple fragments of broken cerclage wires noted around the proximal femur and along the greater trochanter.  This is unchanged from prior x-ray.  Also has some heterotopic ossification noted in the superior aspect of the femoral neck region also unchanged from prior x-ray.  Acceptable position and alignment is also noted in the left hip.  No interval sign of implant loosening noted. Jeovanny Maravilla MD 11/2/21             ASSESSMENT:    Diagnoses and all orders for this visit:    Status post revision of total hip replacement    Benign essential hypertension    Pain of right lower extremity          PLAN    Continue to progress with general strength and conditioning exercises.  Continue to increase activity as pain allows.  No true restrictions.  Continue to use a cane for support as needed.  Follow-up in 6 months with repeat x-rays.    Return in about 6 months (around 5/2/2022) for Recheck with repeat xrays.    Jeovanny Maravilla MD

## 2022-01-09 PROCEDURE — 87635 SARS-COV-2 COVID-19 AMP PRB: CPT | Performed by: NURSE PRACTITIONER

## 2022-01-15 PROCEDURE — 87635 SARS-COV-2 COVID-19 AMP PRB: CPT | Performed by: FAMILY MEDICINE

## 2022-01-25 ENCOUNTER — OFFICE VISIT (OUTPATIENT)
Dept: ORTHOPEDIC SURGERY | Facility: CLINIC | Age: 72
End: 2022-01-25

## 2022-01-25 VITALS — HEIGHT: 69 IN | WEIGHT: 161 LBS | BODY MASS INDEX: 23.85 KG/M2

## 2022-01-25 DIAGNOSIS — M25.551 RIGHT HIP PAIN: ICD-10-CM

## 2022-01-25 DIAGNOSIS — Z96.649 STATUS POST REVISION OF TOTAL HIP REPLACEMENT: Primary | ICD-10-CM

## 2022-01-25 PROCEDURE — 96372 THER/PROPH/DIAG INJ SC/IM: CPT | Performed by: NURSE PRACTITIONER

## 2022-01-25 PROCEDURE — 99214 OFFICE O/P EST MOD 30 MIN: CPT | Performed by: NURSE PRACTITIONER

## 2022-01-25 RX ORDER — KETOROLAC TROMETHAMINE 30 MG/ML
60 INJECTION, SOLUTION INTRAMUSCULAR; INTRAVENOUS ONCE
Status: COMPLETED | OUTPATIENT
Start: 2022-01-25 | End: 2022-01-25

## 2022-01-25 RX ORDER — TRIAMCINOLONE ACETONIDE 40 MG/ML
80 INJECTION, SUSPENSION INTRA-ARTICULAR; INTRAMUSCULAR ONCE
Status: COMPLETED | OUTPATIENT
Start: 2022-01-25 | End: 2022-01-25

## 2022-01-25 RX ADMIN — TRIAMCINOLONE ACETONIDE 80 MG: 40 INJECTION, SUSPENSION INTRA-ARTICULAR; INTRAMUSCULAR at 15:58

## 2022-01-25 RX ADMIN — KETOROLAC TROMETHAMINE 60 MG: 30 INJECTION, SOLUTION INTRAMUSCULAR; INTRAVENOUS at 15:56

## 2022-01-25 NOTE — PROGRESS NOTES
"Chrissy Dawson is a 71 y.o. female returns for     Chief Complaint   Patient presents with   • Right Hip - Follow-up       HISTORY OF PRESENT ILLNESS: This 71-year-old female patient presents today with complaints of right hip pain.  This patient has a history of right hip arthroplasty with revision x2, last procedure 5/2021.  The last procedure was this patient reports her hip was difficult to move starting on 1/23/2022.  The patient reports her pain has gradually gotten worse and and today became unbearable.  The patient states \"it is the type of pain like before my surgery.\".  The patient reports her pain is intermittent depending on positioning, and stabbing that radiates from her lateral hip through the groin and down to her right knee.         CONCURRENT MEDICAL HISTORY:    The following portions of the patient's history were reviewed and updated as appropriate: allergies, current medications, past family history, past medical history, past social history, past surgical history and problem list.     ROS  No fevers or chills.  No chest pain or shortness of air.  No GI or  disturbances.    PHYSICAL EXAMINATION:       Ht 175.3 cm (69\")   Wt 73 kg (161 lb)   BMI 23.78 kg/m²     Physical Exam    GAIT:     []  Normal  [x]  Antalgic    Assistive device: []  None  []  Walker     []  Crutches  [x]  Cane     [x]  Wheelchair  []  Stretcher    Right Hip Exam     Tenderness   The patient is experiencing tenderness in the greater trochanter, lateral and anterior.    Muscle Strength   Abduction: 4/5   Adduction: 4/5   Flexion: 4/5     Tests   FABBY: negative  Fadir:  Negative FADIR test    Other   Erythema: absent  Scars: present  Sensation: normal  Pulse: present    Comments:                                                                         No obvious deformity noted. No erythema, drainage or swelling noted.   Incision is well healed with no abnormalities.   Tenderness present to the lateral hip, groin and " radiates to the knee. Lower leg non swollen and distal pulses palpable.               PACS Images     Radiology Images    Study Result    Narrative & Impression   Ordering Provider:  Jeovanny Maravilla MD  Ordering Diagnosis/Indication:  Status post revision of total hip replacement     Procedure:  XR HIP W OR WO PELVIS 2-3 VIEW RIGHT  Exam Date:  11/2/21     COMPARISON:  Todays X-rays were compared to previous images dated June 24, 2021.     IMPRESSION: AP standing of the pelvis with AP and lateral of the right hip show acceptable position alignment of a right total hip arthroplasty.  No sign of implant loosening or failure noted.  No interval change noted in comparison to prior x-ray.  There are multiple fragments of broken cerclage wires noted around the proximal femur and along the greater trochanter.  This is unchanged from prior x-ray.  Also has some heterotopic ossification noted in the superior aspect of the femoral neck region also unchanged from prior x-ray.  Acceptable position and alignment is also noted in the left hip.  No interval sign of implant loosening noted.        Jeovanny Mraavilla MD  11/2/21             ASSESSMENT:    Diagnoses and all orders for this visit:    Status post revision of total hip replacement  -     XR Hip With or Without Pelvis 2 - 3 View Right; Future  -     ketorolac (TORADOL) injection 60 mg  -     triamcinolone acetonide (KENALOG-40) injection 80 mg    Right hip pain          PLAN  New x-ray obtained this visit, reviewed and discussed with Dr. Maravilla as well as the case. No changes noted in the interim of previous xray, discussed with patient. He is in agreement that the patient should decrease her weightbearing activity with a walker over the next week.  Patient was recommended to rest, ice and alternate heat as needed to assist with pain.  Also encouraged the patient to take 400 mg of ibuprofen daily and continue to use Tylenol per manufacture directions to  control patient's pain.  Offered to prescribe the patient with pain medication however she declined at this time.  She was educated she may call the office if she feels her pain is unbearable and needs something else to control her pain.  Discussed with the patient the option to receive a Toradol 60 mg IM injection as well as a Kenalog 80 mg IM injection.  We discussed the risk and benefits, the patient is agreeable to these injections.  The patient has no allergies to either medication.  The patient received the IM injections and tolerated well with no immediate reaction.    Patient was advised to call the office on Friday if she is not feeling any relief, and I will order an MRI.  The patient verbalizes understanding.    All questions and concerns are addressed with understanding noted. They are aware and are in agreement to this plan.    Return in about 1 week (around 2/1/2022) for Recheck.    JOE Wilder    This document has been electronically signed by JOE Wilder on January 26, 2022 12:23 CST      EMR Dragon/Transciption Disclaimer: Some of this note may be an electronic transcription/translation of spoken language to printed text.  The electronic translation of spoken language may permit erroneous, or at times, nonsensical words or phrases to be inadvertently transcribed. Although I have reviewed the note for such errors, some may still exist.     Time spent of a minimum of 30 minutes including the face to face evaluation, reviewing of medical history and prior medial records, reviewing of diagnostic studies, documentation, patient education and coordination of care.

## 2022-01-27 ENCOUNTER — TELEPHONE (OUTPATIENT)
Dept: ORTHOPEDIC SURGERY | Facility: CLINIC | Age: 72
End: 2022-01-27

## 2022-01-27 DIAGNOSIS — Z96.641 PRESENCE OF RIGHT ARTIFICIAL HIP JOINT: ICD-10-CM

## 2022-01-27 DIAGNOSIS — M70.61 TROCHANTERIC BURSITIS OF RIGHT HIP: ICD-10-CM

## 2022-01-27 DIAGNOSIS — M79.604 PAIN OF RIGHT LOWER EXTREMITY: ICD-10-CM

## 2022-01-27 DIAGNOSIS — T84.030D MECHANICAL LOOSENING OF INTERNAL RIGHT HIP PROSTHETIC JOINT, SUBSEQUENT ENCOUNTER: ICD-10-CM

## 2022-01-27 DIAGNOSIS — M25.551 RIGHT HIP PAIN: ICD-10-CM

## 2022-01-27 DIAGNOSIS — Z96.649 STATUS POST REVISION OF TOTAL HIP REPLACEMENT: Primary | ICD-10-CM

## 2022-02-04 ENCOUNTER — HOSPITAL ENCOUNTER (OUTPATIENT)
Dept: MRI IMAGING | Facility: HOSPITAL | Age: 72
Discharge: HOME | End: 2022-02-04

## 2022-02-04 VITALS
SYSTOLIC BLOOD PRESSURE: 128 MMHG | OXYGEN SATURATION: 98 % | RESPIRATION RATE: 18 BRPM | DIASTOLIC BLOOD PRESSURE: 70 MMHG | HEART RATE: 69 BPM

## 2022-02-04 DIAGNOSIS — M70.61 TROCHANTERIC BURSITIS OF RIGHT HIP: ICD-10-CM

## 2022-02-04 DIAGNOSIS — Z96.649 STATUS POST REVISION OF TOTAL HIP REPLACEMENT: ICD-10-CM

## 2022-02-04 DIAGNOSIS — M79.604 PAIN OF RIGHT LOWER EXTREMITY: ICD-10-CM

## 2022-02-04 DIAGNOSIS — Z96.641 PRESENCE OF RIGHT ARTIFICIAL HIP JOINT: ICD-10-CM

## 2022-02-04 DIAGNOSIS — M25.551 RIGHT HIP PAIN: ICD-10-CM

## 2022-02-04 PROCEDURE — A9270 NON-COVERED ITEM OR SERVICE: HCPCS | Performed by: RADIOLOGY

## 2022-02-04 PROCEDURE — 63710000001 DIAZEPAM 2 MG TABLET: Performed by: RADIOLOGY

## 2022-02-04 RX ORDER — DIAZEPAM 5 MG/1
TABLET ORAL
Status: DISPENSED
Start: 2022-02-04 | End: 2022-02-04

## 2022-02-04 RX ORDER — DIAZEPAM 2 MG/1
TABLET ORAL
Status: DISCONTINUED | OUTPATIENT
Start: 2022-02-04 | End: 2022-02-05 | Stop reason: HOSPADM

## 2022-02-04 RX ADMIN — DIAZEPAM 5 MG: 2 TABLET ORAL at 12:10

## 2022-02-07 ENCOUNTER — TELEPHONE (OUTPATIENT)
Dept: ORTHOPEDIC SURGERY | Facility: CLINIC | Age: 72
End: 2022-02-07

## 2022-02-07 DIAGNOSIS — Z96.641 PRESENCE OF RIGHT ARTIFICIAL HIP JOINT: ICD-10-CM

## 2022-02-07 DIAGNOSIS — M25.551 RIGHT HIP PAIN: ICD-10-CM

## 2022-02-07 DIAGNOSIS — Z96.649 STATUS POST REVISION OF TOTAL HIP REPLACEMENT: Primary | ICD-10-CM

## 2022-02-07 DIAGNOSIS — M79.604 PAIN OF RIGHT LOWER EXTREMITY: ICD-10-CM

## 2022-02-07 NOTE — TELEPHONE ENCOUNTER
CHRISS BAJWA FROM THE MRI DEPARTMENT CALLED TO LET YOU KNOW THEY DO NOT DO MRI FOR PEOPLE WHO HAVE HAD HIP REPLACEMENTS BECAUSE THERE IS TO MUCH METAL TO GET A CLEAR IMAGE     THEY TRIED TO PERFORM MRI OF THE RIGHT HIP ANYWAY'S BUT COULD NOT GET AN IMAGE     ASAF

## 2022-02-07 NOTE — TELEPHONE ENCOUNTER
CHRISS       Pt CALLED TO LET US KNOW SHE WAS UNABLE TO HAVE THE MRI DONE     SHE STATES SHE IS JUST WANTING TO MAKE SURE SHE DOES NOT HAVE A BLOOD CLOT OR SOMETHING SERIOUS GOING ON     Pt IS WANTING TO KNOW WHAT SHE SHOULD DO FROM HERE    Pt IS ALSO REQUESTING A RETURN TO WORK NOTE     Pt CALL BACK # [phone]

## 2022-02-10 ENCOUNTER — HOSPITAL ENCOUNTER (OUTPATIENT)
Dept: ULTRASOUND IMAGING | Facility: HOSPITAL | Age: 72
Discharge: HOME | End: 2022-02-10
Admitting: NURSE PRACTITIONER

## 2022-02-10 ENCOUNTER — TELEPHONE (OUTPATIENT)
Dept: ORTHOPEDIC SURGERY | Facility: CLINIC | Age: 72
End: 2022-02-10

## 2022-02-10 DIAGNOSIS — M79.604 PAIN OF RIGHT LOWER EXTREMITY: ICD-10-CM

## 2022-02-10 DIAGNOSIS — Z96.641 PRESENCE OF RIGHT ARTIFICIAL HIP JOINT: ICD-10-CM

## 2022-02-10 DIAGNOSIS — M25.551 RIGHT HIP PAIN: ICD-10-CM

## 2022-02-10 DIAGNOSIS — Z96.649 STATUS POST REVISION OF TOTAL HIP REPLACEMENT: ICD-10-CM

## 2022-02-10 PROCEDURE — 93971 EXTREMITY STUDY: CPT

## 2022-02-10 NOTE — TELEPHONE ENCOUNTER
CHRISS.  PATIENT IS REQUESTING A STATEMENT TO GO BACK TO WORK, AT LEAST 4- 6 HOURS A DAY UNTIL THE CT SCAN IS APPROVED.  SHE STATES SHE JUST SITS AND VIEWS MEDICAL RECORDS AT WORK.

## 2022-02-11 ENCOUNTER — TELEPHONE (OUTPATIENT)
Dept: ORTHOPEDIC SURGERY | Facility: CLINIC | Age: 72
End: 2022-02-11

## 2022-02-11 NOTE — TELEPHONE ENCOUNTER
----- Message from JOE Wilder sent at 2/10/2022  4:07 PM CST -----  Please notify the patient her u/s is negative for DVT in the right lower extremity.     Thanks.

## 2022-02-22 ENCOUNTER — HOSPITAL ENCOUNTER (OUTPATIENT)
Dept: CT IMAGING | Facility: HOSPITAL | Age: 72
Discharge: HOME | End: 2022-02-22
Admitting: NURSE PRACTITIONER

## 2022-02-22 DIAGNOSIS — Z96.641 PRESENCE OF RIGHT ARTIFICIAL HIP JOINT: ICD-10-CM

## 2022-02-22 DIAGNOSIS — M25.551 RIGHT HIP PAIN: ICD-10-CM

## 2022-02-22 DIAGNOSIS — M79.604 PAIN OF RIGHT LOWER EXTREMITY: ICD-10-CM

## 2022-02-22 DIAGNOSIS — Z96.649 STATUS POST REVISION OF TOTAL HIP REPLACEMENT: ICD-10-CM

## 2022-02-22 PROCEDURE — 73700 CT LOWER EXTREMITY W/O DYE: CPT

## 2022-02-24 NOTE — PROGRESS NOTES
Dr. Maravilla,  Mrs. Dawson saw me recently for right hip pain.  We discussed the case and I ordered an MRI.  According to the MRI report, there is a suggestion of lucency at the proximal femoral stem component of the prosthesis laterally.  No acute fractures or dislocations are noted.Do I need to get a bone scan and have her follow up with you? Please advise me on what you would like me to do.      She does not follow up with you until 5/3/2022.     Thanks  Jamee

## 2022-02-25 ENCOUNTER — TELEPHONE (OUTPATIENT)
Dept: ORTHOPEDIC SURGERY | Facility: CLINIC | Age: 72
End: 2022-02-25

## 2022-02-25 NOTE — TELEPHONE ENCOUNTER
----- Message from JOE Wilder sent at 2/25/2022  9:32 AM CST -----  Please notify the patient that her MRI does not show anything of concern eventhough there is mention of suggestion of lucency, I reviewed with Dr. Maravilla and he believes this is due to the type of prosthesis used; however, she can follow up with Dr. BAHMAN mack or myself to review her MRI more.     Can you find out if her pain and functionality have improved?

## 2022-02-25 NOTE — PROGRESS NOTES
Please notify the patient that her MRI does not show anything of concern eventhough there is mention of suggestion of lucency, I reviewed with Dr. Maravilla and he believes this is due to the type of prosthesis used; however, she can follow up with Dr. Maravilla or myself to review her MRI more.     Can you find out if her pain and functionality have improved?

## 2022-03-24 ENCOUNTER — OFFICE VISIT (OUTPATIENT)
Dept: ORTHOPEDIC SURGERY | Facility: CLINIC | Age: 72
End: 2022-03-24

## 2022-03-24 VITALS — WEIGHT: 161 LBS | HEIGHT: 69 IN | BODY MASS INDEX: 23.85 KG/M2

## 2022-03-24 DIAGNOSIS — I10 BENIGN ESSENTIAL HYPERTENSION: ICD-10-CM

## 2022-03-24 DIAGNOSIS — Z96.649 STATUS POST REVISION OF TOTAL HIP REPLACEMENT: Primary | ICD-10-CM

## 2022-03-24 PROCEDURE — 99213 OFFICE O/P EST LOW 20 MIN: CPT | Performed by: ORTHOPAEDIC SURGERY

## 2022-03-24 NOTE — PROGRESS NOTES
"Chrissy Dawson is a 71 y.o. female returns for     Chief Complaint   Patient presents with   • Right Hip - Follow-up       HISTORY OF PRESENT ILLNESS:  Patient in for follow up on right hip pain.  Patient states, her right hip is doing much better.   She is not having any pain today, at all.   She is not using her cane as much as she used to, she keeps it with her for security.   No fevers or chills.  No numbness or tingling.         CONCURRENT MEDICAL HISTORY:    The following portions of the patient's history were reviewed and updated as appropriate: allergies, current medications, past family history, past medical history, past social history, past surgical history and problem list.     ROS  No fevers or chills.  No chest pain or shortness of air.  No GI or  disturbances.    PHYSICAL EXAMINATION:       Ht 175.3 cm (69\")   Wt 73 kg (161 lb)   BMI 23.78 kg/m²     Physical Exam  Constitutional:       General: She is not in acute distress.     Appearance: Normal appearance.   Pulmonary:      Effort: Pulmonary effort is normal. No respiratory distress.   Neurological:      Mental Status: She is alert and oriented to person, place, and time.         GAIT:     []  Normal  [x]  Antalgic    Assistive device: []  None  []  Walker     []  Crutches  [x]  Cane     []  Wheelchair  []  Stretcher    Right Hip Exam     Comments:  Nontender on exam.  She is able to actively flex her hip.  She is walking with a cane but is carrying the cane beside her.  Good distal pulses and sensation.  Good muscle tone and strength.            US Venous Doppler Lower Extremity Right (duplex) [[id number]] (Order [id number])  Order  Status: Final result       Appointment Information      PACS Images     Radiology Images    Study Result    Narrative & Impression   Right lower extremity venous duplex ultrasound February 10, 2022     INDICATION: Pain and swelling     FINDINGS:  Normal Doppler flow, augmentation and compressibility at " the  common femoral, superficial femoral and popliteal vein levels.  Normal Doppler flow at the posterior tibial, peroneal and  saphenous vein levels.  No soft tissue masses or fluid collections.     IMPRESSION:  No significant abnormality appreciated. In particular, I see no  evidence to suggest presence of deep venous thrombosis involving  the right lower extremity.     Electronically signed by:  Miguel Coyne MD  2/10/2022 3:49 PM  CST Workstation: [id number]       Narrative & Impression    EXAM:    CT Right Lower Extremity Without Intravenous Contrast, Hip     FACILITY:    [facility]     REFERRING:    KATIA MORALES     CLINICAL HISTORY:    71 years, Female; pain, Z96.649 Presence of unspecified  artificial hip joint M25.551 Pain in right hip M79.604 Pain in  right leg Z96.641 Presence of right artificial hip joint     TECHNIQUE:    Axial computed tomography images of the right hip without  intravenous contrast.  This CT exam was performed using one or  more of the following dose reduction techniques:  automated  exposure control, adjustment of the mA and/or kV according to  patient size, and/or use of iterative reconstruction technique.     COMPARISON:    Hip x-rays 1/25/2022, CT head 7/24/2020     FINDINGS:    Bones/joints:  There is redemonstration of right hip  arthroplasty components. There is significant streak artifact  associated with the prosthetic components which does limit bony  detail. There is no definite evidence of acute fracture or  dislocation. There are cerclage wires which are seen encircling  the trochanteric portion of the proximal femur. There are 2 of  the posterior-superior acetabular screws which extend through the  cortex of the ilium.        Heterotopic bone formation is also seen within the region  of the trochanteric proximal femur and acetabulum anteriorly.        There is a suggestion of lucency which is seen about the  proximal femoral stem component of the  prosthesis laterally which  could be associated with loosening.    Soft tissues:  Unremarkable.     IMPRESSION:  *  There is redemonstration of right hip arthroplasty components.  There is significant streak artifact associated with the  prosthetic components which does limit bony detail.  *  No evidence of acute fracture or dislocation.   *  There is a suggestion of lucency which is seen about the  proximal femoral stem component of the prosthesis laterally which  could be associated with loosening.  *  Heterotopic bone formation which is seen adjacent to the  trochanteric region of the proximal femur and acetabulum  anteriorly.     Electronically signed by:  Tito Finley DO  2/24/2022 12:19 AM [facility]  Workstation: [id number]           ASSESSMENT:    Diagnoses and all orders for this visit:    Status post revision of total hip replacement    Benign essential hypertension          PLAN    The patient had some severe pain and swelling involving her right hip.  She had ultrasound which revealed no evidence of deep venous thrombosis.  She has had a CT scan which shows some osteolysis along the proximal aspect of the femoral stem, which is unchanged from previous evaluations.  The distal aspect of her stem seems to be well fitting and with no sign of loosening.  There are no signs of loosening in regards to the acetabular component.  She has multiple loose wires along the lateral aspect of her hip that are consistent and unchanged from prior imaging.  Her symptoms have essentially resolved at this point and she reports no pain and is walking with barely any limp at this point.  We discussed continued activity as tolerated and slowly progressing as tolerated.  Continue general strength and conditioning exercises.  Follow-up as needed.    Return if symptoms worsen or fail to improve, for recheck.    Jeovanny Maravilla MD

## 2022-04-06 ENCOUNTER — TRANSCRIBE ORDERS (OUTPATIENT)
Dept: LAB | Facility: HOSPITAL | Age: 72
End: 2022-04-06

## 2022-04-06 ENCOUNTER — LAB (OUTPATIENT)
Dept: LAB | Facility: HOSPITAL | Age: 72
End: 2022-04-06

## 2022-04-06 DIAGNOSIS — Z01.818 PREOP TESTING: ICD-10-CM

## 2022-04-06 DIAGNOSIS — N39.41 URGE INCONTINENCE: ICD-10-CM

## 2022-04-06 DIAGNOSIS — N39.41 URGE INCONTINENCE: Primary | ICD-10-CM

## 2022-04-06 PROCEDURE — 87086 URINE CULTURE/COLONY COUNT: CPT

## 2022-04-06 PROCEDURE — 87077 CULTURE AEROBIC IDENTIFY: CPT

## 2022-04-06 PROCEDURE — 87186 SC STD MICRODIL/AGAR DIL: CPT

## 2022-04-08 LAB — BACTERIA SPEC AEROBE CULT: ABNORMAL

## 2022-04-27 DIAGNOSIS — I25.10 CORONARY ARTERIOSCLEROSIS: Primary | ICD-10-CM

## 2022-04-27 DIAGNOSIS — I25.718 CORONARY ARTERY DISEASE OF AUTOLOGOUS VEIN BYPASS GRAFT WITH STABLE ANGINA PECTORIS: ICD-10-CM

## 2022-04-28 ENCOUNTER — CLINICAL SUPPORT (OUTPATIENT)
Dept: CARDIOLOGY | Facility: CLINIC | Age: 72
End: 2022-04-28

## 2022-04-28 DIAGNOSIS — E78.2 MIXED HYPERLIPIDEMIA: Primary | ICD-10-CM

## 2022-04-28 DIAGNOSIS — I25.718 CORONARY ARTERY DISEASE OF AUTOLOGOUS VEIN BYPASS GRAFT WITH STABLE ANGINA PECTORIS: Primary | ICD-10-CM

## 2022-04-28 PROCEDURE — 93000 ELECTROCARDIOGRAM COMPLETE: CPT | Performed by: INTERNAL MEDICINE

## 2022-04-28 NOTE — PROGRESS NOTES
Chrissy Dawson  71 y.o. female    04/28/2022  CP      History of Present Illness  HX OF CABG    -        SUBJECTIVE  PT WITH [facility]  Allergies   Allergen Reactions   • Crestor [Rosuvastatin] Shortness Of Breath   • Lipitor [Atorvastatin] Shortness Of Breath         Past Medical History:   Diagnosis Date   • Backache    • Benign essential hypertension     PATIENT DENIES   • Cervical arthritis    • Coronary arteriosclerosis    • Fibrocystic breast    • Ganglion cyst of wrist     Ganglion/synovial cyst - wrist   • Ganglion of wrist    • Hip pain    • History of echocardiogram 10/23/2015    Echocardiogram W/ color flow 33929 (1) - Normal LV function with Ef of 55-60%.Normal RV size and function.Pseudonormal diastolic dysfunciton with borderline [facility].NO sig.valvular regurg or stenosis.   • History of mammogram 09/10/2014    MAMMOGRAM SCREENING 08399 - [facility] ([facility]) - [facility] ([facility])   • History of transfusion    • Hyperlipidemia    • Inguinal pain    • Recurrent angina status post coronary artery bypass graft (HCC)     Recurrent angina after coronary artery bypass graft   • Urge incontinence of urine          Past Surgical History:   Procedure Laterality Date   • BREAST BIOPSY Right 2/2/2017    Procedure: RIGHT BREAST LUMPECTOMY WITH NEEDLE LOCALIZATION AND ULTRASOUND;  Surgeon: Delfino Greer MD;  Location: [facility] OR;  Service:    • BREAST CYST ASPIRATION     • CARDIAC SURGERY     • CORONARY ARTERY BYPASS GRAFT  2000    CABG, arterial, single (1)   • GANGLION CYST EXCISION     • JOINT REPLACEMENT     • ORIF MANDIBULAR FRACTURE     • TOTAL ABDOMINAL HYSTERECTOMY     • TOTAL ABDOMINAL HYSTERECTOMY WITH SALPINGO OOPHORECTOMY  1998    SALVADOR/BSO (1)   • TOTAL HIP ARTHROPLASTY  2011    Total hip replacement (3)   • TOTAL HIP ARTHROPLASTY REVISION Right 5/19/2021    Procedure: anterior revision total hip arthroplasty        (lg-head c-arm and hana table );  Surgeon: Jeovanny Maravilla MD;  Location: [facility] OR;   Service: Orthopedics;  Laterality: Right;         Family History   Problem Relation Age of Onset   • Heart disease Other    • Hypertension Other    • Hypertension Mother    • Diabetes Father    • Depression Maternal Grandmother    • Heart disease Maternal Grandfather          Social History     Socioeconomic History   • Marital status:    Tobacco Use   • Smoking status: Never Smoker   • Smokeless tobacco: Never Used   Vaping Use   • Vaping Use: Never used   Substance and Sexual Activity   • Alcohol use: No   • Drug use: No   • Sexual activity: Defer     Comment:          Prior to Admission medications    Medication Sig Start Date End Date Taking? Authorizing Provider   aspirin 81 MG tablet Take 81 mg by mouth.    ProviderOlga MD   Calcium Citrate (CITRACAL PO) Take  by mouth.    ProviderOlga MD   Multiple Vitamins-Minerals (CENTRUM SILVER PO) Take 1 tablet by mouth Daily.    Olga Ruiz MD   nitroglycerin (NITROSTAT) 0.4 MG SL tablet 1 under the tongue as needed for angina, may repeat q5mins for up three doses  Patient taking differently: Place 0.4 mg under the tongue Take As Directed. 1 under the tongue as needed for angina, may repeat q5mins for up three doses 8/24/20   Vishnu Reynaga MD PhD   pravastatin (PRAVACHOL) 40 MG tablet Take 1 tablet by mouth Every Night. 10/13/21   Luis Diaz MD   vitamin C (ASCORBIC ACID) 500 MG tablet Take 500 mg by mouth Daily.    ProviderOlga MD   fluticasone (FLONASE) 50 MCG/ACT nasal spray 2 sprays into the nostril(s) as directed by provider Daily. 9/19/21 1/9/22  Je Redd MD   montelukast (SINGULAIR) 10 MG tablet Take 1 tablet by mouth Every Night. 9/19/21 1/9/22  Je Redd MD         OBJECTIVE    There were no vitals taken for this visit.        Review of Systems     Constitutional:  Denies recent weight loss, weight gain, fever or chills, no change in exercise tolerance     HENT:  Denies any hearing  loss, epistaxis, hoarseness, or difficulty speaking.     Eyes: Wears eyeglasses or contact lenses     Respiratory:  Denies dyspnea with exertion,no cough, wheezing, or hemoptysis.     Cardiovascular: Negative for palpations, chest pain, orthopnea, PND, peripheral edema, syncope, or claudication.     Gastrointestinal:  Denies change in bowel habits, dyspepsia, ulcer disease, hematochezia, or melena.     Endocrine: Negative for cold intolerance, heat intolerance, polydipsia, polyphagia and polyuria. Denies any history of weight change, heat/cold intolerance, polydipsia, polyuria     Genitourinary: Negative.      Musculoskeletal: Denies any history of arthritic symptoms or back problems     Skin:  Denies any change in hair or nails, rashes, or skin lesions.     Allergic/Immunologic: Negative.  Negative for environmental allergies, food allergies and immunocompromised state.     Neurological:  Denies any history of recurrent headaches, strokes, TIA, or seizure disorder.     Hematological: Denies any food allergies, seasonal allergies, bleeding disorders, or lymphadenopathy.     Psychiatric/Behavioral: Denies any history of depression, substance abuse, or change in cognitive function.         Physical Exam     Constitutional: Cooperative, alert and oriented, well-developed, well-nourished, in no acute distress.     HENT:   Head: Normocephalic, normal hair patterns, no masses or tenderness.  Ears, Nose, and Throat: No gross abnormalities. No pallor or cyanosis. Dentition good.   Eyes: EOMS intact, PERRL, conjunctivae and lids unremarkable. Fundoscopic exam and visual fields not performed.   Neck: No palpable masses or adenopathy, no thyromegaly, no JVD, carotid pulses are full and equal bilaterally and without  Bruits.     Cardiovascular: Regular rhythm, S1 and S2 normal, no S3 or S4. Apical impulse not displaced. No murmurs, gallops, or rubs detected.     Pulmonary/Chest: Chest: normal symmetry, no tenderness to  palpation, normal respiratory excursion, no intercostal retraction, no use of accessory muscles.            Pulmonary: Normal breath sounds. No rales or ronchi.    Abdominal: Abdomen soft, bowel sounds normoactive, no masses, no hepatosplenomegaly, non-tender, no bruits.     Musculoskeletal: No deformities, clubbing, cyanosis, erythema, or edema observed. There are no spinal abnormalities noted. Normal muscle strength and tone. Pulses full and equal in all extremities, no bruits auscultated.     Neurological: No gross motor or sensory deficits noted, affect appropriate, oriented to time, person, place.     Skin: Warm and dry to the touch, no apparent skin lesions or masses noted.     Psychiatric: She has a normal mood and affect. Her behavior is normal. Judgment and thought content normal.         Procedures      Lab Results   Component Value Date    WBC 5.63 05/12/2021    HGB 11.2 (L) 05/19/2021    HCT 34.2 05/19/2021    MCV 84.8 05/12/2021     05/12/2021     Lab Results   Component Value Date    GLUCOSE 117 (H) 04/06/2021    BUN 24 (H) 04/06/2021    CREATININE 0.80 04/06/2021    EGFRIFAFRI 86 04/06/2021    BCR 30.0 (H) 04/06/2021    CO2 28.0 04/06/2021    CALCIUM 9.7 04/06/2021    ALBUMIN 4.60 09/22/2021    AST 19 09/22/2021    ALT 11 09/22/2021     Lab Results   Component Value Date    CHOL 172 09/22/2021    CHOL 194 06/01/2021    CHOL 255 (H) 10/15/2020     Lab Results   Component Value Date    TRIG 68 09/22/2021    TRIG 112 06/01/2021    TRIG 52 10/15/2020     Lab Results   Component Value Date    HDL 64 (H) 09/22/2021    HDL 55 06/01/2021    HDL 82 (H) 10/15/2020     No components found for: LDLCALC  Lab Results   Component Value Date    LDL 95 09/22/2021     (H) 06/01/2021     (H) 10/15/2020     No results found for: HDLLDLRATIO  No components found for: CHOLHDL  Lab Results   Component Value Date    HGBA1C 5.90 (H) 03/29/2019     Lab Results   Component Value Date    TSH 0.870 09/22/2021     I3KBYSJ 8.06 09/22/2021           ASSESSMENT AND PLAN      Diagnoses and all orders for this visit:    1. 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 No follow-up required.                Luis Diaz MD  4/28/2022  08:54 CDT   regular

## 2022-04-29 LAB
QT INTERVAL: 366 MS
QTC INTERVAL: 425 MS

## 2022-05-03 ENCOUNTER — OFFICE VISIT (OUTPATIENT)
Dept: ORTHOPEDIC SURGERY | Facility: CLINIC | Age: 72
End: 2022-05-03

## 2022-05-03 VITALS — BODY MASS INDEX: 24.75 KG/M2 | WEIGHT: 167.1 LBS | HEIGHT: 69 IN

## 2022-05-03 DIAGNOSIS — Z96.649 STATUS POST REVISION OF TOTAL HIP REPLACEMENT: ICD-10-CM

## 2022-05-03 DIAGNOSIS — I10 BENIGN ESSENTIAL HYPERTENSION: ICD-10-CM

## 2022-05-03 DIAGNOSIS — Z96.641 HISTORY OF RIGHT HIP REPLACEMENT: Primary | ICD-10-CM

## 2022-05-03 PROCEDURE — 99213 OFFICE O/P EST LOW 20 MIN: CPT | Performed by: ORTHOPAEDIC SURGERY

## 2022-05-03 NOTE — PROGRESS NOTES
"Chrissy Dawson is a 71 y.o. female returns for     Chief Complaint   Patient presents with   • Right Hip - Follow-up       HISTORY OF PRESENT ILLNESS: Patient is here today for follow up of right revision of hip arthroplasty on 5/19/21.  Having mild soreness on the lateral aspect of thigh but otherwise is doing very well.  She reports no fever or chills  Good mobility using a cane.   She reports that she is much better than she was prior to surgery.      CONCURRENT MEDICAL HISTORY:    The following portions of the patient's history were reviewed and updated as appropriate: allergies, current medications, past family history, past medical history, past social history, past surgical history and problem list.     ROS  No fevers or chills.  No chest pain or shortness of air.  No GI or  disturbances.    PHYSICAL EXAMINATION:       Ht 175.3 cm (69\")   Wt 75.8 kg (167 lb 1.6 oz)   BMI 24.68 kg/m²     Physical Exam  Constitutional:       General: She is not in acute distress.     Appearance: Normal appearance.   Pulmonary:      Effort: Pulmonary effort is normal. No respiratory distress.   Neurological:      Mental Status: She is alert and oriented to person, place, and time.         GAIT:     []  Normal  [x]  Antalgic    Assistive device: []  None  []  Walker     []  Crutches  [x]  Cane     []  Wheelchair  []  Stretcher    Right Hip Exam     Comments:  Mild soreness on lateral aspect of thigh.  She is able to actively flex her hip.  She is walking with a cane but is carrying the cane beside her.  Good distal pulses and sensation.  Good muscle tone and strength.                      ASSESSMENT:    Diagnoses and all orders for this visit:    History of right hip replacement    Benign essential hypertension    Status post revision of total hip replacement          PLAN    Continue use of cane for support.  Continue general strength and conditioning exercises.  No true restrictions  Activity as tolerated.  F/u as " needed.    Return if symptoms worsen or fail to improve, for recheck.    Jeovanny Maravilla MD

## 2022-05-05 ENCOUNTER — HOSPITAL ENCOUNTER (OUTPATIENT)
Dept: NUCLEAR MEDICINE | Facility: HOSPITAL | Age: 72
Discharge: HOME | End: 2022-05-05

## 2022-05-05 ENCOUNTER — HOSPITAL ENCOUNTER (OUTPATIENT)
Dept: CARDIOLOGY | Facility: HOSPITAL | Age: 72
Discharge: HOME | End: 2022-05-05

## 2022-05-05 DIAGNOSIS — I25.718 CORONARY ARTERY DISEASE OF AUTOLOGOUS VEIN BYPASS GRAFT WITH STABLE ANGINA PECTORIS: ICD-10-CM

## 2022-05-05 LAB
BH CV STRESS COMMENTS STAGE 1: NORMAL
BH CV STRESS DOSE REGADENOSON STAGE 1: 0.4
BH CV STRESS DURATION MIN STAGE 1: 0
BH CV STRESS DURATION SEC STAGE 1: 10
BH CV STRESS HR STAGE 1: 67
BH CV STRESS NUCLEAR ISOTOPE DOSE: 35.7 MCI
BH CV STRESS PROTOCOL 1: NORMAL
BH CV STRESS RECOVERY BP: NORMAL MMHG
BH CV STRESS RECOVERY HR: 87 BPM
BH CV STRESS STAGE 1: 1
LV EF NUC BP: 84 %
MAXIMAL PREDICTED HEART RATE: 149 BPM
PERCENT MAX PREDICTED HR: 82.55 %
STRESS BASELINE BP: NORMAL MMHG
STRESS BASELINE HR: 70 BPM
STRESS PERCENT HR: 97 %
STRESS POST ESTIMATED WORKLOAD: 1 METS
STRESS POST EXERCISE DUR SEC: 42 SEC
STRESS POST PEAK BP: NORMAL MMHG
STRESS POST PEAK HR: 123 BPM
STRESS TARGET HR: 127 BPM

## 2022-05-05 PROCEDURE — 93016 CV STRESS TEST SUPVJ ONLY: CPT | Performed by: INTERNAL MEDICINE

## 2022-05-05 PROCEDURE — 0 TECHNETIUM SESTAMIBI: Performed by: INTERNAL MEDICINE

## 2022-05-05 PROCEDURE — 93018 CV STRESS TEST I&R ONLY: CPT | Performed by: INTERNAL MEDICINE

## 2022-05-05 PROCEDURE — 78452 HT MUSCLE IMAGE SPECT MULT: CPT

## 2022-05-05 PROCEDURE — 93017 CV STRESS TEST TRACING ONLY: CPT

## 2022-05-05 PROCEDURE — 25010000002 REGADENOSON 0.4 MG/5ML SOLUTION: Performed by: INTERNAL MEDICINE

## 2022-05-05 PROCEDURE — A9500 TC99M SESTAMIBI: HCPCS | Performed by: INTERNAL MEDICINE

## 2022-05-05 PROCEDURE — 78452 HT MUSCLE IMAGE SPECT MULT: CPT | Performed by: INTERNAL MEDICINE

## 2022-05-05 RX ORDER — SODIUM CHLORIDE 0.9 % (FLUSH) 0.9 %
10 SYRINGE (ML) INJECTION ONCE
Status: COMPLETED | OUTPATIENT
Start: 2022-05-05 | End: 2022-05-05

## 2022-05-05 RX ADMIN — REGADENOSON 0.4 MG: 0.08 INJECTION, SOLUTION INTRAVENOUS at 08:45

## 2022-05-05 RX ADMIN — Medication 10 ML: at 08:45

## 2022-05-05 RX ADMIN — TECHNETIUM TC 99M SESTAMIBI 1 DOSE: 1 INJECTION INTRAVENOUS at 07:41

## 2022-05-05 RX ADMIN — TECHNETIUM TC 99M SESTAMIBI 1 DOSE: 1 INJECTION INTRAVENOUS at 08:46

## 2022-05-06 ENCOUNTER — TELEPHONE (OUTPATIENT)
Dept: CARDIOLOGY | Facility: CLINIC | Age: 72
End: 2022-05-06

## 2022-05-06 NOTE — TELEPHONE ENCOUNTER
Contacted PT per Dr. Diaz about results. Results are as follows;Let her know her nuclear stress test showed no significant ischemia.  PT voiced understanding.           ----- Message from Luis Diaz MD sent at 5/6/2022 10:14 AM CDT -----  Regarding: Stress test  Let her know her nuclear stress test showed no significant ischemia  ----- Message -----  From: Luis Diaz MD  Sent: 5/5/2022  12:25 PM CDT  To: Luis Diaz MD

## 2022-06-23 ENCOUNTER — OFFICE VISIT (OUTPATIENT)
Dept: CARDIOLOGY | Facility: CLINIC | Age: 72
End: 2022-06-23

## 2022-06-23 VITALS
DIASTOLIC BLOOD PRESSURE: 68 MMHG | HEART RATE: 87 BPM | HEIGHT: 70 IN | SYSTOLIC BLOOD PRESSURE: 108 MMHG | BODY MASS INDEX: 25.03 KG/M2 | OXYGEN SATURATION: 93 % | WEIGHT: 174.8 LBS

## 2022-06-23 DIAGNOSIS — I25.708 CORONARY ARTERY DISEASE OF BYPASS GRAFT OF NATIVE HEART WITH STABLE ANGINA PECTORIS: Primary | ICD-10-CM

## 2022-06-23 DIAGNOSIS — E78.2 MIXED HYPERLIPIDEMIA: ICD-10-CM

## 2022-06-23 PROCEDURE — 99213 OFFICE O/P EST LOW 20 MIN: CPT | Performed by: INTERNAL MEDICINE

## 2022-06-23 NOTE — PROGRESS NOTES
Chrissy Dawson  72 y.o. female    06/23/2022  Chief Complaint   Patient presents with   • Coronary Artery Disease   • Hyperlipidemia       History of Present Illness  History of CABG in 2000 to the LAD (this was after she had restenosis status post PCI stent)    -        SUBJECTIVE  Patient overall is doing well.  She is having no angina.  She had some atypical chest pain about a month and a half ago we did a nuclear that showed no ischemia.  Her heart function is normal.  Allergies   Allergen Reactions   • Crestor [Rosuvastatin] Shortness Of Breath   • Lipitor [Atorvastatin] Shortness Of Breath         Past Medical History:   Diagnosis Date   • Backache    • Benign essential hypertension     PATIENT DENIES   • Cervical arthritis    • Coronary arteriosclerosis    • Fibrocystic breast    • Ganglion cyst of wrist     Ganglion/synovial cyst - wrist   • Ganglion of wrist    • Hip pain    • History of echocardiogram 10/23/2015    Echocardiogram W/ color flow 44226 (1) - Normal LV function with Ef of 55-60%.Normal RV size and function.Pseudonormal diastolic dysfunciton with borderline [facility].NO sig.valvular regurg or stenosis.   • History of mammogram 09/10/2014    MAMMOGRAM SCREENING 58671 - [facility] (1) - [facility] ([facility])   • History of transfusion    • Hyperlipidemia    • Inguinal pain    • Recurrent angina status post coronary artery bypass graft (HCC)     Recurrent angina after coronary artery bypass graft   • Urge incontinence of urine          Past Surgical History:   Procedure Laterality Date   • BREAST BIOPSY Right 2/2/2017    Procedure: RIGHT BREAST LUMPECTOMY WITH NEEDLE LOCALIZATION AND ULTRASOUND;  Surgeon: Delfino Greer MD;  Location: [facility] OR;  Service:    • BREAST CYST ASPIRATION     • CARDIAC SURGERY     • CORONARY ARTERY BYPASS GRAFT  2000    CABG, arterial, single (1)   • GANGLION CYST EXCISION     • JOINT REPLACEMENT     • ORIF MANDIBULAR FRACTURE     • TOTAL ABDOMINAL HYSTERECTOMY     •  TOTAL ABDOMINAL HYSTERECTOMY WITH SALPINGO OOPHORECTOMY  1998    SALVADOR/BSO (1)   • TOTAL HIP ARTHROPLASTY  2011    Total hip replacement (3)   • TOTAL HIP ARTHROPLASTY REVISION Right 5/19/2021    Procedure: anterior revision total hip arthroplasty        (lg-head c-arm and hana table );  Surgeon: Jeovanny Maravilla MD;  Location: [facility];  Service: Orthopedics;  Laterality: Right;         Family History   Problem Relation Age of Onset   • Heart disease Other    • Hypertension Other    • Hypertension Mother    • Diabetes Father    • Depression Maternal Grandmother    • Heart disease Maternal Grandfather          Social History     Socioeconomic History   • Marital status:    Tobacco Use   • Smoking status: Never Smoker   • Smokeless tobacco: Never Used   Vaping Use   • Vaping Use: Never used   Substance and Sexual Activity   • Alcohol use: No   • Drug use: No   • Sexual activity: Defer     Comment:          Prior to Admission medications    Medication Sig Start Date End Date Taking? Authorizing Provider   aspirin 81 MG tablet Take 81 mg by mouth.   Yes Olga Ruiz MD   Calcium Citrate (CITRACAL PO) Take  by mouth.   Yes Olga Ruiz MD   Multiple Vitamins-Minerals (CENTRUM SILVER PO) Take 1 tablet by mouth Daily.   Yes Olga Ruiz MD   nitroglycerin (NITROSTAT) 0.4 MG SL tablet 1 under the tongue as needed for angina, may repeat q5mins for up three doses  Patient taking differently: Place 0.4 mg under the tongue Take As Directed. 1 under the tongue as needed for angina, may repeat q5mins for up three doses 8/24/20  Yes Vishnu Reynaga MD PhD   pravastatin (PRAVACHOL) 40 MG tablet Take 1 tablet by mouth Every Night. 10/13/21  Yes Luis Diaz MD   vitamin C (ASCORBIC ACID) 500 MG tablet Take 500 mg by mouth Daily.   Yes Olga Ruiz MD   fluticasone (FLONASE) 50 MCG/ACT nasal spray 2 sprays into the nostril(s) as directed by provider Daily.  "9/19/21 1/9/22  Je Redd MD   montelukast (SINGULAIR) 10 MG tablet Take 1 tablet by mouth Every Night. 9/19/21 1/9/22  Je Redd MD         OBJECTIVE    /68   Pulse 87   Ht 176.5 cm (69.5\")   Wt 79.3 kg (174 lb 12.8 oz)   SpO2 93%   BMI 25.44 kg/m²         Review of Systems     Constitutional:  Denies recent weight loss, weight gain, fever or chills, no change in exercise tolerance     HENT:  Denies any hearing loss, epistaxis, hoarseness, or difficulty speaking.     Eyes: Wears eyeglasses or contact lenses     Respiratory:  Denies dyspnea with exertion,no cough, wheezing, or hemoptysis.     Cardiovascular: Negative for palpations, chest pain, orthopnea, PND, peripheral edema, syncope, or claudication.     Gastrointestinal:  Denies change in bowel habits, dyspepsia, ulcer disease, hematochezia, or melena.     Endocrine: Negative for cold intolerance, heat intolerance, polydipsia, polyphagia and polyuria. Denies any history of weight change, heat/cold intolerance, polydipsia, polyuria     Genitourinary: Negative.      Musculoskeletal: Denies any history of arthritic symptoms or back problems     Skin:  Denies any change in hair or nails, rashes, or skin lesions.     Allergic/Immunologic: Negative.  Negative for environmental allergies, food allergies and immunocompromised state.     Neurological:  Denies any history of recurrent headaches, strokes, TIA, or seizure disorder.     Hematological: Denies any food allergies, seasonal allergies, bleeding disorders, or lymphadenopathy.     Psychiatric/Behavioral: Denies any history of depression, substance abuse, or change in cognitive function.         Physical Exam     Constitutional: Cooperative, alert and oriented, well-developed, well-nourished, in no acute distress.     HENT:   Head: Normocephalic, normal hair patterns, no masses or tenderness.  Ears, Nose, and Throat: No gross abnormalities. No pallor or cyanosis. Dentition good.   Eyes: EOMS " intact, PERRL, conjunctivae and lids unremarkable. Fundoscopic exam and visual fields not performed.   Neck: No palpable masses or adenopathy, no thyromegaly, no JVD, carotid pulses are full and equal bilaterally and without  Bruits.     Cardiovascular: Regular rhythm, S1 and S2 normal, no S3 or S4. Apical impulse not displaced. No murmurs, gallops, or rubs detected.     Pulmonary/Chest: Chest: normal symmetry, no tenderness to palpation, normal respiratory excursion, no intercostal retraction, no use of accessory muscles.            Pulmonary: Normal breath sounds. No rales or ronchi.    Abdominal: Abdomen soft, bowel sounds normoactive, no masses, no hepatosplenomegaly, non-tender, no bruits.     Musculoskeletal: No deformities, clubbing, cyanosis, erythema, or edema observed. There are no spinal abnormalities noted. Normal muscle strength and tone. Pulses full and equal in all extremities, no bruits auscultated.     Neurological: No gross motor or sensory deficits noted, affect appropriate, oriented to time, person, place.     Skin: Warm and dry to the touch, no apparent skin lesions or masses noted.     Psychiatric: She has a normal mood and affect. Her behavior is normal. Judgment and thought content normal.         Procedures      Lab Results   Component Value Date    WBC 5.63 05/12/2021    HGB 11.2 (L) 05/19/2021    HCT 34.2 05/19/2021    MCV 84.8 05/12/2021     05/12/2021     Lab Results   Component Value Date    GLUCOSE 117 (H) 04/06/2021    BUN 24 (H) 04/06/2021    CREATININE 0.80 04/06/2021    EGFRIFAFRI 86 04/06/2021    BCR 30.0 (H) 04/06/2021    CO2 28.0 04/06/2021    CALCIUM 9.7 04/06/2021    ALBUMIN 4.60 09/22/2021    AST 19 09/22/2021    ALT 11 09/22/2021     Lab Results   Component Value Date    CHOL 172 09/22/2021    CHOL 194 06/01/2021    CHOL 255 (H) 10/15/2020     Lab Results   Component Value Date    TRIG 68 09/22/2021    TRIG 112 06/01/2021    TRIG 52 10/15/2020     Lab Results    Component Value Date    HDL 64 (H) 09/22/2021    HDL 55 06/01/2021    HDL 82 (H) 10/15/2020     No components found for: LDLCALC  Lab Results   Component Value Date    LDL 95 09/22/2021     (H) 06/01/2021     (H) 10/15/2020     No results found for: HDLLDLRATIO  No components found for: CHOLHDL  Lab Results   Component Value Date    HGBA1C 5.90 (H) 03/29/2019     Lab Results   Component Value Date    TSH 0.870 09/22/2021    W2EUWCU 8.06 09/22/2021           ASSESSMENT AND PLAN      Diagnoses and all orders for this visit:    1. Coronary artery disease of bypass graft of native heart with stable angina pectoris (HCC) (Primary)    2. Mixed hyperlipidemia    The patient overall is stable.  Nuclear showed no ischemia.  Her cholesterol is much better since her pravastatin was 40 mg.  At this time we will make no change to her medicine.  She will see us back in about 1 year and will see Dr. Lopez.  If there is any issues she is going to contact us.    BMI is >= 25 and <30. (Overweight) The following options were offered after discussion;: referral to primary care                Luis Diaz MD  6/23/2022  12:12 CDT

## 2022-07-29 ENCOUNTER — OFFICE VISIT (OUTPATIENT)
Dept: FAMILY MEDICINE CLINIC | Facility: CLINIC | Age: 72
End: 2022-07-29

## 2022-07-29 ENCOUNTER — LAB (OUTPATIENT)
Dept: LAB | Facility: HOSPITAL | Age: 72
End: 2022-07-29

## 2022-07-29 VITALS
HEIGHT: 70 IN | SYSTOLIC BLOOD PRESSURE: 114 MMHG | HEART RATE: 76 BPM | BODY MASS INDEX: 24.34 KG/M2 | DIASTOLIC BLOOD PRESSURE: 70 MMHG | OXYGEN SATURATION: 97 % | WEIGHT: 170 LBS

## 2022-07-29 DIAGNOSIS — R82.90 ABNORMAL URINE ODOR: Primary | ICD-10-CM

## 2022-07-29 LAB
BILIRUB BLD-MCNC: NEGATIVE MG/DL
CLARITY, POC: ABNORMAL
COLOR UR: YELLOW
GLUCOSE UR STRIP-MCNC: NEGATIVE MG/DL
KETONES UR QL: NEGATIVE
LEUKOCYTE EST, POC: NEGATIVE
NITRITE UR-MCNC: NEGATIVE MG/ML
PH UR: 6.5 [PH] (ref 5–8)
PROT UR STRIP-MCNC: NEGATIVE MG/DL
RBC # UR STRIP: NEGATIVE /UL
SP GR UR: 1.02 (ref 1–1.03)
UROBILINOGEN UR QL: NORMAL

## 2022-07-29 PROCEDURE — 87086 URINE CULTURE/COLONY COUNT: CPT | Performed by: FAMILY MEDICINE

## 2022-07-29 PROCEDURE — 87186 SC STD MICRODIL/AGAR DIL: CPT | Performed by: FAMILY MEDICINE

## 2022-07-29 PROCEDURE — 81002 URINALYSIS NONAUTO W/O SCOPE: CPT | Performed by: FAMILY MEDICINE

## 2022-07-29 PROCEDURE — 81001 URINALYSIS AUTO W/SCOPE: CPT | Performed by: FAMILY MEDICINE

## 2022-07-29 PROCEDURE — 87088 URINE BACTERIA CULTURE: CPT | Performed by: FAMILY MEDICINE

## 2022-07-29 PROCEDURE — 99213 OFFICE O/P EST LOW 20 MIN: CPT | Performed by: FAMILY MEDICINE

## 2022-07-29 NOTE — PROGRESS NOTES
"Chief Complaint  Malodorous Urine    Subjective    History of Present Illness {CC  Problem List  Visit  Diagnosis   Encounters  Notes  Medications  Labs  Result Review Imaging  Media :23}     Chrissy Dawson presents to [facility] - [place] for     Chief Complaint   Patient presents with   • Malodorous Urine      Difficulty Urinating  This is a new problem. The current episode started 1 to 4 weeks ago. The problem occurs constantly. The problem has been unchanged. Pertinent negatives include no abdominal pain or fever. Associated symptoms comments: Urinary odor present, no dysuria, no flank pain. She has tried nothing for the symptoms.       Current Outpatient Medications:   •  aspirin 81 MG tablet, Take 81 mg by mouth., Disp: , Rfl:   •  Calcium Citrate (CITRACAL PO), Take  by mouth., Disp: , Rfl:   •  Multiple Vitamins-Minerals (CENTRUM SILVER PO), Take 1 tablet by mouth Daily., Disp: , Rfl:   •  nitroglycerin (NITROSTAT) 0.4 MG SL tablet, 1 under the tongue as needed for angina, may repeat q5mins for up three doses (Patient taking differently: Place 0.4 mg under the tongue Take As Directed. 1 under the tongue as needed for angina, may repeat q5mins for up three doses), Disp: 15 tablet, Rfl: 6  •  pravastatin (PRAVACHOL) 40 MG tablet, Take 1 tablet by mouth Every Night., Disp: 90 tablet, Rfl: 3  •  vitamin C (ASCORBIC ACID) 500 MG tablet, Take 500 mg by mouth Daily., Disp: , Rfl:      Objective       Vital Signs:   /70   Pulse 76   Ht 176.5 cm (69.5\")   Wt 77.1 kg (170 lb)   SpO2 97%   BMI 24.74 kg/m²     Physical Exam  Vitals reviewed.   Constitutional:       General: She is not in acute distress.     Appearance: She is well-developed.   Cardiovascular:      Rate and Rhythm: Normal rate and regular rhythm.      Heart sounds: Normal heart sounds. No murmur heard.  Pulmonary:      Effort: Pulmonary effort is normal. No respiratory distress.    "   Breath sounds: Normal breath sounds. No wheezing or rales.   Abdominal:      Tenderness: There is no abdominal tenderness (no suprapubic tenderness). There is no right CVA tenderness or left CVA tenderness.   Skin:     General: Skin is warm and dry.      Findings: No rash.   Neurological:      Mental Status: She is alert and oriented to person, place, and time.        Result Review :{ Labs  Result Review  Imaging  Med Tab  Media :23}   The following data was reviewed by: Fanny Herrera MD on 07/29/2022    Common labs    Common Labsle 9/22/21 9/22/21    1353 1353   Albumin  4.60   Total Bilirubin  0.2   Alkaline Phosphatase  92   AST (SGOT)  19   ALT (SGPT)  11   Total Cholesterol 172    Triglycerides 68    HDL Cholesterol 64 (A)    LDL Cholesterol  95    (A) Abnormal value                      Assessment and Plan {CC Problem List  Visit Diagnosis  ROS  Review (Popup)  Health Maintenance  Quality  BestPractice  Medications  SmartSets  SnapShot Encounters  Media :23}   Diagnoses and all orders for this visit:    1. Abnormal urine odor (Primary)  -     Urinalysis With Microscopic - Urine, Clean Catch  -     Urine Culture - Urine, Urine, Clean Catch  -     POCT urinalysis dipstick, manual       Urinalysis done in office normal, no evidence of infection  Send for microscopy and culture  Encouraged to increase water intake  Will follow up once all results are back, antibiotics if indicated on culture      Follow Up {Instructions Charge Capture  Follow-up Communications :23}   Return if symptoms worsen or fail to improve, for Next scheduled follow up.  Patient was given instructions and counseling regarding her condition or for health maintenance advice. Please see specific information pulled into the AVS if appropriate.        This document has been electronically signed by Fanny Herrera MD

## 2022-07-30 LAB
BACTERIA UR QL AUTO: ABNORMAL /HPF
BILIRUB UR QL STRIP: NEGATIVE
CLARITY UR: ABNORMAL
COLOR UR: YELLOW
GLUCOSE UR STRIP-MCNC: NEGATIVE MG/DL
HGB UR QL STRIP.AUTO: NEGATIVE
HYALINE CASTS UR QL AUTO: ABNORMAL /LPF
KETONES UR QL STRIP: NEGATIVE
LEUKOCYTE ESTERASE UR QL STRIP.AUTO: NEGATIVE
NITRITE UR QL STRIP: POSITIVE
PH UR STRIP.AUTO: 7 [PH] (ref 5–8)
PROT UR QL STRIP: NEGATIVE
RBC # UR STRIP: ABNORMAL /HPF
REF LAB TEST METHOD: ABNORMAL
SP GR UR STRIP: 1.02 (ref 1–1.03)
SQUAMOUS #/AREA URNS HPF: ABNORMAL /HPF
UROBILINOGEN UR QL STRIP: ABNORMAL
WBC # UR STRIP: ABNORMAL /HPF

## 2022-07-31 LAB — BACTERIA SPEC AEROBE CULT: ABNORMAL

## 2022-07-31 RX ORDER — CEPHALEXIN 500 MG/1
500 CAPSULE ORAL 3 TIMES DAILY
Qty: 21 CAPSULE | Refills: 0 | Status: SHIPPED | OUTPATIENT
Start: 2022-07-31 | End: 2022-08-07

## 2022-08-09 ENCOUNTER — OFFICE VISIT (OUTPATIENT)
Dept: FAMILY MEDICINE CLINIC | Facility: CLINIC | Age: 72
End: 2022-08-09

## 2022-08-09 VITALS
SYSTOLIC BLOOD PRESSURE: 120 MMHG | HEIGHT: 70 IN | WEIGHT: 167.5 LBS | HEART RATE: 73 BPM | DIASTOLIC BLOOD PRESSURE: 80 MMHG | OXYGEN SATURATION: 94 % | BODY MASS INDEX: 23.98 KG/M2

## 2022-08-09 DIAGNOSIS — R22.42 LOCALIZED SWELLING OF LEFT LOWER EXTREMITY: Primary | ICD-10-CM

## 2022-08-09 PROCEDURE — 99213 OFFICE O/P EST LOW 20 MIN: CPT | Performed by: FAMILY MEDICINE

## 2022-08-11 ENCOUNTER — HOSPITAL ENCOUNTER (OUTPATIENT)
Dept: ULTRASOUND IMAGING | Facility: HOSPITAL | Age: 72
Discharge: HOME | End: 2022-08-11
Admitting: FAMILY MEDICINE

## 2022-08-11 DIAGNOSIS — R22.42 LOCALIZED SWELLING OF LEFT LOWER EXTREMITY: ICD-10-CM

## 2022-08-11 PROCEDURE — 76882 US LMTD JT/FCL EVL NVASC XTR: CPT

## 2022-08-15 ENCOUNTER — TELEPHONE (OUTPATIENT)
Dept: FAMILY MEDICINE CLINIC | Facility: CLINIC | Age: 72
End: 2022-08-15

## 2022-08-15 DIAGNOSIS — D17.24 LIPOMA OF LEFT LOWER EXTREMITY: Primary | ICD-10-CM

## 2022-08-15 NOTE — TELEPHONE ENCOUNTER
Patient called wanting to speak to someone in regards to her ultrasound results. Please call [phone]

## 2022-08-16 NOTE — TELEPHONE ENCOUNTER
Please let patient know that ultrasound showed concerns for lipoma.  This is a benign fatty tumor - non-cancerous.  Sometimes if getting larger or causing pain, we will refer to general surgery to discuss possible removal.  Please find out how she has been feeling since last visit.  Thanks, ROHAN Herrera

## 2022-08-16 NOTE — TELEPHONE ENCOUNTER
Patient said not any better and pain is still nagging. She would like to be referred to [facility]. She prefers someone you are familiar with as well.    Thanks.

## 2022-08-16 NOTE — TELEPHONE ENCOUNTER
Encourage tylenol and ice for pain.  Referral to general surgery has been placed.  Thanks, ROHAN Herrera

## 2022-08-17 ENCOUNTER — TELEPHONE (OUTPATIENT)
Dept: FAMILY MEDICINE CLINIC | Facility: CLINIC | Age: 72
End: 2022-08-17

## 2022-08-18 ENCOUNTER — TELEPHONE (OUTPATIENT)
Dept: FAMILY MEDICINE CLINIC | Facility: CLINIC | Age: 72
End: 2022-08-18

## 2022-08-18 NOTE — TELEPHONE ENCOUNTER
Pt returning call said was probably about her appt with general surgery and says that she didn't see message until 4 pm when she got off work as they can't have their phones at work .  Pt [phone]  Pt is aware that Dr Herrera is not in today

## 2022-08-23 ENCOUNTER — LAB (OUTPATIENT)
Dept: LAB | Facility: HOSPITAL | Age: 72
End: 2022-08-23

## 2022-08-23 ENCOUNTER — TELEPHONE (OUTPATIENT)
Dept: FAMILY MEDICINE CLINIC | Facility: CLINIC | Age: 72
End: 2022-08-23

## 2022-08-23 DIAGNOSIS — R82.90 ABNORMAL URINE ODOR: Primary | ICD-10-CM

## 2022-08-23 DIAGNOSIS — R82.90 ABNORMAL URINE ODOR: ICD-10-CM

## 2022-08-23 PROCEDURE — 87086 URINE CULTURE/COLONY COUNT: CPT

## 2022-08-23 PROCEDURE — 81001 URINALYSIS AUTO W/SCOPE: CPT

## 2022-08-23 PROCEDURE — 87186 SC STD MICRODIL/AGAR DIL: CPT

## 2022-08-23 PROCEDURE — 87088 URINE BACTERIA CULTURE: CPT

## 2022-08-23 NOTE — TELEPHONE ENCOUNTER
Patient called this morning and said the foul smelling urine is back and she wanted a urinalysis to see if the infection is gone.    I placed the lab order.    ThanksChely

## 2022-08-24 LAB
BACTERIA UR QL AUTO: ABNORMAL /HPF
BILIRUB UR QL STRIP: NEGATIVE
CLARITY UR: ABNORMAL
COLOR UR: YELLOW
GLUCOSE UR STRIP-MCNC: NEGATIVE MG/DL
HGB UR QL STRIP.AUTO: NEGATIVE
HYALINE CASTS UR QL AUTO: ABNORMAL /LPF
KETONES UR QL STRIP: NEGATIVE
LEUKOCYTE ESTERASE UR QL STRIP.AUTO: NEGATIVE
NITRITE UR QL STRIP: POSITIVE
PH UR STRIP.AUTO: 6 [PH] (ref 5–8)
PROT UR QL STRIP: NEGATIVE
RBC # UR STRIP: ABNORMAL /HPF
REF LAB TEST METHOD: ABNORMAL
SP GR UR STRIP: 1.02 (ref 1–1.03)
SQUAMOUS #/AREA URNS HPF: ABNORMAL /HPF
UROBILINOGEN UR QL STRIP: ABNORMAL
WBC # UR STRIP: ABNORMAL /HPF

## 2022-08-25 ENCOUNTER — TELEPHONE (OUTPATIENT)
Dept: FAMILY MEDICINE CLINIC | Facility: CLINIC | Age: 72
End: 2022-08-25

## 2022-08-25 DIAGNOSIS — N30.00 ACUTE CYSTITIS WITHOUT HEMATURIA: Primary | ICD-10-CM

## 2022-08-25 DIAGNOSIS — E78.5 HYPERLIPIDEMIA, UNSPECIFIED HYPERLIPIDEMIA TYPE: ICD-10-CM

## 2022-08-25 RX ORDER — SULFAMETHOXAZOLE AND TRIMETHOPRIM 800; 160 MG/1; MG/1
1 TABLET ORAL 2 TIMES DAILY
Qty: 10 TABLET | Refills: 0 | Status: SHIPPED | OUTPATIENT
Start: 2022-08-25 | End: 2022-08-30

## 2022-08-25 NOTE — TELEPHONE ENCOUNTER
-Per Dr. Herrera, Ms. Dawson has been called with recent lab results & recommendations.  Continue current medications and follow-up as planned or sooner if any problems.       ---- Message from Fanny Herrera MD sent at 8/25/2022  5:06 PM CDT -----  Needs treatment for urinary tract infection, will send bactrim.  Ask that she drink plenty of fluid with this medication.  Also needs some labs in 2 weeks once she is feeling better with a repeat urine test - these orders have been placed.  Thanks, ROHAN Herrera

## 2022-08-25 NOTE — PROGRESS NOTES
Per Dr. Herrera, Ms. Dawson has been called with recent lab results & recommendations.  Continue current medications and follow-up as planned or sooner if any problems.

## 2022-08-26 LAB — BACTERIA SPEC AEROBE CULT: ABNORMAL

## 2022-08-31 ENCOUNTER — OFFICE VISIT (OUTPATIENT)
Dept: SURGERY | Facility: CLINIC | Age: 72
End: 2022-08-31

## 2022-08-31 VITALS
BODY MASS INDEX: 24.34 KG/M2 | WEIGHT: 167.2 LBS | HEART RATE: 64 BPM | SYSTOLIC BLOOD PRESSURE: 130 MMHG | DIASTOLIC BLOOD PRESSURE: 78 MMHG | OXYGEN SATURATION: 98 %

## 2022-08-31 DIAGNOSIS — D17.24 LIPOMA OF LEFT LOWER EXTREMITY: Primary | ICD-10-CM

## 2022-08-31 PROCEDURE — 99202 OFFICE O/P NEW SF 15 MIN: CPT | Performed by: SURGERY

## 2022-09-04 PROBLEM — D17.24 LIPOMA OF LEFT LOWER EXTREMITY: Status: ACTIVE | Noted: 2022-09-04

## 2022-09-08 ENCOUNTER — PATIENT ROUNDING (BHMG ONLY) (OUTPATIENT)
Dept: SURGERY | Facility: CLINIC | Age: 72
End: 2022-09-08

## 2022-09-08 NOTE — PROGRESS NOTES
"September 8, 2022    Hello, may I speak with Chrissy Amira Dawson? This is she.    My name is Adina Kruger     I am a medical assistant with [facility]    Before we get started may I verify your date of birth? 1950 Date Of Birth Verified.    I am calling to officially welcome you to our practice and ask about your recent visit. Is this a good time to talk? Yes.    Tell me about your visit with us. What things went well? \"It went well.\"    We're always looking for ways to make our patients' experiences even better. Do you have recommendations on ways we may improve? No.    Overall were you satisfied with your first visit to our practice? Yes.    I appreciate you taking the time to speak with me today. Is there anything else I can do for you? No.      Thank you, and have a great day.    Patient is scheduled for follow-up office visit. Patient instructed to call the office with any questions or concerns.  "

## 2022-11-02 ENCOUNTER — OFFICE VISIT (OUTPATIENT)
Dept: SURGERY | Facility: CLINIC | Age: 72
End: 2022-11-02

## 2022-11-02 VITALS
HEIGHT: 70 IN | BODY MASS INDEX: 24.62 KG/M2 | HEART RATE: 73 BPM | TEMPERATURE: 97.7 F | OXYGEN SATURATION: 98 % | SYSTOLIC BLOOD PRESSURE: 140 MMHG | DIASTOLIC BLOOD PRESSURE: 80 MMHG | WEIGHT: 172 LBS

## 2022-11-02 DIAGNOSIS — D17.24 LIPOMA OF LEFT LOWER EXTREMITY: Primary | ICD-10-CM

## 2022-11-02 PROCEDURE — 99212 OFFICE O/P EST SF 10 MIN: CPT | Performed by: SURGERY

## 2022-11-07 NOTE — PROGRESS NOTES
Chief Complaint   Patient presents with   • Follow-up     Follow up 2 month reval        HPI  Recheck of left leg lipoma. No change since last visit in August.  Past Medical History:   Diagnosis Date   • Backache    • Benign essential hypertension     PATIENT DENIES   • Cervical arthritis    • Coronary arteriosclerosis    • Fibrocystic breast    • Ganglion cyst of wrist     Ganglion/synovial cyst - wrist   • Ganglion of wrist    • Hip pain    • History of echocardiogram 10/23/2015    Echocardiogram W/ color flow 09163 (1) - Normal LV function with Ef of 55-60%.Normal RV size and function.Pseudonormal diastolic dysfunciton with borderline [facility].NO sig.valvular regurg or stenosis.   • History of mammogram 09/10/2014    MAMMOGRAM SCREENING 59494 - [facility] (1) - [facility]ILL[facility] ([facility])   • History of transfusion    • Hyperlipidemia    • Inguinal pain    • Recurrent angina status post coronary artery bypass graft (HCC)     Recurrent angina after coronary artery bypass graft   • Urge incontinence of urine        Past Surgical History:   Procedure Laterality Date   • BREAST BIOPSY Right 2/2/2017    Procedure: RIGHT BREAST LUMPECTOMY WITH NEEDLE LOCALIZATION AND ULTRASOUND;  Surgeon: Delfino Greer MD;  Location: [facility];  Service:    • BREAST CYST ASPIRATION     • CARDIAC SURGERY     • CORONARY ARTERY BYPASS GRAFT  2000    CABG, arterial, single (1)   • GANGLION CYST EXCISION     • JOINT REPLACEMENT     • ORIF MANDIBULAR FRACTURE     • TOTAL ABDOMINAL HYSTERECTOMY     • TOTAL ABDOMINAL HYSTERECTOMY WITH SALPINGO OOPHORECTOMY  1998    SALVADOR/BSO (1)   • TOTAL HIP ARTHROPLASTY  2011    Total hip replacement (3)   • TOTAL HIP ARTHROPLASTY REVISION Right 5/19/2021    Procedure: anterior revision total hip arthroplasty        (lg-head c-arm and hana table );  Surgeon: Jeovanny Maravilla MD;  Location: [facility];  Service: Orthopedics;  Laterality: Right;         Current Outpatient Medications:   •  aspirin 81 MG tablet,  Take 81 mg by mouth., Disp: , Rfl:   •  Calcium Citrate (CITRACAL PO), Take  by mouth., Disp: , Rfl:   •  Multiple Vitamins-Minerals (CENTRUM SILVER PO), Take 1 tablet by mouth Daily., Disp: , Rfl:   •  nitroglycerin (NITROSTAT) 0.4 MG SL tablet, 1 under the tongue as needed for angina, may repeat q5mins for up three doses (Patient taking differently: Place 1 tablet under the tongue Take As Directed. 1 under the tongue as needed for angina, may repeat q5mins for up three doses), Disp: 15 tablet, Rfl: 6  •  pravastatin (PRAVACHOL) 40 MG tablet, Take 1 tablet by mouth Every Night., Disp: 90 tablet, Rfl: 3  •  vitamin C (ASCORBIC ACID) 500 MG tablet, Take 500 mg by mouth Daily., Disp: , Rfl:     Allergies   Allergen Reactions   • Crestor [Rosuvastatin] Shortness Of Breath   • Lipitor [Atorvastatin] Shortness Of Breath       Family History   Problem Relation Age of Onset   • Heart disease Mother    • Hypertension Mother    • Diabetes Father    • Depression Maternal Grandmother    • Heart disease Maternal Grandfather    • Heart disease Other    • Hypertension Other        Social History     Socioeconomic History   • Marital status:    Tobacco Use   • Smoking status: Never   • Smokeless tobacco: Never   Vaping Use   • Vaping Use: Never used   Substance and Sexual Activity   • Alcohol use: No   • Drug use: No   • Sexual activity: Defer     Comment:            Physical Exam  4 cm lipoma of the left leg- unchanged since last visit. No pain    ASSESSMENT    Diagnoses and all orders for this visit:    1. Lipoma of left lower extremity (Primary)        PLAN    1. Recheck in 4 months              This document has been electronically signed by Delfino Greer MD on November 6, 2022 23:43 CST

## 2022-12-06 ENCOUNTER — HOSPITAL ENCOUNTER (OUTPATIENT)
Dept: ULTRASOUND IMAGING | Facility: HOSPITAL | Age: 72
Discharge: HOME | End: 2022-12-06
Admitting: NURSE PRACTITIONER

## 2022-12-06 ENCOUNTER — OFFICE VISIT (OUTPATIENT)
Dept: FAMILY MEDICINE CLINIC | Facility: CLINIC | Age: 72
End: 2022-12-06

## 2022-12-06 ENCOUNTER — DOCUMENTATION (OUTPATIENT)
Dept: FAMILY MEDICINE CLINIC | Facility: CLINIC | Age: 72
End: 2022-12-06

## 2022-12-06 VITALS
DIASTOLIC BLOOD PRESSURE: 68 MMHG | HEIGHT: 70 IN | SYSTOLIC BLOOD PRESSURE: 118 MMHG | BODY MASS INDEX: 24.91 KG/M2 | HEART RATE: 79 BPM | RESPIRATION RATE: 24 BRPM | WEIGHT: 174 LBS | OXYGEN SATURATION: 99 %

## 2022-12-06 DIAGNOSIS — L53.9 ERYTHEMA OF LOWER EXTREMITY: ICD-10-CM

## 2022-12-06 DIAGNOSIS — M79.605 PAIN OF LEFT LOWER EXTREMITY: Primary | ICD-10-CM

## 2022-12-06 PROCEDURE — 93971 EXTREMITY STUDY: CPT

## 2022-12-06 PROCEDURE — 99214 OFFICE O/P EST MOD 30 MIN: CPT | Performed by: NURSE PRACTITIONER

## 2022-12-06 RX ORDER — CYCLOBENZAPRINE HCL 5 MG
5 TABLET ORAL 2 TIMES DAILY PRN
Qty: 60 TABLET | Refills: 0 | Status: SHIPPED | OUTPATIENT
Start: 2022-12-06

## 2022-12-06 NOTE — PROGRESS NOTES
"Chief Complaint  Pain    Subjective          Chrissy Dawson presents to [facility] [place] with left leg pain that started a few days ago and getting worse. Pain is described as a stabbing pain. She admits to working more and being more active, which may be why its hurting.   Lower Extremity Issue  This is a chronic (pain) problem. The current episode started more than 1 year ago. The problem occurs constantly. The problem has been gradually worsening. Associated symptoms include arthralgias and myalgias. The symptoms are aggravated by movement and weight bearing. She has tried rest for the symptoms. The treatment provided no relief.     Outpatient Medications Prior to Visit   Medication Sig Dispense Refill   • aspirin 81 MG tablet Take 81 mg by mouth.     • Calcium Citrate (CITRACAL PO) Take  by mouth.     • Multiple Vitamins-Minerals (CENTRUM SILVER PO) Take 1 tablet by mouth Daily.     • nitroglycerin (NITROSTAT) 0.4 MG SL tablet 1 under the tongue as needed for angina, may repeat q5mins for up three doses (Patient taking differently: Place 0.4 mg under the tongue Take As Directed. 1 under the tongue as needed for angina, may repeat q5mins for up three doses) 15 tablet 6   • pravastatin (PRAVACHOL) 40 MG tablet Take 1 tablet by mouth Every Night. 90 tablet 3   • vitamin C (ASCORBIC ACID) 500 MG tablet Take 500 mg by mouth Daily.       No facility-administered medications prior to visit.       Review of Systems   Musculoskeletal: Positive for arthralgias and myalgias.         Objective   Vital Signs:   Visit Vitals  /68 (BP Location: Right arm, Patient Position: Sitting, Cuff Size: Large Adult)   Pulse 79   Resp 24   Ht 176.5 cm (69.5\")   Wt 78.9 kg (174 lb)   SpO2 99%   BMI 25.33 kg/m²     Physical Exam  Vitals and nursing note reviewed.   Constitutional:       Appearance: She is well-developed.   HENT:      Head: Normocephalic and atraumatic.   Eyes: "      General: Lids are normal.      Conjunctiva/sclera: Conjunctivae normal.   Neck:      Thyroid: No thyroid mass or thyromegaly.      Trachea: Trachea normal. No tracheal tenderness.   Cardiovascular:      Rate and Rhythm: Normal rate.      Pulses: Normal pulses.      Heart sounds: Normal heart sounds.   Pulmonary:      Effort: Pulmonary effort is normal. No respiratory distress.      Breath sounds: Normal breath sounds. No wheezing.   Abdominal:      General: There is no distension.      Palpations: Abdomen is soft. There is no mass.   Musculoskeletal:         General: Normal range of motion.      Cervical back: Normal range of motion. No edema.      Left upper leg: Swelling and tenderness present.        Legs:    Skin:     General: Skin is warm and dry.      Coloration: Skin is not pale.      Findings: No abrasion, erythema or lesion.   Neurological:      Mental Status: She is alert and oriented to person, place, and time.   Psychiatric:         Mood and Affect: Mood is not anxious. Affect is not inappropriate.         Speech: Speech normal.         Behavior: Behavior normal.         Thought Content: Thought content normal.         Judgment: Judgment normal. Judgment is not impulsive.        Result Review :                 Assessment and Plan    Diagnoses and all orders for this visit:    1. Pain of left lower extremity (Primary)  -     US venous doppler lower extremity left (duplex)  -     cyclobenzaprine (FLEXERIL) 5 MG tablet; Take 1 tablet by mouth 2 (Two) Times a Day As Needed for Muscle Spasms.  Dispense: 60 tablet; Refill: 0    2. Erythema of lower extremity  -     US venous doppler lower extremity left (duplex)  -     cyclobenzaprine (FLEXERIL) 5 MG tablet; Take 1 tablet by mouth 2 (Two) Times a Day As Needed for Muscle Spasms.  Dispense: 60 tablet; Refill: 0      US today to rule out dvt   We will call with results    May use otc ibuprofen as well as muscle relaxer to help with pain     Once DVT is  ruled out, and if pain persist, we will discuss further plan, possible xray.       Follow Up   Return if symptoms worsen or fail to improve, for Next scheduled follow up.  Patient was given instructions and counseling regarding her condition or for health maintenance advice. Please see specific information pulled into the AVS if appropriate.           This document has been electronically signed by JOE Andrea on December 7, 2022 08:44 CST  Answers for HPI/ROS submitted by the patient on 12/6/2022  What is the primary reason for your visit?: Lower Extremity Injury  Incident occurred: more than 1 week ago  Incident location: other  Injury mechanism: other  Pain location: left thigh  Pain quality: aching, stabbing  Pain - numeric: 9/10  Pain course: constant  tingling: No  inability to bear weight: No  loss of motion: Yes  Foreign body present: no foreign bodies, other

## 2022-12-08 ENCOUNTER — DOCUMENTATION (OUTPATIENT)
Dept: FAMILY MEDICINE CLINIC | Facility: CLINIC | Age: 72
End: 2022-12-08

## 2023-01-04 ENCOUNTER — TELEPHONE (OUTPATIENT)
Dept: FAMILY MEDICINE CLINIC | Facility: CLINIC | Age: 73
End: 2023-01-04
Payer: MEDICARE

## 2023-01-04 NOTE — TELEPHONE ENCOUNTER
PT IS ASKING IF THERE IS ANOTHER NEUROLOGIST THAT SHE CAN BE REFERRED TO. STATES SHE HAS BEEN UNABLE TO SCHEDULE WITH THE PREVIOUS NEUROLOGIST.

## 2023-01-05 ENCOUNTER — TELEPHONE (OUTPATIENT)
Dept: FAMILY MEDICINE CLINIC | Facility: CLINIC | Age: 73
End: 2023-01-05
Payer: MEDICARE

## 2023-01-05 DIAGNOSIS — R25.1 TREMOR OF LEFT HAND: Primary | ICD-10-CM

## 2023-01-12 DIAGNOSIS — I25.10 CORONARY ARTERIOSCLEROSIS: ICD-10-CM

## 2023-01-12 RX ORDER — PRAVASTATIN SODIUM 40 MG
40 TABLET ORAL NIGHTLY
Qty: 90 TABLET | Refills: 3 | Status: SHIPPED | OUTPATIENT
Start: 2023-01-12

## 2023-01-12 NOTE — TELEPHONE ENCOUNTER
Incoming Refill Request      Medication requested (name and dose):pravastatin (PRAVACHOL) 40 MG tablet    Pharmacy where request should be sent: [facility]     Additional details provided by patient: took last pill last night needs today asks that covering provider fill   Best call back number: [phone]    Does the patient have less than a 3 day supply:  [x] Yes  [] No    Anitha Son Rep  01/12/23, 10:37 CST

## 2023-01-16 DIAGNOSIS — R25.1 TREMOR OF LEFT HAND: Primary | ICD-10-CM

## 2023-02-01 ENCOUNTER — TELEPHONE (OUTPATIENT)
Dept: FAMILY MEDICINE CLINIC | Facility: CLINIC | Age: 73
End: 2023-02-01
Payer: MEDICARE

## 2023-02-01 NOTE — TELEPHONE ENCOUNTER
Received information that patient had already seen/establish with Dr. Alberto at [facility], so [facility] will not see her.  Please ask that she contact Dr. Alberto's office for follow up appointment if she is not already scheduled.  ThanksROHAN

## 2023-03-08 ENCOUNTER — HOSPITAL ENCOUNTER (OUTPATIENT)
Dept: MRI IMAGING | Facility: HOSPITAL | Age: 73
Discharge: HOME | End: 2023-03-08
Admitting: PSYCHIATRY & NEUROLOGY
Payer: MEDICARE

## 2023-03-08 DIAGNOSIS — R25.1 TREMORS OF NERVOUS SYSTEM: ICD-10-CM

## 2023-03-08 DIAGNOSIS — D49.6 BRAIN NEOPLASM: ICD-10-CM

## 2023-03-08 PROCEDURE — 70551 MRI BRAIN STEM W/O DYE: CPT

## 2023-05-11 ENCOUNTER — TELEPHONE (OUTPATIENT)
Dept: FAMILY MEDICINE CLINIC | Facility: CLINIC | Age: 73
End: 2023-05-11
Payer: MEDICARE

## 2023-05-11 NOTE — TELEPHONE ENCOUNTER
[facility] called Pt requested incontinence supplies and they faxed an order form on May1 , 2023     Needs to know if we received it and get status   [phone] ph   [phone] fax

## 2023-05-12 NOTE — TELEPHONE ENCOUNTER
Did not receive, please ask that they send again and confirm with patient that these supplies were requested.  Thanks, ROHAN Herrera

## 2023-06-01 ENCOUNTER — OFFICE VISIT (OUTPATIENT)
Dept: FAMILY MEDICINE CLINIC | Facility: CLINIC | Age: 73
End: 2023-06-01

## 2023-06-01 VITALS
DIASTOLIC BLOOD PRESSURE: 74 MMHG | BODY MASS INDEX: 26.07 KG/M2 | WEIGHT: 176 LBS | HEIGHT: 69 IN | SYSTOLIC BLOOD PRESSURE: 128 MMHG | HEART RATE: 80 BPM | OXYGEN SATURATION: 97 %

## 2023-06-01 DIAGNOSIS — N39.0 RECURRENT UTI: ICD-10-CM

## 2023-06-01 DIAGNOSIS — R30.9 URINARY PAIN: Primary | ICD-10-CM

## 2023-06-01 LAB
BILIRUB BLD-MCNC: NEGATIVE MG/DL
CLARITY, POC: CLEAR
COLOR UR: YELLOW
EXPIRATION DATE: NORMAL
GLUCOSE UR STRIP-MCNC: NEGATIVE MG/DL
KETONES UR QL: NEGATIVE
LEUKOCYTE EST, POC: NEGATIVE
Lab: NORMAL
NITRITE UR-MCNC: NEGATIVE MG/ML
PH UR: 6 [PH] (ref 5–8)
PROT UR STRIP-MCNC: NEGATIVE MG/DL
RBC # UR STRIP: NEGATIVE /UL
SP GR UR: 1.02 (ref 1–1.03)
UROBILINOGEN UR QL: NORMAL

## 2023-06-01 PROCEDURE — 3074F SYST BP LT 130 MM HG: CPT | Performed by: NURSE PRACTITIONER

## 2023-06-01 PROCEDURE — 3078F DIAST BP <80 MM HG: CPT | Performed by: NURSE PRACTITIONER

## 2023-06-01 PROCEDURE — 1159F MED LIST DOCD IN RCRD: CPT | Performed by: NURSE PRACTITIONER

## 2023-06-01 PROCEDURE — 1160F RVW MEDS BY RX/DR IN RCRD: CPT | Performed by: NURSE PRACTITIONER

## 2023-06-01 PROCEDURE — 99213 OFFICE O/P EST LOW 20 MIN: CPT | Performed by: NURSE PRACTITIONER

## 2023-06-01 PROCEDURE — 81003 URINALYSIS AUTO W/O SCOPE: CPT | Performed by: NURSE PRACTITIONER

## 2023-06-01 NOTE — PROGRESS NOTES
"Chief Complaint  Urinary Tract Infection    Subjective          Chrissy Dawson presents to [facility] [place] with concerns of possible UTI. She reports lower abdomen pain as well as urine odor.     Urinary Tract Infection   This is a recurrent problem. The quality of the pain is described as aching. The pain is at a severity of 7/10. Associated symptoms include frequency and urgency. She has tried antibiotics for the symptoms. The treatment provided mild relief. Her past medical history is significant for recurrent UTIs.     Outpatient Medications Prior to Visit   Medication Sig Dispense Refill   • aspirin 81 MG tablet Take 1 tablet by mouth.     • Calcium Citrate (CITRACAL PO) Take  by mouth.     • Multiple Vitamins-Minerals (CENTRUM SILVER PO) Take 1 tablet by mouth Daily.     • nitroglycerin (NITROSTAT) 0.4 MG SL tablet 1 under the tongue as needed for angina, may repeat q5mins for up three doses (Patient taking differently: Place 1 tablet under the tongue Take As Directed. 1 under the tongue as needed for angina, may repeat q5mins for up three doses) 15 tablet 6   • pravastatin (PRAVACHOL) 40 MG tablet Take 1 tablet by mouth Every Night. 90 tablet 3   • vitamin C (ASCORBIC ACID) 500 MG tablet Take 1 tablet by mouth Daily.       No facility-administered medications prior to visit.       Review of Systems   Genitourinary: Positive for frequency and urgency.         Objective   Vital Signs:   Visit Vitals  /74 (BP Location: Left arm, Patient Position: Sitting, Cuff Size: Adult)   Pulse 80   Ht 176.5 cm (69.49\")   Wt 79.8 kg (176 lb)   SpO2 97%   BMI 25.63 kg/m²     Physical Exam  Vitals and nursing note reviewed.   Constitutional:       Appearance: She is well-developed.   HENT:      Head: Normocephalic and atraumatic.   Eyes:      General: Lids are normal.      Conjunctiva/sclera: Conjunctivae normal.   Neck:      Thyroid: No thyroid mass or " thyromegaly.      Trachea: Trachea normal. No tracheal tenderness.   Cardiovascular:      Rate and Rhythm: Normal rate.      Pulses: Normal pulses.      Heart sounds: Normal heart sounds.   Pulmonary:      Effort: Pulmonary effort is normal. No respiratory distress.      Breath sounds: Normal breath sounds. No wheezing.   Abdominal:      General: There is no distension.      Palpations: Abdomen is soft. There is no mass.      Tenderness: There is abdominal tenderness in the suprapubic area.   Musculoskeletal:         General: Normal range of motion.      Cervical back: Normal range of motion. No edema.   Skin:     General: Skin is warm and dry.      Coloration: Skin is not pale.      Findings: No abrasion, erythema or lesion.   Neurological:      Mental Status: She is alert and oriented to person, place, and time.   Psychiatric:         Mood and Affect: Mood is not anxious. Affect is not inappropriate.         Speech: Speech normal.         Behavior: Behavior normal.         Thought Content: Thought content normal.         Judgment: Judgment normal. Judgment is not impulsive.        Result Review :                 Assessment and Plan    Diagnoses and all orders for this visit:    1. Urinary pain (Primary)  -     POCT urinalysis dipstick, automated  -     Ambulatory Referral to Urology  -     Urine Culture - Urine, Urine, Clean Catch; Future  -     Urinalysis With Microscopic - Urine, Clean Catch; Future    2. Recurrent UTI  -     Ambulatory Referral to Urology  -     Urine Culture - Urine, Urine, Clean Catch; Future  -     Urinalysis With Microscopic - Urine, Clean Catch; Future      poc urine was negative, however we will send down to the lab for culture     Increase water intake     We will call with lab results once in        Follow Up   Return if symptoms worsen or fail to improve, for Next scheduled follow up.  Patient was given instructions and counseling regarding her condition or for health maintenance  advice. Please see specific information pulled into the AVS if appropriate.           This document has been electronically signed by JOE Andrea on June 2, 2023 08:07 CDT

## 2023-06-13 ENCOUNTER — LAB (OUTPATIENT)
Dept: LAB | Facility: HOSPITAL | Age: 73
End: 2023-06-13
Payer: MEDICARE

## 2023-06-13 DIAGNOSIS — N39.0 RECURRENT UTI: ICD-10-CM

## 2023-06-13 DIAGNOSIS — R30.9 URINARY PAIN: ICD-10-CM

## 2023-06-13 LAB
BACTERIA UR QL AUTO: ABNORMAL /HPF
BILIRUB UR QL STRIP: NEGATIVE
CLARITY UR: ABNORMAL
COLOR UR: YELLOW
GLUCOSE UR STRIP-MCNC: NEGATIVE MG/DL
HGB UR QL STRIP.AUTO: NEGATIVE
HYALINE CASTS UR QL AUTO: ABNORMAL /LPF
KETONES UR QL STRIP: NEGATIVE
LEUKOCYTE ESTERASE UR QL STRIP.AUTO: ABNORMAL
NITRITE UR QL STRIP: POSITIVE
PH UR STRIP.AUTO: 7 [PH] (ref 5–9)
PROT UR QL STRIP: NEGATIVE
RBC # UR STRIP: ABNORMAL /HPF
REF LAB TEST METHOD: ABNORMAL
SP GR UR STRIP: 1.02 (ref 1–1.03)
SQUAMOUS #/AREA URNS HPF: ABNORMAL /HPF
UROBILINOGEN UR QL STRIP: ABNORMAL
WBC # UR STRIP: ABNORMAL /HPF
WBC CLUMPS # UR AUTO: ABNORMAL /HPF

## 2023-06-13 PROCEDURE — 87186 SC STD MICRODIL/AGAR DIL: CPT

## 2023-06-13 PROCEDURE — 87086 URINE CULTURE/COLONY COUNT: CPT

## 2023-06-13 PROCEDURE — 81001 URINALYSIS AUTO W/SCOPE: CPT

## 2023-06-13 PROCEDURE — 87077 CULTURE AEROBIC IDENTIFY: CPT

## 2023-06-14 ENCOUNTER — TELEPHONE (OUTPATIENT)
Dept: FAMILY MEDICINE CLINIC | Facility: CLINIC | Age: 73
End: 2023-06-14
Payer: MEDICARE

## 2023-06-14 DIAGNOSIS — N39.0 RECURRENT UTI: Primary | ICD-10-CM

## 2023-06-14 RX ORDER — NITROFURANTOIN 25; 75 MG/1; MG/1
100 CAPSULE ORAL 2 TIMES DAILY
Qty: 10 CAPSULE | Refills: 0 | Status: SHIPPED | OUTPATIENT
Start: 2023-06-14

## 2023-06-14 NOTE — TELEPHONE ENCOUNTER
MsBayron Samir called and wanted to see if Dr. Herrera had the results of her urine test that she had done yesterday.  She said that she tried to get into her MyChart because it said that it had came back but couldn't get into it to see the results.     Pt call back [phone]

## 2023-06-16 LAB
BACTERIA SPEC AEROBE CULT: ABNORMAL
BACTERIA SPEC AEROBE CULT: ABNORMAL

## 2023-07-20 PROBLEM — I10 BENIGN ESSENTIAL HYPERTENSION: Status: ACTIVE | Noted: 2021-05-10

## 2023-07-20 PROBLEM — G25.0 ESSENTIAL TREMOR: Status: ACTIVE | Noted: 2023-07-14

## 2023-07-20 PROBLEM — E78.5 HYPERLIPIDEMIA: Status: ACTIVE | Noted: 2021-05-10

## 2023-08-24 ENCOUNTER — ANESTHESIA EVENT (OUTPATIENT)
Dept: PERIOP | Facility: HOSPITAL | Age: 73
End: 2023-08-24
Payer: MEDICARE

## 2023-08-25 ENCOUNTER — HOSPITAL ENCOUNTER (OUTPATIENT)
Facility: HOSPITAL | Age: 73
Setting detail: HOSPITAL OUTPATIENT SURGERY
Discharge: HOME | End: 2023-08-25
Attending: OPHTHALMOLOGY | Admitting: OPHTHALMOLOGY
Payer: MEDICARE

## 2023-08-25 ENCOUNTER — ANESTHESIA (OUTPATIENT)
Dept: PERIOP | Facility: HOSPITAL | Age: 73
End: 2023-08-25
Payer: MEDICARE

## 2023-08-25 VITALS
DIASTOLIC BLOOD PRESSURE: 66 MMHG | HEIGHT: 70 IN | WEIGHT: 174.16 LBS | TEMPERATURE: 97 F | OXYGEN SATURATION: 97 % | SYSTOLIC BLOOD PRESSURE: 123 MMHG | HEART RATE: 65 BPM | BODY MASS INDEX: 24.93 KG/M2 | RESPIRATION RATE: 18 BRPM

## 2023-08-25 PROCEDURE — 25010000002 VANCOMYCIN 1 G RECONSTITUTED SOLUTION 1 EACH VIAL: Performed by: OPHTHALMOLOGY

## 2023-08-25 PROCEDURE — V2632 POST CHMBR INTRAOCULAR LENS: HCPCS | Performed by: OPHTHALMOLOGY

## 2023-08-25 PROCEDURE — 25010000002 FENTANYL CITRATE (PF) 50 MCG/ML SOLUTION: Performed by: NURSE ANESTHETIST, CERTIFIED REGISTERED

## 2023-08-25 PROCEDURE — 25010000002 MIDAZOLAM PER 1 MG: Performed by: NURSE ANESTHETIST, CERTIFIED REGISTERED

## 2023-08-25 PROCEDURE — 25010000002 EPINEPHRINE PER 0.1 MG: Performed by: OPHTHALMOLOGY

## 2023-08-25 RX ORDER — PROMETHAZINE HYDROCHLORIDE 25 MG/1
25 SUPPOSITORY RECTAL ONCE AS NEEDED
Status: DISCONTINUED | OUTPATIENT
Start: 2023-08-25 | End: 2023-08-25 | Stop reason: HOSPADM

## 2023-08-25 RX ORDER — PHENYLEPHRINE HYDROCHLORIDE 25 MG/ML
1 SOLUTION/ DROPS OPHTHALMIC
Status: COMPLETED | OUTPATIENT
Start: 2023-08-25 | End: 2023-08-25

## 2023-08-25 RX ORDER — SODIUM CHLORIDE 0.9 % (FLUSH) 0.9 %
10 SYRINGE (ML) INJECTION AS NEEDED
Status: DISCONTINUED | OUTPATIENT
Start: 2023-08-25 | End: 2023-08-25 | Stop reason: HOSPADM

## 2023-08-25 RX ORDER — PREDNISOLONE ACETATE 10 MG/ML
SUSPENSION/ DROPS OPHTHALMIC AS NEEDED
Status: DISCONTINUED | OUTPATIENT
Start: 2023-08-25 | End: 2023-08-25 | Stop reason: HOSPADM

## 2023-08-25 RX ORDER — CYCLOPENTOLATE HYDROCHLORIDE 20 MG/ML
1 SOLUTION/ DROPS OPHTHALMIC
Status: COMPLETED | OUTPATIENT
Start: 2023-08-25 | End: 2023-08-25

## 2023-08-25 RX ORDER — TETRACAINE HYDROCHLORIDE 5 MG/ML
1 SOLUTION OPHTHALMIC
Status: COMPLETED | OUTPATIENT
Start: 2023-08-25 | End: 2023-08-25

## 2023-08-25 RX ORDER — TETRACAINE HYDROCHLORIDE 5 MG/ML
SOLUTION OPHTHALMIC AS NEEDED
Status: DISCONTINUED | OUTPATIENT
Start: 2023-08-25 | End: 2023-08-25 | Stop reason: HOSPADM

## 2023-08-25 RX ORDER — FENTANYL CITRATE 50 UG/ML
INJECTION, SOLUTION INTRAMUSCULAR; INTRAVENOUS AS NEEDED
Status: DISCONTINUED | OUTPATIENT
Start: 2023-08-25 | End: 2023-08-25 | Stop reason: SURG

## 2023-08-25 RX ORDER — OXYBUTYNIN CHLORIDE 10 MG/1
10 TABLET, EXTENDED RELEASE ORAL DAILY
COMMUNITY

## 2023-08-25 RX ORDER — NITROFURANTOIN MACROCRYSTALS 100 MG/1
100 CAPSULE ORAL NIGHTLY
COMMUNITY

## 2023-08-25 RX ORDER — MIDAZOLAM HYDROCHLORIDE 1 MG/ML
INJECTION INTRAMUSCULAR; INTRAVENOUS AS NEEDED
Status: DISCONTINUED | OUTPATIENT
Start: 2023-08-25 | End: 2023-08-25 | Stop reason: SURG

## 2023-08-25 RX ORDER — ONDANSETRON 2 MG/ML
4 INJECTION INTRAMUSCULAR; INTRAVENOUS ONCE AS NEEDED
Status: DISCONTINUED | OUTPATIENT
Start: 2023-08-25 | End: 2023-08-25 | Stop reason: HOSPADM

## 2023-08-25 RX ORDER — PROMETHAZINE HYDROCHLORIDE 25 MG/1
25 TABLET ORAL ONCE AS NEEDED
Status: DISCONTINUED | OUTPATIENT
Start: 2023-08-25 | End: 2023-08-25 | Stop reason: HOSPADM

## 2023-08-25 RX ORDER — MOXIFLOXACIN 5 MG/ML
SOLUTION/ DROPS OPHTHALMIC AS NEEDED
Status: DISCONTINUED | OUTPATIENT
Start: 2023-08-25 | End: 2023-08-25 | Stop reason: HOSPADM

## 2023-08-25 RX ORDER — BRIMONIDINE TARTRATE 1.5 MG/ML
SOLUTION/ DROPS OPHTHALMIC AS NEEDED
Status: DISCONTINUED | OUTPATIENT
Start: 2023-08-25 | End: 2023-08-25 | Stop reason: HOSPADM

## 2023-08-25 RX ADMIN — PHENYLEPHRINE HYDROCHLORIDE 1 DROP: 2.5 SOLUTION/ DROPS OPHTHALMIC at 07:30

## 2023-08-25 RX ADMIN — TETRACAINE HYDROCHLORIDE 1 DROP: 5 SOLUTION OPHTHALMIC at 07:30

## 2023-08-25 RX ADMIN — MIDAZOLAM 1 MG: 1 INJECTION INTRAMUSCULAR; INTRAVENOUS at 09:29

## 2023-08-25 RX ADMIN — CYCLOPENTOLATE HYDROCHLORIDE 1 DROP: 20 SOLUTION/ DROPS OPHTHALMIC at 07:52

## 2023-08-25 RX ADMIN — TETRACAINE HYDROCHLORIDE 1 DROP: 5 SOLUTION OPHTHALMIC at 07:52

## 2023-08-25 RX ADMIN — PHENYLEPHRINE HYDROCHLORIDE 1 DROP: 2.5 SOLUTION/ DROPS OPHTHALMIC at 07:40

## 2023-08-25 RX ADMIN — CYCLOPENTOLATE HYDROCHLORIDE 1 DROP: 20 SOLUTION/ DROPS OPHTHALMIC at 07:40

## 2023-08-25 RX ADMIN — PHENYLEPHRINE HYDROCHLORIDE 1 DROP: 2.5 SOLUTION/ DROPS OPHTHALMIC at 07:52

## 2023-08-25 RX ADMIN — CYCLOPENTOLATE HYDROCHLORIDE 1 DROP: 20 SOLUTION/ DROPS OPHTHALMIC at 07:30

## 2023-08-25 RX ADMIN — Medication 10 ML: at 07:40

## 2023-08-25 RX ADMIN — FENTANYL CITRATE 25 MCG: 50 INJECTION, SOLUTION INTRAMUSCULAR; INTRAVENOUS at 09:29

## 2023-08-25 NOTE — ANESTHESIA POSTPROCEDURE EVALUATION
"Patient: Chrissy Dawson    Procedure Summary       Date: 08/25/23 Room / Location: [facility] OR  / [facility] OR    Anesthesia Start: 0927 Anesthesia Stop: 0940    Procedure: REMOVA CATARACT AND INTRAOCULAR LENS (Left: Eye) Diagnosis:       Age-related nuclear cataract of left eye      (Age-related nuclear cataract of left eye [H25.12])    Surgeons: Aneudy Hogue MD Provider: Concepcion Baeza CRNA    Anesthesia Type: MAC ASA Status: 3            Anesthesia Type: MAC    Vitals  No vitals data found for the desired time range.          Post Anesthesia Care and Evaluation    Patient location during evaluation: bedside  Patient participation: complete - patient participated  Level of consciousness: awake and alert  Pain score: 0  Pain management: adequate    Airway patency: patent  Anesthetic complications: No anesthetic complications  PONV Status: none  Cardiovascular status: acceptable  Respiratory status: acceptable  Hydration status: acceptable    Comments: /66 (BP Location: Right arm, Patient Position: Sitting)   Pulse 65   Temp 97 øF (36.1 øC) (Temporal)   Resp 18   Ht 176.5 cm (69.5\")   Wt 79 kg (174 lb 2.6 oz)   LMP  (LMP Unknown)   SpO2 97%   BMI 25.35 kg/mý     No anesthesia care post op    "

## 2023-08-25 NOTE — ANESTHESIA PREPROCEDURE EVALUATION
Anesthesia Evaluation     Patient summary reviewed and Nursing notes reviewed   no history of anesthetic complications:   NPO Solid Status: > 8 hours  NPO Liquid Status: > 8 hours           Airway   Mallampati: II  TM distance: >3 FB  Neck ROM: full  Possible difficult intubation  Dental    (+) lower dentures and partials    Comment: Partial on bottom      Pulmonary     breath sounds clear to auscultation  (-) asthma, sleep apnea, rhonchi, decreased breath sounds, wheezes, rales, not a smoker  Cardiovascular   Exercise tolerance: poor (<4 METS)    ECG reviewed  Rhythm: regular  Rate: normal    (+) hypertension, CAD, CABG > 6 Months, cardiac stents Drug eluting stent more than 12 months ago , hyperlipidemia  (-) pacemaker, valvular problems/murmurs, past MI, dysrhythmias, angina, murmur, DVT    ROS comment: EKG 4/28/22  Normal sinus rhythm with sinus arrhythmia  Left axis deviation  Abnormal ECG  When compared with ECG of 12-MAY-2021 11:15,  No significant change was found      TTE:8/17/21  Interpretation Summary    ú Left ventricular wall thickness is consistent with mild concentric hypertrophy.  ú Left ventricular ejection fraction appears to be 51 - 55%.  ú Left ventricular diastolic function is consistent with (grade I) impaired relaxation and age.  ú Estimated right ventricular systolic pressure from tricuspid regurgitation is normal (<35 mmHg).      Neuro/Psych  (-) seizures, TIA, CVA  GI/Hepatic/Renal/Endo    (-)  obesity, GERD, liver disease, no renal disease, diabetes, no thyroid disorder    Musculoskeletal     Abdominal   (-) obese   Substance History   (-) alcohol use, drug use     OB/GYN negative ob/gyn ROS     Comment: Previous Hysterectomy and Tubal      Other   arthritis,     ROS/Med Hx Other: Per pt she does not have HTN     CABG in 2000 x1 with 1 stent placed.     No significant valvular regurg or stenosis per echo in Hx. EF=55%                  Anesthesia Plan    ASA 3     MAC     intravenous  induction     Anesthetic plan, risks, benefits, and alternatives have been provided, discussed and informed consent has been obtained with: patient and spouse/significant other.

## 2023-08-28 ENCOUNTER — LAB (OUTPATIENT)
Dept: LAB | Facility: HOSPITAL | Age: 73
End: 2023-08-28
Payer: MEDICARE

## 2023-08-28 ENCOUNTER — OFFICE VISIT (OUTPATIENT)
Dept: FAMILY MEDICINE CLINIC | Facility: CLINIC | Age: 73
End: 2023-08-28
Payer: MEDICARE

## 2023-08-28 VITALS
HEIGHT: 70 IN | OXYGEN SATURATION: 97 % | WEIGHT: 174 LBS | HEART RATE: 76 BPM | SYSTOLIC BLOOD PRESSURE: 110 MMHG | BODY MASS INDEX: 24.91 KG/M2 | DIASTOLIC BLOOD PRESSURE: 80 MMHG

## 2023-08-28 DIAGNOSIS — E78.5 HYPERLIPIDEMIA, UNSPECIFIED HYPERLIPIDEMIA TYPE: ICD-10-CM

## 2023-08-28 DIAGNOSIS — Z00.00 MEDICARE ANNUAL WELLNESS VISIT, SUBSEQUENT: Primary | ICD-10-CM

## 2023-08-28 DIAGNOSIS — I25.10 CORONARY ARTERIOSCLEROSIS: ICD-10-CM

## 2023-08-28 DIAGNOSIS — Z12.31 ENCOUNTER FOR SCREENING MAMMOGRAM FOR MALIGNANT NEOPLASM OF BREAST: ICD-10-CM

## 2023-08-28 DIAGNOSIS — N39.0 RECURRENT UTI: ICD-10-CM

## 2023-08-28 DIAGNOSIS — Z78.0 POSTMENOPAUSAL: ICD-10-CM

## 2023-08-28 LAB
ALBUMIN SERPL-MCNC: 4.6 G/DL (ref 3.5–5.2)
ALBUMIN/GLOB SERPL: 1.4 G/DL
ALP SERPL-CCNC: 99 U/L (ref 39–117)
ALT SERPL W P-5'-P-CCNC: 9 U/L (ref 1–33)
ANION GAP SERPL CALCULATED.3IONS-SCNC: 8.9 MMOL/L (ref 5–15)
AST SERPL-CCNC: 20 U/L (ref 1–32)
BASOPHILS # BLD AUTO: 0.01 10*3/MM3 (ref 0–0.2)
BASOPHILS NFR BLD AUTO: 0.2 % (ref 0–1.5)
BILIRUB SERPL-MCNC: 0.4 MG/DL (ref 0–1.2)
BUN SERPL-MCNC: 17 MG/DL (ref 8–23)
BUN/CREAT SERPL: 27 (ref 7–25)
CALCIUM SPEC-SCNC: 10.3 MG/DL (ref 8.6–10.5)
CHLORIDE SERPL-SCNC: 101 MMOL/L (ref 98–107)
CHOLEST SERPL-MCNC: 201 MG/DL (ref 0–200)
CO2 SERPL-SCNC: 28.1 MMOL/L (ref 22–29)
CREAT SERPL-MCNC: 0.63 MG/DL (ref 0.57–1)
DEPRECATED RDW RBC AUTO: 39.9 FL (ref 37–54)
EGFRCR SERPLBLD CKD-EPI 2021: 93.8 ML/MIN/1.73
EOSINOPHIL # BLD AUTO: 0.03 10*3/MM3 (ref 0–0.4)
EOSINOPHIL NFR BLD AUTO: 0.7 % (ref 0.3–6.2)
ERYTHROCYTE [DISTWIDTH] IN BLOOD BY AUTOMATED COUNT: 13.5 % (ref 12.3–15.4)
GLOBULIN UR ELPH-MCNC: 3.4 GM/DL
GLUCOSE SERPL-MCNC: 95 MG/DL (ref 65–99)
HCT VFR BLD AUTO: 40.4 % (ref 34–46.6)
HDLC SERPL-MCNC: 76 MG/DL (ref 40–60)
HGB BLD-MCNC: 13.6 G/DL (ref 12–15.9)
IMM GRANULOCYTES # BLD AUTO: 0.02 10*3/MM3 (ref 0–0.05)
IMM GRANULOCYTES NFR BLD AUTO: 0.4 % (ref 0–0.5)
LDLC SERPL CALC-MCNC: 115 MG/DL (ref 0–100)
LDLC/HDLC SERPL: 1.5 {RATIO}
LYMPHOCYTES # BLD AUTO: 1.26 10*3/MM3 (ref 0.7–3.1)
LYMPHOCYTES NFR BLD AUTO: 27.9 % (ref 19.6–45.3)
MCH RBC QN AUTO: 27.8 PG (ref 26.6–33)
MCHC RBC AUTO-ENTMCNC: 33.7 G/DL (ref 31.5–35.7)
MCV RBC AUTO: 82.4 FL (ref 79–97)
MONOCYTES # BLD AUTO: 0.25 10*3/MM3 (ref 0.1–0.9)
MONOCYTES NFR BLD AUTO: 5.5 % (ref 5–12)
NEUTROPHILS NFR BLD AUTO: 2.95 10*3/MM3 (ref 1.7–7)
NEUTROPHILS NFR BLD AUTO: 65.3 % (ref 42.7–76)
NRBC BLD AUTO-RTO: 0 /100 WBC (ref 0–0.2)
PLATELET # BLD AUTO: 272 10*3/MM3 (ref 140–450)
PMV BLD AUTO: 11.8 FL (ref 6–12)
POTASSIUM SERPL-SCNC: 4.6 MMOL/L (ref 3.5–5.2)
PROT SERPL-MCNC: 8 G/DL (ref 6–8.5)
RBC # BLD AUTO: 4.9 10*6/MM3 (ref 3.77–5.28)
SODIUM SERPL-SCNC: 138 MMOL/L (ref 136–145)
TRIGL SERPL-MCNC: 56 MG/DL (ref 0–150)
VLDLC SERPL-MCNC: 10 MG/DL (ref 5–40)
WBC NRBC COR # BLD: 4.52 10*3/MM3 (ref 3.4–10.8)

## 2023-08-28 PROCEDURE — 1170F FXNL STATUS ASSESSED: CPT | Performed by: FAMILY MEDICINE

## 2023-08-28 PROCEDURE — 3074F SYST BP LT 130 MM HG: CPT | Performed by: FAMILY MEDICINE

## 2023-08-28 PROCEDURE — 3079F DIAST BP 80-89 MM HG: CPT | Performed by: FAMILY MEDICINE

## 2023-08-28 PROCEDURE — 80053 COMPREHEN METABOLIC PANEL: CPT

## 2023-08-28 PROCEDURE — 1160F RVW MEDS BY RX/DR IN RCRD: CPT | Performed by: FAMILY MEDICINE

## 2023-08-28 PROCEDURE — 85025 COMPLETE CBC W/AUTO DIFF WBC: CPT

## 2023-08-28 PROCEDURE — G0439 PPPS, SUBSEQ VISIT: HCPCS | Performed by: FAMILY MEDICINE

## 2023-08-28 PROCEDURE — 36415 COLL VENOUS BLD VENIPUNCTURE: CPT

## 2023-08-28 PROCEDURE — 80061 LIPID PANEL: CPT

## 2023-08-28 PROCEDURE — 1159F MED LIST DOCD IN RCRD: CPT | Performed by: FAMILY MEDICINE

## 2023-08-28 NOTE — PROGRESS NOTES
The ABCs of the Annual Wellness Visit  Subsequent Medicare Wellness Visit    Subjective      Chrissy Dawson is a 73 y.o. female who presents for a Subsequent Medicare Wellness Visit.    The following portions of the patient's history were reviewed and   updated as appropriate: allergies, current medications, past family history, past medical history, past social history, past surgical history, and problem list.    Compared to one year ago, the patient feels her physical   health is the same.    Compared to one year ago, the patient feels her mental   health is the same.    Recent Hospitalizations:  She was not admitted to the hospital during the last year.       Current Medical Providers:  Patient Care Team:  Fanny Herrera MD as PCP - General (Family Medicine)  [place], Jeovanny ESPINO MD as Consulting Physician (Urology)  Neil Lopez MD as Consulting Physician (Cardiology)  Aneudy Hogue MD as Consulting Physician (Ophthalmology)  Edwin Alberto MD as Consulting Physician (Neurology)    Outpatient Medications Prior to Visit   Medication Sig Dispense Refill    Apoaequorin (Prevagen) 10 MG capsule Take 1 capsule by mouth Every Night.      aspirin 81 MG tablet Take 1 tablet by mouth Every Night.      Calcium Citrate (CITRACAL PO) Take  by mouth Every Night.      Multiple Vitamins-Minerals (CENTRUM SILVER PO) Take 1 tablet by mouth Every Night.      nitrofurantoin (MACRODANTIN) 100 MG capsule Take 1 capsule by mouth Every Night.      nitroglycerin (NITROSTAT) 0.4 MG SL tablet 1 under the tongue as needed for angina, may repeat q5mins for up three doses (Patient taking differently: Place 1 tablet under the tongue Take As Directed. 1 under the tongue as needed for angina, may repeat q5mins for up three doses) 15 tablet 6    oxybutynin XL (DITROPAN-XL) 10 MG 24 hr tablet Take 1 tablet by mouth Daily.      pravastatin (PRAVACHOL) 40 MG tablet Take 1 tablet by mouth Every Night. 90 tablet 3  "   vitamin C (ASCORBIC ACID) 500 MG tablet Take 1 tablet by mouth Every Night.       No facility-administered medications prior to visit.       No opioid medication identified on active medication list. I have reviewed chart for other potential  high risk medication/s and harmful drug interactions in the elderly.        Aspirin is on active medication list. Aspirin use is indicated based on review of current medical condition/s. Pros and cons of this therapy have been discussed today. Benefits of this medication outweigh potential harm.  Patient has been encouraged to continue taking this medication.  .      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  ACP discussion was held with the patient during this visit. Patient does not have an advance directive, information provided.     Objective    Vitals:    08/28/23 0832   Pulse: 76   SpO2: 97%   Weight: 78.9 kg (174 lb)   Height: 176.5 cm (69.5\")   PainSc:   6   PainLoc: Abdomen     Estimated body mass index is 25.33 kg/mý as calculated from the following:    Height as of this encounter: 176.5 cm (69.5\").    Weight as of this encounter: 78.9 kg (174 lb).    BMI is >= 25 and <30. (Overweight) The following options were offered after discussion;: exercise " counseling/recommendations and nutrition counseling/recommendations      Does the patient have evidence of cognitive impairment?   No            HEALTH RISK ASSESSMENT    Smoking Status:  Social History     Tobacco Use   Smoking Status Never   Smokeless Tobacco Never     Alcohol Consumption:  Social History     Substance and Sexual Activity   Alcohol Use No     Fall Risk Screen:    CARLOS Fall Risk Assessment has not been completed.    Depression Screenin/29/2022     9:06 AM   PHQ-2/PHQ-9 Depression Screening   Little Interest or Pleasure in Doing Things 0-->not at all   Feeling Down, Depressed or Hopeless 0-->not at all   PHQ-9: Brief Depression Severity Measure Score 0       Health Habits and Functional and Cognitive Screenin/15/2021     8:00 AM   Functional & Cognitive Status   Do you have difficulty preparing food and eating? No   Do you have difficulty bathing yourself, getting dressed or grooming yourself? No   Do you have difficulty using the toilet? No   Do you have difficulty moving around from place to place? No   Do you have trouble with steps or getting out of a bed or a chair? No   Current Diet Well Balanced Diet   Dental Exam Not up to date   Eye Exam Up to date   Exercise (times per week) 3 times per week   Current Exercises Include Walking   Do you need help using the phone?  No   Are you deaf or do you have serious difficulty hearing?  No   Do you need help to go to places out of walking distance? No   Do you need help shopping? No   Do you need help preparing meals?  No   Do you need help with housework?  No   Do you need help with laundry? No   Do you need help taking your medications? No   Do you need help managing money? No   Do you ever drive or ride in a car without wearing a seat belt? No   Have you felt unusual stress, anger or loneliness in the last month? No   Who do you live with? Spouse   If you need help, do you have trouble finding someone available to you? No   Have  you been bothered in the last four weeks by sexual problems? No   Do you have difficulty concentrating, remembering or making decisions? No       Age-appropriate Screening Schedule:  Refer to the list below for future screening recommendations based on patient's age, sex and/or medical conditions. Orders for these recommended tests are listed in the plan section. The patient has been provided with a written plan.    Health Maintenance   Topic Date Due    TDAP/TD VACCINES (1 - Tdap) Never done    ZOSTER VACCINE (1 of 2) Never done    ANNUAL WELLNESS VISIT  Never done    LIPID PANEL  09/22/2022    MAMMOGRAM  04/20/2023    DXA SCAN  04/20/2023    COVID-19 Vaccine (6 - Pfizer series) 05/05/2023    INFLUENZA VACCINE  10/01/2023    COLORECTAL CANCER SCREENING  12/09/2026    HEPATITIS C SCREENING  Completed    Pneumococcal Vaccine 65+  Completed                  CMS Preventative Services Quick Reference  Risk Factors Identified During Encounter:    Fall Risk-High or Moderate: Discussed Fall Prevention in the home and continued use of cane  Immunizations Discussed/Encouraged: Shingrix - patient declines, reports no history of chickenpox as child and does not wish to have this vaccine  Inactivity/Sedentary:  gradual increase in physical activity    The above risks/problems have been discussed with the patient.  Pertinent information has been shared with the patient in the After Visit Summary.    Diagnoses and all orders for this visit:    1. Medicare annual wellness visit, subsequent (Primary)    2. Coronary arteriosclerosis  -     Lipid Panel; Future  -     CBC & Differential; Future  -     Comprehensive Metabolic Panel; Future    3. Hyperlipidemia, unspecified hyperlipidemia type    4. Recurrent UTI  -     Cancel: Urinalysis With Culture If Indicated - Urine, Clean Catch; Future    5. Encounter for screening mammogram for malignant neoplasm of breast  -     Mammo Screening Digital Tomosynthesis Bilateral With CAD;  Future    6. Postmenopausal  -     DEXA Bone Density Axial; Future      Medicare wellness visit completed today  Check labs as above for monitoring chronic disease  Continue current medications  Continue following with urology for recurrent UTI, on Macrobid and oxybutynin  Encouraged increase water intake  Has CT scan and office visit follow-up this week  Due for mammogram and DEXA, ordered today      Follow Up:   Return in about 4 months (around 12/28/2023) for Recheck.   Next Medicare Wellness visit to be scheduled in 1 year.      An After Visit Summary and PPPS were made available to the patient.        This document has been electronically signed by Fanny Herrera MD

## 2023-08-28 NOTE — PATIENT INSTRUCTIONS
Medicare Wellness  Personal Prevention Plan of Service     Date of Office Visit:    Encounter Provider:  Fanny Herrera MD  Place of Service:  [facility] - [place]  Patient Name: Chrissy Dawson  :  1950    As part of the Medicare Wellness portion of your visit today, we are providing you with this personalized preventive plan of services (PPPS). This plan is based upon recommendations of the [place] Preventive Services Task Force (USPSTF) and the Advisory Committee on Immunization Practices (ACIP).    This lists the preventive care services that should be considered, and provides dates of when you are due. Items listed as completed are up-to-date and do not require any further intervention.    Health Maintenance   Topic Date Due    TDAP/TD VACCINES (1 - Tdap) Never done    ANNUAL WELLNESS VISIT  Never done    LIPID PANEL  2022    MAMMOGRAM  2023    DXA SCAN  2023    COVID-19 Vaccine (6 - Pfizer series) 2023    ZOSTER VACCINE (1 of 2) 2023 (Originally 2000)    INFLUENZA VACCINE  10/01/2023    COLORECTAL CANCER SCREENING  2026    HEPATITIS C SCREENING  Completed    Pneumococcal Vaccine 65+  Completed       Orders Placed This Encounter   Procedures    Mammo Screening Digital Tomosynthesis Bilateral With CAD     Standing Status:   Future     Standing Expiration Date:   2024     Order Specific Question:   Reason for Exam:     Answer:   screen     Order Specific Question:   Release to patient     Answer:   Routine Release [[id number]]    DEXA Bone Density Axial     Standing Status:   Future     Standing Expiration Date:   2024     Order Specific Question:   Is patient taking or have taken long term Glucocorticoid (steroids)?     Answer:   No     Order Specific Question:   Does the patient have rheumatoid arthritis?     Answer:   No     Order Specific Question:   Does the patient have secondary  osteoporosis?     Answer:   No     Order Specific Question:   Reason for Exam:     Answer:   postmenopausal     Order Specific Question:   Release to patient     Answer:   Routine Release [[id number]]    Lipid Panel     Standing Status:   Future     Standing Expiration Date:   8/27/2024     Order Specific Question:   Release to patient     Answer:   Routine Release [[id number]]    Comprehensive Metabolic Panel     Standing Status:   Future     Standing Expiration Date:   8/28/2024     Order Specific Question:   Release to patient     Answer:   Routine Release [[id number]]    CBC & Differential     Standing Status:   Future     Standing Expiration Date:   8/27/2024     Order Specific Question:   Manual Differential     Answer:   No     Order Specific Question:   Release to patient     Answer:   Routine Release [[id number]]       Return in about 4 months (around 12/28/2023) for Recheck.

## 2023-08-30 ENCOUNTER — TELEPHONE (OUTPATIENT)
Dept: FAMILY MEDICINE CLINIC | Facility: CLINIC | Age: 73
End: 2023-08-30
Payer: MEDICARE

## 2023-08-30 NOTE — TELEPHONE ENCOUNTER
-Per Dr. Herrera, Ms. Dawson has been called with recent lab results & recommendations.  Continue current medications and follow-up as planned or sooner if any problems.     ---- Message from Fanny Herrera MD sent at 8/29/2023  9:21 PM CDT -----  Labs all ok.  ThanksROHAN

## 2023-09-11 ENCOUNTER — OFFICE VISIT (OUTPATIENT)
Dept: CARDIOLOGY | Facility: CLINIC | Age: 73
End: 2023-09-11
Payer: MEDICARE

## 2023-09-11 VITALS
SYSTOLIC BLOOD PRESSURE: 122 MMHG | HEART RATE: 74 BPM | DIASTOLIC BLOOD PRESSURE: 72 MMHG | OXYGEN SATURATION: 100 % | WEIGHT: 177.9 LBS | HEIGHT: 69 IN | BODY MASS INDEX: 26.35 KG/M2

## 2023-09-11 DIAGNOSIS — I25.810 CORONARY ARTERY DISEASE INVOLVING CORONARY BYPASS GRAFT OF NATIVE HEART WITHOUT ANGINA PECTORIS: Primary | ICD-10-CM

## 2023-09-11 DIAGNOSIS — E78.00 PURE HYPERCHOLESTEROLEMIA: ICD-10-CM

## 2023-09-11 DIAGNOSIS — Z95.1 S/P CABG (CORONARY ARTERY BYPASS GRAFT): ICD-10-CM

## 2023-09-11 PROCEDURE — 99214 OFFICE O/P EST MOD 30 MIN: CPT | Performed by: INTERNAL MEDICINE

## 2023-09-11 PROCEDURE — 3074F SYST BP LT 130 MM HG: CPT | Performed by: INTERNAL MEDICINE

## 2023-09-11 PROCEDURE — 3078F DIAST BP <80 MM HG: CPT | Performed by: INTERNAL MEDICINE

## 2023-09-11 PROCEDURE — 1160F RVW MEDS BY RX/DR IN RCRD: CPT | Performed by: INTERNAL MEDICINE

## 2023-09-11 PROCEDURE — 1159F MED LIST DOCD IN RCRD: CPT | Performed by: INTERNAL MEDICINE

## 2023-09-11 PROCEDURE — 93000 ELECTROCARDIOGRAM COMPLETE: CPT | Performed by: INTERNAL MEDICINE

## 2023-09-11 NOTE — PROGRESS NOTES
[facility]  OFFICE NOTE    Cardiovascular Medicine  Neil Lopez M.D., [facility]         No referring provider defined for this encounter.    History of Present Illness    Chrissy Dawson is a 73 y.o. female who presents for a follow up visit today.   Per prior notes, she has a known history of coronary artery disease.  She initially underwent a PCI to the LAD and subsequently underwent CABG with a LIMA to the LAD in 2000 due to in-stent restenosis.  Per prior cardiology notes, her last coronary angiography in October 2010 revealed patency of LIMA to LAD graft.  Last transthoracic echocardiogram in August 2021 reported LVEF of 51-55%, normal biatrial sizes, normal RV cavity size/systolic function, mild MR, mild TR and no evidence of pericardial effusion.  She reports doing well currently from cardiovascular standpoint.  She has not had any chest discomfort or dyspnea.  She denies having any PND, orthopnea or leg swelling.  She has not any palpitations, dizziness, presyncope or syncope.  She is currently on baby aspirin and pravastatin therapy.  She has not tolerated Crestor or Lipitor therapy previously.    Review of Systems - ROS  Constitution: Negative for weight gain and weight loss.   HENT: Negative for congestion.    Eyes: Negative for blurred vision.   Cardiovascular: As mentioned above  Respiratory: Negative for cough and hemoptysis.    Endocrine: Negative for polydipsia and polyuria.   Hematologic/Lymphatic: Negative for bleeding problem. Does not bruise/bleed easily.   Skin: Negative for flushing.   Musculoskeletal: Negative for neck pain and stiffness.   Gastrointestinal: Negative for abdominal pain, nausea and vomiting.   Genitourinary: Negative for dysuria and hematuria.   Neurological: Negative for dizziness, focal weakness and numbness.   Psychiatric/Behavioral: Negative for altered mental status and depression.      All other systems were reviewed and were  negative.    Past Medical History:   Diagnosis Date    Backache     Cervical arthritis     Coronary arteriosclerosis     CABG 1 vessel  (Dr Montes).  is followed by cardiology at [facility]    Fibrocystic breast     Ganglion cyst of wrist     Ganglion/synovial cyst - wrist    Ganglion of wrist     Hip pain     History of echocardiogram 10/23/2015    Echocardiogram W/ color flow 44710 (1) - Normal LV function with Ef of 55-60%.Normal RV size and function.Pseudonormal diastolic dysfunciton with borderline [facility].NO sig.valvular regurg or stenosis.    History of mammogram 09/10/2014    MAMMOGRAM SCREENING 05705 - [facility] (1) - [facility] ([facility])    History of transfusion     Hyperlipidemia     Inguinal pain     Urge incontinence of urine        Family History:  family history includes Depression in her maternal grandmother; Diabetes in her father; Heart disease in her maternal grandfather, mother, and another family member; Hypertension in her mother and another family member.    Social History: Denies current tobacco, alcohol or illicit drug use.   reports that she has never smoked. She has never used smokeless tobacco. She reports that she does not drink alcohol and does not use drugs.    Allergies:  Allergies   Allergen Reactions    Crestor [Rosuvastatin] Shortness Of Breath    Lipitor [Atorvastatin] Shortness Of Breath         Current Outpatient Medications:     Apoaequorin (Prevagen) 10 MG capsule, Take 1 capsule by mouth Every Night., Disp: , Rfl:     aspirin 81 MG tablet, Take 1 tablet by mouth Every Night., Disp: , Rfl:     Calcium Citrate (CITRACAL PO), Take  by mouth Every Night., Disp: , Rfl:     Multiple Vitamins-Minerals (CENTRUM SILVER PO), Take 1 tablet by mouth Every Night., Disp: , Rfl:     nitrofurantoin (MACRODANTIN) 100 MG capsule, Take 1 capsule by mouth Every Night., Disp: , Rfl:     nitroglycerin (NITROSTAT) 0.4 MG SL tablet, 1 under the tongue as needed for angina, may repeat q5mins for up three  "doses (Patient taking differently: Place 1 tablet under the tongue Take As Directed. 1 under the tongue as needed for angina, may repeat q5mins for up three doses), Disp: 15 tablet, Rfl: 6    oxybutynin XL (DITROPAN-XL) 10 MG 24 hr tablet, Take 1 tablet by mouth Daily., Disp: , Rfl:     pravastatin (PRAVACHOL) 40 MG tablet, Take 1 tablet by mouth Every Night., Disp: 90 tablet, Rfl: 3    vitamin C (ASCORBIC ACID) 500 MG tablet, Take 1 tablet by mouth Every Night., Disp: , Rfl:     Evolocumab (REPATHA) solution auto-injector SureClick injection, Inject 1 mL under the skin into the appropriate area as directed Every 14 (Fourteen) Days., Disp: 2 mL, Rfl: 11    Physical Exam:  Vitals:    09/11/23 1524   BP: 122/72   BP Location: Left arm   Patient Position: Sitting   Cuff Size: Adult   Pulse: 74   SpO2: 100%   Weight: 80.7 kg (177 lb 14.4 oz)   Height: 176.5 cm (69.49\")   PainSc: 0-No pain     Current Pain Level: none  Pulse Ox: Normal  on room air  General: alert, appears stated age and cooperative     Body Habitus: Borderline overweight  HEENT: Head: Normocephalic, no lesions, without obvious abnormality.   Neuro: alert, oriented x3  JVP: Volume/Pulsation: Normal.  Normal waveforms.   Carotid Exam: no bruit normal pulsation bilaterally     Respirations: no increased work of breathing   Chest: Sternal scar appears well-healed    Pulmonary:Normal   Heart rate: normal    Heart Rhythm: regular     Heart Sounds: S1: normal  S2: normal  S3: absent      Abdomen:   Appearance:   Palpation: Soft, non-tender to palpation, bowel sounds positive in all four quadrants  Extremity: no significant pitting lower extremity edema.       DATA REVIEWED:     EKG. I personally reviewed and interpreted the EKG.  normal sinus rhythm, possible Q waves in leads V3 and V4, 1st degree AV block, low voltage QRS on 9/11/2023    ECG/EMG Results (all)       Procedure Component Value Units Date/Time    ECG 12 Lead [[id number]] Collected: 09/11/23 1532 "     Updated: 09/11/23 1535     QT Interval 390 ms      QTC Interval 432 ms     Narrative:      Test Reason : Coronary artery disease of bypass graft of native heart with stable angina pect~  Blood Pressure :   */*   mmHG  Vent. Rate :  74 BPM     Atrial Rate :  74 BPM     P-R Int : 146 ms          QRS Dur :  88 ms      QT Int : 390 ms       P-R-T Axes :  41 -30  54 degrees     QTc Int : 432 ms    Normal sinus rhythm  Left axis deviation  Anterior infarct (cited on or before 05-MAY-2022)  Abnormal ECG  When compared with ECG of 05-MAY-2022 08:38,  No significant change was found    Referred By:            Confirmed By:           ---------------------------------------------------  TTE/HUBERT:  Results for orders placed in visit on 05/12/21    Adult Transthoracic Echo Complete W/ Cont if Necessary Per Protocol    Interpretation Summary  · Left ventricular wall thickness is consistent with mild concentric hypertrophy.  · Left ventricular ejection fraction appears to be 51 - 55%.  · Left ventricular diastolic function is consistent with (grade I) impaired relaxation and age.  · Estimated right ventricular systolic pressure from tricuspid regurgitation is normal (<35 mmHg).      -----------------------------------------------------  CXR/Imaging:   Imaging Results (Most Recent)       None            -----------------------------------------------------  CT:   CT Abdomen Pelvis With Contrast    Result Date: 8/31/2023  1. Stone in lower left kidney 2. Both kidneys are packed with parapelvic cysts . This would be an incidental finding but for the future this will reduces sensitivity for mild hydronephrosis on ultrasound 3. No bladder abnormality is seen but the bilateral prosthetic hips have a large amount of associated artifact that obscures the bladder [id number]    US Renal Bilateral    Result Date: 8/17/2023  The appearance of both kidneys is probably due to the numerous parapelvic cysts rather than hydronephrosis, but  as a precaution, CT of the abdomen and pelvis with IV contrast and delayed images is recommended. [id number]     ----------------------------------------------------      --------------------------------------------------------------------------------------------------  LABS:     The 10-year CVD risk score (Micah et al., 2008) is: 9.8%    Values used to calculate the score:      Age: 73 years      Sex: Female      Diabetic: No      Tobacco smoker: No      Systolic Blood Pressure: 122 mmHg      Is BP treated: Yes      HDL Cholesterol: 76 mg/dL      Total Cholesterol: 201 mg/dL         Lab Results   Component Value Date    GLUCOSE 95 08/28/2023    BUN 17 08/28/2023    CREATININE 0.63 08/28/2023    EGFRIFAFRI 86 04/06/2021    BCR 27.0 (H) 08/28/2023    K 4.6 08/28/2023    CO2 28.1 08/28/2023    CALCIUM 10.3 08/28/2023    ALBUMIN 4.6 08/28/2023    AST 20 08/28/2023    ALT 9 08/28/2023     Lab Results   Component Value Date    WBC 4.52 08/28/2023    HGB 13.6 08/28/2023    HCT 40.4 08/28/2023    MCV 82.4 08/28/2023     08/28/2023     Lab Results   Component Value Date    CHOL 201 (H) 08/28/2023    CHLPL 294 (H) 10/19/2015    TRIG 56 08/28/2023    HDL 76 (H) 08/28/2023     (H) 08/28/2023     Lab Results   Component Value Date    TSH 0.870 09/22/2021    Y7IQSKV 8.06 09/22/2021     No results found for: CKTOTAL, CKMB, CKMBINDEX, TROPONINI, TROPONINT  Lab Results   Component Value Date    HGBA1C 5.90 (H) 03/29/2019     Lab Results   Component Value Date    DDIMER 773 (H) 08/08/2014     Lab Results   Component Value Date    ALT 9 08/28/2023     Lab Results   Component Value Date    HGBA1C 5.90 (H) 03/29/2019     Lab Results   Component Value Date    CREATININE 0.63 08/28/2023     No results found for: IRON, TIBC, FERRITIN  Lab Results   Component Value Date    INR 1.84 (H) 06/29/2021    INR 1.88 (H) 06/22/2021    INR 1.66 (H) 06/15/2021    PROTIME 20.9 (H) 06/29/2021    PROTIME 21.2 (H) 06/22/2021     PROTIME 20.5 (H) 06/15/2021       [unfilled]    1. Coronary artery disease involving coronary bypass graft of native heart without angina pectoris  - ECG 12 Lead    2. S/P CABG (coronary artery bypass graft)    3. Pure hypercholesterolemia    Per prior notes, she has a known history of coronary artery disease.  She initially underwent a PCI to the LAD and subsequently underwent CABG with a LIMA to the LAD in 2000 due to in-stent restenosis.  Per prior cardiology notes, her last coronary angiography in October 2010 revealed patency of LIMA to LAD graft.  Last transthoracic echocardiogram in August 2021 reported LVEF of 51-55%, normal biatrial sizes, normal RV cavity size/systolic function, mild MR, mild TR and no evidence of pericardial effusion.  She appears to be doing well from cardiovascular standpoint currently and denied any concerning cardiovascular symptoms today.  She appeared largely euvolemic on exam.  Continue baby aspirin and pravastatin therapy.  She has not tolerated Crestor or Lipitor therapy previously.  Her LDL cholesterol levels do not appear to be at goal on current therapy.  We will initiate Repatha therapy pending insurance approval.    Prevention:  Body mass index is 25.9 kg/m².  Overweight-referral to primary care is recommended      Chrissy Dawson  reports that she has never smoked. She has never used smokeless tobacco..       Return in about 3 months (around 12/11/2023).            Electronically signed by Neil Lopez MD on 09/11/23 at 15:51 CDT

## 2023-09-12 ENCOUNTER — TELEPHONE (OUTPATIENT)
Dept: CARDIOLOGY | Facility: CLINIC | Age: 73
End: 2023-09-12
Payer: MEDICARE

## 2023-09-12 NOTE — TELEPHONE ENCOUNTER
Contacted pt to let her know that I was in [place]  today and when I get back ill look for the PA and work on the PA and will let pt know when it is approved.     Voiced her understanding.       ----- Message from Roseanna Caro sent at 2023  9:59 AM CDT -----  Regarding: call back  Contact: [phone]  Chrissy Dawson marion 50 stated her new medication Repatha injection is going to cost her 600 +, and she wanted to talk about getting assistant with this medication or getting something else in it place. She wanted a call back @ [phone] to talk about this. [place]

## 2023-09-12 NOTE — TELEPHONE ENCOUNTER
Attempted to call pt not available at this time. No Vm.       ----- Message from Roseanna Caro sent at 2023  9:59 AM CDT -----  Regarding: call back  Contact: [phone]  Chrissy Dawson marion 50 stated her new medication Repatha injection is going to cost her 600 +, and she wanted to talk about getting assistant with this medication or getting something else in it place. She wanted a call back @ [phone] to talk about this. [place]

## 2023-09-14 LAB
QT INTERVAL: 390 MS
QTC INTERVAL: 432 MS

## 2023-09-15 ENCOUNTER — TELEPHONE (OUTPATIENT)
Dept: CARDIOLOGY | Facility: CLINIC | Age: 73
End: 2023-09-15
Payer: MEDICARE

## 2023-09-15 NOTE — TELEPHONE ENCOUNTER
Attempted to call pt. Not available at this time.       ----- Message from Anitha Finley sent at 9/15/2023 10:42 AM CDT -----  Regarding: callback  Contact: [phone]  She would like a callback at [phone] to discuss her cholesterol and medications. Thanks

## 2023-09-20 ENCOUNTER — TELEPHONE (OUTPATIENT)
Dept: CARDIOLOGY | Facility: CLINIC | Age: 73
End: 2023-09-20
Payer: MEDICARE

## 2023-09-21 ENCOUNTER — TELEPHONE (OUTPATIENT)
Dept: CARDIOLOGY | Facility: CLINIC | Age: 73
End: 2023-09-21
Payer: MEDICARE

## 2023-09-21 NOTE — TELEPHONE ENCOUNTER
Contacted the Pharmacy to let them know know that the Repatha has been approved from 09/15/2023 until further notice. The Authorization number is [id number].     Voiced understanding.

## 2023-09-26 ENCOUNTER — TELEPHONE (OUTPATIENT)
Dept: CARDIOLOGY | Facility: CLINIC | Age: 73
End: 2023-09-26
Payer: MEDICARE

## 2023-09-26 NOTE — TELEPHONE ENCOUNTER
Contacted pt per Dr. Lopez to see if the Repatha rep had other option.   Pt voiced [employer] gave her a name of a medication that they will cover. Voiced she would send it over when she got home.       ----- Message from Neil Lopez MD sent at 9/25/2023  6:13 PM CDT -----  Regarding: RE: Prescription questions  Contact: [phone]  Can you check with the Repatha rep if she has any other options for better coverage?    ----- Message -----  From: Kinsey Esposito  Sent: 9/25/2023   8:21 AM CDT  To: Neil Lopez MD  Subject: FW: Prescription questions                       Did the PA as well  ----- Message -----  From: Gaby Bethea RegSched Rep  Sent: 9/22/2023   1:09 PM CDT  To: Kinsey Esposito  Subject: Prescription questions                           Evolocumab (REPATHA) solution auto-injector SureClick injection    Patient called in saying the prescription itself is $1400, insurance will pay all but around $200.00. She said she cannot afford this medication, is there something else she can take in its place? Asking for a call back please

## 2023-09-26 NOTE — TELEPHONE ENCOUNTER
Attempted to call pt not available at this time.       ----- Message from Neil Lopez MD sent at 9/25/2023  6:13 PM CDT -----  Regarding: RE: Prescription questions  Contact: [phone]  Can you check with the Repatha rep if she has any other options for better coverage?    ----- Message -----  From: Kinsey Esposito  Sent: 9/25/2023   8:21 AM CDT  To: Neil Lopez MD  Subject: FW: Prescription questions                       Did the PA as well  ----- Message -----  From: Gaby Bethea RegSched Rep  Sent: 9/22/2023   1:09 PM CDT  To: Kinsey Esposito  Subject: Prescription questions                           Evolocumab (REPATHA) solution auto-injector SureClick injection    Patient called in saying the prescription itself is $1400, insurance will pay all but around $200.00. She said she cannot afford this medication, is there something else she can take in its place? Asking for a call back please

## 2023-09-27 NOTE — TELEPHONE ENCOUNTER
Contacted pharmacy over PA on the Repatha stated faxed over the questions over to insurance company today.   Voiced understanding.    ----- Message from Roseanna Caro sent at 2023  2:05 PM CDT -----  Regarding: call back  Contact: [phone]  Lee Ann @ [facility] [phone] need to talk about pt Chrissy schultz 50 Repatha injection prescription/PA. Thxs     
Improved

## 2023-10-13 NOTE — TELEPHONE ENCOUNTER
Abdomen , soft, non-tender, non-distended , no guarding or rigidity , no masses palpable , normal bowel sounds Liver and Spleen , no hepatomegaly present , liver non-tender , spleen not palpable Please find out where she would like to go, Jc?  - ROHAN Herrera

## 2024-05-22 NOTE — TELEPHONE ENCOUNTER
Called gave info to patient.    [de-identified] : Evaluated in July 2023 for allergy to medications, and a history of asthma and allergies.  She had adverse reaction to Bactrim with rash, urticaria with Zithromax, significant urticaria and fever with Macrobid, possible urticaria with Keflex received after Macrobid, serotonin syndrome with Paxil, urticaria and itching from rosuvastatin, itchy rash on contact with Neosporin, and adverse reaction from Praluent 3 days later she woke up with stuffy nose and left foot was burning followed by swelling of the lips).  Blood work did not show allergy to venom.  Serum tryptase was normal.  It showed a CBC with slight increased lymphocytes.  Total IgE was elevated (122) and respiratory allergy profile showed allergy to dust mites and cat.  Spirometry showed mild restriction and obstruction.  At that time, she was advised to use Breo 100 daily.  She was also expected to return for skin testing to Praluent.  She had skin testing to Bactrim that was negative.  After taking Bactrim single strength 1 tablet in the office, she had cough and scratchy throat attributed to acid reflux but she also became itchy and scratched her chest and abdomen.  She had redness after scratching.  She was itching the right ear.  She was treated with epinephrine and Zyrtec, considering symptoms a mild form of anaphylaxis to Bactrim. Today she reports symptoms that started in April with severe feeling of dryness in her mouth, nose, and sinuses, humidifier not helping.  She feels pressure on the roof of her mouth and she has stuffy nose but no mucus.  At the time of symptoms onset, she was cleaning her room heavily including using Clorox and sniffing old make-up.  On May 2 she woke up with intense burning in her mouth in various locations.  She had pressure in the left side of the face with migraine aura and her left eye was sensitive to the light.  When she came out of the shower she had redness of her face and neck.  She also has increased anxiety and panic attacks.  She attributes symptoms partially to 1 year anniversary from her brother's death and her old cat being sick.  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  She also consulted neurologist for neuropathic pain in her legs last summer.  MRI of the neck and spine showed scoliosis and slipped disks but could not explain the symptoms.  At that time, she was also evaluated by rheumatologist, autoimmune blood work was negative but it showed inflammation.  ENT ordered screening for Sjogren's that was negative as per patient.  She is scheduled for nerve conduction studies. She has no chest symptoms and she does not use Breo.  She uses albuterol rarely. Last year she had urticaria on and off for several months.  No trigger was detected. Current medications: Omeprazole, Zoloft, Depakote, Suboxone, Valtrex as needed.  There is no change in her medications recently.

## 2024-05-24 NOTE — H&P (VIEW-ONLY)
Chief Complaint: Chrissy Rogers is a 66 y.o. female who was seen in consultation at the request of Yessica Juarez MD  for abnormal breast imaging    History of Present Illness:  66 year old woman with an abnormal RIGHT mammogram and US category 5. No palpable masses, skin dimpling, no nipple dc, no axillary adenopathy. No FH of breast or ovarian cancer.  Patient presents with abnormal breast imaging. She noted no new masses, skin changes, nipple discharge, nipple changes prior to her most recent imaging.  Her most recent imaging includes the following:     Patient Name- CHRISSY ROGERS  Patient ID- [id number]   Ordering- YESSICA JUAREZ  Attending-   Referring- MD YESSICA JUAREZ  ------------------------------------------------      PROCEDURE- Right unilateral mammogram with 3-D tomosynthesis with CAD  and ultrasound right breast      REASON FOR EXAM- Abnormal screening mammography      FINDINGS- Comparison study dated 1/4/2017.Patient presents for workup  of questionable right breast upper inner quadrant small asymmetric  density and associated architectural distortion. This workup consisted  of spot compression imaging true lateral projection right breast and  ultrasound. Spot compression imaging reveals persistent right breast  12 to 1-00 position small asymmetric density with associated  architectural distortion. Ultrasound was performed to further evaluate  this region. On ultrasound this lesion is seen to represent a solid  hypoechoic mass with very irregular borders and acoustic shadowing.  This mass appears deeper than wide and measures 1 x 0.84 x 0.61 cm.  This mass is highly suggestive of malignancy.      IMPRESSION-   Right breast 12 to 1-00 position mass lesion which is highly  suggestive of malignancy. Biopsy is recommended to further evaluate.      BI-RADS category 5- Highly suggestive of malignancy.      Recommendation- Ultrasound biopsy recommended.      Electronically  "  Assessment & Plan     Soreness of tongue  Will check labs, as patient with genetic disorder.   - Vitamin B12; Future  - Methylmalonic Acid; Future    Thrush  Likely thrush with daily prednisone use   - clotrimazole (MYCELEX) 10 MG lozenge; Place 1 lozenge (10 mg) inside cheek 5 times daily for 14 days                Subjective   Valencia is a 51 year old, presenting for the following health issues:  Tongue Lesion(S)        5/24/2024    11:15 AM   Additional Questions   Roomed by rasheeda DRIVER CMA   Accompanied by self     HPI       For 2 weeks patients tongue has gradually gotten more sore. Soreness started on the tip of her tongue  She notes that the soreness moving further back on the tongue  No sore throat  Notes a sore cracked area on the corner of the lip as well   Concerned about possible vitamin B 12 levels being low               Objective    /82   Pulse 77   Temp 97.4  F (36.3  C) (Temporal)   Resp 20   Ht 1.588 m (5' 2.5\")   Wt 48.3 kg (106 lb 6.4 oz)   LMP 02/01/2009 (LMP Unknown)   SpO2 99%   BMI 19.15 kg/m    Body mass index is 19.15 kg/m .  Physical Exam   GENERAL: alert and no distress  HENT: normal cephalic/atraumatic, oral mucous membranes moist, and some loss of tastes buds tip and anterior lateral sides of the tongue  Red/ chelitis area right corner of the mouth   RESP: lungs clear to auscultation - no rales, rhonchi or wheezes  CV: regular rates and rhythm and normal S1 S2, no S3 or S4            Signed Electronically by: Azeb Werner PA-C    " Signed By- Alphonse Botello MD On: 2017-01-17 16:17:07  Breast Biopsy History: Patient has had the following breast biopsies:bilateral, multiple  Breast Cancer HIstory: Patient does not have a past medical history of breast cancer.  Obstetric/Gynecologic History:   Age menstrual periods began:           Patient is postmenopausal, entered menopause naturally at age: 14    Number of pregnancies: 3   Number of live births: 3      Age of delivery of first child: 19   She has had her uterus and ovaries removed, is postmenopausal, and takes norhormones.      She is here for evaluation.    Past Medical History   Diagnosis Date   • Backache    • Benign essential hypertension    • Cervical arthritis    • Coronary arteriosclerosis    • Ganglion cyst of wrist      Ganglion/synovial cyst - wrist   • Ganglion of wrist    • Hip pain    • History of echocardiogram 10/23/2015     Echocardiogram W/ color flow 56048 (1) - Normal LV function with Ef of 55-60%.Normal RV size and function.Pseudonormal diastolic dysfunciton with borderline [facility].NO sig.valvular regurg or stenosis.   • History of mammogram 09/10/2014     MAMMOGRAM SCREENING 84149 - [facility] (1) - [facility] ([facility])   • Hyperlipidemia    • Inguinal pain    • Recurrent angina status post coronary artery bypass graft      Recurrent angina after coronary artery bypass graft   • Urge incontinence of urine      Past Surgical History   Procedure Laterality Date   • Coronary artery bypass graft  2000     CABG, arterial, single (1)   • Total abdominal hysterectomy with salpingo oophorectomy  1998     SALVADOR/BSO (1)   • Total hip arthroplasty  2011     Total hip replacement (3)       Current Outpatient Prescriptions:     •  aspirin 81 MG tablet, Take 81 mg by mouth., Disp: , Rfl:   •  cefuroxime (CEFTIN) 250 MG tablet, TK 1 T PO BID, Disp: , Rfl: 0  •  Multiple Vitamins-Minerals (CENTRUM SILVER PO), Take 1 tablet by mouth Daily., Disp: , Rfl:   •  pravastatin (PRAVACHOL) 10 MG  "tablet, Take 10 mg by mouth., Disp: , Rfl:     Allergies   Allergen Reactions   • Crestor [Rosuvastatin]    • Lipitor [Atorvastatin]      Family History   Problem Relation Age of Onset   • Heart disease Other    • Hypertension Other    • Hypertension Mother    • Diabetes Father    • Depression Maternal Grandmother    • Heart disease Maternal Grandfather      Social History     Social History   • Marital status:                      Occupational History   • Nurse assistant at [employer]     Social History Main Topics   • Smoking status: Never Smoker   • Smokeless tobacco: No               Review of Systems    Vital Signs:  Visit Vitals   • /80   • Ht 69\" (175.3 cm)   • Wt 174 lb (78.9 kg)   • BMI 25.7 kg/m2        Physical Exam   Constitutional: She is oriented to person, place, and time and well-developed, well-nourished, and in no distress. No distress.   HENT:   Head: Normocephalic and atraumatic.   Eyes: Conjunctivae and EOM are normal. Pupils are equal, round, and reactive to light.   Neck: Normal range of motion. Neck supple. No JVD present. No tracheal deviation present. No thyromegaly present.   Cardiovascular: Normal rate, regular rhythm and normal heart sounds.    Pulmonary/Chest: Effort normal and breath sounds normal. No respiratory distress. She has no wheezes. She has no rales. Right breast exhibits no inverted nipple, no mass, no nipple discharge, no skin change and no tenderness. Left breast exhibits no inverted nipple, no mass, no nipple discharge, no skin change and no tenderness. Breasts are symmetrical.       Musculoskeletal: Normal range of motion. She exhibits no edema, tenderness or deformity.   Lymphadenopathy:     She has no cervical adenopathy.   Neurological: She is oriented to person, place, and time. GCS score is 15.   Skin: Skin is warm and dry. No rash noted. She is not diaphoretic. No erythema. No pallor.   Psychiatric: Mood, memory, affect and judgment normal.   "   Nursing note and vitals reviewed.      I have personally reviewed all breast imaging and concur with the radiologic diagnosis: BiRADS 4 RIGHT side    Assessment:    Chrissy was seen today for abnormal breast imaging.    Diagnoses and all orders for this visit:    Abnormal mammogram of right breast        Plan:  1. US mammotome RIGHT breast with clip placement    The procedure is explained to the patient. The patient will be placed supine for the procedure. A small incision will be made under local anesthesia, and a special, large needle will be used to perform the biopsy under ultrasound guidance.   A permanent titanium clip will be placed in the breast to red the site of biopsy should further surgery be necessary.  The risks of bleeding/bruising, infection, the possible need for open biopsy or other procedure, scarring, and poor wound healing are explained. There is a low transfusion risk. The risks of chronic pain are, likewise, low.   Alternatives include open biopsy in the operating room under local anesthesia with moderate sedation or general anesthesia or simply watching the lesion to see if there is change. These are not currently suggested.    She understands and agrees to the procedure.      Delfino Greer MD        We have spent 20 minutes in visit today, 20 in face to face .          EMR Dragon/transcription disclaimer:    Much of this encounter note is an electronic transcription/translocation of spoken language to printed text.  The electronic translation of spoken language may permit erroneous, or at times, nonsensical words or phrases to be inadvertently transcribed.  Although I have reviewed the note from such areas, some may still exist.

## 2024-12-31 NOTE — ED PROVIDER NOTES
Subjective   PT slipped while walking at work 5 days ago, and hit her head on the tile floor.  She was seen in the clinic and had x-ray imaging done of her facial bones as well has her left left hand, demonstrating a fracture involving the base of the fifth metacarpal.  Patient states that she has been having headaches since her fall, and was sent to the emergency department by primary care to rule out any type of intracranial hemorrhage.      Fall   Mechanism of injury: fall    Injury location:  Face and hand  Facial injury location:  L eye  Hand injury location:  L hand  Incident location:  Home  Time since incident:  5 days  Arrived directly from scene: no    Fall:     Fall occurred:  Walking    Impact surface:  Hard floor    Point of impact:  Unable to specify    Entrapped after fall: no    Protective equipment: none    Suspicion of alcohol use: no    Suspicion of drug use: no    Prior to arrival data:     Bystander interventions:  None    Patient ambulatory at scene: yes      Responsiveness at scene:  Alert    Orientation at scene:  Person, situation, time and place    Amnesic to event: no      Airway interventions:  None    Breathing interventions:  None  Associated symptoms: headaches    Associated symptoms: no abdominal pain, no chest pain, no nausea, no neck pain, no seizures and no vomiting    Headache   Associated symptoms: no abdominal pain, no congestion, no cough, no diarrhea, no dizziness, no ear pain, no fatigue, no fever, no myalgias, no nausea, no neck pain, no seizures, no sinus pressure, no sore throat, no vomiting and no weakness        Review of Systems   Constitutional: Negative for appetite change, chills, diaphoresis, fatigue and fever.   HENT: Negative for congestion, ear discharge, ear pain, nosebleeds, rhinorrhea, sinus pressure, sore throat and trouble swallowing.    Eyes: Negative for discharge and redness.   Respiratory: Negative for apnea, cough, chest tightness, shortness of breath  and wheezing.    Cardiovascular: Negative for chest pain.   Gastrointestinal: Negative for abdominal pain, diarrhea, nausea and vomiting.   Endocrine: Negative for polyuria.   Genitourinary: Negative for dysuria, frequency and urgency.   Musculoskeletal: Negative for myalgias and neck pain.   Skin: Negative for color change and rash.   Allergic/Immunologic: Negative for immunocompromised state.   Neurological: Positive for headaches. Negative for dizziness, seizures, syncope, weakness and light-headedness.   Hematological: Negative for adenopathy. Does not bruise/bleed easily.   Psychiatric/Behavioral: Negative for behavioral problems and confusion.   All other systems reviewed and are negative.      Past Medical History:   Diagnosis Date   • Backache    • Benign essential hypertension     PATIENT DENIES   • Cervical arthritis    • Coronary arteriosclerosis    • Fibrocystic breast    • Ganglion cyst of wrist     Ganglion/synovial cyst - wrist   • Ganglion of wrist    • Hip pain    • History of echocardiogram 10/23/2015    Echocardiogram W/ color flow 67783 (1) - Normal LV function with Ef of 55-60%.Normal RV size and function.Pseudonormal diastolic dysfunciton with borderline [facility].NO sig.valvular regurg or stenosis.   • History of mammogram 09/10/2014    MAMMOGRAM SCREENING 88297 - [facility] (1[facility] - J. [facility] ([facility])   • History of transfusion    • Hyperlipidemia    • Inguinal pain    • Recurrent angina status post coronary artery bypass graft (CMS/HCC)     Recurrent angina after coronary artery bypass graft   • Urge incontinence of urine        Allergies   Allergen Reactions   • Crestor [Rosuvastatin] Shortness Of Breath   • Lipitor [Atorvastatin] Shortness Of Breath       Past Surgical History:   Procedure Laterality Date   • BREAST BIOPSY Right 2/2/2017    Procedure: RIGHT BREAST LUMPECTOMY WITH NEEDLE LOCALIZATION AND ULTRASOUND;  Surgeon: Delfino Greer MD;  Location: [facility], [place];  Service:    • BREAST CYST  ASPIRATION     • CARDIAC SURGERY     • CORONARY ARTERY BYPASS GRAFT  2000    CABG, arterial, single (1)   • GANGLION CYST EXCISION     • JOINT REPLACEMENT     • ORIF MANDIBULAR FRACTURE     • TOTAL ABDOMINAL HYSTERECTOMY     • TOTAL ABDOMINAL HYSTERECTOMY WITH SALPINGO OOPHORECTOMY  1998    SALVADOR/BSO (1)   • TOTAL HIP ARTHROPLASTY  2011    Total hip replacement (3)       Family History   Problem Relation Age of Onset   • Heart disease Other    • Hypertension Other    • Hypertension Mother    • Diabetes Father    • Depression Maternal Grandmother    • Heart disease Maternal Grandfather        Social History     Socioeconomic History   • Marital status:      Spouse name: Not on file   • Number of children: Not on file   • Years of education: Not on file   • Highest education level: Not on file   Tobacco Use   • Smoking status: Never Smoker   • Smokeless tobacco: Never Used   Substance and Sexual Activity   • Alcohol use: No   • Drug use: No   • Sexual activity: Defer     Comment:            Objective   Physical Exam   Constitutional: She is oriented to person, place, and time. She appears well-developed and well-nourished.   HENT:   Head: Normocephalic and atraumatic.       Nose: Nose normal.   Mouth/Throat: Oropharynx is clear and moist.   Ecchymosis noted jorge-orbitally over the left upper and lower eyelids.  On physical exam there are no signs of orbital entrapment, extraocular muscles are intact.   Eyes: Pupils are equal, round, and reactive to light. Conjunctivae and EOM are normal. Right eye exhibits no discharge. Left eye exhibits no discharge. No scleral icterus.   Neck: Normal range of motion. Neck supple. No tracheal deviation present.   Cardiovascular: Normal rate, regular rhythm and normal heart sounds.   No murmur heard.  Pulmonary/Chest: Effort normal and breath sounds normal. No stridor. No respiratory distress. She has no wheezes. She has no rales.   Abdominal: Soft. Bowel sounds are  normal. She exhibits no distension and no mass. There is no tenderness. There is no rebound and no guarding.   Musculoskeletal: She exhibits no edema.   Neurological: She is alert and oriented to person, place, and time. Coordination normal.   Skin: Skin is warm and dry. No rash noted. No erythema.   Psychiatric: She has a normal mood and affect. Her behavior is normal. Thought content normal.   Nursing note and vitals reviewed.      Procedures           ED Course  ED Course as of Mar 17 1411   Tue Mar 17, 2020   1410 CT imaging of the patient's face was offered, but patient refuses additional imaging and states that she would like to go home.  She did not want any other additional lab tests or anything else performed.  The CT scan of the patient's head was shown to her in the emergency room.  The visualized portion of the patient's left orbit did not demonstrate any signs of trauma, there was no blood in the maxillary or nasal sinuses as well.    [CB]      ED Course User Index  [CB] Neal Daniel MD                   Labs Reviewed - No data to display    CT Head Without Contrast   Final Result   CONCLUSION:   Left periorbital and left frontal scalp, contusion and minimal   left frontal scalp hematoma.   No intracranial injury or acute process.   Minimal cerebral and cerebellar atrophy.      [id number]      Electronically signed by:  Lv Beasley MD  3/17/2020 11:57 AM   CDT Workstation: [id number]                                       [facility]    Final diagnoses:   Contusion of face, initial encounter   Concussion without loss of consciousness, initial encounter   Nonintractable headache, unspecified chronicity pattern, unspecified headache type            Neal Daniel MD  03/17/20 1411

## (undated) DEVICE — STERILE POLYISOPRENE POWDER-FREE SURGICAL GLOVES WITH EMOLLIENT COATING: Brand: PROTEXIS

## (undated) DEVICE — PATIENT RETURN ELECTRODE, SINGLE-USE, CONTACT QUALITY MONITORING, ADULT, WITH 9FT CORD, FOR PATIENTS WEIGING OVER 33LBS. (15KG): Brand: MEGADYNE

## (undated) DEVICE — ANTIBACTERIAL UNDYED BRAIDED (POLYGLACTIN 910), SYNTHETIC ABSORBABLE SUTURE: Brand: COATED VICRYL

## (undated) DEVICE — STCKNT STRL 4X54IN

## (undated) DEVICE — Device

## (undated) DEVICE — SYS CLS SKIN PREMIERPRO EXOFINFUSION 22CM

## (undated) DEVICE — GLV SURG TRIUMPH PF LTX 6.5 STRL

## (undated) DEVICE — GOWN,PREVENTION PLUS,XLONG/XLARGE,STRL: Brand: MEDLINE

## (undated) DEVICE — GLV SURG SIGNATURE ESSENTIAL PF LTX SZ7

## (undated) DEVICE — NDL LOCAL BRST KOPAN SPRNG/HK 20G 9CM

## (undated) DEVICE — GOWN,NON-REINFORCED,4XL: Brand: MEDLINE

## (undated) DEVICE — GLV SURG SENSICARE PI ORTHO SZ7.5 LF STRL

## (undated) DEVICE — GLV SURG TRIUMPH LT PF LTX 7.5 STRL

## (undated) DEVICE — GLV SURG SIGNATURE ESSENTIAL PF LTX SZ8

## (undated) DEVICE — SOL IRR NACL 0.9PCT BT 1000ML

## (undated) DEVICE — 3M™ STERI-STRIP™ REINFORCED ADHESIVE SKIN CLOSURES, R1547, 1/2 IN X 4 IN (12 MM X 100 MM), 6 STRIPS/ENVELOPE: Brand: 3M™ STERI-STRIP™

## (undated) DEVICE — DEV SPECI TRANSPEC W/PERF PLT

## (undated) DEVICE — SPECIMEN ORIENTATION CHARMS, SIX DISTINCTLY SHAPED STERILE 10MM CHARMS: Brand: MARGINMAP

## (undated) DEVICE — ST CVR PROB PULLUP ULTRASND 5X48IN

## (undated) DEVICE — SHEET,DRAPE,53X77,STERILE: Brand: MEDLINE

## (undated) DEVICE — SUT VICRYL 3-0 SH-1 PO 18IN J772D

## (undated) DEVICE — CAMERA COVER: Brand: UNBRANDED

## (undated) DEVICE — 3M™ WARMING BLANKET, LOWER BODY, 10 PER CASE, 42568: Brand: BAIR HUGGER™

## (undated) DEVICE — GOWN,PREVENTION PLUS,XLARGE,STERILE: Brand: MEDLINE

## (undated) DEVICE — SPNG LAP 18X18IN LF STRL PK/5

## (undated) DEVICE — DRSNG SPNG GZ WOVN 8PLY 4X4IN 2PK LF STRL BX/50PK

## (undated) DEVICE — DUAL CUT SAGITTAL BLADE

## (undated) DEVICE — SOL IRRIG NACL 1000ML

## (undated) DEVICE — SUT VIC 2/0 TIES 18IN J111T

## (undated) DEVICE — GLV SURG SENSICARE GREEN W/ALOE PF LF 8 STRL

## (undated) DEVICE — PK HIP ANT LF 60

## (undated) DEVICE — PK MAJ PROC LF 60

## (undated) DEVICE — GLV SURG TRIUMPH LT PF LTX 6.5 STRL

## (undated) DEVICE — GLV SURG SENSICARE GREEN W/ALOE PF LF 7 STRL

## (undated) DEVICE — 6617 IOBAN II PATIENT ISOLATION DRAPE 5/BX,4BX/CS: Brand: STERI-DRAPE™ IOBAN™ 2

## (undated) DEVICE — SPNG DRN AMD EXCILON 6PLY 4X4IN PK/2

## (undated) DEVICE — GLV SURG SENSICARE POLYISPRN W/ALOE PF LF 6.5 GRN STRL

## (undated) DEVICE — HOOD, PEEL-AWAY: Brand: FLYTE

## (undated) DEVICE — GOWN,AURORA,NOREINF,RAGLAN,XL,STERILE: Brand: MEDLINE

## (undated) DEVICE — PENCL ES MEGADINE EZ/CLEAN BUTN W/HOLSTR 10FT

## (undated) DEVICE — ADHS LIQ MASTISOL 2/3ML